# Patient Record
Sex: MALE | URBAN - METROPOLITAN AREA
[De-identification: names, ages, dates, MRNs, and addresses within clinical notes are randomized per-mention and may not be internally consistent; named-entity substitution may affect disease eponyms.]

---

## 2018-06-29 ENCOUNTER — LAB REQUISITION (OUTPATIENT)
Dept: LAB | Facility: HOSPITAL | Age: 58
End: 2018-06-29

## 2018-06-29 DIAGNOSIS — Z12.11 ENCOUNTER FOR SCREENING FOR MALIGNANT NEOPLASM OF COLON: ICD-10-CM

## 2018-06-29 DIAGNOSIS — Z80.0 FAMILY HISTORY OF MALIGNANT NEOPLASM OF DIGESTIVE ORGAN: ICD-10-CM

## 2018-06-29 PROCEDURE — 88305 TISSUE EXAM BY PATHOLOGIST: CPT | Performed by: PATHOLOGY

## 2024-04-11 ENCOUNTER — APPOINTMENT (EMERGENCY)
Dept: CT IMAGING | Facility: HOSPITAL | Age: 64
DRG: 180 | End: 2024-04-11
Payer: COMMERCIAL

## 2024-04-11 ENCOUNTER — HOSPITAL ENCOUNTER (INPATIENT)
Facility: HOSPITAL | Age: 64
LOS: 6 days | DRG: 180 | End: 2024-04-18
Attending: EMERGENCY MEDICINE | Admitting: INTERNAL MEDICINE
Payer: COMMERCIAL

## 2024-04-11 DIAGNOSIS — Z51.5 PALLIATIVE CARE PATIENT: ICD-10-CM

## 2024-04-11 DIAGNOSIS — C78.7 METASTASIS TO LIVER (HCC): ICD-10-CM

## 2024-04-11 DIAGNOSIS — J18.9 PNEUMONIA: ICD-10-CM

## 2024-04-11 DIAGNOSIS — C34.90 LUNG CANCER (HCC): ICD-10-CM

## 2024-04-11 DIAGNOSIS — R91.8 LUNG MASS: ICD-10-CM

## 2024-04-11 DIAGNOSIS — R78.81 BACTEREMIA: ICD-10-CM

## 2024-04-11 DIAGNOSIS — G89.3 CANCER RELATED PAIN: Primary | ICD-10-CM

## 2024-04-11 LAB
ALBUMIN SERPL BCP-MCNC: 3.2 G/DL (ref 3.5–5)
ALP SERPL-CCNC: 535 U/L (ref 34–104)
ALT SERPL W P-5'-P-CCNC: 161 U/L (ref 7–52)
ANION GAP SERPL CALCULATED.3IONS-SCNC: 9 MMOL/L (ref 4–13)
AST SERPL W P-5'-P-CCNC: 204 U/L (ref 13–39)
BACTERIA UR QL AUTO: ABNORMAL /HPF
BASOPHILS # BLD MANUAL: 0 THOUSAND/UL (ref 0–0.1)
BASOPHILS NFR MAR MANUAL: 0 % (ref 0–1)
BILIRUB SERPL-MCNC: 2.95 MG/DL (ref 0.2–1)
BILIRUB UR QL STRIP: ABNORMAL
BUN SERPL-MCNC: 20 MG/DL (ref 5–25)
CALCIUM ALBUM COR SERPL-MCNC: 10.3 MG/DL (ref 8.3–10.1)
CALCIUM SERPL-MCNC: 9.7 MG/DL (ref 8.4–10.2)
CARDIAC TROPONIN I PNL SERPL HS: 6 NG/L
CHLORIDE SERPL-SCNC: 100 MMOL/L (ref 96–108)
CLARITY UR: CLEAR
CO2 SERPL-SCNC: 27 MMOL/L (ref 21–32)
COLOR UR: ABNORMAL
CREAT SERPL-MCNC: 1.13 MG/DL (ref 0.6–1.3)
EOSINOPHIL # BLD MANUAL: 0 THOUSAND/UL (ref 0–0.4)
EOSINOPHIL NFR BLD MANUAL: 0 % (ref 0–6)
ERYTHROCYTE [DISTWIDTH] IN BLOOD BY AUTOMATED COUNT: 13.5 % (ref 11.6–15.1)
GFR SERPL CREATININE-BSD FRML MDRD: 69 ML/MIN/1.73SQ M
GLUCOSE SERPL-MCNC: 130 MG/DL (ref 65–140)
GLUCOSE UR STRIP-MCNC: NEGATIVE MG/DL
HCT VFR BLD AUTO: 46.6 % (ref 36.5–49.3)
HGB BLD-MCNC: 15.5 G/DL (ref 12–17)
HGB UR QL STRIP.AUTO: NEGATIVE
KETONES UR STRIP-MCNC: NEGATIVE MG/DL
LEUKOCYTE ESTERASE UR QL STRIP: NEGATIVE
LYMPHOCYTES # BLD AUTO: 1.16 THOUSAND/UL (ref 0.6–4.47)
LYMPHOCYTES # BLD AUTO: 15 % (ref 14–44)
MAGNESIUM SERPL-MCNC: 2.2 MG/DL (ref 1.9–2.7)
MCH RBC QN AUTO: 29 PG (ref 26.8–34.3)
MCHC RBC AUTO-ENTMCNC: 33.3 G/DL (ref 31.4–37.4)
MCV RBC AUTO: 87 FL (ref 82–98)
MONOCYTES # BLD AUTO: 0.69 THOUSAND/UL (ref 0–1.22)
MONOCYTES NFR BLD: 9 % (ref 4–12)
MUCOUS THREADS UR QL AUTO: ABNORMAL
NEUTROPHILS # BLD MANUAL: 5.85 THOUSAND/UL (ref 1.85–7.62)
NEUTS SEG NFR BLD AUTO: 76 % (ref 43–75)
NITRITE UR QL STRIP: NEGATIVE
NON-SQ EPI CELLS URNS QL MICRO: ABNORMAL /HPF
PH UR STRIP.AUTO: 5.5 [PH]
PLATELET # BLD AUTO: 206 THOUSANDS/UL (ref 149–390)
PLATELET BLD QL SMEAR: ADEQUATE
PMV BLD AUTO: 9.9 FL (ref 8.9–12.7)
POTASSIUM SERPL-SCNC: 3.9 MMOL/L (ref 3.5–5.3)
PROT SERPL-MCNC: 6.9 G/DL (ref 6.4–8.4)
PROT UR STRIP-MCNC: ABNORMAL MG/DL
RBC # BLD AUTO: 5.34 MILLION/UL (ref 3.88–5.62)
RBC #/AREA URNS AUTO: ABNORMAL /HPF
SODIUM SERPL-SCNC: 136 MMOL/L (ref 135–147)
SP GR UR STRIP.AUTO: >=1.05 (ref 1–1.03)
TSH SERPL DL<=0.05 MIU/L-ACNC: 1.86 UIU/ML (ref 0.45–4.5)
UROBILINOGEN UR STRIP-ACNC: <2 MG/DL
WBC # BLD AUTO: 7.7 THOUSAND/UL (ref 4.31–10.16)
WBC #/AREA URNS AUTO: ABNORMAL /HPF

## 2024-04-11 PROCEDURE — 85027 COMPLETE CBC AUTOMATED: CPT | Performed by: EMERGENCY MEDICINE

## 2024-04-11 PROCEDURE — 96360 HYDRATION IV INFUSION INIT: CPT

## 2024-04-11 PROCEDURE — 87449 NOS EACH ORGANISM AG IA: CPT | Performed by: EMERGENCY MEDICINE

## 2024-04-11 PROCEDURE — 80053 COMPREHEN METABOLIC PANEL: CPT | Performed by: EMERGENCY MEDICINE

## 2024-04-11 PROCEDURE — 71260 CT THORAX DX C+: CPT

## 2024-04-11 PROCEDURE — 74177 CT ABD & PELVIS W/CONTRAST: CPT

## 2024-04-11 PROCEDURE — 93005 ELECTROCARDIOGRAM TRACING: CPT

## 2024-04-11 PROCEDURE — 99285 EMERGENCY DEPT VISIT HI MDM: CPT | Performed by: EMERGENCY MEDICINE

## 2024-04-11 PROCEDURE — 84443 ASSAY THYROID STIM HORMONE: CPT | Performed by: EMERGENCY MEDICINE

## 2024-04-11 PROCEDURE — 83735 ASSAY OF MAGNESIUM: CPT | Performed by: EMERGENCY MEDICINE

## 2024-04-11 PROCEDURE — 99285 EMERGENCY DEPT VISIT HI MDM: CPT

## 2024-04-11 PROCEDURE — 85007 BL SMEAR W/DIFF WBC COUNT: CPT | Performed by: EMERGENCY MEDICINE

## 2024-04-11 PROCEDURE — 84145 PROCALCITONIN (PCT): CPT | Performed by: INTERNAL MEDICINE

## 2024-04-11 PROCEDURE — 36415 COLL VENOUS BLD VENIPUNCTURE: CPT | Performed by: EMERGENCY MEDICINE

## 2024-04-11 PROCEDURE — 84484 ASSAY OF TROPONIN QUANT: CPT | Performed by: EMERGENCY MEDICINE

## 2024-04-11 PROCEDURE — 81001 URINALYSIS AUTO W/SCOPE: CPT | Performed by: EMERGENCY MEDICINE

## 2024-04-11 RX ADMIN — SODIUM CHLORIDE 1000 ML: 0.9 INJECTION, SOLUTION INTRAVENOUS at 21:55

## 2024-04-11 RX ADMIN — IOHEXOL 100 ML: 350 INJECTION, SOLUTION INTRAVENOUS at 22:39

## 2024-04-12 PROBLEM — R91.8 LUNG MASS: Status: ACTIVE | Noted: 2024-04-12

## 2024-04-12 PROBLEM — J18.9 PNEUMONIA: Status: ACTIVE | Noted: 2024-04-12

## 2024-04-12 PROBLEM — Z72.0 TOBACCO ABUSE: Chronic | Status: ACTIVE | Noted: 2019-04-10

## 2024-04-12 PROBLEM — R74.01 TRANSAMINITIS: Status: ACTIVE | Noted: 2024-04-12

## 2024-04-12 LAB
2HR DELTA HS TROPONIN: 2 NG/L
4HR DELTA HS TROPONIN: 0 NG/L
ATRIAL RATE: 96 BPM
CARDIAC TROPONIN I PNL SERPL HS: 6 NG/L
CARDIAC TROPONIN I PNL SERPL HS: 8 NG/L
FLUAV RNA RESP QL NAA+PROBE: NEGATIVE
FLUBV RNA RESP QL NAA+PROBE: NEGATIVE
L PNEUMO1 AG UR QL IA.RAPID: NEGATIVE
P AXIS: 58 DEGREES
PR INTERVAL: 184 MS
PROCALCITONIN SERPL-MCNC: 13.19 NG/ML
QRS AXIS: -23 DEGREES
QRSD INTERVAL: 108 MS
QT INTERVAL: 354 MS
QTC INTERVAL: 447 MS
RSV RNA RESP QL NAA+PROBE: NEGATIVE
S PNEUM AG UR QL: NEGATIVE
SARS-COV-2 RNA RESP QL NAA+PROBE: NEGATIVE
T WAVE AXIS: 25 DEGREES
VENTRICULAR RATE: 96 BPM

## 2024-04-12 PROCEDURE — 93010 ELECTROCARDIOGRAM REPORT: CPT | Performed by: INTERNAL MEDICINE

## 2024-04-12 PROCEDURE — 0241U HB NFCT DS VIR RESP RNA 4 TRGT: CPT

## 2024-04-12 PROCEDURE — 94664 DEMO&/EVAL PT USE INHALER: CPT

## 2024-04-12 PROCEDURE — 99223 1ST HOSP IP/OBS HIGH 75: CPT | Performed by: INTERNAL MEDICINE

## 2024-04-12 PROCEDURE — 84484 ASSAY OF TROPONIN QUANT: CPT | Performed by: EMERGENCY MEDICINE

## 2024-04-12 PROCEDURE — 87147 CULTURE TYPE IMMUNOLOGIC: CPT | Performed by: EMERGENCY MEDICINE

## 2024-04-12 PROCEDURE — 87449 NOS EACH ORGANISM AG IA: CPT

## 2024-04-12 PROCEDURE — 94760 N-INVAS EAR/PLS OXIMETRY 1: CPT

## 2024-04-12 PROCEDURE — 87040 BLOOD CULTURE FOR BACTERIA: CPT | Performed by: EMERGENCY MEDICINE

## 2024-04-12 PROCEDURE — 36415 COLL VENOUS BLD VENIPUNCTURE: CPT | Performed by: EMERGENCY MEDICINE

## 2024-04-12 PROCEDURE — 87154 CUL TYP ID BLD PTHGN 6+ TRGT: CPT | Performed by: EMERGENCY MEDICINE

## 2024-04-12 PROCEDURE — 87077 CULTURE AEROBIC IDENTIFY: CPT | Performed by: EMERGENCY MEDICINE

## 2024-04-12 PROCEDURE — 99223 1ST HOSP IP/OBS HIGH 75: CPT | Performed by: STUDENT IN AN ORGANIZED HEALTH CARE EDUCATION/TRAINING PROGRAM

## 2024-04-12 PROCEDURE — 84484 ASSAY OF TROPONIN QUANT: CPT

## 2024-04-12 PROCEDURE — 87186 SC STD MICRODIL/AGAR DIL: CPT | Performed by: EMERGENCY MEDICINE

## 2024-04-12 RX ORDER — ACETAMINOPHEN 325 MG/1
650 TABLET ORAL EVERY 6 HOURS PRN
Status: DISCONTINUED | OUTPATIENT
Start: 2024-04-12 | End: 2024-04-18 | Stop reason: HOSPADM

## 2024-04-12 RX ORDER — CALCIUM CARBONATE 500 MG/1
1000 TABLET, CHEWABLE ORAL DAILY PRN
Status: DISCONTINUED | OUTPATIENT
Start: 2024-04-12 | End: 2024-04-18 | Stop reason: HOSPADM

## 2024-04-12 RX ORDER — ALBUTEROL SULFATE 90 UG/1
2 AEROSOL, METERED RESPIRATORY (INHALATION) EVERY 4 HOURS PRN
Status: DISCONTINUED | OUTPATIENT
Start: 2024-04-12 | End: 2024-04-18 | Stop reason: HOSPADM

## 2024-04-12 RX ORDER — DOCUSATE SODIUM 100 MG/1
100 CAPSULE, LIQUID FILLED ORAL 2 TIMES DAILY
Status: DISCONTINUED | OUTPATIENT
Start: 2024-04-12 | End: 2024-04-15

## 2024-04-12 RX ORDER — ALBUTEROL SULFATE 90 UG/1
2 AEROSOL, METERED RESPIRATORY (INHALATION)
COMMUNITY

## 2024-04-12 RX ORDER — HYDROMORPHONE HCL IN WATER/PF 6 MG/30 ML
0.2 PATIENT CONTROLLED ANALGESIA SYRINGE INTRAVENOUS EVERY 2 HOUR PRN
Status: DISCONTINUED | OUTPATIENT
Start: 2024-04-12 | End: 2024-04-13

## 2024-04-12 RX ORDER — ONDANSETRON 2 MG/ML
4 INJECTION INTRAMUSCULAR; INTRAVENOUS EVERY 6 HOURS PRN
Status: DISCONTINUED | OUTPATIENT
Start: 2024-04-12 | End: 2024-04-18 | Stop reason: HOSPADM

## 2024-04-12 RX ORDER — VANCOMYCIN HYDROCHLORIDE 750 MG/150ML
750 INJECTION, SOLUTION INTRAVENOUS EVERY 12 HOURS
Status: DISCONTINUED | OUTPATIENT
Start: 2024-04-13 | End: 2024-04-13

## 2024-04-12 RX ORDER — GUAIFENESIN 600 MG/1
600 TABLET, EXTENDED RELEASE ORAL 2 TIMES DAILY
Status: DISCONTINUED | OUTPATIENT
Start: 2024-04-12 | End: 2024-04-18 | Stop reason: HOSPADM

## 2024-04-12 RX ORDER — OXYCODONE HYDROCHLORIDE 5 MG/1
5 TABLET ORAL EVERY 4 HOURS PRN
Status: DISCONTINUED | OUTPATIENT
Start: 2024-04-12 | End: 2024-04-13

## 2024-04-12 RX ORDER — SODIUM CHLORIDE, SODIUM LACTATE, POTASSIUM CHLORIDE, CALCIUM CHLORIDE 600; 310; 30; 20 MG/100ML; MG/100ML; MG/100ML; MG/100ML
75 INJECTION, SOLUTION INTRAVENOUS CONTINUOUS
Status: DISCONTINUED | OUTPATIENT
Start: 2024-04-12 | End: 2024-04-18

## 2024-04-12 RX ORDER — METHYLPREDNISOLONE SODIUM SUCCINATE 40 MG/ML
40 INJECTION, POWDER, LYOPHILIZED, FOR SOLUTION INTRAMUSCULAR; INTRAVENOUS EVERY 8 HOURS SCHEDULED
Status: DISCONTINUED | OUTPATIENT
Start: 2024-04-12 | End: 2024-04-13

## 2024-04-12 RX ORDER — LEVALBUTEROL INHALATION SOLUTION 0.63 MG/3ML
0.31 SOLUTION RESPIRATORY (INHALATION) EVERY 6 HOURS PRN
Status: DISCONTINUED | OUTPATIENT
Start: 2024-04-12 | End: 2024-04-12

## 2024-04-12 RX ORDER — NICOTINE 21 MG/24HR
1 PATCH, TRANSDERMAL 24 HOURS TRANSDERMAL DAILY
Status: DISCONTINUED | OUTPATIENT
Start: 2024-04-12 | End: 2024-04-18 | Stop reason: HOSPADM

## 2024-04-12 RX ORDER — ENOXAPARIN SODIUM 100 MG/ML
40 INJECTION SUBCUTANEOUS DAILY
Status: DISCONTINUED | OUTPATIENT
Start: 2024-04-12 | End: 2024-04-18 | Stop reason: HOSPADM

## 2024-04-12 RX ADMIN — CEFTRIAXONE SODIUM 1000 MG: 10 INJECTION, POWDER, FOR SOLUTION INTRAVENOUS at 23:33

## 2024-04-12 RX ADMIN — OXYCODONE HYDROCHLORIDE 5 MG: 5 TABLET ORAL at 02:34

## 2024-04-12 RX ADMIN — METHYLPREDNISOLONE SODIUM SUCCINATE 40 MG: 40 INJECTION, POWDER, FOR SOLUTION INTRAMUSCULAR; INTRAVENOUS at 15:43

## 2024-04-12 RX ADMIN — GUAIFENESIN 600 MG: 600 TABLET ORAL at 08:58

## 2024-04-12 RX ADMIN — HYDROMORPHONE HYDROCHLORIDE 0.2 MG: 0.2 INJECTION, SOLUTION INTRAMUSCULAR; INTRAVENOUS; SUBCUTANEOUS at 19:47

## 2024-04-12 RX ADMIN — DOCUSATE SODIUM 100 MG: 100 CAPSULE, LIQUID FILLED ORAL at 08:59

## 2024-04-12 RX ADMIN — METHYLPREDNISOLONE SODIUM SUCCINATE 40 MG: 40 INJECTION, POWDER, FOR SOLUTION INTRAMUSCULAR; INTRAVENOUS at 21:16

## 2024-04-12 RX ADMIN — SODIUM CHLORIDE, SODIUM LACTATE, POTASSIUM CHLORIDE, AND CALCIUM CHLORIDE 75 ML/HR: .6; .31; .03; .02 INJECTION, SOLUTION INTRAVENOUS at 09:02

## 2024-04-12 RX ADMIN — Medication 1000 MG: at 01:33

## 2024-04-12 RX ADMIN — NICOTINE 1 PATCH: 21 PATCH, EXTENDED RELEASE TRANSDERMAL at 08:58

## 2024-04-12 RX ADMIN — METHYLPREDNISOLONE SODIUM SUCCINATE 40 MG: 40 INJECTION, POWDER, FOR SOLUTION INTRAMUSCULAR; INTRAVENOUS at 02:30

## 2024-04-12 RX ADMIN — OXYCODONE HYDROCHLORIDE 5 MG: 5 TABLET ORAL at 12:03

## 2024-04-12 RX ADMIN — GUAIFENESIN 600 MG: 600 TABLET ORAL at 17:00

## 2024-04-12 RX ADMIN — DICLOFENAC SODIUM 2 G: 10 GEL TOPICAL at 17:01

## 2024-04-12 RX ADMIN — DICLOFENAC SODIUM 2 G: 10 GEL TOPICAL at 08:58

## 2024-04-12 RX ADMIN — ENOXAPARIN SODIUM 40 MG: 40 INJECTION SUBCUTANEOUS at 08:58

## 2024-04-12 RX ADMIN — OXYCODONE HYDROCHLORIDE 5 MG: 5 TABLET ORAL at 16:43

## 2024-04-12 RX ADMIN — DOCUSATE SODIUM 100 MG: 100 CAPSULE, LIQUID FILLED ORAL at 17:00

## 2024-04-12 NOTE — CONSULTS
Oncology Consult Note  Ceasar March 63 y.o. male MRN: [id number]  Unit/Bed#: -01 Encounter: [id number]      Presenting Complaint: Here with stage IV presumed lung cancer waiting by    History of Presenting Illness: Ceasar March is seen for initial consultation 4/11/2024 at the referral of hospitalist service.  63-year-old gentleman is seen with increased weakness weight loss of about 30 pounds over 3 months absent appetite short of breath.  He became more short of breath and more fatigued can emergency room.  He was found to have right middle lobe pneumonia with what appears to be adenopathy in the mediastinum.  He also has what appears to be supraclavicular adenopathyPortal and perigastric adenopathy as well as diffuse liver abnormalities consistent with metastatic disease.  He was a heavy smoker as well as been exposed to unusual fumes as a .  He is able to eat very little.  He gets full quickly.  He again is also short of breath.  Of concern his bilirubin is 3 and he appears to becoming jaundiced.  He does not appear to have obstructive liver disease.  He has had 4 hernia repairs in the past history of tobacco use.    Review of Systems - As stated in the HPI otherwise the fourteen point review of systems was negative.    Past Medical History:   Diagnosis Date    Asthma        Social History     Socioeconomic History    Marital status: Single     Spouse name: None    Number of children: None    Years of education: None    Highest education level: None   Occupational History    None   Tobacco Use    Smoking status: Some Days     Current packs/day: 2.00     Types: Cigarettes    Smokeless tobacco: Never   Vaping Use    Vaping status: Never Used   Substance and Sexual Activity    Alcohol use: Never    Drug use: Yes     Types: Marijuana    Sexual activity: None   Other Topics Concern    None   Social History Narrative    None     Social Determinants of Health     Financial Resource Strain: Not on  file   Food Insecurity: No Food Insecurity (4/12/2024)    Hunger Vital Sign     Worried About Running Out of Food in the Last Year: Never true     Ran Out of Food in the Last Year: Never true   Transportation Needs: No Transportation Needs (4/12/2024)    PRAPARE - Transportation     Lack of Transportation (Medical): No     Lack of Transportation (Non-Medical): No   Physical Activity: Not on file   Stress: Not on file (2/11/2021)   Social Connections: Not on file   Intimate Partner Violence: Low Risk  (4/13/2021)    Received from [facility]    Intimate Partner Violence     Insults You: Not on file     Threatens You: Not on file     Screams at You: Not on file     Physically Hurt: Not on file     Intimate Partner Violence Score: Not on file   Housing Stability: Low Risk  (4/12/2024)    Housing Stability Vital Sign     Unable to Pay for Housing in the Last Year: No     Number of Places Lived in the Last Year: 1     Unstable Housing in the Last Year: No       History reviewed. No pertinent family history.    No Known Allergies      Current Facility-Administered Medications:     acetaminophen (TYLENOL) tablet 650 mg, 650 mg, Oral, Q6H PRN, CHRISTIANA Golden    albuterol (PROVENTIL HFA,VENTOLIN HFA) inhaler 2 puff, 2 puff, Inhalation, Q4H PRN, Jose Daniel Barahona MD    calcium carbonate (TUMS) chewable tablet 1,000 mg, 1,000 mg, Oral, Daily PRN, CHRISTIANA Golden    [START ON 4/13/2024] ceftriaxone (ROCEPHIN) 1 g/50 mL in dextrose IVPB, 1,000 mg, Intravenous, Q24H, CHRISTIANA Golden    Diclofenac Sodium (VOLTAREN) 1 % topical gel 2 g, 2 g, Topical, 4x Daily, CHRISTIANA Golden, 2 g at 04/12/24 1701    docusate sodium (COLACE) capsule 100 mg, 100 mg, Oral, BID, CHRISTIANA Golden, 100 mg at 04/12/24 1700    enoxaparin (LOVENOX) subcutaneous injection 40 mg, 40 mg, Subcutaneous, Daily, CHRISTIANA Golden, 40 mg at 04/12/24 0879     guaiFENesin (MUCINEX) 12 hr tablet 600 mg, 600 mg, Oral, BID, Odette Stevense-Sami, CRNP, 600 mg at 04/12/24 1700    HYDROmorphone HCl (DILAUDID) injection 0.2 mg, 0.2 mg, Intravenous, Q2H PRN, Odette Mikel-Sami, CRNP    lactated ringers infusion, 75 mL/hr, Intravenous, Continuous, Yohan Kong MD, Last Rate: 75 mL/hr at 04/12/24 0902, 75 mL/hr at 04/12/24 0902    methylPREDNISolone sodium succinate (Solu-MEDROL) injection 40 mg, 40 mg, Intravenous, Q8H KRYSTYNA, Odette Chaves-Sami, CRNP, 40 mg at 04/12/24 1543    naloxone (NARCAN) 0.04 mg/mL syringe 0.04 mg, 0.04 mg, Intravenous, Q1MIN PRN, Odette Stevense-Sami, CRNP    nicotine (NICODERM CQ) 21 mg/24 hr TD 24 hr patch 1 patch, 1 patch, Transdermal, Daily, Odette Chaves-Sami, CRNP, 1 patch at 04/12/24 0858    ondansetron (ZOFRAN) injection 4 mg, 4 mg, Intravenous, Q6H PRN, Odette Chaves-Sami, CRNP    oxyCODONE (ROXICODONE) split tablet 2.5 mg, 2.5 mg, Oral, Q4H PRN **OR** oxyCODONE (ROXICODONE) IR tablet 5 mg, 5 mg, Oral, Q4H PRN, Odette Morin-[place], CRNP, 5 mg at 04/12/24 1643      /80   Pulse 93   Temp 98 °F (36.7 °C)   Resp 18   Ht 6' (1.829 m)   Wt 79.4 kg (175 lb)   SpO2 96%   BMI 23.73 kg/m²       General Appearance:    Alert, oriented appears to have lost a lot of weight appears chronically and acutely ill       Eyes:    PERRL   Ears:    Normal external ear canals, both ears   Nose:   Nares normal, septum midline   Throat:   Mucosa moist. Pharynx without injection.    Neck:   Supple       Lungs:     Clear to auscultation bilaterally has some rhonchi bilaterally   Chest Wall:    No tenderness or deformity    Heart:    Regular rate and rhythm       Abdomen:     Soft, non-tender, bowel sounds +, no organomegaly    He does have hepatomegaly liver at least 5 cm below the costal margin minimally tender nodular       Extremities:   Extremities no cyanosis or edema       Skin:   no rash or icterus.    Lymph nodes:    "Cervical, supraclavicular, and axillary nodes normal   Neurologic:   CNII-XII intact, normal strength, sensation and reflexes     Throughout               Recent Results (from the past 48 hour(s))   CBC and differential    Collection Time: 04/11/24  9:55 PM   Result Value Ref Range    WBC 7.70 4.31 - 10.16 Thousand/uL    RBC 5.34 3.88 - 5.62 Million/uL    Hemoglobin 15.5 12.0 - 17.0 g/dL    Hematocrit 46.6 36.5 - 49.3 %    MCV 87 82 - 98 fL    MCH 29.0 26.8 - 34.3 pg    MCHC 33.3 31.4 - 37.4 g/dL    RDW 13.5 11.6 - 15.1 %    MPV 9.9 8.9 - 12.7 fL    Platelets 206 149 - 390 Thousands/uL   Comprehensive metabolic panel    Collection Time: 04/11/24  9:55 PM   Result Value Ref Range    Sodium 136 135 - 147 mmol/L    Potassium 3.9 3.5 - 5.3 mmol/L    Chloride 100 96 - 108 mmol/L    CO2 27 21 - 32 mmol/L    ANION GAP 9 4 - 13 mmol/L    BUN 20 5 - 25 mg/dL    Creatinine 1.13 0.60 - 1.30 mg/dL    Glucose 130 65 - 140 mg/dL    Calcium 9.7 8.4 - 10.2 mg/dL    Corrected Calcium 10.3 (H) 8.3 - 10.1 mg/dL     (H) 13 - 39 U/L     (H) 7 - 52 U/L    Alkaline Phosphatase 535 (H) 34 - 104 U/L    Total Protein 6.9 6.4 - 8.4 g/dL    Albumin 3.2 (L) 3.5 - 5.0 g/dL    Total Bilirubin 2.95 (H) 0.20 - 1.00 mg/dL    eGFR 69 ml/min/1.73sq m   Magnesium    Collection Time: 04/11/24  9:55 PM   Result Value Ref Range    Magnesium 2.2 1.9 - 2.7 mg/dL   TSH, 3rd generation with Free T4 reflex    Collection Time: 04/11/24  9:55 PM   Result Value Ref Range    TSH 3RD GENERATON 1.858 0.450 - 4.500 uIU/mL   HS Troponin 0hr (reflex protocol)    Collection Time: 04/11/24  9:55 PM   Result Value Ref Range    hs TnI 0hr 6 \"Refer to ACS Flowchart\"- see link ng/L   Manual Differential(PHLEBS Do Not Order)    Collection Time: 04/11/24  9:55 PM   Result Value Ref Range    Segmented % 76 (H) 43 - 75 %    Lymphocytes % 15 14 - 44 %    Monocytes % 9 4 - 12 %    Eosinophils % 0 0 - 6 %    Basophils % 0 0 - 1 %    Absolute Neutrophils 5.85 1.85 - " "7.62 Thousand/uL    Absolute Lymphocytes 1.16 0.60 - 4.47 Thousand/uL    Absolute Monocytes 0.69 0.00 - 1.22 Thousand/uL    Absolute Eosinophils 0.00 0.00 - 0.40 Thousand/uL    Absolute Basophils 0.00 0.00 - 0.10 Thousand/uL    Total Counted      Platelet Estimate Adequate Adequate   Procalcitonin    Collection Time: 04/11/24  9:55 PM   Result Value Ref Range    Procalcitonin 13.19 (H) <=0.25 ng/ml   ECG 12 lead    Collection Time: 04/11/24 10:00 PM   Result Value Ref Range    Ventricular Rate 96 BPM    Atrial Rate 96 BPM    WI Interval 184 ms    QRSD Interval 108 ms    QT Interval 354 ms    QTC Interval 447 ms    P Axis 58 degrees    QRS Axis -23 degrees    T Wave Axis 25 degrees   UA w Reflex to Microscopic w Reflex to Culture    Collection Time: 04/11/24 11:36 PM    Specimen: Urine   Result Value Ref Range    Color, UA Dark Yellow     Clarity, UA Clear     Specific Gravity, UA >=1.050 (H) 1.003 - 1.030    pH, UA 5.5 4.5, 5.0, 5.5, 6.0, 6.5, 7.0, 7.5, 8.0    Leukocytes, UA Negative Negative    Nitrite, UA Negative Negative    Protein, UA Trace (A) Negative mg/dl    Glucose, UA Negative Negative mg/dl    Ketones, UA Negative Negative mg/dl    Urobilinogen, UA <2.0 <2.0 mg/dl mg/dl    Bilirubin, UA Small (A) Negative    Occult Blood, UA Negative Negative   Urine Microscopic    Collection Time: 04/11/24 11:36 PM   Result Value Ref Range    RBC, UA 1-2 None Seen, 1-2 /hpf    WBC, UA 1-2 None Seen, 1-2 /hpf    Epithelial Cells None Seen None Seen, Occasional /hpf    Bacteria, UA None Seen None Seen, Occasional /hpf    MUCUS THREADS Occasional (A) None Seen   Strep Pneumoniae, Urine    Collection Time: 04/11/24 11:36 PM    Specimen: Urine   Result Value Ref Range    Strep pneumoniae antigen, urine Negative Negative   HS Troponin I 2hr    Collection Time: 04/12/24  1:16 AM   Result Value Ref Range    hs TnI 2hr 8 \"Refer to ACS Flowchart\"- see link ng/L    Delta 2hr hsTnI 2 <20 ng/L   Blood culture #1    Collection Time: " "04/12/24  1:16 AM    Specimen: Arm, Left; Blood   Result Value Ref Range    Blood Culture Received in Microbiology Lab. Culture in Progress.    Blood culture #2    Collection Time: 04/12/24  1:17 AM    Specimen: Arm, Left; Blood   Result Value Ref Range    Blood Culture Received in Microbiology Lab. Culture in Progress.    HS Troponin I 4hr    Collection Time: 04/12/24  2:30 AM   Result Value Ref Range    hs TnI 4hr 6 \"Refer to ACS Flowchart\"- see link ng/L    Delta 4hr hsTnI 0 <20 ng/L   Legionella antigen, Urine    Collection Time: 04/12/24  2:30 AM    Specimen: Urine, Clean Catch   Result Value Ref Range    Legionella Urinary Antigen Negative Negative   COVID/FLU/RSV    Collection Time: 04/12/24  4:16 AM    Specimen: Nose; Nares   Result Value Ref Range    SARS-CoV-2 Negative Negative    INFLUENZA A PCR Negative Negative    INFLUENZA B PCR Negative Negative    RSV PCR Negative Negative         CT chest abdomen pelvis w contrast    Result Date: 4/11/2024  Narrative: CT CHEST, ABDOMEN AND PELVIS WITH IV CONTRAST INDICATION: Weakness, weight loss. COMPARISON: None. TECHNIQUE: CT examination of the chest, abdomen and pelvis was performed. Multiplanar 2D reformatted images were created from the source data. This examination, like all CT scans performed in the [facility], was performed utilizing techniques to minimize radiation dose exposure, including the use of iterative reconstruction and automated exposure control. Radiation dose length product (DLP) for this visit: 721 mGy-cm IV Contrast: 100 mL of iohexol (OMNIPAQUE) Enteric Contrast: Not administered. FINDINGS: CHEST LUNGS: Airspace consolidation in the right middle lobe contiguous with the hilar mass, suggesting postobstructive pneumonia. Nodular opacification along the axial interstitium in the right lower lobe and nodular thickening along the right major fissure likely reflects lymphangitic spread of tumor. Severe narrowing of right middle " lobe segmental and subsegmental bronchi. PLEURA: No effusion. HEART/GREAT VESSELS: Normal heart size. No thoracic aortic aneurysm. Proximal segmental branches of the right pulmonary artery in the right middle and upper lobes are narrowed by the encasing mass. MEDIASTINUM AND LEWIS: Right hilar and perihilar heterogeneously enhancing mass measures 5.5 x 6.3 x 5.2 cm. High right paratracheal lymph nodes measure up to 1.7 cm in short axis diameter. Subcentimeter prevascular lymph nodes. Subcarinal lymph node measures 2 cm in short axis diameter. Left paraesophageal 1.4 cm lymph node. Right supraclavicular 1.4 cm lymph node. CHEST WALL AND LOWER NECK: Unremarkable. ABDOMEN LIVER/BILIARY TREE: Innumerable hypoenhancing lesions throughout the liver measuring up to 3.1 cm. Hepatomegaly. GALLBLADDER: Cholecystectomy. SPLEEN: Unremarkable. PANCREAS: Unremarkable. ADRENAL GLANDS: Unremarkable. KIDNEYS/URETERS: Symmetric nephrographic phase enhancement of the kidneys. No obstructive uropathy. STOMACH AND BOWEL: Diverticulosis without evidence of diverticulitis or colitis. Ventral herniorrhaphy APPENDIX: Normal appendix. ABDOMINOPELVIC CAVITY: Partially confluent hypoenhancing periportal lymph nodes measuring up to 2.3 cm. Paracaval lymph node measures 1.5 cm in short axis diameter. Gastroesophageal junction lymph nodes measure up to 1 cm subcentimeter para-aortic lymph nodes. VESSELS: No abdominal aortic aneurysm. Patent portal, splenic and mesenteric veins PELVIS REPRODUCTIVE ORGANS: Enlarged prostate. URINARY BLADDER: Unremarkable. ABDOMINAL WALL/INGUINAL REGIONS: Fat-containing left inguinal 2 cm hernia.. BONES: Subtle subcentimeter sclerotic lesions in the pelvis. No evidence of fracture..     Impression: 1. Right hilar and perihilar 5.5 x 6.3 x 5.2 cm mass consistent lung malignancy. 2. Right hilar, mediastinal and right supraclavicular lymphadenopathy compatible with metastatic disease. 3. Airspace consolidation in the  right middle lobe adjacent to the hilum suggesting postobstructive pneumonia. 4. Nodular thickening along the axial interstitium in the right lower lobe and the right major fissure suggesting lymphangitic spread of tumor. 5. Diffuse hepatic metastases. 6. Periportal, gastroesophageal and para-aortic lymphadenopathy The study was marked in EPIC for immediate notification. Workstation performed: [id number]     ECOG : 1-2      Assessment and plan:  1 stage IV carcinoma presumed lung origin with mediastinal adenopathy and possible obstructive pneumonia  2 diffuse liver metastasis  3 periportal perigastric mediastinal and right supraclavicular nodes.  Plan: Patient has what appears to be obstructive pneumonia we will continue antibiotics.  Patient needs to have a biopsy.  I am concerned about his liver chemistries as if they are elevated as was his bilirubin.  This may portend difficulty giving him any treatments.  Ideally we should get a biopsy within the next several days to try and potentiate any kind of treatment in the future.  The disease is not curable depending on type he may have some response to treatment.  Spoke with staff.

## 2024-04-12 NOTE — H&P
"[facility]  H&P  Name: Ceasar March 63 y.o. male I MRN: [id number]  Unit/Bed#: ED 23 I Date of Admission: 4/11/2024   Date of Service: 4/12/2024 I Hospital Day: 0      Assessment/Plan   * Pneumonia  Assessment & Plan  Patient reports feeling \"unwell\" for the last several weeks. Reports increased dyspnea on exertion, chest tightness, night sweats and chills. He states he has a strong painful cough at times and brings up thick mucous. He denies chest pain, fever, nausea, vomiting  CT reveals: Airspace consolidation in the right middle lobe adjacent to the hilum suggesting postobstructive pneumonia   Flu/RSV/ Covid pending  Urine strep and legionella pending  Initially given Rocephin in the emergency department, will continue Rocephin and monitor cultures and titrate therapy as indicated  Procalcitonin 13.19; continue to trend  WBC 7.70; continue to trend, monitor fever curve  Blood Cultures pending  Sputum culture ordered  Supplemental oxygen for saturations > 90%; wean as tolerated  Aspiration Precautions  Respiratory Protocol   Supportive Care    Transaminitis  Assessment & Plan  Lab Results   Component Value Date     (H) 04/11/2024     (H) 04/11/2024    TBILI 2.95 (H) 04/11/2024    ALKPHOS 535 (H) 04/11/2024     Like secondary to lung metastasis  Avoid hepatoxins  Consult GI, appreciate input    Lung mass  Assessment & Plan  Patient reports night sweats, recent weight loss, current every day smoker, severe back and flank pain  CT shows:   Right hilar and perihilar 5.5 x 6.3 x 5.2 cm mass consistent lung malignancy.Right hilar, mediastinal and right supraclavicular lymphadenopathy compatible with metastatic disease.Airspace consolidation in the right middle lobe adjacent to the hilum suggesting postobstructive pneumonia.Nodular thickening along the axial interstitium in the right lower lobe and the right major fissure suggesting lymphangitic spread of tumor. Diffuse hepatic " "metastases. Periportal, gastroesophageal and para-aortic lymphadenopathy  Hem/Onc consult, appreciate recommendations  Supportive care    Tobacco abuse  Assessment & Plan  Reports an average 1-1.5 pack daily cigarette smoke  Sincere smoking cessation education provided  Declining nicotine replacement therapy at this time       VTE Pharmacologic Prophylaxis: VTE Score: 7 High Risk (Score >/= 5) - Pharmacological DVT Prophylaxis Ordered: enoxaparin (Lovenox). Sequential Compression Devices Ordered.  Code Status: Level 1 - Full Code   Discussion with family: Updated  (friend) at bedside.    Anticipated Length of Stay: Patient will be admitted on an inpatient basis with an anticipated length of stay of greater than 2 midnights secondary to pneumonia.    Total Time Spent on Date of Encounter in care of patient: 78 mins. This time was spent on one or more of the following: performing physical exam; counseling and coordination of care; obtaining or reviewing history; documenting in the medical record; reviewing/ordering tests, medications or procedures; communicating with other healthcare professionals and discussing with patient's family/caregivers.    Chief Complaint:    Chief Complaint   Patient presents with    Weakness - Generalized     Pt c/o generalized weakness x2weeks. Pts states 4 infected teeth with an abscess that has been causing pain in face, causing headaches. Pt states amoxicillin for x1 week, that brought the pain done slightly. Pt states having chills, no confirmed fevers.          History of Present Illness:  Ceasar March is a 63 y.o. male with a PMH of asthma and tobacco dependence who presents with shortness of breath and generalized weakness. Patient reports increasing weakness and generally feeling \"unwell\" over the last few weeks. He has a dental abscess and was treating it with amoxicillin, however his symptoms persisted. He noted increased dyspnea, chest and back pain, night " sweats, fatigue, cough, thick sputum and unintentional weight loss. On arrival to ED, CT scan reveal Lung mass concerning for malignancy with metastasis to the liver and post obstructive pneumonia. Discussed findings with patient. All questions answered to patient's satisfaction.     Review of Systems:  Review of Systems   Constitutional:  Positive for chills, fatigue and unexpected weight change.   Respiratory:  Positive for cough and shortness of breath.    Neurological:  Positive for weakness.       Past Medical and Surgical History:   Past Medical History:   Diagnosis Date    Asthma        Past Surgical History:   Procedure Laterality Date    HERNIA REPAIR         Meds/Allergies:  Prior to Admission medications    Not on File     I have reviewed home medications with patient personally.    Allergies: No Known Allergies    Social History:  Marital Status: Single   Occupation:   Patient Pre-hospital Living Situation: Apartment, With spouse  Patient Pre-hospital Level of Mobility: walks  Patient Pre-hospital Diet Restrictions:   Substance Use History:   Social History     Substance and Sexual Activity   Alcohol Use Never     Social History     Tobacco Use   Smoking Status Some Days    Current packs/day: 2.00    Types: Cigarettes   Smokeless Tobacco Never     Social History     Substance and Sexual Activity   Drug Use Yes    Types: Marijuana       Family History:  History reviewed. No pertinent family history.    Physical Exam:     Vitals:   Blood Pressure: 116/75 (04/11/24 2110)  Pulse: 86 (04/12/24 0340)  Temperature: 97.7 °F (36.5 °C) (04/11/24 2110)  Temp Source: Oral (04/11/24 2110)  Respirations: (!) 23 (04/12/24 0340)  SpO2: 95 % (04/12/24 0340)    Physical Exam  Constitutional:       Appearance: He is ill-appearing.   Cardiovascular:      Rate and Rhythm: Normal rate and regular rhythm.      Pulses: Normal pulses.      Heart sounds: Normal heart sounds.   Pulmonary:      Effort: Pulmonary effort is normal.       Breath sounds: Wheezing present.   Abdominal:      Palpations: Abdomen is soft.   Skin:     General: Skin is warm.      Capillary Refill: Capillary refill takes less than 2 seconds.   Neurological:      General: No focal deficit present.      Mental Status: He is alert and oriented to person, place, and time.   Psychiatric:         Mood and Affect: Mood normal.         Behavior: Behavior normal.          Additional Data:     Lab Results:  Results from last 7 days   Lab Units 04/11/24  2155   WBC Thousand/uL 7.70   HEMOGLOBIN g/dL 15.5   HEMATOCRIT % 46.6   PLATELETS Thousands/uL 206   LYMPHO PCT % 15   MONO PCT % 9   EOS PCT % 0     Results from last 7 days   Lab Units 04/11/24  2155   SODIUM mmol/L 136   POTASSIUM mmol/L 3.9   CHLORIDE mmol/L 100   CO2 mmol/L 27   BUN mg/dL 20   CREATININE mg/dL 1.13   ANION GAP mmol/L 9   CALCIUM mg/dL 9.7   ALBUMIN g/dL 3.2*   TOTAL BILIRUBIN mg/dL 2.95*   ALK PHOS U/L 535*   ALT U/L 161*   AST U/L 204*   GLUCOSE RANDOM mg/dL 130                 Results from last 7 days   Lab Units 04/11/24  2155   PROCALCITONIN ng/ml 13.19*       Lines/Drains:  Invasive Devices       Peripheral Intravenous Line  Duration             Peripheral IV 04/11/24 Left Antecubital <1 day                        Imaging: Reviewed radiology reports from this admission including: abdominal/pelvic CT  CT chest abdomen pelvis w contrast   Final Result by Eron Smallwood MD (04/11 1849)         1. Right hilar and perihilar 5.5 x 6.3 x 5.2 cm mass consistent lung malignancy.   2. Right hilar, mediastinal and right supraclavicular lymphadenopathy compatible with metastatic disease.   3. Airspace consolidation in the right middle lobe adjacent to the hilum suggesting postobstructive pneumonia.   4. Nodular thickening along the axial interstitium in the right lower lobe and the right major fissure suggesting lymphangitic spread of tumor.   5. Diffuse hepatic metastases.   6. Periportal, gastroesophageal  and para-aortic lymphadenopathy      The study was marked in EPIC for immediate notification.         Workstation performed: [id number]             EKG and Other Studies Reviewed on Admission:   EKG: NSR. HR 96.    ** Please Note: This note has been constructed using a voice recognition system. **

## 2024-04-12 NOTE — ED NOTES
Called patient on his cell phone at this time as he told the provider he just needed to get something from his car but has been gone for awhile. Patient stated he will be back in 5 minutes      Anjali Bautista RN  04/12/24 0055

## 2024-04-12 NOTE — ASSESSMENT & PLAN NOTE
Reports an average 1-1.5 pack daily cigarette smoke  Sincere smoking cessation education provided  Declining nicotine replacement therapy at this time

## 2024-04-12 NOTE — ASSESSMENT & PLAN NOTE
Lab Results   Component Value Date     (H) 04/11/2024     (H) 04/11/2024    TBILI 2.95 (H) 04/11/2024    ALKPHOS 535 (H) 04/11/2024     Like secondary to lung metastasis  Avoid hepatoxins  Consult GI, appreciate input

## 2024-04-12 NOTE — ED PROVIDER NOTES
History  Chief Complaint   Patient presents with    Weakness - Generalized     Pt c/o generalized weakness x2weeks. Pts states 4 infected teeth with an abscess that has been causing pain in face, causing headaches. Pt states amoxicillin for x1 week, that brought the pain done slightly. Pt states having chills, no confirmed fevers.      Patient is a 63-year-old male.  History of asthma.  He is a smoker.  He has had bad dentition which is chronic.  Over the last couple weeks he reports his teeth become abscessed.  He was placed on amoxicillin.  No fever.  No facial swelling.  However he has become very weak.  He is lost a lot of weight.  He does have some dyspnea on exertion.  No chest pain.  No abdominal pain.  No vomiting or diarrhea.  No melena or hematochezia.  Patient does report night sweats.  Patient is worried that the dental infection has spread to his bloodstream.        None       Past Medical History:   Diagnosis Date    Asthma        Past Surgical History:   Procedure Laterality Date    HERNIA REPAIR         History reviewed. No pertinent family history.  I have reviewed and agree with the history as documented.    E-Cigarette/Vaping    E-Cigarette Use Never User      E-Cigarette/Vaping Substances    Nicotine No     THC No     CBD No     Flavoring No     Other No     Unknown No      Social History     Tobacco Use    Smoking status: Some Days     Current packs/day: 2.00     Types: Cigarettes    Smokeless tobacco: Never   Vaping Use    Vaping status: Never Used   Substance Use Topics    Alcohol use: Never    Drug use: Yes     Types: Marijuana       Review of Systems   Constitutional:  Positive for unexpected weight change. Negative for chills and fever.   HENT:  Positive for dental problem. Negative for rhinorrhea and sore throat.    Eyes:  Negative for pain, redness and visual disturbance.   Respiratory:  Positive for shortness of breath. Negative for cough.    Cardiovascular:  Negative for chest pain and  leg swelling.   Gastrointestinal:  Negative for abdominal pain, diarrhea and vomiting.   Endocrine: Negative for polydipsia and polyuria.   Genitourinary:  Negative for dysuria, frequency and hematuria.   Musculoskeletal:  Negative for back pain and neck pain.   Skin:  Negative for rash and wound.   Allergic/Immunologic: Negative for immunocompromised state.   Neurological:  Positive for weakness. Negative for numbness and headaches.   Psychiatric/Behavioral:  Negative for hallucinations and suicidal ideas.    All other systems reviewed and are negative.      Physical Exam  Physical Exam  Vitals reviewed.   Constitutional:       General: He is not in acute distress.     Appearance: He is not toxic-appearing.   HENT:      Head: Normocephalic and atraumatic.      Nose: Nose normal.      Mouth/Throat:      Mouth: Mucous membranes are moist.      Comments: Very poor dentition.  No intraoral swelling.  There is some tenderness to the vestibular space of the maxilla in the midline.  However no fluctuance.  No facial swelling.  Eyes:      General:         Right eye: No discharge.         Left eye: No discharge.      Conjunctiva/sclera: Conjunctivae normal.   Cardiovascular:      Rate and Rhythm: Normal rate and regular rhythm.      Pulses: Normal pulses.      Heart sounds: Normal heart sounds. No murmur heard.     No friction rub. No gallop.   Pulmonary:      Effort: Pulmonary effort is normal. No respiratory distress.      Breath sounds: No stridor. Wheezing present. No rhonchi or rales.   Abdominal:      General: Bowel sounds are normal. There is no distension.      Palpations: Abdomen is soft.      Tenderness: There is no abdominal tenderness. There is no right CVA tenderness, left CVA tenderness, guarding or rebound.   Musculoskeletal:         General: No swelling, tenderness, deformity or signs of injury. Normal range of motion.      Cervical back: Normal range of motion and neck supple. No rigidity.      Right lower  leg: No edema.      Left lower leg: No edema.      Comments: No calf pain or unilateral leg swelling   Skin:     General: Skin is warm and dry.      Coloration: Skin is not jaundiced or pale.      Findings: No bruising, erythema or rash.   Neurological:      General: No focal deficit present.      Mental Status: He is alert and oriented to person, place, and time.      Cranial Nerves: No facial asymmetry.      Sensory: No sensory deficit.      Motor: Motor function is intact.   Psychiatric:         Mood and Affect: Mood normal.         Behavior: Behavior normal.         Vital Signs  ED Triage Vitals [04/11/24 2110]   Temperature Pulse Respirations Blood Pressure SpO2   97.7 °F (36.5 °C) 100 12 116/75 97 %      Temp Source Heart Rate Source Patient Position - Orthostatic VS BP Location FiO2 (%)   Oral Monitor Sitting Left arm --      Pain Score       --           Vitals:    04/11/24 2110 04/11/24 2200 04/12/24 0000   BP: 116/75     Pulse: 100 92 88   Patient Position - Orthostatic VS: Sitting           Visual Acuity  Visual Acuity      Flowsheet Row Most Recent Value   L Pupil Size (mm) 3   R Pupil Size (mm) 3            ED Medications  Medications   ceftriaxone (ROCEPHIN) 1 g/50 mL in dextrose IVPB (has no administration in time range)   sodium chloride 0.9 % bolus 1,000 mL (0 mL Intravenous Stopped 4/11/24 2321)   iohexol (OMNIPAQUE) 350 MG/ML injection (MULTI-DOSE) 100 mL (100 mL Intravenous Given 4/11/24 2239)       Diagnostic Studies  Results Reviewed       Procedure Component Value Units Date/Time    Blood culture #1 [[id number]]     Lab Status: No result Specimen: Blood     Blood culture #2 [[id number]]     Lab Status: No result Specimen: Blood     Urine Microscopic [[id number]]  (Abnormal) Collected: 04/11/24 2336    Lab Status: Final result Specimen: Urine Updated: 04/11/24 2355     RBC, UA 1-2 /hpf      WBC, UA 1-2 /hpf      Epithelial Cells None Seen /hpf      Bacteria, UA None Seen /hpf      MUCUS THREADS  Occasional    HS Troponin I 4hr [[id number]]     Lab Status: No result Specimen: Blood     UA w Reflex to Microscopic w Reflex to Culture [[id number]]  (Abnormal) Collected: 04/11/24 2336    Lab Status: Final result Specimen: Urine Updated: 04/11/24 2343     Color, UA Dark Yellow     Clarity, UA Clear     Specific Gravity, UA >=1.050     pH, UA 5.5     Leukocytes, UA Negative     Nitrite, UA Negative     Protein, UA Trace mg/dl      Glucose, UA Negative mg/dl      Ketones, UA Negative mg/dl      Urobilinogen, UA <2.0 mg/dl      Bilirubin, UA Small     Occult Blood, UA Negative    CBC and differential [[id number]]  (Normal) Collected: 04/11/24 2155    Lab Status: Final result Specimen: Blood from Arm, Left Updated: 04/11/24 2246     WBC 7.70 Thousand/uL      RBC 5.34 Million/uL      Hemoglobin 15.5 g/dL      Hematocrit 46.6 %      MCV 87 fL      MCH 29.0 pg      MCHC 33.3 g/dL      RDW 13.5 %      MPV 9.9 fL      Platelets 206 Thousands/uL     Narrative:      This is an appended report.  These results have been appended to a previously verified report.    Manual Differential(PHLEBS Do Not Order) [[id number]]  (Abnormal) Collected: 04/11/24 2155    Lab Status: Final result Specimen: Blood from Arm, Left Updated: 04/11/24 2246     Segmented % 76 %      Lymphocytes % 15 %      Monocytes % 9 %      Eosinophils % 0 %      Basophils % 0 %      Absolute Neutrophils 5.85 Thousand/uL      Absolute Lymphocytes 1.16 Thousand/uL      Absolute Monocytes 0.69 Thousand/uL      Absolute Eosinophils 0.00 Thousand/uL      Absolute Basophils 0.00 Thousand/uL      Total Counted --     Platelet Estimate Adequate    TSH, 3rd generation with Free T4 reflex [[id number]]  (Normal) Collected: 04/11/24 2155    Lab Status: Final result Specimen: Blood from Arm, Left Updated: 04/11/24 2240     TSH 3RD GENERATON 1.858 uIU/mL     HS Troponin 0hr (reflex protocol) [[id number]]  (Normal) Collected: 04/11/24 2155    Lab Status: Final result Specimen: Blood  from Arm, Left Updated: 04/11/24 2231     hs TnI 0hr 6 ng/L     HS Troponin I 2hr [[id number]]     Lab Status: No result Specimen: Blood     Comprehensive metabolic panel [[id number]]  (Abnormal) Collected: 04/11/24 2155    Lab Status: Final result Specimen: Blood from Arm, Left Updated: 04/11/24 2228     Sodium 136 mmol/L      Potassium 3.9 mmol/L      Chloride 100 mmol/L      CO2 27 mmol/L      ANION GAP 9 mmol/L      BUN 20 mg/dL      Creatinine 1.13 mg/dL      Glucose 130 mg/dL      Calcium 9.7 mg/dL      Corrected Calcium 10.3 mg/dL       U/L       U/L      Alkaline Phosphatase 535 U/L      Total Protein 6.9 g/dL      Albumin 3.2 g/dL      Total Bilirubin 2.95 mg/dL      eGFR 69 ml/min/1.73sq m     Narrative:      National Kid[employer] Foundation guidelines for Chronic Kidney Disease (CKD):     Stage 1 with normal or high GFR (GFR > 90 mL/min/1.73 square meters)    Stage 2 Mild CKD (GFR = 60-89 mL/min/1.73 square meters)    Stage 3A Moderate CKD (GFR = 45-59 mL/min/1.73 square meters)    Stage 3B Moderate CKD (GFR = 30-44 mL/min/1.73 square meters)    Stage 4 Severe CKD (GFR = 15-29 mL/min/1.73 square meters)    Stage 5 End Stage CKD (GFR <15 mL/min/1.73 square meters)  Note: GFR calculation is accurate only with a steady state creatinine    Magnesium [[id number]]  (Normal) Collected: 04/11/24 2155    Lab Status: Final result Specimen: Blood from Arm, Left Updated: 04/11/24 2228     Magnesium 2.2 mg/dL                    CT chest abdomen pelvis w contrast   Final Result by Eron Smallwood MD (04/11 2342)         1. Right hilar and perihilar 5.5 x 6.3 x 5.2 cm mass consistent lung malignancy.   2. Right hilar, mediastinal and right supraclavicular lymphadenopathy compatible with metastatic disease.   3. Airspace consolidation in the right middle lobe adjacent to the hilum suggesting postobstructive pneumonia.   4. Nodular thickening along the axial interstitium in the right lower lobe and the  right major fissure suggesting lymphangitic spread of tumor.   5. Diffuse hepatic metastases.   6. Periportal, gastroesophageal and para-aortic lymphadenopathy      The study was marked in EPIC for immediate notification.         Workstation performed: [id number]                    Procedures  Procedures         ED Course                                             Medical Decision Making  Differential diagnosis included Ludewig's angina, dental abscess, bacteremia/endocarditis, malignancy, dehydration, tuberculosis, and other causes of patient's symptoms.  Imaging of the chest shows a right hilar mass likely lung cancer with liver metastasis.  There is evidence of postobstructive pneumonia.  Patient was cultured.  Empiric antibiotics were ordered.  Consulted with hospitalist for admission.      Amount and/or Complexity of Data Reviewed  Labs: ordered. Decision-making details documented in ED Course.  Radiology: ordered. Decision-making details documented in ED Course.  ECG/medicine tests: ordered and independent interpretation performed. Decision-making details documented in ED Course.    Risk  Prescription drug management.  Decision regarding hospitalization.  Risk Details: Hospitalist             Disposition  Final diagnoses:   Pneumonia   Lung cancer (HCC)   Metastasis to liver (HCC)     Time reflects when diagnosis was documented in both MDM as applicable and the Disposition within this note       Time User Action Codes Description Comment    4/12/2024 12:33 AM Tim Martinez [J18.9] Pneumonia     4/12/2024 12:33 AM Tim Martinez [C34.90] Lung cancer (HCC)     4/12/2024 12:33 AM Tim Martinez [C78.7] Metastasis to liver (HCC)           ED Disposition       ED Disposition   Admit    Condition   Stable    Date/Time   Fri Apr 12, 2024 12:33 AM    Comment   --             Follow-up Information    None         Patient's Medications    No medications on file       No discharge procedures on  file.    PDMP Review       None            ED Provider  Electronically Signed by             Tim Martinez MD  04/12/24 0035

## 2024-04-12 NOTE — RESPIRATORY THERAPY NOTE
RT Protocol Note  Ceasar March 63 y.o. male MRN: [id number]  Unit/Bed#: ED 23 Encounter: [id number]    Assessment    Principal Problem:    Pneumonia  Active Problems:    Tobacco abuse    Lung mass    Transaminitis      Home Pulmonary Medications:  inhalers       Past Medical History:   Diagnosis Date    Asthma      Social History     Socioeconomic History    Marital status: Single     Spouse name: None    Number of children: None    Years of education: None    Highest education level: None   Occupational History    None   Tobacco Use    Smoking status: Some Days     Current packs/day: 2.00     Types: Cigarettes    Smokeless tobacco: Never   Vaping Use    Vaping status: Never Used   Substance and Sexual Activity    Alcohol use: Never    Drug use: Yes     Types: Marijuana    Sexual activity: None   Other Topics Concern    None   Social History Narrative    None     Social Determinants of Health     Financial Resource Strain: Not on file   Food Insecurity: Not on file   Transportation Needs: Not on file   Physical Activity: Not on file   Stress: Not on file (2/11/2021)   Social Connections: Not on file   Intimate Partner Violence: Low Risk  (4/13/2021)    Received from [facility]    Intimate Partner Violence     Insults You: Not on file     Threatens You: Not on file     Screams at You: Not on file     Physically Hurt: Not on file     Intimate Partner Violence Score: Not on file   Housing Stability: Not on file       Subjective    Subjective Data: awake and alert    Objective    Physical Exam:   Assessment Type: Assess only  General Appearance: Alert, Awake  Respiratory Pattern: Shallow, Spontaneous  Chest Assessment: Chest expansion symmetrical  Bilateral Breath Sounds: Clear, Diminished  Cough: None  O2 Device: room air    Vitals:  Blood pressure 116/75, pulse 86, temperature 97.7 °F (36.5 °C), temperature source Oral, resp. rate (!) 23, SpO2 95%.          Imaging and other studies: I have personally reviewed  pertinent reports.      O2 Device: room air     Plan    Respiratory Plan: Home Bronchodilator Patient pathway        Resp Comments: respiratory protocol completed patient beign admitted for weakness with significant pulmonary PMH for asthma patient uses inhalers at home as needed and wishes to have them available here for this admission

## 2024-04-12 NOTE — RESPIRATORY THERAPY NOTE
RT Protocol Note  Ceasar March 63 y.o. male MRN: [id number]  Unit/Bed#: ED 23 Encounter: [id number]    Assessment    Principal Problem:    Pneumonia  Active Problems:    Tobacco abuse    Lung mass    Transaminitis      Home Pulmonary Medications:     04/12/24 0700   Respiratory Protocol   Protocol Initiated? No   Protocol Selection Respiratory   Language Barrier? No   Medical & Social History Reviewed? Yes   Diagnostic Studies Reviewed? Yes   Physical Assessment Performed? Yes   Respiratory Plan Home Bronchodilator Patient pathway   Respiratory Assessment   Resp Comments continue prn albuterol mdi   Additional Assessments   Pulse 89   Respirations (!) 23   SpO2 94 %            Past Medical History:   Diagnosis Date    Asthma      Social History     Socioeconomic History    Marital status: Single     Spouse name: None    Number of children: None    Years of education: None    Highest education level: None   Occupational History    None   Tobacco Use    Smoking status: Some Days     Current packs/day: 2.00     Types: Cigarettes    Smokeless tobacco: Never   Vaping Use    Vaping status: Never Used   Substance and Sexual Activity    Alcohol use: Never    Drug use: Yes     Types: Marijuana    Sexual activity: None   Other Topics Concern    None   Social History Narrative    None     Social Determinants of Health     Financial Resource Strain: Not on file   Food Insecurity: Not on file   Transportation Needs: Not on file   Physical Activity: Not on file   Stress: Not on file (2/11/2021)   Social Connections: Not on file   Intimate Partner Violence: Low Risk  (4/13/2021)    Received from [facility]    Intimate Partner Violence     Insults You: Not on file     Threatens You: Not on file     Screams at You: Not on file     Physically Hurt: Not on file     Intimate Partner Violence Score: Not on file   Housing Stability: Not on file       Subjective    Subjective Data: awake and alert    Objective    Physical Exam:    Assessment Type: Assess only  General Appearance: Alert, Awake  Respiratory Pattern: Shallow, Spontaneous  Chest Assessment: Chest expansion symmetrical  Bilateral Breath Sounds: Clear, Diminished  Cough: None  O2 Device: room air    Vitals:  Blood pressure 116/75, pulse 89, temperature 97.7 °F (36.5 °C), temperature source Oral, resp. rate (!) 23, SpO2 94%.          Imaging and other studies: I have personally reviewed pertinent reports.      O2 Device: room air     Plan    Respiratory Plan: Home Bronchodilator Patient pathway        Resp Comments: continue prn albuterol mdi

## 2024-04-12 NOTE — ASSESSMENT & PLAN NOTE
Patient reports night sweats, recent weight loss, current every day smoker, severe back and flank pain  CT shows:   Right hilar and perihilar 5.5 x 6.3 x 5.2 cm mass consistent lung malignancy.Right hilar, mediastinal and right supraclavicular lymphadenopathy compatible with metastatic disease.Airspace consolidation in the right middle lobe adjacent to the hilum suggesting postobstructive pneumonia.Nodular thickening along the axial interstitium in the right lower lobe and the right major fissure suggesting lymphangitic spread of tumor. Diffuse hepatic metastases. Periportal, gastroesophageal and para-aortic lymphadenopathy  Hem/Onc consult, appreciate recommendations  Supportive care

## 2024-04-12 NOTE — ASSESSMENT & PLAN NOTE
"Patient reports feeling \"unwell\" for the last several weeks. Reports increased dyspnea on exertion, chest tightness, night sweats and chills. He states he has a strong painful cough at times and brings up thick mucous. He denies chest pain, fever, nausea, vomiting  CT reveals: Airspace consolidation in the right middle lobe adjacent to the hilum suggesting postobstructive pneumonia   Flu/RSV/ Covid pending  Urine strep and legionella pending  Initially given Rocephin in the emergency department, will continue Rocephin and monitor cultures and titrate therapy as indicated  Procalcitonin 13.19; continue to trend  WBC 7.70; continue to trend, monitor fever curve  Blood Cultures pending  Sputum culture ordered  Supplemental oxygen for saturations > 90%; wean as tolerated  Aspiration Precautions  Respiratory Protocol   Supportive Care  "

## 2024-04-13 PROBLEM — R78.81 BACTEREMIA: Status: ACTIVE | Noted: 2024-04-13

## 2024-04-13 PROBLEM — E43 SEVERE PROTEIN-CALORIE MALNUTRITION (HCC): Status: ACTIVE | Noted: 2024-04-13

## 2024-04-13 LAB
ALBUMIN SERPL BCP-MCNC: 2.9 G/DL (ref 3.5–5)
ALP SERPL-CCNC: 466 U/L (ref 34–104)
ALT SERPL W P-5'-P-CCNC: 145 U/L (ref 7–52)
ANION GAP SERPL CALCULATED.3IONS-SCNC: 6 MMOL/L (ref 4–13)
AST SERPL W P-5'-P-CCNC: 191 U/L (ref 13–39)
BILIRUB SERPL-MCNC: 2.41 MG/DL (ref 0.2–1)
BUN SERPL-MCNC: 18 MG/DL (ref 5–25)
CALCIUM ALBUM COR SERPL-MCNC: 9.8 MG/DL (ref 8.3–10.1)
CALCIUM SERPL-MCNC: 8.9 MG/DL (ref 8.4–10.2)
CHLORIDE SERPL-SCNC: 105 MMOL/L (ref 96–108)
CO2 SERPL-SCNC: 26 MMOL/L (ref 21–32)
CREAT SERPL-MCNC: 0.82 MG/DL (ref 0.6–1.3)
ERYTHROCYTE [DISTWIDTH] IN BLOOD BY AUTOMATED COUNT: 13.9 % (ref 11.6–15.1)
GFR SERPL CREATININE-BSD FRML MDRD: 94 ML/MIN/1.73SQ M
GLUCOSE SERPL-MCNC: 125 MG/DL (ref 65–140)
HCT VFR BLD AUTO: 45.5 % (ref 36.5–49.3)
HGB BLD-MCNC: 14.9 G/DL (ref 12–17)
MAGNESIUM SERPL-MCNC: 2.1 MG/DL (ref 1.9–2.7)
MCH RBC QN AUTO: 28.8 PG (ref 26.8–34.3)
MCHC RBC AUTO-ENTMCNC: 32.7 G/DL (ref 31.4–37.4)
MCV RBC AUTO: 88 FL (ref 82–98)
PLATELET # BLD AUTO: 193 THOUSANDS/UL (ref 149–390)
PMV BLD AUTO: 10.2 FL (ref 8.9–12.7)
POTASSIUM SERPL-SCNC: 4.4 MMOL/L (ref 3.5–5.3)
PROCALCITONIN SERPL-MCNC: 14.05 NG/ML
PROT SERPL-MCNC: 6.1 G/DL (ref 6.4–8.4)
RBC # BLD AUTO: 5.17 MILLION/UL (ref 3.88–5.62)
S AUREUS+CONS DNA BLD POS NAA+NON-PROBE: DETECTED
SODIUM SERPL-SCNC: 137 MMOL/L (ref 135–147)
WBC # BLD AUTO: 11.31 THOUSAND/UL (ref 4.31–10.16)

## 2024-04-13 PROCEDURE — 83735 ASSAY OF MAGNESIUM: CPT | Performed by: STUDENT IN AN ORGANIZED HEALTH CARE EDUCATION/TRAINING PROGRAM

## 2024-04-13 PROCEDURE — 85027 COMPLETE CBC AUTOMATED: CPT | Performed by: STUDENT IN AN ORGANIZED HEALTH CARE EDUCATION/TRAINING PROGRAM

## 2024-04-13 PROCEDURE — 87040 BLOOD CULTURE FOR BACTERIA: CPT | Performed by: STUDENT IN AN ORGANIZED HEALTH CARE EDUCATION/TRAINING PROGRAM

## 2024-04-13 PROCEDURE — 84145 PROCALCITONIN (PCT): CPT

## 2024-04-13 PROCEDURE — 80053 COMPREHEN METABOLIC PANEL: CPT | Performed by: STUDENT IN AN ORGANIZED HEALTH CARE EDUCATION/TRAINING PROGRAM

## 2024-04-13 PROCEDURE — 99233 SBSQ HOSP IP/OBS HIGH 50: CPT | Performed by: STUDENT IN AN ORGANIZED HEALTH CARE EDUCATION/TRAINING PROGRAM

## 2024-04-13 RX ORDER — METHYLPREDNISOLONE SODIUM SUCCINATE 40 MG/ML
40 INJECTION, POWDER, LYOPHILIZED, FOR SOLUTION INTRAMUSCULAR; INTRAVENOUS EVERY 12 HOURS SCHEDULED
Status: DISCONTINUED | OUTPATIENT
Start: 2024-04-13 | End: 2024-04-14

## 2024-04-13 RX ORDER — OXYCODONE HYDROCHLORIDE 5 MG/1
5 TABLET ORAL EVERY 4 HOURS PRN
Status: DISCONTINUED | OUTPATIENT
Start: 2024-04-13 | End: 2024-04-15

## 2024-04-13 RX ORDER — HYDROMORPHONE HCL/PF 1 MG/ML
0.5 SYRINGE (ML) INJECTION
Status: DISCONTINUED | OUTPATIENT
Start: 2024-04-13 | End: 2024-04-16

## 2024-04-13 RX ORDER — OXYCODONE HYDROCHLORIDE 10 MG/1
10 TABLET ORAL EVERY 4 HOURS PRN
Status: DISCONTINUED | OUTPATIENT
Start: 2024-04-13 | End: 2024-04-15

## 2024-04-13 RX ADMIN — OXYCODONE HYDROCHLORIDE 10 MG: 10 TABLET ORAL at 17:12

## 2024-04-13 RX ADMIN — VANCOMYCIN HYDROCHLORIDE 2000 MG: 5 INJECTION, POWDER, LYOPHILIZED, FOR SOLUTION INTRAVENOUS at 00:17

## 2024-04-13 RX ADMIN — GUAIFENESIN 600 MG: 600 TABLET ORAL at 17:12

## 2024-04-13 RX ADMIN — SODIUM CHLORIDE, SODIUM LACTATE, POTASSIUM CHLORIDE, AND CALCIUM CHLORIDE 75 ML/HR: .6; .31; .03; .02 INJECTION, SOLUTION INTRAVENOUS at 02:27

## 2024-04-13 RX ADMIN — METHYLPREDNISOLONE SODIUM SUCCINATE 40 MG: 40 INJECTION, POWDER, FOR SOLUTION INTRAMUSCULAR; INTRAVENOUS at 05:00

## 2024-04-13 RX ADMIN — ENOXAPARIN SODIUM 40 MG: 40 INJECTION SUBCUTANEOUS at 08:28

## 2024-04-13 RX ADMIN — METHYLPREDNISOLONE SODIUM SUCCINATE 40 MG: 40 INJECTION, POWDER, FOR SOLUTION INTRAMUSCULAR; INTRAVENOUS at 21:16

## 2024-04-13 RX ADMIN — SODIUM CHLORIDE, SODIUM LACTATE, POTASSIUM CHLORIDE, AND CALCIUM CHLORIDE 75 ML/HR: .6; .31; .03; .02 INJECTION, SOLUTION INTRAVENOUS at 19:49

## 2024-04-13 RX ADMIN — VANCOMYCIN HYDROCHLORIDE 750 MG: 750 INJECTION, SOLUTION INTRAVENOUS at 11:49

## 2024-04-13 RX ADMIN — OXYCODONE HYDROCHLORIDE 5 MG: 5 TABLET ORAL at 04:46

## 2024-04-13 RX ADMIN — OXYCODONE HYDROCHLORIDE 10 MG: 10 TABLET ORAL at 21:15

## 2024-04-13 RX ADMIN — VANCOMYCIN HYDROCHLORIDE 1250 MG: 1 INJECTION, POWDER, LYOPHILIZED, FOR SOLUTION INTRAVENOUS at 22:35

## 2024-04-13 RX ADMIN — DOCUSATE SODIUM 100 MG: 100 CAPSULE, LIQUID FILLED ORAL at 17:12

## 2024-04-13 RX ADMIN — METHYLPREDNISOLONE SODIUM SUCCINATE 40 MG: 40 INJECTION, POWDER, FOR SOLUTION INTRAMUSCULAR; INTRAVENOUS at 13:35

## 2024-04-13 RX ADMIN — DOCUSATE SODIUM 100 MG: 100 CAPSULE, LIQUID FILLED ORAL at 08:27

## 2024-04-13 RX ADMIN — NICOTINE 1 PATCH: 21 PATCH, EXTENDED RELEASE TRANSDERMAL at 08:28

## 2024-04-13 RX ADMIN — HYDROMORPHONE HYDROCHLORIDE 0.5 MG: 1 INJECTION, SOLUTION INTRAMUSCULAR; INTRAVENOUS; SUBCUTANEOUS at 19:44

## 2024-04-13 RX ADMIN — GUAIFENESIN 600 MG: 600 TABLET ORAL at 08:27

## 2024-04-13 RX ADMIN — HYDROMORPHONE HYDROCHLORIDE 0.5 MG: 1 INJECTION, SOLUTION INTRAMUSCULAR; INTRAVENOUS; SUBCUTANEOUS at 13:35

## 2024-04-13 RX ADMIN — OXYCODONE HYDROCHLORIDE 10 MG: 10 TABLET ORAL at 11:50

## 2024-04-13 NOTE — PROGRESS NOTES
"[facility]  Progress Note  Name: Ceasar March I  MRN: [id number]  Unit/Bed#: -01 I Date of Admission: 4/11/2024   Date of Service: 4/13/2024 I Hospital Day: 1    Assessment/Plan   Lung mass  Assessment & Plan  Patient reports night sweats, recent weight loss, current every day smoker, severe back and flank pain  CT shows:   Right hilar and perihilar 5.5 x 6.3 x 5.2 cm mass consistent lung malignancy.Right hilar, mediastinal and right supraclavicular lymphadenopathy compatible with metastatic disease.Airspace consolidation in the right middle lobe adjacent to the hilum suggesting postobstructive pneumonia.Nodular thickening along the axial interstitium in the right lower lobe and the right major fissure suggesting lymphangitic spread of tumor. Diffuse hepatic metastases. Periportal, gastroesophageal and para-aortic lymphadenopathy  Hem/Onc consulted - will place IR consult for possible biopsy. Per Oncology if small cell they may consider transfer and start treatment.   Having some hip pain likely related to mets will increase oxy dose     Bacteremia  Assessment & Plan  2 out of 2 blood cultures GPCs overnight   Vanc was started   Awaiting speciation- if not CONs will consult ID   TTE  Continue Vanc and ctx for now pending speciation and sensitivities   Repeat blood cultures     * Pneumonia  Assessment & Plan  Patient reports feeling \"unwell\" for the last several weeks. Reports increased dyspnea on exertion, chest tightness, night sweats and chills. He states he has a strong painful cough at times and brings up thick mucous. He denies chest pain, fever, nausea, vomiting  CT reveals: Airspace consolidation in the right middle lobe adjacent to the hilum suggesting postobstructive pneumonia   Flu/RSV/ Covid pending  Urine strep and legionella pending  Initially given Rocephin in the emergency department, will continue Rocephin and monitor cultures and titrate therapy as " indicated  Procalcitonin 13.19; continue to trend  WBC 7.70; continue to trend, monitor fever curve  Blood Cultures pending  Sputum culture ordered  Supplemental oxygen for saturations > 90%; wean as tolerated  Aspiration Precautions  Respiratory Protocol   Supportive Care    Severe protein-calorie malnutrition (HCC)  Assessment & Plan  Malnutrition Findings:   Adult Malnutrition type: Chronic illness  Adult Degree of Malnutrition: Other severe protein calorie malnutrition  Malnutrition Characteristics: Inadequate energy, Weight loss                  360 Statement: Malnutrition related to chronic illness as evidenced by >7.5% body weight loss in 3 months, <75% energy intake compared to estimated energy needs for >1-month.  To treat with oral diet as tolerated.    BMI Findings:           Body mass index is 23.73 kg/m².       Transaminitis  Assessment & Plan  Lab Results   Component Value Date     (H) 04/13/2024     (H) 04/13/2024    TBILI 2.41 (H) 04/13/2024    ALKPHOS 466 (H) 04/13/2024     Like secondary to liver metastasis  Avoid hepatoxins  Will cont to trend     Tobacco abuse  Assessment & Plan  Reports an average 1-1.5 pack daily cigarette smoke  Sincere smoking cessation education provided  Declining nicotine replacement therapy at this time               VTE Pharmacologic Prophylaxis: VTE Score: 7 Moderate Risk (Score 3-4) - Pharmacological DVT Prophylaxis Ordered: enoxaparin (Lovenox).    Mobility:   Basic Mobility Inpatient Raw Score: 24  JH-HLM Goal: 8: Walk 250 feet or more  JH-HLM Achieved: 6: Walk 10 steps or more  JH-HLM Goal achieved. Continue to encourage appropriate mobility.    Patient Centered Rounds: I performed bedside rounds with nursing staff today.   Discussions with Specialists or Other Care Team Provider: none    Education and Discussions with Family / Patient: Patient declined call to .     Total Time Spent on Date of Encounter in care of patient: 55 mins. This  time was spent on one or more of the following: performing physical exam; counseling and coordination of care; obtaining or reviewing history; documenting in the medical record; reviewing/ordering tests, medications or procedures; communicating with other healthcare professionals and discussing with patient's family/caregivers.    Current Length of Stay: 1 day(s)  Current Patient Status: Inpatient   Certification Statement: The patient will continue to require additional inpatient hospital stay due to PNA, positive blood cultures, lung mass  Discharge Plan: Anticipate discharge in 48-72 hrs to TBD    Code Status: Level 1 - Full Code    Subjective:   Having hip pain that is worsening today. Likely from metastases. Breathing is stable. He reported that he and his family were updated regarding possible cancer and plans for biopsy. No chest pain, sob.     Objective:     Vitals:   Temp (24hrs), Av.9 °F (36.6 °C), Min:97.5 °F (36.4 °C), Max:98.6 °F (37 °C)    Temp:  [97.5 °F (36.4 °C)-98.6 °F (37 °C)] 97.5 °F (36.4 °C)  HR:  [82-94] 94  Resp:  [17-18] 17  BP: (113-124)/(71-79) 124/74  SpO2:  [93 %-100 %] 100 %  Body mass index is 23.73 kg/m².     Input and Output Summary (last 24 hours):     Intake/Output Summary (Last 24 hours) at 2024 1540  Last data filed at 2024 1700  Gross per 24 hour   Intake 360 ml   Output --   Net 360 ml       Physical Exam:   Physical Exam   NAD  Nonlabored resp  RRR  NTND abd  No pitting edema   aaox3    Additional Data:     Labs:  Results from last 7 days   Lab Units 24  0439 24  2155   WBC Thousand/uL 11.31* 7.70   HEMOGLOBIN g/dL 14.9 15.5   HEMATOCRIT % 45.5 46.6   PLATELETS Thousands/uL 193 206   LYMPHO PCT %  --  15   MONO PCT %  --  9   EOS PCT %  --  0     Results from last 7 days   Lab Units 24  0439   SODIUM mmol/L 137   POTASSIUM mmol/L 4.4   CHLORIDE mmol/L 105   CO2 mmol/L 26   BUN mg/dL 18   CREATININE mg/dL 0.82   ANION GAP mmol/L 6   CALCIUM mg/dL  8.9   ALBUMIN g/dL 2.9*   TOTAL BILIRUBIN mg/dL 2.41*   ALK PHOS U/L 466*   ALT U/L 145*   AST U/L 191*   GLUCOSE RANDOM mg/dL 125                 Results from last 7 days   Lab Units 04/13/24  0439 04/11/24  2155   PROCALCITONIN ng/ml 14.05* 13.19*       Lines/Drains:  Invasive Devices       Peripheral Intravenous Line  Duration             Peripheral IV 04/11/24 Left Antecubital 1 day                          Imaging: Reviewed radiology reports from this admission including: chest CT scan    Recent Cultures (last 7 days):   Results from last 7 days   Lab Units 04/12/24  0230 04/12/24  0117 04/12/24  0116   GRAM STAIN RESULT   --  Gram positive cocci in clusters* Gram positive cocci in clusters*   LEGIONELLA URINARY ANTIGEN  Negative  --   --        Last 24 Hours Medication List:   Current Facility-Administered Medications   Medication Dose Route Frequency Provider Last Rate    acetaminophen  650 mg Oral Q6H PRN Odette Mcbride, RUSSELNP      albuterol  2 puff Inhalation Q4H PRN Jose Daniel Barahona MD      calcium carbonate  1,000 mg Oral Daily PRN Odette Mcbride, CRNP      cefTRIAXone  1,000 mg Intravenous Q24H Odette Mcbride, RUSSELNP 1,000 mg (04/12/24 2333)    Diclofenac Sodium  2 g Topical 4x Daily Odette Mcbride, CHRISTIANA      docusate sodium  100 mg Oral BID Odette Mcbride, CRNP      enoxaparin  40 mg Subcutaneous Daily Odette Mcbride, CRNP      guaiFENesin  600 mg Oral BID Odette Mcbride, CRNP      HYDROmorphone  0.5 mg Intravenous Q3H PRN Yohan Kong MD      lactated ringers  75 mL/hr Intravenous Continuous Yohan Kong MD 75 mL/hr (04/13/24 0227)    methylPREDNISolone sodium succinate  40 mg Intravenous Q12H KRYSTYNA Yohan Kong MD      naloxone  0.04 mg Intravenous Q1MIN PRN Odette Mcbride, CHRISTIANA      nicotine  1 patch Transdermal Daily Odette Mcbride, CHRISTIANA      ondansetron  4 mg Intravenous Q6H PRN Odette Mcbride, CHRISTIANA       oxyCODONE  5 mg Oral Q4H PRN Yohan Kong MD      Or    oxyCODONE  10 mg Oral Q4H PRN Yohan Kong MD      vancomycin  1,250 mg Intravenous Q12H Nini Shelley PA-C          Today, Patient Was Seen By: Yohan Kong MD    **Please Note: This note may have been constructed using a voice recognition system.**

## 2024-04-13 NOTE — PROGRESS NOTES
Ceasar March is a 63 y.o. male who is currently ordered Vancomycin IV with management by the Pharmacy Consult service.  Relevant clinical data and objective / subjective history reviewed.  Vancomycin Assessment:  Indication and Goal AUC/Trough: Bacteremia (goal -600, trough >10)  Clinical Status:  new start  Micro:   pending  Renal Function:  SCr: 1.13 mg/dL  CrCl: 74.4 mL/min  Renal replacement: Not on dialysis  Days of Therapy: 1  Current Dose: 1250mg IV q12h  Vancomycin Plan:  New Dosinmg IV once then 750mg IV q12h  Estimated AUC: 429 mcg*hr/mL  Estimated Trough: 12.7 mcg/mL  Next Level: 24 AM labs  Renal Function Monitoring: Daily BMP and UOP  Pharmacy will continue to follow closely for s/sx of nephrotoxicity, infusion reactions and appropriateness of therapy.  BMP and CBC will be ordered per protocol. We will continue to follow the patient’s culture results and clinical progress daily.    Etta Morton, Pharmacist

## 2024-04-13 NOTE — MALNUTRITION/BMI
This medical record reflects one or more clinical indicators suggestive of malnutrition and/or morbid obesity.    Malnutrition Findings:   Adult Malnutrition type: Chronic illness  Adult Degree of Malnutrition: Other severe protein calorie malnutrition  Malnutrition Characteristics: Inadequate energy, Weight loss    360 Statement: Malnutrition related to chronic illness as evidenced by >7.5% body weight loss in 3 months, <75% energy intake compared to estimated energy needs for >1-month.  To treat with oral diet as tolerated.    BMI Findings:           Body mass index is 23.73 kg/m².     See Nutrition note dated 4/13/2024 in flow sheets for additional details.  Completed nutrition assessment is viewable in the nutrition documentation.

## 2024-04-13 NOTE — ASSESSMENT & PLAN NOTE
Lab Results   Component Value Date     (H) 04/13/2024     (H) 04/13/2024    TBILI 2.41 (H) 04/13/2024    ALKPHOS 466 (H) 04/13/2024     Like secondary to liver metastasis  Avoid hepatoxins  Will cont to trend

## 2024-04-13 NOTE — ASSESSMENT & PLAN NOTE
Malnutrition Findings:   Adult Malnutrition type: Chronic illness  Adult Degree of Malnutrition: Other severe protein calorie malnutrition  Malnutrition Characteristics: Inadequate energy, Weight loss                  360 Statement: Malnutrition related to chronic illness as evidenced by >7.5% body weight loss in 3 months, <75% energy intake compared to estimated energy needs for >1-month.  To treat with oral diet as tolerated.    BMI Findings:           Body mass index is 23.73 kg/m².

## 2024-04-13 NOTE — UTILIZATION REVIEW
Initial Clinical Review    Admission: Date/Time/Statement:   Admission Orders (From admission, onward)       Ordered        04/12/24 0035  INPATIENT ADMISSION  Once                       Orders Placed This Encounter   Procedures    INPATIENT ADMISSION     Standing Status:   Standing     Number of Occurrences:   1     Order Specific Question:   Level of Care     Answer:   Med Surg [16]     Order Specific Question:   Estimated length of stay     Answer:   More than 2 Midnights     Order Specific Question:   Certification     Answer:   I certify that inpatient services are medically necessary for this patient for a duration of greater than two midnights. See H&P and MD Progress Notes for additional information about the patient's course of treatment.     ED Arrival Information       Expected   -    Arrival   4/11/2024 20:56    Acuity   Urgent              Means of arrival   Walk-In    Escorted by   Friend    Service   Hospitalist    Admission type   Emergency              Arrival complaint   WEAKNESS             Chief Complaint   Patient presents with    Weakness - Generalized     Pt c/o generalized weakness x2weeks. Pts states 4 infected teeth with an abscess that has been causing pain in face, causing headaches. Pt states amoxicillin for x1 week, that brought the pain done slightly. Pt states having chills, no confirmed fevers.        Initial Presentation: 63 y.o. male with a PMH of asthma and tobacco dependence who presents to the ED from home with shortness of breath and generalized weakness over the last few weeks. He noted increased dyspnea, chest and back pain, night sweats, fatigue, cough, thick sputum and unintentional weight loss. ED  CT scan revealed lung mass concerning for malignancy with metastasis to the liver and post obstructive pneumonia. PE:  AOx3. Wheezing.     4/12 Inpatient admission for evaluation and treatment of pneumonia, transaminitis:  Continue IV Rocephin, follow cultures, trend  procalcitonin, WBC. Consult Gastroenterology, Oncology.     4/12 Oncology consult:  1 Stage IV carcinoma presumed lung origin with mediastinal adenopathy and possible obstructive pneumonia  2 diffuse liver metastasis.  3 periportal perigastric mediastinal and right supraclavicular nodes.  Plan: Patient has what appears to be obstructive pneumonia we will continue antibiotics.  Patient needs to have a biopsy.  I am concerned about his liver chemistries as if they are elevated as was his bilirubin.  This may portend difficulty giving him any treatments.  Ideally we should get a biopsy within the next several days to try and potentiate any kind of treatment in the future.  The disease is not curable depending on type he may have some response to treatment.        4/13 Internal Medicine:  Patient having hip pain that is worsening today. Likely from metastases. Will increase oxy dose. IR consulted for possible biopsy. 2/2 blood cultures GPCs overnight, vancomycin was started. Awaiting speciation. Repeat blood cultures. TTE.  PE: AOx3. Non labored respirations.         ED Triage Vitals   Temperature Pulse Respirations Blood Pressure SpO2   04/11/24 2110 04/11/24 2110 04/11/24 2110 04/11/24 2110 04/11/24 2110   97.7 °F (36.5 °C) 100 12 116/75 97 %      Temp Source Heart Rate Source Patient Position - Orthostatic VS BP Location FiO2 (%)   04/11/24 2110 04/11/24 2110 04/11/24 2110 04/11/24 2110 --   Oral Monitor Sitting Left arm       Pain Score       04/12/24 0807       No Pain          Wt Readings from Last 1 Encounters:   04/12/24 79.4 kg (175 lb)     Additional Vital Signs:          Date/Time Temp Pulse Resp BP MAP (mmHg) SpO2 O2 Device   04/13/24 07:19:58 97.9 °F (36.6 °C) 86 17 113/71 85 95 % --   04/12/24 22:21:10 98.6 °F (37 °C) 82 18 118/79 92 96 % --   04/12/24 2137 -- -- -- -- -- 95 % None (Room air)   04/12/24 15:00:29 98 °F (36.7 °C) 93 18 116/80 92 96 % --   04/12/24 14:36:17 97.3 °F (36.3 °C) Abnormal  92 18  118/90 99 95 % None (Room air)   04/12/24 1200 -- 81 22 127/86 -- 95 % None (Room air)   04/12/24 1041 -- 91 22 124/82 -- 96 % None (Room air)   04/12/24 0807 -- 82 26 Abnormal  128/81 -- 95 % None (Room air)   04/12/24 0755 -- -- -- -- -- 96 % --   04/12/24 0700 -- 89 23 Abnormal  -- -- 94 % --   04/12/24 0350 -- -- -- -- -- 95 % None (Room air)   04/12/24 0340 -- 86 23 Abnormal  -- -- 95 % --   04/12/24 0200 -- 87 21 -- -- 97 % None (Room air)   04/12/24 0000 -- 88 20 -- -- 98 % None (Room air)   04/11/24 2246 -- -- -- -- -- -- None (Room air)   04/11/24 2200 -- 92 15 -- -- 100 % None (Room air)           Pertinent Labs/Diagnostic Test Results:       CT chest abdomen pelvis w contrast   Final Result by Eron Smallwood MD (04/11 2342)         1. Right hilar and perihilar 5.5 x 6.3 x 5.2 cm mass consistent lung malignancy.   2. Right hilar, mediastinal and right supraclavicular lymphadenopathy compatible with metastatic disease.   3. Airspace consolidation in the right middle lobe adjacent to the hilum suggesting postobstructive pneumonia.   4. Nodular thickening along the axial interstitium in the right lower lobe and the right major fissure suggesting lymphangitic spread of tumor.   5. Diffuse hepatic metastases.   6. Periportal, gastroesophageal and para-aortic lymphadenopathy      The study was marked in EPIC for immediate notification.         Workstation performed: [id number]           4/11 EKG:    Normal sinus rhythm  Normal ECG  No previous ECGs available      Results from last 7 days   Lab Units 04/12/24  0416   SARS-COV-2  Negative     Results from last 7 days   Lab Units 04/13/24  0439 04/11/24  2155   WBC Thousand/uL 11.31* 7.70   HEMOGLOBIN g/dL 14.9 15.5   HEMATOCRIT % 45.5 46.6   PLATELETS Thousands/uL 193 206         Results from last 7 days   Lab Units 04/13/24  0439 04/11/24  2155   SODIUM mmol/L 137 136   POTASSIUM mmol/L 4.4 3.9   CHLORIDE mmol/L 105 100   CO2 mmol/L 26 27   ANION GAP mmol/L  6 9   BUN mg/dL 18 20   CREATININE mg/dL 0.82 1.13   EGFR ml/min/1.73sq m 94 69   CALCIUM mg/dL 8.9 9.7   MAGNESIUM mg/dL 2.1 2.2     Results from last 7 days   Lab Units 04/13/24 0439 04/11/24 2155   AST U/L 191* 204*   ALT U/L 145* 161*   ALK PHOS U/L 466* 535*   TOTAL PROTEIN g/dL 6.1* 6.9   ALBUMIN g/dL 2.9* 3.2*   TOTAL BILIRUBIN mg/dL 2.41* 2.95*         Results from last 7 days   Lab Units 04/13/24  0439 04/11/24  2155   GLUCOSE RANDOM mg/dL 125 130               Results from last 7 days   Lab Units 04/12/24  0230 04/12/24  0116 04/11/24  2155   HS TNI 0HR ng/L  --   --  6   HS TNI 2HR ng/L  --  8  --    HSTNI D2 ng/L  --  2  --    HS TNI 4HR ng/L 6  --   --    HSTNI D4 ng/L 0  --   --              Results from last 7 days   Lab Units 04/11/24 2155   TSH 3RD GENERATON uIU/mL 1.858     Results from last 7 days   Lab Units 04/13/24 0439 04/11/24 2155   PROCALCITONIN ng/ml 14.05* 13.19*             Results from last 7 days   Lab Units 04/11/24  2336   CLARITY UA  Clear   COLOR UA  Dark Yellow   SPEC GRAV UA  >=1.050*   PH UA  5.5   GLUCOSE UA mg/dl Negative   KETONES UA mg/dl Negative   BLOOD UA  Negative   PROTEIN UA mg/dl Trace*   NITRITE UA  Negative   BILIRUBIN UA  Small*   UROBILINOGEN UA (BE) mg/dl <2.0   LEUKOCYTES UA  Negative   WBC UA /hpf 1-2   RBC UA /hpf 1-2   BACTERIA UA /hpf None Seen   EPITHELIAL CELLS WET PREP /hpf None Seen   MUCUS THREADS  Occasional*     Results from last 7 days   Lab Units 04/12/24  0416 04/12/24  0230 04/11/24  2336   STREP PNEUMONIAE ANTIGEN, URINE   --   --  Negative   LEGIONELLA URINARY ANTIGEN   --  Negative  --    INFLUENZA A PCR  Negative  --   --    INFLUENZA B PCR  Negative  --   --    RSV PCR  Negative  --   --                Results from last 7 days   Lab Units 04/12/24  0117 04/12/24  0116   GRAM STAIN RESULT  Gram positive cocci in clusters* Gram positive cocci in clusters*                   ED Treatment:   Medication Administration from 04/11/2024 2056 to  04/12/2024 1428         Date/Time Order Dose Route Action     04/11/2024 2321 EDT sodium chloride 0.9 % bolus 1,000 mL 0 mL Intravenous Stopped     04/11/2024 2155 EDT sodium chloride 0.9 % bolus 1,000 mL 1,000 mL Intravenous New Bag     04/11/2024 2239 EDT iohexol (OMNIPAQUE) 350 MG/ML injection (MULTI-DOSE) 100 mL 100 mL Intravenous Given     04/12/2024 0159 EDT ceftriaxone (ROCEPHIN) 1 g/50 mL in dextrose IVPB 0 mg Intravenous Stopped     04/12/2024 0133 EDT ceftriaxone (ROCEPHIN) 1 g/50 mL in dextrose IVPB 1,000 mg Intravenous New Bag     04/12/2024 0859 EDT docusate sodium (COLACE) capsule 100 mg 100 mg Oral Given     04/12/2024 0858 EDT nicotine (NICODERM CQ) 21 mg/24 hr TD 24 hr patch 1 patch 1 patch Transdermal Medication Applied     04/12/2024 0858 EDT guaiFENesin (MUCINEX) 12 hr tablet 600 mg 600 mg Oral Given     04/12/2024 0858 EDT enoxaparin (LOVENOX) subcutaneous injection 40 mg 40 mg Subcutaneous Given     04/12/2024 1203 EDT oxyCODONE (ROXICODONE) split tablet 2.5 mg -- Oral See Alternative     04/12/2024 0234 EDT oxyCODONE (ROXICODONE) split tablet 2.5 mg -- Oral See Alternative     04/12/2024 1203 EDT oxyCODONE (ROXICODONE) IR tablet 5 mg 5 mg Oral Given     04/12/2024 0234 EDT oxyCODONE (ROXICODONE) IR tablet 5 mg 5 mg Oral Given     04/12/2024 1204 EDT Diclofenac Sodium (VOLTAREN) 1 % topical gel 2 g 2 g Topical Not Given     04/12/2024 0858 EDT Diclofenac Sodium (VOLTAREN) 1 % topical gel 2 g 2 g Topical Given     04/12/2024 0230 EDT methylPREDNISolone sodium succinate (Solu-MEDROL) injection 40 mg 40 mg Intravenous Given     04/12/2024 0902 EDT lactated ringers infusion 75 mL/hr Intravenous New Bag          Past Medical History:   Diagnosis Date    Asthma      Present on Admission:   Pneumonia   Lung mass   Tobacco abuse   Transaminitis      Admitting Diagnosis: Lung cancer (HCC) [C34.90]  Metastasis to liver (HCC) [C78.7]  Pneumonia [J18.9]  Weakness [R53.1]  Age/Sex: 63 y.o.  male      Admission Orders: SCD, sputum culture and gram stain.       Scheduled Medications:      cefTRIAXone, 1,000 mg, Intravenous, Q24H  Diclofenac Sodium, 2 g, Topical, 4x Daily  docusate sodium, 100 mg, Oral, BID  enoxaparin, 40 mg, Subcutaneous, Daily  guaiFENesin, 600 mg, Oral, BID  methylPREDNISolone sodium succinate, 40 mg, Intravenous, Q8H KRYSTYNA  nicotine, 1 patch, Transdermal, Daily      vancomycin (VANCOCIN) IVPB (premix in dextrose) 750 mg 150 mL  Dose: 750 mg  Freq: Every 12 hours Route: IV  Last Dose: 750 mg (04/13/24 1149)  Start: 04/13/24 1100       Continuous IV Infusions:    lactated ringers, 75 mL/hr, Intravenous, Continuous      PRN Meds:    acetaminophen, 650 mg, Oral, Q6H PRN  albuterol, 2 puff, Inhalation, Q4H PRN  calcium carbonate, 1,000 mg, Oral, Daily PRN  HYDROmorphone, 0.5 mg, Intravenous, Q3H PRN x 1 dose 4/13 @ 1335  HYDROmorphone, 0.2 mg, Intravenous, Q2H PRN x 1 dose 4/12 @ 1947  naloxone, 0.04 mg, Intravenous, Q1MIN PRN  ondansetron, 4 mg, Intravenous, Q6H PRN  oxyCODONE, 5 mg, Oral, Q4H PRN x 1 dose 4/12 @ 1643, x 1 dose 4/13 @ 0446    Or  oxyCODONE, 10 mg, Oral, Q4H PRN x 2 doses 4/13 @ 1150, 1712        IP CONSULT TO ONCOLOGY  IP CONSULT TO PHARMACY  INPATIENT CONSULT TO IR    Network Utilization Review Department  ATTENTION: Please call with any questions or concerns to [phone] and carefully listen to the prompts so that you are directed to the right person. All voicemails are confidential.   For Discharge needs, contact Care Management DC Support Team at [phone] opt. [phone]  Send all requests for admission clinical reviews, approved or denied determinations and any other requests to dedicated fax number below belonging to the campus where the patient is receiving treatment. List of dedicated fax numbers for the Facilities:  FACILITY NAME UR FAX NUMBER   ADMISSION DENIALS (Administrative/Medical Necessity) [phone]   DISCHARGE SUPPORT TEAM (NETWORK) [phone]   PARENT  CHILD HEALTH (Maternity/NICU/Pediatrics) [phone]   [facility] [phone]   [facility] [phone]   [facility] [phone]   [facility] [phone]   [facility] [phone]   [facility] [phone]   [facility] [phone]   [facility] [phone]   [facility] [phone]   [facility] [phone]   [facility] [phone]   [facility] [phone]

## 2024-04-13 NOTE — ASSESSMENT & PLAN NOTE
2 out of 2 blood cultures GPCs overnight   Vanc was started   Awaiting speciation- if not CONs will consult ID   TTE  Continue Vanc and ctx for now pending speciation and sensitivities   Repeat blood cultures

## 2024-04-13 NOTE — PLAN OF CARE
Problem: PAIN - ADULT  Goal: Verbalizes/displays adequate comfort level or baseline comfort level  Description: Interventions:  - Encourage patient to monitor pain and request assistance  - Assess pain using appropriate pain scale  - Administer analgesics based on type and severity of pain and evaluate response  - Implement non-pharmacological measures as appropriate and evaluate response  - Consider cultural and social influences on pain and pain management  - Notify physician/advanced practitioner if interventions unsuccessful or patient reports new pain  Outcome: Progressing     Problem: DISCHARGE PLANNING  Goal: Discharge to home or other facility with appropriate resources  Description: INTERVENTIONS:  - Identify barriers to discharge w/patient and caregiver  - Arrange for needed discharge resources and transportation as appropriate  - Identify discharge learning needs (meds, wound care, etc.)  - Arrange for interpretive services to assist at discharge as needed  - Refer to Case Management Department for coordinating discharge planning if the patient needs post-hospital services based on physician/advanced practitioner order or complex needs related to functional status, cognitive ability, or social support system  Outcome: Progressing     Problem: Knowledge Deficit  Goal: Patient/family/caregiver demonstrates understanding of disease process, treatment plan, medications, and discharge instructions  Description: Complete learning assessment and assess knowledge base.  Interventions:  - Provide teaching at level of understanding  - Provide teaching via preferred learning methods  Outcome: Progressing     Problem: RESPIRATORY - ADULT  Goal: Achieves optimal ventilation and oxygenation  Description: INTERVENTIONS:  - Assess for changes in respiratory status  - Assess for changes in mentation and behavior  - Position to facilitate oxygenation and minimize respiratory effort  - Oxygen administered by appropriate  delivery if ordered  - Initiate smoking cessation education as indicated  - Encourage broncho-pulmonary hygiene including cough, deep breathe, Incentive Spirometry  - Assess the need for suctioning and aspirate as needed  - Assess and instruct to report SOB or any respiratory difficulty  - Respiratory Therapy support as indicated  Outcome: Progressing

## 2024-04-13 NOTE — QUICK NOTE
Notified that patient with 2 out of 2 blood cultures positive for gram-positive cocci in clusters.  Continue to trend blood cultures and await identification panel.  However, for now for coverage will start IV vancomycin 15 mg/kg every 12 hours with pharmacy consult and continue IV ceftriaxone.

## 2024-04-13 NOTE — ASSESSMENT & PLAN NOTE
Patient reports night sweats, recent weight loss, current every day smoker, severe back and flank pain  CT shows:   Right hilar and perihilar 5.5 x 6.3 x 5.2 cm mass consistent lung malignancy.Right hilar, mediastinal and right supraclavicular lymphadenopathy compatible with metastatic disease.Airspace consolidation in the right middle lobe adjacent to the hilum suggesting postobstructive pneumonia.Nodular thickening along the axial interstitium in the right lower lobe and the right major fissure suggesting lymphangitic spread of tumor. Diffuse hepatic metastases. Periportal, gastroesophageal and para-aortic lymphadenopathy  Hem/Onc consulted - will place IR consult for possible biopsy. Per Oncology if small cell they may consider transfer and start treatment.   Having some hip pain likely related to mets will increase oxy dose

## 2024-04-13 NOTE — PROGRESS NOTES
Ceasar March is a 63 y.o. male who is currently ordered Vancomycin IV with management by the Pharmacy Consult service.  Relevant clinical data and objective / subjective history reviewed.  Vancomycin Assessment:  Indication and Goal AUC/Trough: Bacteremia (goal -600, trough >10)  Clinical Status: stable  Micro:     Renal Function:  SCr: 0.82 mg/dL  CrCl: 89.4 mL/min  Renal replacement: Not on dialysis  Days of Therapy: 2  Current Dose: 750mg IV q12h  Vancomycin Plan:  New Dosinmg IV q12h, next level   Estimated AUC: 523 mcg*hr/mL  Estimated Trough: 13.8 mcg/mL  Next Level: 24 with AM labs  Renal Function Monitoring: Daily BMP and UOP  Pharmacy will continue to follow closely for s/sx of nephrotoxicity, infusion reactions and appropriateness of therapy.  BMP and CBC will be ordered per protocol. We will continue to follow the patient’s culture results and clinical progress daily.    Valery Alcocer, Pharmacist

## 2024-04-13 NOTE — NUTRITION
04/13/24 1512   Biochemical Data,Medical Tests, and Procedures   Biochemical Data/Medical Tests/Procedures Lab values reviewed;Meds reviewed   Labs (Comment) 4/13/2024 , , total bilirubin 2.41   Meds (Comment) Colace, Solu-Medrol, lactated Ringer's infusion, Dilaudid   Nutrition-Focused Physical Exam   Nutrition-Focused Physical Exam Findings RN skin assessment reviewed;No edema documented;No skin issues documented   Nutrition-Focused Physical Exam Findings No signs of muscle/adipose loss at this time.   Medical-Related Concerns Tobacco abuse   Adequacy of Intake   Nutrition Modality PO   Feeding Route   PO Independent   Current PO Intake   Current Diet Order Regular diet, thin liquids   Current Meal Intake Inadequate   Estimated calorie intake compared to estimated need Nutrient needs are not met   PES Statement   Oral or Nutritional Support Intake (2) Inadequate oral intake NI-2.1   Related to Decreased appetite   As evidenced by: Per patient/family interview;Malnutrition   Recommendations/Interventions   360 Statement Malnutrition related to chronic illness as evidenced by >7.5% body weight loss in 3 months, <75% energy intake compared to estimated energy needs for >1-month.  To treat with oral diet as tolerated.   Summary Weight loss, poor p.o.-MST 4.  Presents with generalized weakness and complaint of 4 abscessed teeth.  CT showing lung mass with concern for malignancy and metastasis.  Past medical history significant for tobacco abuse.  Weight history unknown per EMR.  4/12 175#, estimated weight.  Please obtain updated weight via standing scale as able.  No edema.  No pressure areas.  Prescribed regular diet, thin liquids.  Meal completion 100%.  Met with patient at bedside.  He reports appetite has been decreased with pain ongoing for 3 weeks.  Along with decreased appetite he reports decreased PO intake and associated unintentional 25# weight loss over past 3 months.  Reports usually  having 2 meals daily, lunch and dinner.  NKFA.  Endorses difficulty chewing in setting of missing teeth.  Opts for softer foods and cuts food into safely sized pieces.  Denies swallowing difficulty.  Reports having tried nutrition supplements in the past however aftertaste prevents him from drinking them regularly.  His wife grocery shops.  Patient and his wife cook.  Reports usual body weight 195#-200#.  Malnutrition criteria met.  Discussed nutrition supplements - patient declines them at this time.  Agreeable to double portions.  RD to reassess intakes at follow-up.   Interventions/Recommendations Adjust diet order;Monitor I & O's;Obtain current weight   Education Assessment   Education Patient/caregiver not appropriate for education at this time   Patient Nutrition Goals   Goal Increase kcal/PRO intake;Meet PO needs

## 2024-04-14 ENCOUNTER — APPOINTMENT (INPATIENT)
Dept: NON INVASIVE DIAGNOSTICS | Facility: HOSPITAL | Age: 64
DRG: 180 | End: 2024-04-14
Payer: COMMERCIAL

## 2024-04-14 LAB
ALBUMIN SERPL BCP-MCNC: 2.9 G/DL (ref 3.5–5)
ALP SERPL-CCNC: 525 U/L (ref 34–104)
ALT SERPL W P-5'-P-CCNC: 162 U/L (ref 7–52)
ANION GAP SERPL CALCULATED.3IONS-SCNC: 4 MMOL/L (ref 4–13)
AORTIC ROOT: 3.1 CM
ASCENDING AORTA: 3.2 CM
AST SERPL W P-5'-P-CCNC: 236 U/L (ref 13–39)
BILIRUB SERPL-MCNC: 2.73 MG/DL (ref 0.2–1)
BSA FOR ECHO PROCEDURE: 2.01 M2
BUN SERPL-MCNC: 17 MG/DL (ref 5–25)
CALCIUM ALBUM COR SERPL-MCNC: 9.7 MG/DL (ref 8.3–10.1)
CALCIUM SERPL-MCNC: 8.8 MG/DL (ref 8.4–10.2)
CHLORIDE SERPL-SCNC: 103 MMOL/L (ref 96–108)
CO2 SERPL-SCNC: 29 MMOL/L (ref 21–32)
CREAT SERPL-MCNC: 0.77 MG/DL (ref 0.6–1.3)
E WAVE DECELERATION TIME: 150 MS
E/A RATIO: 1.07
ERYTHROCYTE [DISTWIDTH] IN BLOOD BY AUTOMATED COUNT: 14 % (ref 11.6–15.1)
FRACTIONAL SHORTENING: 40 (ref 28–44)
GFR SERPL CREATININE-BSD FRML MDRD: 97 ML/MIN/1.73SQ M
GLUCOSE SERPL-MCNC: 101 MG/DL (ref 65–140)
HCT VFR BLD AUTO: 44.9 % (ref 36.5–49.3)
HGB BLD-MCNC: 14.3 G/DL (ref 12–17)
INTERVENTRICULAR SEPTUM IN DIASTOLE (PARASTERNAL SHORT AXIS VIEW): 0.9 CM
INTERVENTRICULAR SEPTUM: 0.9 CM (ref 0.6–1.1)
LAAS-AP2: 8.6 CM2
LAAS-AP4: 18.5 CM2
LEFT ATRIUM AREA SYSTOLE SINGLE PLANE A4C: 17.5 CM2
LEFT ATRIUM SIZE: 3.6 CM
LEFT ATRIUM VOLUME (MOD BIPLANE): 33 ML
LEFT ATRIUM VOLUME INDEX (MOD BIPLANE): 16.4 ML/M2
LEFT INTERNAL DIMENSION IN SYSTOLE: 2.8 CM (ref 2.1–4)
LEFT VENTRICULAR INTERNAL DIMENSION IN DIASTOLE: 4.7 CM (ref 3.5–6)
LEFT VENTRICULAR POSTERIOR WALL IN END DIASTOLE: 0.8 CM
LEFT VENTRICULAR STROKE VOLUME: 76 ML
LVSV (TEICH): 76 ML
MAGNESIUM SERPL-MCNC: 2.3 MG/DL (ref 1.9–2.7)
MCH RBC QN AUTO: 28.5 PG (ref 26.8–34.3)
MCHC RBC AUTO-ENTMCNC: 31.8 G/DL (ref 31.4–37.4)
MCV RBC AUTO: 89 FL (ref 82–98)
MV E'TISSUE VEL-SEP: 11 CM/S
MV PEAK A VEL: 0.46 M/S
MV PEAK E VEL: 49 CM/S
MV STENOSIS PRESSURE HALF TIME: 44 MS
MV VALVE AREA P 1/2 METHOD: 5
NRBC BLD AUTO-RTO: 0 /100 WBCS
PLATELET # BLD AUTO: 177 THOUSANDS/UL (ref 149–390)
PMV BLD AUTO: 10.6 FL (ref 8.9–12.7)
POTASSIUM SERPL-SCNC: 4.8 MMOL/L (ref 3.5–5.3)
PROT SERPL-MCNC: 6.2 G/DL (ref 6.4–8.4)
RBC # BLD AUTO: 5.02 MILLION/UL (ref 3.88–5.62)
RIGHT ATRIUM AREA SYSTOLE A4C: 14 CM2
RIGHT VENTRICLE ID DIMENSION: 4.1 CM
SL CV LEFT ATRIUM LENGTH A2C: 3.5 CM
SL CV LV EF: 60
SL CV PED ECHO LEFT VENTRICLE DIASTOLIC VOLUME (MOD BIPLANE) 2D: 104 ML
SL CV PED ECHO LEFT VENTRICLE SYSTOLIC VOLUME (MOD BIPLANE) 2D: 28 ML
SODIUM SERPL-SCNC: 136 MMOL/L (ref 135–147)
TRICUSPID ANNULAR PLANE SYSTOLIC EXCURSION: 2.3 CM
VANCOMYCIN SERPL-MCNC: 15.7 UG/ML (ref 10–20)
WBC # BLD AUTO: 13.3 THOUSAND/UL (ref 4.31–10.16)

## 2024-04-14 PROCEDURE — NC001 PR NO CHARGE: Performed by: INTERNAL MEDICINE

## 2024-04-14 PROCEDURE — 93306 TTE W/DOPPLER COMPLETE: CPT | Performed by: INTERNAL MEDICINE

## 2024-04-14 PROCEDURE — 85027 COMPLETE CBC AUTOMATED: CPT | Performed by: STUDENT IN AN ORGANIZED HEALTH CARE EDUCATION/TRAINING PROGRAM

## 2024-04-14 PROCEDURE — 83735 ASSAY OF MAGNESIUM: CPT | Performed by: STUDENT IN AN ORGANIZED HEALTH CARE EDUCATION/TRAINING PROGRAM

## 2024-04-14 PROCEDURE — 80053 COMPREHEN METABOLIC PANEL: CPT | Performed by: STUDENT IN AN ORGANIZED HEALTH CARE EDUCATION/TRAINING PROGRAM

## 2024-04-14 PROCEDURE — 80202 ASSAY OF VANCOMYCIN: CPT | Performed by: PHYSICIAN ASSISTANT

## 2024-04-14 PROCEDURE — 93306 TTE W/DOPPLER COMPLETE: CPT

## 2024-04-14 PROCEDURE — 99232 SBSQ HOSP IP/OBS MODERATE 35: CPT | Performed by: INTERNAL MEDICINE

## 2024-04-14 RX ORDER — METHOCARBAMOL 750 MG/1
750 TABLET, FILM COATED ORAL EVERY 6 HOURS SCHEDULED
Status: DISCONTINUED | OUTPATIENT
Start: 2024-04-14 | End: 2024-04-18 | Stop reason: HOSPADM

## 2024-04-14 RX ORDER — PREDNISONE 20 MG/1
40 TABLET ORAL DAILY
Status: DISCONTINUED | OUTPATIENT
Start: 2024-04-15 | End: 2024-04-14

## 2024-04-14 RX ORDER — PREDNISONE 20 MG/1
40 TABLET ORAL DAILY
Status: COMPLETED | OUTPATIENT
Start: 2024-04-15 | End: 2024-04-16

## 2024-04-14 RX ORDER — GABAPENTIN 100 MG/1
100 CAPSULE ORAL 3 TIMES DAILY
Status: DISCONTINUED | OUTPATIENT
Start: 2024-04-14 | End: 2024-04-18 | Stop reason: HOSPADM

## 2024-04-14 RX ORDER — METHYLPREDNISOLONE SODIUM SUCCINATE 40 MG/ML
40 INJECTION, POWDER, LYOPHILIZED, FOR SOLUTION INTRAMUSCULAR; INTRAVENOUS DAILY
Status: DISCONTINUED | OUTPATIENT
Start: 2024-04-15 | End: 2024-04-14

## 2024-04-14 RX ADMIN — HYDROMORPHONE HYDROCHLORIDE 0.5 MG: 1 INJECTION, SOLUTION INTRAMUSCULAR; INTRAVENOUS; SUBCUTANEOUS at 19:18

## 2024-04-14 RX ADMIN — METHOCARBAMOL TABLETS 750 MG: 750 TABLET, COATED ORAL at 15:24

## 2024-04-14 RX ADMIN — DOCUSATE SODIUM 100 MG: 100 CAPSULE, LIQUID FILLED ORAL at 09:57

## 2024-04-14 RX ADMIN — GUAIFENESIN 600 MG: 600 TABLET ORAL at 09:57

## 2024-04-14 RX ADMIN — CEFTRIAXONE SODIUM 1000 MG: 10 INJECTION, POWDER, FOR SOLUTION INTRAVENOUS at 23:02

## 2024-04-14 RX ADMIN — CEFTRIAXONE SODIUM 1000 MG: 10 INJECTION, POWDER, FOR SOLUTION INTRAVENOUS at 00:12

## 2024-04-14 RX ADMIN — OXYCODONE HYDROCHLORIDE 10 MG: 10 TABLET ORAL at 20:59

## 2024-04-14 RX ADMIN — OXYCODONE HYDROCHLORIDE 10 MG: 10 TABLET ORAL at 16:48

## 2024-04-14 RX ADMIN — METHYLPREDNISOLONE SODIUM SUCCINATE 40 MG: 40 INJECTION, POWDER, FOR SOLUTION INTRAMUSCULAR; INTRAVENOUS at 09:57

## 2024-04-14 RX ADMIN — GUAIFENESIN 600 MG: 600 TABLET ORAL at 18:36

## 2024-04-14 RX ADMIN — HYDROMORPHONE HYDROCHLORIDE 0.5 MG: 1 INJECTION, SOLUTION INTRAMUSCULAR; INTRAVENOUS; SUBCUTANEOUS at 09:44

## 2024-04-14 RX ADMIN — GABAPENTIN 100 MG: 100 CAPSULE ORAL at 20:59

## 2024-04-14 RX ADMIN — NICOTINE 1 PATCH: 21 PATCH, EXTENDED RELEASE TRANSDERMAL at 09:57

## 2024-04-14 RX ADMIN — GABAPENTIN 100 MG: 100 CAPSULE ORAL at 15:24

## 2024-04-14 RX ADMIN — OXYCODONE HYDROCHLORIDE 10 MG: 10 TABLET ORAL at 04:36

## 2024-04-14 RX ADMIN — METHOCARBAMOL TABLETS 750 MG: 750 TABLET, COATED ORAL at 23:02

## 2024-04-14 RX ADMIN — OXYCODONE HYDROCHLORIDE 10 MG: 10 TABLET ORAL at 12:06

## 2024-04-14 RX ADMIN — ENOXAPARIN SODIUM 40 MG: 40 INJECTION SUBCUTANEOUS at 09:57

## 2024-04-14 RX ADMIN — DOCUSATE SODIUM 100 MG: 100 CAPSULE, LIQUID FILLED ORAL at 18:36

## 2024-04-14 NOTE — PLAN OF CARE
Problem: PAIN - ADULT  Goal: Verbalizes/displays adequate comfort level or baseline comfort level  Description: Interventions:  - Encourage patient to monitor pain and request assistance  - Assess pain using appropriate pain scale  - Administer analgesics based on type and severity of pain and evaluate response  - Implement non-pharmacological measures as appropriate and evaluate response  - Consider cultural and social influences on pain and pain management  - Notify physician/advanced practitioner if interventions unsuccessful or patient reports new pain  Outcome: Progressing     Problem: Knowledge Deficit  Goal: Patient/family/caregiver demonstrates understanding of disease process, treatment plan, medications, and discharge instructions  Description: Complete learning assessment and assess knowledge base.  Interventions:  - Provide teaching at level of understanding  - Provide teaching via preferred learning methods  Outcome: Progressing     Problem: RESPIRATORY - ADULT  Goal: Achieves optimal ventilation and oxygenation  Description: INTERVENTIONS:  - Assess for changes in respiratory status  - Assess for changes in mentation and behavior  - Position to facilitate oxygenation and minimize respiratory effort  - Oxygen administered by appropriate delivery if ordered  - Initiate smoking cessation education as indicated  - Encourage broncho-pulmonary hygiene including cough, deep breathe, Incentive Spirometry  - Assess the need for suctioning and aspirate as needed  - Assess and instruct to report SOB or any respiratory difficulty  - Respiratory Therapy support as indicated  Outcome: Progressing     Problem: Nutrition/Hydration-ADULT  Goal: Nutrient/Hydration intake appropriate for improving, restoring or maintaining nutritional needs  Description: Monitor and assess patient's nutrition/hydration status for malnutrition. Collaborate with interdisciplinary team and initiate plan and interventions as ordered.   Monitor patient's weight and dietary intake as ordered or per policy. Utilize nutrition screening tool and intervene as necessary. Determine patient's food preferences and provide high-protein, high-caloric foods as appropriate.     INTERVENTIONS:  - Monitor oral intake, urinary output, labs, and treatment plans  - Assess nutrition and hydration status and recommend course of action  - Evaluate amount of meals eaten  - Assist patient with eating if necessary   - Allow adequate time for meals  - Recommend/ encourage appropriate diets, oral nutritional supplements, and vitamin/mineral supplements  - Order, calculate, and assess calorie counts as needed  - Recommend, monitor, and adjust tube feedings and TPN/PPN based on assessed needs  - Assess need for intravenous fluids  - Provide specific nutrition/hydration education as appropriate  - Include patient/family/caregiver in decisions related to nutrition  Outcome: Progressing

## 2024-04-14 NOTE — TELEMEDICINE
e-Consult ([facility])  - Interventional Radiology  Ceasar March 63 y.o. male MRN: [id number]  Unit/Bed#: -01 Encounter: [id number]          Interventional Radiology has been consulted to evaluate Ceasar March    We were consulted by medicine service concerning this patient with metastatic disease.    Inpatient Consult to IR  Consult performed by: Agapito Vargas MD  Consult ordered by: Yohan Kong MD        04/14/24    Assessment/Recommendation:   63 year-old male with history of smoking, who presented to ED due to shortness of breath and weakness. CT of the chest, abdomen, and pelvis showed right lung mass, diffuse hepatic metastatic disease, and right supraclavicular lymphadenopathy.    We will assess right supraclavicular adenopathy for ultrasound-guided biopsy. If no sonographic window present, then we will proceed with biopsy of a liver lesion. Please make patient NPO after midnight and contact IR tomorrow morning to discus scheduling availability. PT/INR will be ordered with AM labs tomorrow.    >5 min, >50% time spent reviewing/analyzing record, written report will be generated in the EMR.     Thank you for allowing Interventional Radiology to participate in the care of Ceasar March. Please don't hesitate to call or TigerText us with any questions.     Agapito Vargas MD

## 2024-04-14 NOTE — PLAN OF CARE
Problem: PAIN - ADULT  Goal: Verbalizes/displays adequate comfort level or baseline comfort level  Description: Interventions:  - Encourage patient to monitor pain and request assistance  - Assess pain using appropriate pain scale  - Administer analgesics based on type and severity of pain and evaluate response  - Implement non-pharmacological measures as appropriate and evaluate response  - Consider cultural and social influences on pain and pain management  - Notify physician/advanced practitioner if interventions unsuccessful or patient reports new pain  Outcome: Progressing     Problem: DISCHARGE PLANNING  Goal: Discharge to home or other facility with appropriate resources  Description: INTERVENTIONS:  - Identify barriers to discharge w/patient and caregiver  - Arrange for needed discharge resources and transportation as appropriate  - Identify discharge learning needs (meds, wound care, etc.)  - Arrange for interpretive services to assist at discharge as needed  - Refer to Case Management Department for coordinating discharge planning if the patient needs post-hospital services based on physician/advanced practitioner order or complex needs related to functional status, cognitive ability, or social support system  Outcome: Progressing     Problem: Knowledge Deficit  Goal: Patient/family/caregiver demonstrates understanding of disease process, treatment plan, medications, and discharge instructions  Description: Complete learning assessment and assess knowledge base.  Interventions:  - Provide teaching at level of understanding  - Provide teaching via preferred learning methods  Outcome: Progressing     Problem: RESPIRATORY - ADULT  Goal: Achieves optimal ventilation and oxygenation  Description: INTERVENTIONS:  - Assess for changes in respiratory status  - Assess for changes in mentation and behavior  - Position to facilitate oxygenation and minimize respiratory effort  - Oxygen administered by appropriate  delivery if ordered  - Initiate smoking cessation education as indicated  - Encourage broncho-pulmonary hygiene including cough, deep breathe, Incentive Spirometry  - Assess the need for suctioning and aspirate as needed  - Assess and instruct to report SOB or any respiratory difficulty  - Respiratory Therapy support as indicated  Outcome: Progressing     Problem: Nutrition/Hydration-ADULT  Goal: Nutrient/Hydration intake appropriate for improving, restoring or maintaining nutritional needs  Description: Monitor and assess patient's nutrition/hydration status for malnutrition. Collaborate with interdisciplinary team and initiate plan and interventions as ordered.  Monitor patient's weight and dietary intake as ordered or per policy. Utilize nutrition screening tool and intervene as necessary. Determine patient's food preferences and provide high-protein, high-caloric foods as appropriate.     INTERVENTIONS:  - Monitor oral intake, urinary output, labs, and treatment plans  - Assess nutrition and hydration status and recommend course of action  - Evaluate amount of meals eaten  - Assist patient with eating if necessary   - Allow adequate time for meals  - Recommend/ encourage appropriate diets, oral nutritional supplements, and vitamin/mineral supplements  - Order, calculate, and assess calorie counts as needed  - Recommend, monitor, and adjust tube feedings and TPN/PPN based on assessed needs  - Assess need for intravenous fluids  - Provide specific nutrition/hydration education as appropriate  - Include patient/family/caregiver in decisions related to nutrition  Outcome: Progressing

## 2024-04-14 NOTE — CONSULTS
The patients vancomycin therapy has been completed/discontinued. Thank you for this consult. Pharmacy will sign off now.

## 2024-04-14 NOTE — ASSESSMENT & PLAN NOTE
2/2 blood culture showing Staphylococcus hominis. Sensitive to cefazolin  Continue ceftriaxone   TTE reviewed no vegetation  repeat blood culture negative at 24 hours

## 2024-04-14 NOTE — PROGRESS NOTES
Formerly [facility], [facility]  Progress Note  Name: Ceasar March I  MRN: [id number]  Unit/Bed#: -01 I Date of Admission: 4/11/2024   Date of Service: 4/14/2024 I Hospital Day: 2    Assessment/Plan   Lung mass  Assessment & Plan  Patient reports night sweats, recent weight loss, current every day smoker, severe back and flank pain  CT shows:   Right hilar and perihilar 5.5 x 6.3 x 5.2 cm mass consistent lung malignancy.Right hilar, mediastinal and right supraclavicular lymphadenopathy compatible with metastatic disease.Airspace consolidation in the right middle lobe adjacent to the hilum suggesting postobstructive pneumonia.Nodular thickening along the axial interstitium in the right lower lobe and the right major fissure suggesting lymphangitic spread of tumor. Diffuse hepatic metastases. Periportal, gastroesophageal and para-aortic lymphadenopathy  Hem/Onc consulted - will place IR consult for possible biopsy. Per Oncology if small cell they may consider transfer and start treatment.   Complaining of hip pain.  Suspicion for metastasis.  Start gabapentin 100 mg 3 times daily, Robaxin  Consult palliative care  Increase oxycodone dose  Follow-up echocardiogram result  No note from IR but will keep n.p.o. for tentative IR biopsy tomorrow.    * Pneumonia of right middle lobe due to infectious organism  Assessment & Plan  Presented with increased shortness of breath, chest tightness, night sweats, chills.  Found to have consolidation in right middle lobe with rt hilar/mediastinal mass with metastasis suspected to be lung primary along with postobstructive pneumonia.  Urine strep/Legionella negative.  Flu/RSV/COVID-negative.  Elevated procalcitonin.  Normal leukocyte count.  Blood culture without any growth.    Follow-up sputum culture  Continue ceftriaxone  Start oral prednisone 40 mg daily for 2 days to complete 5-day course for suspected COPD exacerbation  Blood Cultures pending  IV fluids 75  cc/h    Severe protein-calorie malnutrition (HCC)  Assessment & Plan  Malnutrition Findings:   Adult Malnutrition type: Chronic illness  Adult Degree of Malnutrition: Other severe protein calorie malnutrition  Malnutrition Characteristics: Inadequate energy, Weight loss                  360 Statement: Malnutrition related to chronic illness as evidenced by >7.5% body weight loss in 3 months, <75% energy intake compared to estimated energy needs for >1-month.  To treat with oral diet as tolerated.    BMI Findings:           Body mass index is 23.73 kg/m².       Bacteremia  Assessment & Plan  2/2 blood culture showing Staphylococcus hominis.  Continue vancomycin, ceftriaxone until susceptibilities are available  Hold off on echo  Follow-up repeat blood culture    Transaminitis  Assessment & Plan  Lab Results   Component Value Date     (H) 04/14/2024     (H) 04/14/2024    TBILI 2.73 (H) 04/14/2024    ALKPHOS 525 (H) 04/14/2024     Like secondary to liver metastasis  Avoid hepatoxins  Will cont to trend     Tobacco abuse  Assessment & Plan  Reports an average 1-1.5 pack daily cigarette smoke  Sincere smoking cessation education provided  Declining nicotine replacement therapy at this time               VTE Pharmacologic Prophylaxis: VTE Score: 7 Moderate Risk (Score 3-4) - Pharmacological DVT Prophylaxis Ordered: enoxaparin (Lovenox).    Mobility:   Basic Mobility Inpatient Raw Score: 24  JH-HLM Goal: 8: Walk 250 feet or more  JH-HLM Achieved: 7: Walk 25 feet or more  JH-HLM Goal achieved. Continue to encourage appropriate mobility.    Patient Centered Rounds: I performed bedside rounds with nursing staff today.   Discussions with Specialists or Other Care Team Provider: none    Education and Discussions with Family / Patient: Patient declined call to .     Total Time Spent on Date of Encounter in care of patient: 55 mins. This time was spent on one or more of the following: performing  physical exam; counseling and coordination of care; obtaining or reviewing history; documenting in the medical record; reviewing/ordering tests, medications or procedures; communicating with other healthcare professionals and discussing with patient's family/caregivers.    Current Length of Stay: 2 day(s)  Current Patient Status: Inpatient   Certification Statement: The patient will continue to require additional inpatient hospital stay due to PNA, positive blood cultures, lung mass  Discharge Plan: Anticipate discharge in 48-72 hrs to TBD    Code Status: Level 1 - Full Code    Subjective:   Still complaining of hip pain.    Objective:     Vitals:   Temp (24hrs), Av.7 °F (36.5 °C), Min:97.5 °F (36.4 °C), Max:98 °F (36.7 °C)    Temp:  [97.5 °F (36.4 °C)-98 °F (36.7 °C)] 98 °F (36.7 °C)  HR:  [71-94] 87  Resp:  [17] 17  BP: (119-126)/(74-78) 119/78  SpO2:  [93 %-100 %] 95 %  Body mass index is 23.73 kg/m².     Input and Output Summary (last 24 hours):   No intake or output data in the 24 hours ending 24 1449      Physical Exam:   Physical Exam  Vitals reviewed.   Constitutional:       General: He is not in acute distress.     Appearance: Normal appearance.   HENT:      Head: Normocephalic and atraumatic.   Eyes:      General: No scleral icterus.        Right eye: No discharge.         Left eye: No discharge.   Cardiovascular:      Rate and Rhythm: Normal rate and regular rhythm.      Pulses: Normal pulses.      Heart sounds: Normal heart sounds.   Pulmonary:      Effort: Pulmonary effort is normal. No respiratory distress.      Breath sounds: Normal breath sounds. No wheezing or rales.   Chest:      Chest wall: No tenderness.   Abdominal:      General: Abdomen is flat. Bowel sounds are normal. There is no distension.      Palpations: Abdomen is soft. There is mass.      Tenderness: There is no abdominal tenderness. There is no guarding.   Musculoskeletal:         General: No deformity. Normal range of  motion.      Cervical back: Normal range of motion and neck supple.      Right lower leg: No edema.      Left lower leg: No edema.   Skin:     General: Skin is warm.      Coloration: Skin is not jaundiced or pale.      Findings: No rash.   Neurological:      General: No focal deficit present.      Mental Status: He is alert and oriented to person, place, and time. Mental status is at baseline.      Cranial Nerves: No cranial nerve deficit.      Motor: No weakness.   Psychiatric:         Mood and Affect: Mood normal.         Behavior: Behavior normal.          Additional Data:     Labs:  Results from last 7 days   Lab Units 04/14/24  0432 04/13/24  0439 04/11/24  2155   WBC Thousand/uL 13.30*   < > 7.70   HEMOGLOBIN g/dL 14.3   < > 15.5   HEMATOCRIT % 44.9   < > 46.6   PLATELETS Thousands/uL 177   < > 206   LYMPHO PCT %  --   --  15   MONO PCT %  --   --  9   EOS PCT %  --   --  0    < > = values in this interval not displayed.     Results from last 7 days   Lab Units 04/14/24  0432   SODIUM mmol/L 136   POTASSIUM mmol/L 4.8   CHLORIDE mmol/L 103   CO2 mmol/L 29   BUN mg/dL 17   CREATININE mg/dL 0.77   ANION GAP mmol/L 4   CALCIUM mg/dL 8.8   ALBUMIN g/dL 2.9*   TOTAL BILIRUBIN mg/dL 2.73*   ALK PHOS U/L 525*   ALT U/L 162*   AST U/L 236*   GLUCOSE RANDOM mg/dL 101                 Results from last 7 days   Lab Units 04/13/24  0439 04/11/24  2155   PROCALCITONIN ng/ml 14.05* 13.19*       Lines/Drains:  Invasive Devices       Peripheral Intravenous Line  Duration             Peripheral IV 04/11/24 Left Antecubital 2 days                          Imaging: Reviewed radiology reports from this admission including: chest CT scan    Recent Cultures (last 7 days):   Results from last 7 days   Lab Units 04/13/24  1700 04/12/24  0230 04/12/24  0117 04/12/24  0116   BLOOD CULTURE  Received in Microbiology Lab. Culture in Progress.  Received in Microbiology Lab. Culture in Progress.  --  Staphylococcus hominis* Staphylococcus  hominis*   GRAM STAIN RESULT   --   --  Gram positive cocci in clusters* Gram positive cocci in clusters*   LEGIONELLA URINARY ANTIGEN   --  Negative  --   --        Last 24 Hours Medication List:   Current Facility-Administered Medications   Medication Dose Route Frequency Provider Last Rate    acetaminophen  650 mg Oral Q6H PRN Odette Mikel-[place], CRNP      albuterol  2 puff Inhalation Q4H PRN Jose Daniel Barahona MD      calcium carbonate  1,000 mg Oral Daily PRN Odette Mikel-[place], CRNP      cefTRIAXone  1,000 mg Intravenous Q24H Odette Mikel-[place], CRNP 1,000 mg (04/14/24 0012)    Diclofenac Sodium  2 g Topical 4x Daily Odette Bethel[place], CRNP      docusate sodium  100 mg Oral BID Odette [place], CRNP      enoxaparin  40 mg Subcutaneous Daily Odette [place]hel-[place], CRNP      gabapentin  100 mg Oral TID Ashish Gordon MD      guaiFENesin  600 mg Oral BID Odette Morin-[place], CRNP      HYDROmorphone  0.5 mg Intravenous Q3H PRN Yohan Kong MD      lactated ringers  75 mL/hr Intravenous Continuous Yohan Kong MD 75 mL/hr (04/13/24 1949)    methocarbamol  750 mg Oral Q6H [facility] Ashish Gordon MD      naloxone  0.04 mg Intravenous Q1MIN PRN Odette Mcbride, CRNP      nicotine  1 patch Transdermal Daily Odette Mcbride, CRNP      ondansetron  4 mg Intravenous Q6H PRN Odette Mcbride, CRNP      oxyCODONE  5 mg Oral Q4H PRN Yohan Kong MD      Or    oxyCODONE  10 mg Oral Q4H PRN Yohan Kong MD      [START ON 4/15/2024] predniSONE  40 mg Oral Daily Ashish Gordon MD          Today, Patient Was Seen By: Ashish Gordon MD    **Please Note: This note may have been constructed using a voice recognition system.**

## 2024-04-14 NOTE — ASSESSMENT & PLAN NOTE
Lab Results   Component Value Date     (H) 04/14/2024     (H) 04/14/2024    TBILI 2.73 (H) 04/14/2024    ALKPHOS 525 (H) 04/14/2024     Like secondary to liver metastasis  Avoid hepatoxins  Will cont to trend

## 2024-04-14 NOTE — ASSESSMENT & PLAN NOTE
Reports an average 1-1.5 pack daily cigarette smoke  Sincere smoking cessation education provided  Declining nicotine replacement therapy at this time   none

## 2024-04-14 NOTE — ASSESSMENT & PLAN NOTE
Presented with increased shortness of breath, chest tightness, night sweats, chills.  Found to have consolidation in right middle lobe with rt hilar/mediastinal mass with metastasis suspected to be lung primary along with postobstructive pneumonia.  Urine strep/Legionella negative.  Flu/RSV/COVID-negative.  Elevated procalcitonin.  Normal leukocyte count.  Blood culture without any growth.      Continue ceftriaxone (D4)  Start oral prednisone 40 mg daily for 2 days to complete 5-day course for suspected COPD exacerbation  Repeated blood culture revealed no growth at 24 hours  IV fluids 75 cc/h  Repeat procalcitonin

## 2024-04-14 NOTE — PROGRESS NOTES
Ceasar March is a 63 y.o. male who is currently ordered Vancomycin IV with management by the Pharmacy Consult service.  Relevant clinical data and objective / subjective history reviewed.  Vancomycin Assessment:  Indication and Goal AUC/Trough: Bacteremia (goal -600, trough >10)  Clinical Status: stable  Micro:     Renal Function:  SCr: 0.77 mg/dL  CrCl: 94.8 mL/min  Renal replacement: Not on dialysis  Days of Therapy: 3  Current Dose: 1250mg IV q12h  Vancomycin Plan:  New Dosing: continue current dose, next level 4/21  Estimated AUC: 462 mcg*hr/mL  Estimated Trough: 11.5 mcg/mL  Next Level: 04/21/24 with AM labs  Renal Function Monitoring: Daily BMP and UOP  Pharmacy will continue to follow closely for s/sx of nephrotoxicity, infusion reactions and appropriateness of therapy.  BMP and CBC will be ordered per protocol. We will continue to follow the patient’s culture results and clinical progress daily.    Valery Alcocer, Pharmacist

## 2024-04-14 NOTE — ASSESSMENT & PLAN NOTE
Patient reports night sweats, recent weight loss, current every day smoker, severe back and flank pain  CT shows:   Right hilar and perihilar 5.5 x 6.3 x 5.2 cm mass consistent lung malignancy.Right hilar, mediastinal and right supraclavicular lymphadenopathy compatible with metastatic disease.Airspace consolidation in the right middle lobe adjacent to the hilum suggesting postobstructive pneumonia.Nodular thickening along the axial interstitium in the right lower lobe and the right major fissure suggesting lymphangitic spread of tumor. Diffuse hepatic metastases. Periportal, gastroesophageal and para-aortic lymphadenopathy  Hem/Onc consulted - will place IR consult for possible biopsy. Per Oncology if small cell they may consider transfer and start treatment.   Complaining of hip pain.  Suspicion for metastasis.  Start gabapentin 100 mg 3 times daily, Robaxin  Consult palliative care  Increase oxycodone dose  Follow-up echocardiogram result  Outpatient follow-up with medical oncology.

## 2024-04-15 ENCOUNTER — APPOINTMENT (OUTPATIENT)
Dept: NON INVASIVE DIAGNOSTICS | Facility: HOSPITAL | Age: 64
DRG: 180 | End: 2024-04-15
Attending: INTERNAL MEDICINE
Payer: COMMERCIAL

## 2024-04-15 ENCOUNTER — APPOINTMENT (INPATIENT)
Dept: CT IMAGING | Facility: HOSPITAL | Age: 64
DRG: 180 | End: 2024-04-15
Attending: RADIOLOGY
Payer: COMMERCIAL

## 2024-04-15 PROBLEM — R16.0 LIVER MASS: Status: ACTIVE | Noted: 2024-04-15

## 2024-04-15 PROBLEM — Z51.5 PALLIATIVE CARE PATIENT: Status: ACTIVE | Noted: 2024-04-15

## 2024-04-15 PROBLEM — G89.3 CANCER RELATED PAIN: Status: ACTIVE | Noted: 2024-04-15

## 2024-04-15 LAB
ALBUMIN SERPL BCP-MCNC: 2.9 G/DL (ref 3.5–5)
ALP SERPL-CCNC: 696 U/L (ref 34–104)
ALT SERPL W P-5'-P-CCNC: 207 U/L (ref 7–52)
ANION GAP SERPL CALCULATED.3IONS-SCNC: 3 MMOL/L (ref 4–13)
AST SERPL W P-5'-P-CCNC: 289 U/L (ref 13–39)
BACTERIA BLD CULT: ABNORMAL
BASOPHILS # BLD MANUAL: 0.12 THOUSAND/UL (ref 0–0.1)
BASOPHILS NFR MAR MANUAL: 1 % (ref 0–1)
BILIRUB SERPL-MCNC: 3.72 MG/DL (ref 0.2–1)
BUN SERPL-MCNC: 21 MG/DL (ref 5–25)
CALCIUM ALBUM COR SERPL-MCNC: 9.5 MG/DL (ref 8.3–10.1)
CALCIUM SERPL-MCNC: 8.6 MG/DL (ref 8.4–10.2)
CHLORIDE SERPL-SCNC: 104 MMOL/L (ref 96–108)
CO2 SERPL-SCNC: 30 MMOL/L (ref 21–32)
CREAT SERPL-MCNC: 0.84 MG/DL (ref 0.6–1.3)
EOSINOPHIL # BLD MANUAL: 0 THOUSAND/UL (ref 0–0.4)
EOSINOPHIL NFR BLD MANUAL: 0 % (ref 0–6)
ERYTHROCYTE [DISTWIDTH] IN BLOOD BY AUTOMATED COUNT: 14.5 % (ref 11.6–15.1)
GFR SERPL CREATININE-BSD FRML MDRD: 93 ML/MIN/1.73SQ M
GLUCOSE SERPL-MCNC: 106 MG/DL (ref 65–140)
GRAM STN SPEC: ABNORMAL
GRAM STN SPEC: ABNORMAL
HAV IGM SER QL: NORMAL
HBV CORE IGM SER QL: NORMAL
HBV SURFACE AG SER QL: NORMAL
HCT VFR BLD AUTO: 44.6 % (ref 36.5–49.3)
HCV AB SER QL: NORMAL
HGB BLD-MCNC: 14.7 G/DL (ref 12–17)
INR PPP: 1.12 (ref 0.84–1.19)
LYMPHOCYTES # BLD AUTO: 1.69 THOUSAND/UL (ref 0.6–4.47)
LYMPHOCYTES # BLD AUTO: 14 % (ref 14–44)
MCH RBC QN AUTO: 29 PG (ref 26.8–34.3)
MCHC RBC AUTO-ENTMCNC: 33 G/DL (ref 31.4–37.4)
MCV RBC AUTO: 88 FL (ref 82–98)
MONOCYTES # BLD AUTO: 0.48 THOUSAND/UL (ref 0–1.22)
MONOCYTES NFR BLD: 4 % (ref 4–12)
MYELOCYTES NFR BLD MANUAL: 3 % (ref 0–1)
NEUTROPHILS # BLD MANUAL: 9.41 THOUSAND/UL (ref 1.85–7.62)
NEUTS SEG NFR BLD AUTO: 78 % (ref 43–75)
PLATELET # BLD AUTO: 171 THOUSANDS/UL (ref 149–390)
PLATELET BLD QL SMEAR: ADEQUATE
PMV BLD AUTO: 10.2 FL (ref 8.9–12.7)
POTASSIUM SERPL-SCNC: 4.9 MMOL/L (ref 3.5–5.3)
PROT SERPL-MCNC: 6.2 G/DL (ref 6.4–8.4)
PROTHROMBIN TIME: 15 SECONDS (ref 11.6–14.5)
RBC # BLD AUTO: 5.07 MILLION/UL (ref 3.88–5.62)
RBC MORPH BLD: NORMAL
S AUREUS+CONS DNA BLD POS NAA+NON-PROBE: DETECTED
SODIUM SERPL-SCNC: 137 MMOL/L (ref 135–147)
WBC # BLD AUTO: 12.07 THOUSAND/UL (ref 4.31–10.16)

## 2024-04-15 PROCEDURE — 80074 ACUTE HEPATITIS PANEL: CPT | Performed by: INTERNAL MEDICINE

## 2024-04-15 PROCEDURE — 85007 BL SMEAR W/DIFF WBC COUNT: CPT | Performed by: INTERNAL MEDICINE

## 2024-04-15 PROCEDURE — 99222 1ST HOSP IP/OBS MODERATE 55: CPT | Performed by: NURSE PRACTITIONER

## 2024-04-15 PROCEDURE — 88341 IMHCHEM/IMCYTCHM EA ADD ANTB: CPT | Performed by: PATHOLOGY

## 2024-04-15 PROCEDURE — NC001 PR NO CHARGE: Performed by: RADIOLOGY

## 2024-04-15 PROCEDURE — 88342 IMHCHEM/IMCYTCHM 1ST ANTB: CPT | Performed by: PATHOLOGY

## 2024-04-15 PROCEDURE — 88307 TISSUE EXAM BY PATHOLOGIST: CPT | Performed by: PATHOLOGY

## 2024-04-15 PROCEDURE — 47000 NEEDLE BIOPSY OF LIVER PERQ: CPT | Performed by: RADIOLOGY

## 2024-04-15 PROCEDURE — 85027 COMPLETE CBC AUTOMATED: CPT | Performed by: INTERNAL MEDICINE

## 2024-04-15 PROCEDURE — 77012 CT SCAN FOR NEEDLE BIOPSY: CPT | Performed by: RADIOLOGY

## 2024-04-15 PROCEDURE — 88360 TUMOR IMMUNOHISTOCHEM/MANUAL: CPT | Performed by: PATHOLOGY

## 2024-04-15 PROCEDURE — 80053 COMPREHEN METABOLIC PANEL: CPT | Performed by: INTERNAL MEDICINE

## 2024-04-15 PROCEDURE — 85610 PROTHROMBIN TIME: CPT | Performed by: INTERNAL MEDICINE

## 2024-04-15 RX ORDER — MIDAZOLAM HYDROCHLORIDE 2 MG/2ML
INJECTION, SOLUTION INTRAMUSCULAR; INTRAVENOUS AS NEEDED
Status: COMPLETED | OUTPATIENT
Start: 2024-04-15 | End: 2024-04-15

## 2024-04-15 RX ORDER — OXYCODONE HYDROCHLORIDE 10 MG/1
10 TABLET ORAL EVERY 4 HOURS
Status: DISCONTINUED | OUTPATIENT
Start: 2024-04-15 | End: 2024-04-18 | Stop reason: HOSPADM

## 2024-04-15 RX ORDER — FENTANYL CITRATE 50 UG/ML
INJECTION, SOLUTION INTRAMUSCULAR; INTRAVENOUS AS NEEDED
Status: COMPLETED | OUTPATIENT
Start: 2024-04-15 | End: 2024-04-15

## 2024-04-15 RX ORDER — LIDOCAINE WITH 8.4% SOD BICARB 0.9%(10ML)
SYRINGE (ML) INJECTION AS NEEDED
Status: COMPLETED | OUTPATIENT
Start: 2024-04-15 | End: 2024-04-15

## 2024-04-15 RX ORDER — SENNOSIDES 8.6 MG
1 TABLET ORAL 2 TIMES DAILY
Status: DISCONTINUED | OUTPATIENT
Start: 2024-04-15 | End: 2024-04-18 | Stop reason: HOSPADM

## 2024-04-15 RX ORDER — POLYETHYLENE GLYCOL 3350 17 G/17G
17 POWDER, FOR SOLUTION ORAL DAILY PRN
Status: DISCONTINUED | OUTPATIENT
Start: 2024-04-15 | End: 2024-04-18 | Stop reason: HOSPADM

## 2024-04-15 RX ADMIN — OXYCODONE HYDROCHLORIDE 10 MG: 10 TABLET ORAL at 16:00

## 2024-04-15 RX ADMIN — GABAPENTIN 100 MG: 100 CAPSULE ORAL at 17:08

## 2024-04-15 RX ADMIN — MIDAZOLAM HYDROCHLORIDE 1 MG: 1 INJECTION, SOLUTION INTRAMUSCULAR; INTRAVENOUS at 11:02

## 2024-04-15 RX ADMIN — DOCUSATE SODIUM 100 MG: 100 CAPSULE, LIQUID FILLED ORAL at 09:16

## 2024-04-15 RX ADMIN — METHOCARBAMOL TABLETS 750 MG: 750 TABLET, COATED ORAL at 23:05

## 2024-04-15 RX ADMIN — OXYCODONE HYDROCHLORIDE 10 MG: 10 TABLET ORAL at 09:57

## 2024-04-15 RX ADMIN — PREDNISONE 40 MG: 20 TABLET ORAL at 09:16

## 2024-04-15 RX ADMIN — OXYCODONE HYDROCHLORIDE 10 MG: 10 TABLET ORAL at 04:37

## 2024-04-15 RX ADMIN — MIDAZOLAM HYDROCHLORIDE 1 MG: 1 INJECTION, SOLUTION INTRAMUSCULAR; INTRAVENOUS at 10:48

## 2024-04-15 RX ADMIN — GABAPENTIN 100 MG: 100 CAPSULE ORAL at 23:05

## 2024-04-15 RX ADMIN — SODIUM CHLORIDE, SODIUM LACTATE, POTASSIUM CHLORIDE, AND CALCIUM CHLORIDE 75 ML/HR: .6; .31; .03; .02 INJECTION, SOLUTION INTRAVENOUS at 20:14

## 2024-04-15 RX ADMIN — SENNOSIDES 8.6 MG: 8.6 TABLET, FILM COATED ORAL at 17:49

## 2024-04-15 RX ADMIN — OXYCODONE HYDROCHLORIDE 10 MG: 10 TABLET ORAL at 20:14

## 2024-04-15 RX ADMIN — METHOCARBAMOL TABLETS 750 MG: 750 TABLET, COATED ORAL at 05:17

## 2024-04-15 RX ADMIN — FENTANYL CITRATE 50 MCG: 50 INJECTION, SOLUTION INTRAMUSCULAR; INTRAVENOUS at 10:48

## 2024-04-15 RX ADMIN — POLYETHYLENE GLYCOL 3350 17 G: 17 POWDER, FOR SOLUTION ORAL at 17:59

## 2024-04-15 RX ADMIN — CEFTRIAXONE SODIUM 1000 MG: 10 INJECTION, POWDER, FOR SOLUTION INTRAVENOUS at 23:07

## 2024-04-15 RX ADMIN — HYDROMORPHONE HYDROCHLORIDE 0.5 MG: 1 INJECTION, SOLUTION INTRAMUSCULAR; INTRAVENOUS; SUBCUTANEOUS at 07:28

## 2024-04-15 RX ADMIN — METHOCARBAMOL TABLETS 750 MG: 750 TABLET, COATED ORAL at 17:49

## 2024-04-15 RX ADMIN — SODIUM CHLORIDE, SODIUM LACTATE, POTASSIUM CHLORIDE, AND CALCIUM CHLORIDE 75 ML/HR: .6; .31; .03; .02 INJECTION, SOLUTION INTRAVENOUS at 04:36

## 2024-04-15 RX ADMIN — FENTANYL CITRATE 50 MCG: 50 INJECTION, SOLUTION INTRAMUSCULAR; INTRAVENOUS at 11:02

## 2024-04-15 RX ADMIN — GABAPENTIN 100 MG: 100 CAPSULE ORAL at 09:16

## 2024-04-15 RX ADMIN — Medication 10 ML: at 11:02

## 2024-04-15 NOTE — BRIEF OP NOTE (RAD/CATH)
INTERVENTIONAL RADIOLOGY PROCEDURE NOTE    Date: 4/15/2024    Procedure: IR BIOPSY LIVER MASS     Preoperative diagnosis:   1. Pneumonia    2. Lung cancer (HCC)    3. Metastasis to liver (HCC)    4. Lung mass       Postoperative diagnosis: Same.    Surgeon: Red Chauhan MD     Assistant: None. No qualified resident was available.    Blood loss: minimal    Specimens: 18 gauge core x 3    Findings: Successful CT guided liver mass biopsy.    Complications: None immediate.    Anesthesia: conscious sedation

## 2024-04-15 NOTE — CASE MANAGEMENT
Case Management Assessment & Discharge Planning Note    Patient name Ceasar March  Location /-01 MRN [id number]  : 1960 Date 4/15/2024       Current Admission Date: 2024  Current Admission Diagnosis:Pneumonia of right middle lobe due to infectious organism   Patient Active Problem List    Diagnosis Date Noted    Liver mass 04/15/2024    Cancer related pain 04/15/2024    Palliative care patient 04/15/2024    Bacteremia 2024    Severe protein-calorie malnutrition (HCC) 2024    Pneumonia of right middle lobe due to infectious organism 2024    Lung mass 2024    Transaminitis 2024    Tobacco abuse 04/10/2019      LOS (days): 3  Geometric Mean LOS (GMLOS) (days): 4.9  Days to GMLOS:1.2     OBJECTIVE:    Risk of Unplanned Readmission Score: 13.37         Current admission status: Inpatient       Preferred Pharmacy:   [id number] [address]  [facility] [address]  Phone: [phone] Fax: [phone]    Primary Care Provider: No primary care provider on file.    Primary Insurance: BLUE CROSS  Secondary Insurance:     ASSESSMENT:  Active Health Care Proxies       Anna March [facility] Care Representative - Daughter   Primary Phone: [phone] (Mobile)  Home Phone: [phone]                 Advance Directives  Does patient have a Health Care POA?: No  Was patient offered paperwork?: Yes (CM to give patient paperwork)  Does patient currently have a Health Care decision maker?: Yes, please see Health Care Proxy section  Does patient have Advance Directives?: No  Was patient offered paperwork?: Yes (CM to give patient paperwork)  Primary Contact: Anna March, [place]    Readmission Root Cause  30 Day Readmission: No    Patient Information  Admitted from:: Home  Mental Status: Alert  During Assessment patient was accompanied by: Daughter  Assessment information provided  by:: Daughter  Primary Caregiver: Self  Support Systems: Self, Spouse/significant other, Son, Daughter, Friends/neighbors  County of Residence: [place]  What [facility] do you live in?: [place]  Home entry access options. Select all that apply.: Stairs  Number of steps to enter home.: 4  Do the steps have railings?: Yes  Type of Current Residence: 2 story home  Upon entering residence, is there a bedroom on the main floor (no further steps)?: No  A bedroom is located on the following floor levels of residence (select all that apply):: 2nd Floor  Upon entering residence, is there a bathroom on the main floor (no further steps)?: Yes  Number of steps to 2nd floor from main floor: One Flight  Living Arrangements: Lives w/ Spouse/significant other, Lives w/ Daughter  Is patient a ?: No    Activities of Daily Living Prior to Admission  Functional Status: Independent  Completes ADLs independently?: Yes  Ambulates independently?: Yes  Does patient use assisted devices?: No  Does patient currently own DME?: No  Does patient have a history of Outpatient Therapy (PT/OT)?: No  Does the patient have a history of Short-Term Rehab?: No  Does patient have a history of HHC?: No  Does patient currently have HHC?: No    Patient Information Continued  Income Source: Self-employed (To file for SSI)  Does patient have prescription coverage?: Yes  Does patient receive dialysis treatments?: No  Does patient have a history of substance abuse?: No  Does patient have a history of Mental Health Diagnosis?: No    PHQ 2/9 Screening   Reviewed PHQ 2/9 Depression Screening Score?: No    Means of Transportation  Means of Transport to Appts:: Drives Self      Social Determinants of Health (SDOH)      Flowsheet Row Most Recent Value   Housing Stability    In the last 12 months, was there a time when you were not able to pay the mortgage or rent on time? N   In the last 12 months, how many places have you lived? 1   In the last 12 months, was  there a time when you did not have a steady place to sleep or slept in a shelter (including now)? N   Transportation Needs    In the past 12 months, has lack of transportation kept you from medical appointments or from getting medications? no   In the past 12 months, has lack of transportation kept you from meetings, work, or from getting things needed for daily living? No   Food Insecurity    Within the past 12 months, you worried that your food would run out before you got the money to buy more. Never true   Within the past 12 months, the food you bought just didn't last and you didn't have money to get more. Never true   Utilities    In the past 12 months has the electric, gas, oil, or water company threatened to shut off services in your home? No            DISCHARGE DETAILS:    Discharge planning discussed with:: Daughter, Anna March  Freedom of Choice: Yes  Comments - Freedom of Choice: CM discussed discharge planning and freedom of choice if services are recommended by [facility].  CM contacted family/caregiver?: Yes  Were Treatment Team discharge recommendations reviewed with patient/caregiver?: Yes  Did patient/caregiver verbalize understanding of patient care needs?: Yes  Were patient/caregiver advised of the risks associated with not following Treatment Team discharge recommendations?: Yes    Contacts  Patient Contacts: Anna Eliahannah, [place]  Relationship to Patient:: Family  Contact Method: Phone  Phone Number: [phone]  Reason/Outcome: Continuity of Care, Discharge Planning    Requested Home Health Care         Is the patient interested in HHC at discharge?: No    DME Referral Provided  Referral made for DME?: No    Other Referral/Resources/Interventions Provided:  Interventions: None Indicated    Would you like to participate in our [facility] service program?  : No - Declined    Treatment Team Recommendation: Home  Discharge Destination Plan:: Home  Transport at Discharge : Family

## 2024-04-15 NOTE — ASSESSMENT & PLAN NOTE
Patient with increasing back pain rating 9-10/10 over past 4-5 weeks.  He did not take any medication at home.   Oxycodone 10 mg Q 4 hours scheduled with hold parameters  Robaxin Q 6 hours scheduled  Hydromorphone 0.5 mg Q 3 hours prn  Started bowel regimen

## 2024-04-15 NOTE — ASSESSMENT & PLAN NOTE
Malnutrition Findings:   Adult Malnutrition type: Chronic illness  Adult Degree of Malnutrition: Other severe protein calorie malnutrition  Malnutrition Characteristics: Inadequate energy, Weight loss     360 Statement: Malnutrition related to chronic illness as evidenced by >7.5% body weight loss in 3 months, <75% energy intake compared to estimated energy needs for >1-month.  To treat with oral diet as tolerated.    BMI Findings:    Body mass index is 23.73 kg/m².       -  patient reports 20 pound weight loss in past 3 months.  - poor appetite  -  would benefit from outpatient oncology nutrition.  -  Encouraged use of supplements if unable to consume enough protein, calories.

## 2024-04-15 NOTE — DISCHARGE INSTRUCTIONS
Percutaneous Liver Biopsy   WHAT YOU NEED TO KNOW:   A PLB is a procedure to remove a sample of tissue from your liver. The sample can be sent to a lab and tested for liver disease, cancer, or infection. After the procedure you may have pain and bruising at the biopsy site. You may also have pain in your right shoulder. These symptoms should get better in 48 to 72 hours.    DISCHARGE INSTRUCTIONS:     Contact Interventional Radiology at [phone]   [facility] PATIENTS: Contact Interventional Radiology at [phone]   [facility] PATIENTS: Contact Interventional Radiology at [phone] if:    Fever greater than 101 or chills  You have severe pain in your abdomen.    Your abdomen is larger than usual and feels hard.    Your neck is more swollen and you have trouble swallowing.    You feel weak or dizzy.    Your heart is beating faster than usual.   Your pain does not get better after you take pain medicine.    Your wound is red, swollen, or draining pus.   You have nausea or are vomiting.   Your skin is itchy, swollen, or you have a rash.   You have questions or concerns about your condition or care.  Medicines:   Acetaminophen decreases pain and fever. It is available without a doctor's order. Acetaminophen can cause liver damage if not taken correctly.   Take your home medicine as directed.  Resume your normal diet. Small sips of flat soda will help with mild nausea.  Self-care:   Rest as directed. Do not play sports, exercise, or lift anything heavier than 5 pounds for up to 1 week.     Apply firm, steady pressure if bleeding occurs. A small amount of bleeding from your wound is possible. Apply pressure with a clean gauze or towel for 5 to 10 minutes. Call 911 if bleeding becomes heavy or does not stop.    Ask your healthcare provider when to take your blood thinner or antiplatelet medicine. You may need to wait 24 to 72 hours to take your medicine. This will prevent bleeding.  Follow up with your healthcare provider  as directed: Write down your questions so you remember to ask them during your visits.

## 2024-04-15 NOTE — PROGRESS NOTES
[facility]  Progress Note  Name: Ceasar March I  MRN: [id number]  Unit/Bed#: -01 I Date of Admission: 4/11/2024   Date of Service: 4/15/2024 I Hospital Day: 3    Assessment/Plan   Liver mass  Assessment & Plan  Likely due metastasis  S/p IR liver mass biopsy on 4/15/2024    Severe protein-calorie malnutrition (HCC)  Assessment & Plan  Malnutrition Findings:   Adult Malnutrition type: Chronic illness  Adult Degree of Malnutrition: Other severe protein calorie malnutrition  Malnutrition Characteristics: Inadequate energy, Weight loss                  360 Statement: Malnutrition related to chronic illness as evidenced by >7.5% body weight loss in 3 months, <75% energy intake compared to estimated energy needs for >1-month.  To treat with oral diet as tolerated.    BMI Findings:           Body mass index is 23.73 kg/m².       Bacteremia  Assessment & Plan  2/2 blood culture showing Staphylococcus hominis. Sensitive to cefazolin  Continue ceftriaxone   TTE reviewed no vegetation  repeat blood culture negative at 24 hours    Transaminitis  Assessment & Plan  Lab Results   Component Value Date     (H) 04/14/2024     (H) 04/14/2024    TBILI 2.73 (H) 04/14/2024    ALKPHOS 525 (H) 04/14/2024     Like secondary to liver metastasis  Avoid hepatoxins  Will cont to trend     Lung mass  Assessment & Plan  Patient reports night sweats, recent weight loss, current every day smoker, severe back and flank pain  CT shows:   Right hilar and perihilar 5.5 x 6.3 x 5.2 cm mass consistent lung malignancy.Right hilar, mediastinal and right supraclavicular lymphadenopathy compatible with metastatic disease.Airspace consolidation in the right middle lobe adjacent to the hilum suggesting postobstructive pneumonia.Nodular thickening along the axial interstitium in the right lower lobe and the right major fissure suggesting lymphangitic spread of tumor. Diffuse hepatic metastases. Periportal,  gastroesophageal and para-aortic lymphadenopathy  Hem/Onc consulted - will place IR consult for possible biopsy. Per Oncology if small cell they may consider transfer and start treatment.   Complaining of hip pain.  Suspicion for metastasis.  Start gabapentin 100 mg 3 times daily, Robaxin  Consult palliative care  Increase oxycodone dose  Follow-up echocardiogram result  Outpatient follow-up with medical oncology.    Tobacco abuse  Assessment & Plan  Reports an average 1-1.5 pack daily cigarette smoke  Sincere smoking cessation education provided  Declining nicotine replacement therapy at this time    * Pneumonia of right middle lobe due to infectious organism  Assessment & Plan  Presented with increased shortness of breath, chest tightness, night sweats, chills.  Found to have consolidation in right middle lobe with rt hilar/mediastinal mass with metastasis suspected to be lung primary along with postobstructive pneumonia.  Urine strep/Legionella negative.  Flu/RSV/COVID-negative.  Elevated procalcitonin.  Normal leukocyte count.  Blood culture without any growth.      Continue ceftriaxone (D4)  Start oral prednisone 40 mg daily for 2 days to complete 5-day course for suspected COPD exacerbation  Repeated blood culture revealed no growth at 24 hours  IV fluids 75 cc/h  Repeat procalcitonin                 VTE Pharmacologic Prophylaxis: VTE Score: 7 High Risk (Score >/= 5) - Pharmacological DVT Prophylaxis Ordered: enoxaparin (Lovenox). Sequential Compression Devices Ordered.    Mobility:   Basic Mobility Inpatient Raw Score: 24  JH-HLM Goal: 8: Walk 250 feet or more  JH-HLM Achieved: 7: Walk 25 feet or more  JH-HLM Goal achieved. Continue to encourage appropriate mobility.    Patient Centered Rounds: I performed bedside rounds with nursing staff today.   Discussions with Specialists or Other Care Team Provider:     Education and Discussions with Family / Patient:  Patient.     Total Time Spent on Date of Encounter  in care of patient: 30 mins. This time was spent on one or more of the following: performing physical exam; counseling and coordination of care; obtaining or reviewing history; documenting in the medical record; reviewing/ordering tests, medications or procedures; communicating with other healthcare professionals and discussing with patient's family/caregivers.    Current Length of Stay: 3 day(s)  Current Patient Status: Inpatient   Certification Statement: The patient will continue to require additional inpatient hospital stay due to pneumonia  Discharge Plan: Anticipate discharge in 24-48 hrs to home.    Code Status: Level 1 - Full Code    Subjective:   Patient complained of back pain and dehydrated.    Objective:     Vitals:   Temp (24hrs), Av °F (36.7 °C), Min:97.5 °F (36.4 °C), Max:98.4 °F (36.9 °C)    Temp:  [97.5 °F (36.4 °C)-98.4 °F (36.9 °C)] 97.8 °F (36.6 °C)  HR:  [87-94] 87  Resp:  [18-22] 18  BP: (108-142)/(66-91) 134/91  SpO2:  [92 %-100 %] 92 %  Body mass index is 23.73 kg/m².     Input and Output Summary (last 24 hours):     Intake/Output Summary (Last 24 hours) at 4/15/2024 1603  Last data filed at 4/15/2024 1015  Gross per 24 hour   Intake 4480 ml   Output 300 ml   Net 4180 ml       Physical Exam:   Physical Exam  Vitals reviewed.   Constitutional:       General: He is not in acute distress.     Appearance: Normal appearance.   HENT:      Head: Normocephalic and atraumatic.   Eyes:      General: No scleral icterus.        Right eye: No discharge.         Left eye: No discharge.   Cardiovascular:      Rate and Rhythm: Normal rate and regular rhythm.      Pulses: Normal pulses.      Heart sounds: Normal heart sounds.   Pulmonary:      Effort: Pulmonary effort is normal. No respiratory distress.      Breath sounds: Normal breath sounds. No wheezing or rales.   Chest:      Chest wall: No tenderness.   Abdominal:      General: Abdomen is flat. Bowel sounds are normal. There is no distension.       Palpations: Abdomen is soft. There is mass.      Tenderness: There is no abdominal tenderness. There is no guarding.   Musculoskeletal:         General: No deformity. Normal range of motion.      Cervical back: Normal range of motion and neck supple.      Right lower leg: No edema.      Left lower leg: No edema.   Skin:     General: Skin is warm.      Coloration: Skin is not jaundiced or pale.      Findings: No rash.   Neurological:      General: No focal deficit present.      Mental Status: He is alert and oriented to person, place, and time. Mental status is at baseline.      Cranial Nerves: No cranial nerve deficit.      Motor: No weakness.   Psychiatric:         Mood and Affect: Mood normal.         Behavior: Behavior normal.          Additional Data:     Labs:  Results from last 7 days   Lab Units 04/15/24  0432   WBC Thousand/uL 12.07*   HEMOGLOBIN g/dL 14.7   HEMATOCRIT % 44.6   PLATELETS Thousands/uL 171   LYMPHO PCT % 14   MONO PCT % 4   EOS PCT % 0     Results from last 7 days   Lab Units 04/15/24  0432   SODIUM mmol/L 137   POTASSIUM mmol/L 4.9   CHLORIDE mmol/L 104   CO2 mmol/L 30   BUN mg/dL 21   CREATININE mg/dL 0.84   ANION GAP mmol/L 3*   CALCIUM mg/dL 8.6   ALBUMIN g/dL 2.9*   TOTAL BILIRUBIN mg/dL 3.72*   ALK PHOS U/L 696*   ALT U/L 207*   AST U/L 289*   GLUCOSE RANDOM mg/dL 106     Results from last 7 days   Lab Units 04/15/24  0432   INR  1.12             Results from last 7 days   Lab Units 04/13/24  0439 04/11/24  2155   PROCALCITONIN ng/ml 14.05* 13.19*       Lines/Drains:  Invasive Devices       Peripheral Intravenous Line  Duration             Peripheral IV 04/14/24 Distal;Dorsal (posterior);Left Forearm 1 day                          Imaging: Reviewed radiology reports from this admission including: chest CT scan    Recent Cultures (last 7 days):   Results from last 7 days   Lab Units 04/13/24  1700 04/12/24  0230 04/12/24  0117 04/12/24  0116   BLOOD CULTURE  No Growth at 24 hrs.  No  Growth at 24 hrs.  --  Staphylococcus hominis* Staphylococcus hominis*  Dermobacter hominis*   GRAM STAIN RESULT   --   --  Gram positive cocci in clusters* Gram positive cocci in clusters*   LEGIONELLA URINARY ANTIGEN   --  Negative  --   --        Last 24 Hours Medication List:   Current Facility-Administered Medications   Medication Dose Route Frequency Provider Last Rate    acetaminophen  650 mg Oral Q6H PRN Odette Mcbride, RUSSELNP      albuterol  2 puff Inhalation Q4H PRN Jose Daniel Barahona MD      calcium carbonate  1,000 mg Oral Daily PRN Odette Mcbride, CRNP      cefTRIAXone  1,000 mg Intravenous Q24H Odette Mcbride, RUSSELNP 1,000 mg (04/14/24 2302)    Diclofenac Sodium  2 g Topical 4x Daily Odette Mcbride, CHRISTIANA      enoxaparin  40 mg Subcutaneous Daily Odette Mcbride, CHRISTIANA      gabapentin  100 mg Oral TID Ashish Gordon MD      guaiFENesin  600 mg Oral BID Odette Mcbride, CHRISTIANA      HYDROmorphone  0.5 mg Intravenous Q3H PRN Yohan Kong MD      lactated ringers  75 mL/hr Intravenous Continuous Yohan Kong MD 75 mL/hr (04/15/24 1132)    methocarbamol  750 mg Oral Q6H [facility] Ashish Gordon MD      naloxone  0.04 mg Intravenous Q1MIN PRN Odette Mcbride, CHRISTIANA      nicotine  1 patch Transdermal Daily Odette Mcbride, CHRISTIANA      ondansetron  4 mg Intravenous Q6H PRN Odette Mcbride, CHRISTIANA      oxyCODONE  10 mg Oral Q4H April CHRISTIANA Hernandez      polyethylene glycol  17 g Oral Daily PRN April CHRISTIANA Hernandez      predniSONE  40 mg Oral Daily Ashish Gordon MD      senna  1 tablet Oral BID April CHRISTIANA Hernandez          Today, Patient Was Seen By: Meaghan Allen MD    **Please Note: This note may have been constructed using a voice recognition system.**

## 2024-04-15 NOTE — ASSESSMENT & PLAN NOTE
CT CAP- R Hilar mass with lymphangitic spread of tumor, Right hilar and perihilar heterogeneously enhancing mass measures 5.5 x 6.3 x 5.2 cm,  Innumerable hypoenhancing lesions throughout the liver measuring up to 3.1 cm   Likely new diagnosis metastatic cancer.  Biopsy completed 4/15/24  Outpatient medical oncology appointment scheduled 5/1

## 2024-04-15 NOTE — ASSESSMENT & PLAN NOTE
CT CAP- R Hilar mass with lymphangitic spread of tumor, Right hilar and perihilar heterogeneously enhancing mass measures 5.5 x 6.3 x 5.2 cm,  Innumerable hypoenhancing lesions throughout the liver measuring up to 3.1 cm   Likely new diagnosis metastatic cancer.

## 2024-04-15 NOTE — CONSULTS
[facility]  Consult  Name: Ceasar March 63 y.o. male I MRN: [id number]  Unit/Bed#: -01 I Date of Admission: 4/11/2024   Date of Service: 4/15/2024 I Hospital Day: 3      Assessment/Plan   Palliative care patient  Assessment & Plan  Introduced palliative care to the patient during inpatient consult 4/15  Appropriate for outpatient follow up for symptom management, support and advanced care planning in metastatic cancer.  Psychosocial support  Prefers daughter be contacted prior to wife.  Lives with wife and daughter, Anna.    Cancer related pain  Assessment & Plan  Patient with increasing back pain rating 9-10/10 over past 4-5 weeks.  He did not take any medication at home.   Oxycodone 10 mg Q 4 hours scheduled with hold parameters  Robaxin Q 6 hours scheduled  Hydromorphone 0.5 mg Q 3 hours prn  Started bowel regimen    Liver mass  Assessment & Plan  CT CAP- R Hilar mass with lymphangitic spread of tumor, Right hilar and perihilar heterogeneously enhancing mass measures 5.5 x 6.3 x 5.2 cm,  Innumerable hypoenhancing lesions throughout the liver measuring up to 3.1 cm   Likely new diagnosis metastatic cancer.  Biopsy completed 4/15/24  Outpatient medical oncology appointment scheduled 5/1    Severe protein-calorie malnutrition (HCC)  Assessment & Plan  Malnutrition Findings:   Adult Malnutrition type: Chronic illness  Adult Degree of Malnutrition: Other severe protein calorie malnutrition  Malnutrition Characteristics: Inadequate energy, Weight loss     360 Statement: Malnutrition related to chronic illness as evidenced by >7.5% body weight loss in 3 months, <75% energy intake compared to estimated energy needs for >1-month.  To treat with oral diet as tolerated.    BMI Findings:    Body mass index is 23.73 kg/m².       -  patient reports 20 pound weight loss in past 3 months.  - poor appetite  -  would benefit from outpatient oncology nutrition.  -  Encouraged use of supplements  if unable to consume enough protein, calories.     Lung mass  Assessment & Plan  CT CAP- R Hilar mass with lymphangitic spread of tumor, Right hilar and perihilar heterogeneously enhancing mass measures 5.5 x 6.3 x 5.2 cm,  Innumerable hypoenhancing lesions throughout the liver measuring up to 3.1 cm   Likely new diagnosis metastatic cancer.           Code Status: Full Code  - Level 1   Decisional apparatus:  Patient is competent on my exam today.  If competence is lost, patient's substitute decision maker would default to spouse, Lisa by [place] Act 169.   Advance Directive / Living Will / POLST:  none on file      I have reviewed the patient's controlled substance dispensing history in the Prescription Drug Monitoring Program in compliance with the [facility] regulations before prescribing any controlled substances.         We appreciate the invitation to be involved in this patient's care.  We will continue to follow.  Please do not hesitate to reach our on call provider through our clinic answering service at [phone] should you have acute symptom control concerns.    CHRISTIANA Hensley  Palliative and Supportive Care  Clinic/Answering Service: [phone]  You can find me on [employer]!       IDENTIFICATION:  Inpatient consult to Palliative Care  Consult performed by: CHRISTIANA Hensley  Consult ordered by: Ashish Gordon MD        Physician Requesting Consult: Meaghan Allen MD  Reason for Consult / Principal Problem: symptom management  Hx and PE limited by: tired    NARRATIVE AND INTERVAL HISTORY:       Ceasar March is a 63 y.o. male with past medical history of asthma and tobacco dependence who presented to the ED on 4/11/24  with increased weakness, generally feeling unwell, back pain, night sweats, fatigue and weight loss.  The patient underwent The chest abdomen pelvis which revealed multiple liver masses and  postobstructive pneumonia .    The patient is being treated for pneumonia.  He had live  "biopsy completed in IR on 4/15.    Introduced palliative care to the patient and reviewed current symptoms.  The patient reports decreased appetite for the past 5 weeks. He states he had a 20 pound weight loss.  He reports pain significantly impairs his appetite.  Prior to admission he was not taking anything for pain.   He reports oxycodone 10 mg is moderately effective but does not feel he gets it \"on time: reviewed PRN dosing.  Will schedule 10 mg for next 24 hours with hold parameters for better pain control.  Reviewed appropriate use of pain medications.      Patient is not very engaged in consult and reports feeling tired and wanting to sleep after biopsy.     Appropriate for outpatient follow up      Review of Systems   Constitutional: Positive for decreased appetite, malaise/fatigue, night sweats and weight loss.   Musculoskeletal:  Positive for back pain.   All other systems reviewed and are negative.      Past Medical History:   Diagnosis Date    Asthma      Past Surgical History:   Procedure Laterality Date    HERNIA REPAIR      IR BIOPSY LIVER MASS  4/15/2024     Social History     Socioeconomic History    Marital status: Single     Spouse name: Not on file    Number of children: Not on file    Years of education: Not on file    Highest education level: Not on file   Occupational History    Not on file   Tobacco Use    Smoking status: Some Days     Current packs/day: 2.00     Types: Cigarettes    Smokeless tobacco: Never   Vaping Use    Vaping status: Never Used   Substance and Sexual Activity    Alcohol use: Never    Drug use: Yes     Types: Marijuana    Sexual activity: Not on file   Other Topics Concern    Not on file   Social History Narrative    Not on file     Social Determinants of Health     Financial Resource Strain: Not on file   Food Insecurity: No Food Insecurity (4/12/2024)    Hunger Vital Sign     Worried About Running Out of Food in the Last Year: Never true     Ran Out of Food in the Last " Year: Never true   Transportation Needs: No Transportation Needs (4/12/2024)    PRAPARE - Transportation     Lack of Transportation (Medical): No     Lack of Transportation (Non-Medical): No   Physical Activity: Not on file   Stress: Not on file (2/11/2021)   Social Connections: Not on file   Intimate Partner Violence: Low Risk  (4/13/2021)    Received from [facility]    Intimate Partner Violence     Insults You: Not on file     Threatens You: Not on file     Screams at You: Not on file     Physically Hurt: Not on file     Intimate Partner Violence Score: Not on file   Housing Stability: Low Risk  (4/12/2024)    Housing Stability Vital Sign     Unable to Pay for Housing in the Last Year: No     Number of Places Lived in the Last Year: 1     Unstable Housing in the Last Year: No     History reviewed. No pertinent family history.    MEDICATIONS / ALLERGIES:    all current active meds have been reviewed    No Known Allergies    OBJECTIVE:    Physical Exam  Physical Exam  Vitals and nursing note reviewed.   Constitutional:       General: He is not in acute distress.     Appearance: Normal appearance. He is well-developed.   HENT:      Head: Normocephalic and atraumatic.   Eyes:      Conjunctiva/sclera: Conjunctivae normal.   Cardiovascular:      Rate and Rhythm: Normal rate and regular rhythm.      Heart sounds: No murmur heard.  Pulmonary:      Effort: Pulmonary effort is normal. No respiratory distress.      Breath sounds: Normal breath sounds.   Abdominal:      Palpations: Abdomen is soft.      Tenderness: There is no abdominal tenderness.   Musculoskeletal:         General: No swelling.      Cervical back: Neck supple.   Skin:     General: Skin is warm and dry.   Neurological:      General: No focal deficit present.      Mental Status: He is alert and oriented to person, place, and time.   Psychiatric:         Attention and Perception: Attention normal.         Mood and Affect: Mood normal. Affect is flat.        "  Behavior: Behavior is cooperative.         Lab Results: CBC:   Lab Results   Component Value Date    WBC 12.07 (H) 04/15/2024    HGB 14.7 04/15/2024    HCT 44.6 04/15/2024    MCV 88 04/15/2024     04/15/2024    RBC 5.07 04/15/2024    MCH 29.0 04/15/2024    MCHC 33.0 04/15/2024    RDW 14.5 04/15/2024    MPV 10.2 04/15/2024   , CMP:   Lab Results   Component Value Date    SODIUM 137 04/15/2024    K 4.9 04/15/2024     04/15/2024    CO2 30 04/15/2024    BUN 21 04/15/2024    CREATININE 0.84 04/15/2024    CALCIUM 8.6 04/15/2024     (H) 04/15/2024     (H) 04/15/2024    ALKPHOS 696 (H) 04/15/2024    EGFR 93 04/15/2024   , BMP:  Lab Results   Component Value Date    SODIUM 137 04/15/2024    K 4.9 04/15/2024     04/15/2024    CO2 30 04/15/2024    BUN 21 04/15/2024    CREATININE 0.84 04/15/2024    GLUC 106 04/15/2024    CALCIUM 8.6 04/15/2024    AGAP 3 (L) 04/15/2024    EGFR 93 04/15/2024   , PT/PTT:No results found for: \"PT\", \"PTT\"      Counseling / Coordination of Care    Total floor / unit time spent today 45+  minutes. Greater than 50% of total time was spent with the patient and / or family counseling and / or coordination of care. A description of the counseling / coordination of care: review of chart, modification of medication, supportive listening, introduced palliative care. .        "

## 2024-04-15 NOTE — ASSESSMENT & PLAN NOTE
Introduced palliative care to the patient during inpatient consult 4/15  Appropriate for outpatient follow up for symptom management, support and advanced care planning in metastatic cancer.  Psychosocial support  Prefers daughter be contacted prior to wife.  Lives with wife and daughter, Anna.

## 2024-04-16 ENCOUNTER — APPOINTMENT (INPATIENT)
Dept: CT IMAGING | Facility: HOSPITAL | Age: 64
DRG: 180 | End: 2024-04-16
Payer: COMMERCIAL

## 2024-04-16 LAB
ALBUMIN SERPL BCP-MCNC: 2.9 G/DL (ref 3.5–5)
ALP SERPL-CCNC: 915 U/L (ref 34–104)
ALT SERPL W P-5'-P-CCNC: 252 U/L (ref 7–52)
ANION GAP SERPL CALCULATED.3IONS-SCNC: 2 MMOL/L (ref 4–13)
AST SERPL W P-5'-P-CCNC: 361 U/L (ref 13–39)
BILIRUB SERPL-MCNC: 5.36 MG/DL (ref 0.2–1)
BUN SERPL-MCNC: 22 MG/DL (ref 5–25)
CALCIUM ALBUM COR SERPL-MCNC: 9.5 MG/DL (ref 8.3–10.1)
CALCIUM SERPL-MCNC: 8.6 MG/DL (ref 8.4–10.2)
CHLORIDE SERPL-SCNC: 103 MMOL/L (ref 96–108)
CO2 SERPL-SCNC: 32 MMOL/L (ref 21–32)
CREAT SERPL-MCNC: 0.83 MG/DL (ref 0.6–1.3)
ERYTHROCYTE [DISTWIDTH] IN BLOOD BY AUTOMATED COUNT: 14.9 % (ref 11.6–15.1)
GFR SERPL CREATININE-BSD FRML MDRD: 94 ML/MIN/1.73SQ M
GLUCOSE SERPL-MCNC: 92 MG/DL (ref 65–140)
HCT VFR BLD AUTO: 43.3 % (ref 36.5–49.3)
HGB BLD-MCNC: 14.5 G/DL (ref 12–17)
MCH RBC QN AUTO: 29.2 PG (ref 26.8–34.3)
MCHC RBC AUTO-ENTMCNC: 33.5 G/DL (ref 31.4–37.4)
MCV RBC AUTO: 87 FL (ref 82–98)
PLATELET # BLD AUTO: 150 THOUSANDS/UL (ref 149–390)
PMV BLD AUTO: 9.9 FL (ref 8.9–12.7)
POTASSIUM SERPL-SCNC: 4.7 MMOL/L (ref 3.5–5.3)
PROCALCITONIN SERPL-MCNC: 16.48 NG/ML
PROT SERPL-MCNC: 6.1 G/DL (ref 6.4–8.4)
RBC # BLD AUTO: 4.96 MILLION/UL (ref 3.88–5.62)
SODIUM SERPL-SCNC: 137 MMOL/L (ref 135–147)
WBC # BLD AUTO: 11.05 THOUSAND/UL (ref 4.31–10.16)

## 2024-04-16 PROCEDURE — 99233 SBSQ HOSP IP/OBS HIGH 50: CPT | Performed by: NURSE PRACTITIONER

## 2024-04-16 PROCEDURE — 84145 PROCALCITONIN (PCT): CPT | Performed by: INTERNAL MEDICINE

## 2024-04-16 PROCEDURE — 80053 COMPREHEN METABOLIC PANEL: CPT | Performed by: INTERNAL MEDICINE

## 2024-04-16 PROCEDURE — 99223 1ST HOSP IP/OBS HIGH 75: CPT | Performed by: INTERNAL MEDICINE

## 2024-04-16 PROCEDURE — 72128 CT CHEST SPINE W/O DYE: CPT

## 2024-04-16 PROCEDURE — 85027 COMPLETE CBC AUTOMATED: CPT | Performed by: INTERNAL MEDICINE

## 2024-04-16 PROCEDURE — 99233 SBSQ HOSP IP/OBS HIGH 50: CPT | Performed by: STUDENT IN AN ORGANIZED HEALTH CARE EDUCATION/TRAINING PROGRAM

## 2024-04-16 PROCEDURE — 72131 CT LUMBAR SPINE W/O DYE: CPT

## 2024-04-16 RX ORDER — ONDANSETRON 4 MG/1
4 TABLET, FILM COATED ORAL EVERY 8 HOURS PRN
Qty: 10 TABLET | Refills: 0 | Status: SHIPPED | OUTPATIENT
Start: 2024-04-16

## 2024-04-16 RX ORDER — GABAPENTIN 100 MG/1
100 CAPSULE ORAL 3 TIMES DAILY
Qty: 90 CAPSULE | Refills: 0 | Status: SHIPPED | OUTPATIENT
Start: 2024-04-16

## 2024-04-16 RX ORDER — OXYCODONE HYDROCHLORIDE 10 MG/1
10 TABLET ORAL EVERY 4 HOURS PRN
Qty: 60 TABLET | Refills: 0 | Status: SHIPPED | OUTPATIENT
Start: 2024-04-16 | End: 2024-04-22

## 2024-04-16 RX ORDER — METHOCARBAMOL 750 MG/1
750 TABLET, FILM COATED ORAL EVERY 6 HOURS PRN
Qty: 60 TABLET | Refills: 0 | Status: SHIPPED | OUTPATIENT
Start: 2024-04-16 | End: 2024-04-22

## 2024-04-16 RX ORDER — HYDROMORPHONE HCL/PF 1 MG/ML
0.3 SYRINGE (ML) INJECTION
Status: DISCONTINUED | OUTPATIENT
Start: 2024-04-16 | End: 2024-04-18 | Stop reason: HOSPADM

## 2024-04-16 RX ADMIN — METHOCARBAMOL TABLETS 750 MG: 750 TABLET, COATED ORAL at 04:44

## 2024-04-16 RX ADMIN — PREDNISONE 40 MG: 20 TABLET ORAL at 08:58

## 2024-04-16 RX ADMIN — AMPICILLIN SODIUM AND SULBACTAM SODIUM 3 G: 100; 50 INJECTION, POWDER, FOR SOLUTION INTRAVENOUS at 12:02

## 2024-04-16 RX ADMIN — SODIUM CHLORIDE, SODIUM LACTATE, POTASSIUM CHLORIDE, AND CALCIUM CHLORIDE 75 ML/HR: .6; .31; .03; .02 INJECTION, SOLUTION INTRAVENOUS at 12:52

## 2024-04-16 RX ADMIN — METHOCARBAMOL TABLETS 750 MG: 750 TABLET, COATED ORAL at 17:15

## 2024-04-16 RX ADMIN — GABAPENTIN 100 MG: 100 CAPSULE ORAL at 08:58

## 2024-04-16 RX ADMIN — AMPICILLIN SODIUM AND SULBACTAM SODIUM 3 G: 100; 50 INJECTION, POWDER, FOR SOLUTION INTRAVENOUS at 17:15

## 2024-04-16 RX ADMIN — GABAPENTIN 100 MG: 100 CAPSULE ORAL at 20:40

## 2024-04-16 RX ADMIN — OXYCODONE HYDROCHLORIDE 10 MG: 10 TABLET ORAL at 16:22

## 2024-04-16 RX ADMIN — OXYCODONE HYDROCHLORIDE 10 MG: 10 TABLET ORAL at 08:58

## 2024-04-16 RX ADMIN — OXYCODONE HYDROCHLORIDE 10 MG: 10 TABLET ORAL at 00:37

## 2024-04-16 RX ADMIN — ALBUTEROL SULFATE 2 PUFF: 90 AEROSOL, METERED RESPIRATORY (INHALATION) at 16:21

## 2024-04-16 RX ADMIN — GABAPENTIN 100 MG: 100 CAPSULE ORAL at 16:22

## 2024-04-16 RX ADMIN — HYDROMORPHONE HYDROCHLORIDE 0.3 MG: 1 INJECTION, SOLUTION INTRAMUSCULAR; INTRAVENOUS; SUBCUTANEOUS at 19:15

## 2024-04-16 RX ADMIN — METHOCARBAMOL TABLETS 750 MG: 750 TABLET, COATED ORAL at 22:45

## 2024-04-16 RX ADMIN — AMPICILLIN SODIUM AND SULBACTAM SODIUM 3 G: 100; 50 INJECTION, POWDER, FOR SOLUTION INTRAVENOUS at 22:45

## 2024-04-16 RX ADMIN — SENNOSIDES 8.6 MG: 8.6 TABLET, FILM COATED ORAL at 08:58

## 2024-04-16 RX ADMIN — METHOCARBAMOL TABLETS 750 MG: 750 TABLET, COATED ORAL at 12:01

## 2024-04-16 RX ADMIN — OXYCODONE HYDROCHLORIDE 10 MG: 10 TABLET ORAL at 04:40

## 2024-04-16 RX ADMIN — OXYCODONE HYDROCHLORIDE 10 MG: 10 TABLET ORAL at 12:01

## 2024-04-16 RX ADMIN — OXYCODONE HYDROCHLORIDE 10 MG: 10 TABLET ORAL at 20:40

## 2024-04-16 NOTE — PROGRESS NOTES
[facility]  Progress Note  Name: Ceasar March I  MRN: [id number]  Unit/Bed#: -01 I Date of Admission: 4/11/2024   Date of Service: 4/16/2024  Hospital Day: 4    Assessment/Plan   Palliative care patient  Assessment & Plan  Introduced palliative care to the patient during inpatient consult 4/15  Appropriate for outpatient follow up for symptom management, support and advanced care planning in metastatic cancer.  Psychosocial support  Prefers daughter be contacted prior to wife.  Lives with wife and daughter, Anna.    Cancer related pain  Assessment & Plan  Patient with increasing chest and back pain rating 9-10/10 over past 4-5 weeks.  He did not take any medication at home.   Oxycodone 10 mg Q 4 hours scheduled with hold parameters  Robaxin Q 6 hours scheduled  Hydromorphone 0.3 mg Q 3 hours prn  Continue bowel regimen  Patient reports severe low back pain with radiation down his legs.  Unclear cause.  Discussed with primary team, more imaging to be obtained.      Liver mass  Assessment & Plan  CT CAP- R Hilar mass with lymphangitic spread of tumor, Right hilar and perihilar heterogeneously enhancing mass measures 5.5 x 6.3 x 5.2 cm,  Innumerable hypoenhancing lesions throughout the liver measuring up to 3.1 cm   Likely new diagnosis metastatic cancer.  Liver biopsy, biopsy completed 4/15/24  Outpatient medical oncology appointment scheduled 5/1    Severe protein-calorie malnutrition (HCC)  Assessment & Plan  Malnutrition Findings:   Adult Malnutrition type: Chronic illness  Adult Degree of Malnutrition: Other severe protein calorie malnutrition  Malnutrition Characteristics: Inadequate energy, Weight loss     360 Statement: Malnutrition related to chronic illness as evidenced by >7.5% body weight loss in 3 months, <75% energy intake compared to estimated energy needs for >1-month.  To treat with oral diet as tolerated.    BMI Findings:    Body mass index is 23.73 kg/m².       -   patient reports 20 pound weight loss in past 3 months.  - poor appetite  -  would benefit from outpatient oncology nutrition.  -  Encouraged use of supplements if unable to consume enough protein, calories.         Code Status: Full Code  - Level 1   Decisional apparatus:  Patient is competent on my exam today.  If competence is lost, patient's substitute decision maker would default to spouse by [place] Act 169.   Advance Directive / Living Will / POLST:  none on file     NARRATIVE AND INTERVAL HISTORY:       Patient reports improvement in chest and upper back pain with oxycodone.  Has complaints of severe low back pain with radiation down his legs with weakness.  Patient states he is having difficulty ambulating and feels like his legs are going to collapse.  No identified underlying cause.  Communicated with primary team.  Additional imaging may be warranted.      MEDICATIONS / ALLERGIES:     current meds:   Current Facility-Administered Medications   Medication Dose Route Frequency    acetaminophen (TYLENOL) tablet 650 mg  650 mg Oral Q6H PRN    albuterol (PROVENTIL HFA,VENTOLIN HFA) inhaler 2 puff  2 puff Inhalation Q4H PRN    calcium carbonate (TUMS) chewable tablet 1,000 mg  1,000 mg Oral Daily PRN    ceftriaxone (ROCEPHIN) 1 g/50 mL in dextrose IVPB  1,000 mg Intravenous Q24H    Diclofenac Sodium (VOLTAREN) 1 % topical gel 2 g  2 g Topical 4x Daily    enoxaparin (LOVENOX) subcutaneous injection 40 mg  40 mg Subcutaneous Daily    gabapentin (NEURONTIN) capsule 100 mg  100 mg Oral TID    guaiFENesin (MUCINEX) 12 hr tablet 600 mg  600 mg Oral BID    HYDROmorphone (DILAUDID) injection 0.3 mg  0.3 mg Intravenous Q3H PRN    lactated ringers infusion  75 mL/hr Intravenous Continuous    methocarbamol (ROBAXIN) tablet 750 mg  750 mg Oral Q6H KRYSTYNA    naloxone (NARCAN) 0.04 mg/mL syringe 0.04 mg  0.04 mg Intravenous Q1MIN PRN    nicotine (NICODERM CQ) 21 mg/24 hr TD 24 hr patch 1 patch  1 patch Transdermal Daily     ondansetron (ZOFRAN) injection 4 mg  4 mg Intravenous Q6H PRN    oxyCODONE (ROXICODONE) immediate release tablet 10 mg  10 mg Oral Q4H    polyethylene glycol (MIRALAX) packet 17 g  17 g Oral Daily PRN    senna (SENOKOT) tablet 8.6 mg  1 tablet Oral BID       No Known Allergies    OBJECTIVE:    Physical Exam  Physical Exam  Vitals and nursing note reviewed.   Constitutional:       General: He is not in acute distress.     Appearance: He is well-developed.   HENT:      Head: Normocephalic and atraumatic.   Eyes:      Conjunctiva/sclera: Conjunctivae normal.   Cardiovascular:      Rate and Rhythm: Normal rate and regular rhythm.      Heart sounds: No murmur heard.  Pulmonary:      Effort: Pulmonary effort is normal. No respiratory distress.      Breath sounds: Normal breath sounds.   Abdominal:      Palpations: Abdomen is soft.      Tenderness: There is no abdominal tenderness.   Musculoskeletal:         General: No swelling.      Cervical back: Neck supple.   Skin:     General: Skin is warm and dry.      Coloration: Skin is jaundiced.   Neurological:      General: No focal deficit present.      Mental Status: He is alert and oriented to person, place, and time.   Psychiatric:         Attention and Perception: Attention and perception normal.         Mood and Affect: Mood normal.         Cognition and Memory: Cognition and memory normal.         Lab Results: CBC:   Lab Results   Component Value Date    WBC 11.05 (H) 04/16/2024    HGB 14.5 04/16/2024    HCT 43.3 04/16/2024    MCV 87 04/16/2024     04/16/2024    RBC 4.96 04/16/2024    MCH 29.2 04/16/2024    MCHC 33.5 04/16/2024    RDW 14.9 04/16/2024    MPV 9.9 04/16/2024   , CMP:   Lab Results   Component Value Date    SODIUM 137 04/16/2024    K 4.7 04/16/2024     04/16/2024    CO2 32 04/16/2024    BUN 22 04/16/2024    CREATININE 0.83 04/16/2024    CALCIUM 8.6 04/16/2024     (H) 04/16/2024     (H) 04/16/2024    ALKPHOS 915 (H) 04/16/2024     "EGFR 94 04/16/2024   , BMP:  Lab Results   Component Value Date    SODIUM 137 04/16/2024    K 4.7 04/16/2024     04/16/2024    CO2 32 04/16/2024    BUN 22 04/16/2024    CREATININE 0.83 04/16/2024    GLUC 92 04/16/2024    CALCIUM 8.6 04/16/2024    AGAP 2 (L) 04/16/2024    EGFR 94 04/16/2024   , PT/PTT:No results found for: \"PT\", \"PTT\"      Counseling / Coordination of Care    Total floor / unit time spent today 35+  minutes. Greater than 50% of total time was spent with the patient and / or family counseling and / or coordination of care. A description of the counseling / coordination of care: symptoms management, modification of opioid medications, support given, collaboration with primary team.        "

## 2024-04-16 NOTE — ASSESSMENT & PLAN NOTE
CT CAP- R Hilar mass with lymphangitic spread of tumor, Right hilar and perihilar heterogeneously enhancing mass measures 5.5 x 6.3 x 5.2 cm,  Innumerable hypoenhancing lesions throughout the liver measuring up to 3.1 cm   Likely new diagnosis metastatic cancer.  Liver biopsy, biopsy completed 4/15/24  Outpatient medical oncology appointment scheduled 5/1

## 2024-04-16 NOTE — PROGRESS NOTES
Pending [facility] to [facility]  Progress Note  Name: Ceasar March I  MRN: [id number]  Unit/Bed#: -01 I Date of Admission: 4/11/2024   Date of Service: 4/16/2024  Hospital Day: 4    Assessment/Plan   * Pneumonia of right middle lobe due to infectious organism  Assessment & Plan  Presented with increased shortness of breath, chest tightness, night sweats, chills.  Found to have consolidation in right middle lobe with rt hilar/mediastinal mass with metastasis suspected to be lung primary along with postobstructive pneumonia.  Urine strep/Legionella negative.  Flu/RSV/COVID-negative.  Elevated procalcitonin.  Normal leukocyte count.  Blood culture without any growth.      On my encounter patient appears comfortable nondistressed.  Acutely nontoxic-appearing.  Transition to IV Unasyn per ID recommendation.  Monitor CBC and procalcitonin trend.  Continue supportive care.    Cancer related pain  Assessment & Plan  Palliative care team helping manage pain.    Liver mass  Assessment & Plan  Likely due metastasis  S/p IR liver mass biopsy on 4/15/2024  Pathology report currently pending.    Severe protein-calorie malnutrition (HCC)  Assessment & Plan  Malnutrition Findings:   Adult Malnutrition type: Chronic illness  Adult Degree of Malnutrition: Other severe protein calorie malnutrition  Malnutrition Characteristics: Inadequate energy, Weight loss                  360 Statement: Malnutrition related to chronic illness as evidenced by >7.5% body weight loss in 3 months, <75% energy intake compared to estimated energy needs for >1-month.  To treat with oral diet as tolerated.    BMI Findings:           Body mass index is 23.73 kg/m².       Bacteremia  Assessment & Plan  2/2 blood culture showing Staphylococcus hominis.  Likely contaminant per ID.    Transaminitis  Assessment & Plan  Lab Results   Component Value Date     (H) 04/16/2024     (H) 04/16/2024    TBILI 5.36 (H) 04/16/2024    ALKPHOS 915  (H) 04/16/2024     Like secondary to liver metastasis  Avoid hepatoxins  S/p IR guided liver biopsy currently pending.  Will cont to trend     Lung mass  Assessment & Plan  Patient reports night sweats, recent weight loss, current every day smoker, severe back and flank pain  CT shows:   Right hilar and perihilar 5.5 x 6.3 x 5.2 cm mass consistent lung malignancy.Right hilar, mediastinal and right supraclavicular lymphadenopathy compatible with metastatic disease.Airspace consolidation in the right middle lobe adjacent to the hilum suggesting postobstructive pneumonia.Nodular thickening along the axial interstitium in the right lower lobe and the right major fissure suggesting lymphangitic spread of tumor. Diffuse hepatic metastases. Periportal, gastroesophageal and para-aortic lymphadenopathy    Hem/Onc consulted -Per Oncology if small cell they may consider transfer and start treatment.   Complaining of hip pain: CT abdomen pelvis report noted with sclerotic lesion in the pelvis.  Start gabapentin 100 mg 3 times daily, Robaxin  Consult palliative care  Increase oxycodone dose  2D echo report noted with EF of 60%.  Oncology recommendation noted status post IR guided liver biopsy.    Tobacco abuse  Assessment & Plan  Reports an average 1-1.5 pack daily cigarette smoke  Sincere smoking cessation education provided for more than 3 minutes.                 VTE Pharmacologic Prophylaxis: VTE Score: 7 Moderate Risk (Score 3-4) - Pharmacological DVT Prophylaxis Ordered: enoxaparin (Lovenox).    Mobility:   Basic Mobility Inpatient Raw Score: 23  JH-HLM Goal: 7: Walk 25 feet or more  JH-HLM Achieved: 7: Walk 25 feet or more      Patient Centered Rounds: I performed bedside rounds with nursing staff today.   Discussions with Specialists or Other Care Team Provider: ID    Education and Discussions with Family / Patient: Updated  (daughter) at bedside.    Total Time Spent on Date of Encounter in care of patient:  35 mins. This time was spent on one or more of the following: performing physical exam; counseling and coordination of care; obtaining or reviewing history; documenting in the medical record; reviewing/ordering tests, medications or procedures; communicating with other healthcare professionals and discussing with patient's family/caregivers.    Current Length of Stay: 4 day(s)  Current Patient Status: Inpatient   Certification Statement: The patient will continue to require additional inpatient hospital stay due to pending biopsy results.  Discharge Plan:  To be determined    Code Status: Level 1 - Full Code    Subjective:   Seen during a.m. rounds and patient was also again seen during afternoon.  Complaining of back pain.  Does report significant weight loss over a month.  Seen ambulating with the daughter at times in the hallway.  No other events reported.    Objective:     Vitals:   Temp (24hrs), Av.1 °F (36.7 °C), Min:97.6 °F (36.4 °C), Max:98.6 °F (37 °C)    Temp:  [97.6 °F (36.4 °C)-98.6 °F (37 °C)] 98.1 °F (36.7 °C)  HR:  [82-95] 82  Resp:  [14-17] 14  BP: (122-139)/(81-93) 139/86  SpO2:  [89 %-96 %] 96 %  Body mass index is 23.73 kg/m².     Input and Output Summary (last 24 hours):     Intake/Output Summary (Last 24 hours) at 2024 1613  Last data filed at 2024 1252  Gross per 24 hour   Intake 1360 ml   Output 400 ml   Net 960 ml       Physical Exam:   Physical Exam  Constitutional:       General: He is not in acute distress.     Appearance: Normal appearance. He is ill-appearing. He is not toxic-appearing or diaphoretic.   HENT:      Head: Normocephalic and atraumatic.   Eyes:      Pupils: Pupils are equal, round, and reactive to light.   Cardiovascular:      Rate and Rhythm: Normal rate.      Pulses: Normal pulses.   Pulmonary:      Effort: Pulmonary effort is normal. No respiratory distress.      Breath sounds: Normal breath sounds. No wheezing.   Abdominal:      General: Bowel sounds are  normal. There is no distension.      Palpations: Abdomen is soft.      Tenderness: There is no abdominal tenderness.   Musculoskeletal:      Right lower leg: No edema.      Left lower leg: No edema.      Comments: Midthoracic back point tenderness noted on exam.   Neurological:      Mental Status: He is alert and oriented to person, place, and time.   Psychiatric:         Mood and Affect: Mood normal.         Behavior: Behavior normal.          Additional Data:     Labs:  Results from last 7 days   Lab Units 04/16/24  0428 04/15/24  0432   WBC Thousand/uL 11.05* 12.07*   HEMOGLOBIN g/dL 14.5 14.7   HEMATOCRIT % 43.3 44.6   PLATELETS Thousands/uL 150 171   LYMPHO PCT %  --  14   MONO PCT %  --  4   EOS PCT %  --  0     Results from last 7 days   Lab Units 04/16/24  0428   SODIUM mmol/L 137   POTASSIUM mmol/L 4.7   CHLORIDE mmol/L 103   CO2 mmol/L 32   BUN mg/dL 22   CREATININE mg/dL 0.83   ANION GAP mmol/L 2*   CALCIUM mg/dL 8.6   ALBUMIN g/dL 2.9*   TOTAL BILIRUBIN mg/dL 5.36*   ALK PHOS U/L 915*   ALT U/L 252*   AST U/L 361*   GLUCOSE RANDOM mg/dL 92     Results from last 7 days   Lab Units 04/15/24  0432   INR  1.12             Results from last 7 days   Lab Units 04/16/24  0428 04/13/24  0439 04/11/24  2155   PROCALCITONIN ng/ml 16.48* 14.05* 13.19*       Lines/Drains:  Invasive Devices       Peripheral Intravenous Line  Duration             Peripheral IV 04/14/24 Distal;Dorsal (posterior);Left Forearm 2 days                          Imaging: Reviewed radiology reports from this admission including: abdominal/pelvic CT    Recent Cultures (last 7 days):   Results from last 7 days   Lab Units 04/13/24  1700 04/12/24  0230 04/12/24  0117 04/12/24  0116   BLOOD CULTURE  No Growth at 48 hrs.  No Growth at 48 hrs.  --  Staphylococcus hominis* Staphylococcus hominis*  Dermobacter hominis*   GRAM STAIN RESULT   --   --  Gram positive cocci in clusters* Gram positive cocci in clusters*   LEGIONELLA URINARY ANTIGEN   --   Negative  --   --        Last 24 Hours Medication List:   Current Facility-Administered Medications   Medication Dose Route Frequency Provider Last Rate    acetaminophen  650 mg Oral Q6H PRN CHRISTIANA Golden      albuterol  2 puff Inhalation Q4H PRN Jose Daniel Barahona MD      ampicillin-sulbactam  3 g Intravenous Q6H Surya Capone MD 3 g (04/16/24 1202)    calcium carbonate  1,000 mg Oral Daily PRN Odette Mcbride, CHRISTIANA      Diclofenac Sodium  2 g Topical 4x Daily Odette Mcbride, CHRISTIANA      enoxaparin  40 mg Subcutaneous Daily Odette Mcbride, CHRISTIANA      gabapentin  100 mg Oral TID Ashish Gordon MD      guaiFENesin  600 mg Oral BID Odette Mcbride, CHRISTIANA      HYDROmorphone  0.3 mg Intravenous Q3H PRN CHRISTIANA Hensley      lactated ringers  75 mL/hr Intravenous Continuous Yohna Kong MD 75 mL/hr (04/16/24 1252)    methocarbamol  750 mg Oral Q6H [facility] Ashish Gordon MD      naloxone  0.04 mg Intravenous Q1MIN PRN CHRISTIANA Golden      nicotine  1 patch Transdermal Daily Odette Mcbride, CHRISTIANA      ondansetron  4 mg Intravenous Q6H PRN Odette Mcbride, CHRISTIANA      oxyCODONE  10 mg Oral Q4H CHRISTIANA Hensley      polyethylene glycol  17 g Oral Daily PRN April CHRISTIANA Hernandez      senna  1 tablet Oral BID April CHRISTIANA Hernandez          Today, Patient Was Seen By: Gareth Romo MD    **Please Note: This note may have been constructed using a voice recognition system.**

## 2024-04-16 NOTE — CONSULTS
"Consultation - Infectious Disease   Ceasar March 63 y.o. male MRN: [id number]  Unit/Bed#: -01 Encounter: [id number]      IMPRESSION & RECOMMENDATIONS:   Impression/Recommendations:  This is a 63 y.o. male, with history of asthma and tobacco dependence, presented to ER with dyspnea, chest pain, back pain and generalized weakness.  Chest CT showed right-sided lung mass with postobstructive pneumonia.  Pelvis CT with multiple liver masses concerning for metastasis.  Pneumonia is not responding to ceftriaxone.    1.  RML postobstructive pneumonia, secondary to obstructing large right-sided lung mass.  Patient has low risk for resistant pathogens, given lack of hospitalization or antibiotic use.  However patient's respiratory symptoms are not improved and his procalcitonin is increasing despite ceftriaxone.  Patient will benefit from addition of anaerobic coverage.  Hopefully, this will help.  Patient may need bronchoscopy in attempt to open up his bronchus, if antibiotic does not alleviate postobstructive pneumonia.  Change antibiotic to IV Unasyn.  Monitor temperature/WBC per  Monitor respiratory symptoms.    Monitor procalcitonin.  Patient may need pulmonary evaluation for bronchoscopy if his postobstructive pneumonia is not improved with antibiotic alone.    2.  Bacteremia.  Although there was growth of Staphylococcus hominis in \"both\" admission blood cultures, it appears that this was a single blood draw from the left arm.  In addition, there was Dermobacter growing in 1 out of 2 sets also.  Patient has no indwelling intravascular foreign body.  He is systemically well, without sepsis or systemic toxicity.  Repeat blood cultures have no growth thus far.  This is more consistent with contaminated blood draw than true bacteremia.  No antibiotic needed for this indication.  Follow-up on final repeat blood cultures.    3.  Low back pain.  Given presence of likely metastatic malignancy, we did consider the " possibility of vertebral metastasis.  Doubt discitis/vertebral osteomyelitis without true bacteremia.  Monitor low back pain.  Consider imaging of spine.    4.  Right-sided lung mass with hilar mediastinal lymphadenopathy.  Especially in a smoker, this is concerning for primary lung malignancy.  Possible need for biopsy depending on pathology from liver biopsy.    5.  Multiple liver masses, suggestive of metastases.  Patient is status post liver biopsy on 4/15.  Follow-up on pathology from biopsy.    6.  Elevated LFTs, most likely secondary to liver masses.  Abdominal exam is benign.  Doubt biliary obstruction.  Monitor LFTs.    Hospitalization records reviewed in detail.  Discussed with patient in detail regarding the above plan.  Discussed with Dr. Romo regarding antibiotic change for patient's postobstructive pneumonia and likely contaminated blood draw rather than true bacteremia.  He is in agreement.    Thank you for this consultation.  We will follow along with you.    HISTORY OF PRESENT ILLNESS:  Reason for Consult: Postobstructive pneumonia, not improving on ceftriaxone.    HPI: Ceasar March is a 63 y.o. male, with history of asthma and tobacco dependence, presented to ER on 4/12 with dyspnea, chest pain, low back pain and generalized weakness.  On presentation, patient had no fever or leukocytosis.  However, chest/abdomen/pelvis CT showed right-sided lung mass with postobstructive pneumonia and multiple hepatic metastases.  Patient was admitted and started on IV ceftriaxone.  Yesterday, patient had biopsy of his liver mass.  Despite IV ceftriaxone since admission, patient's procalcitonin continues to increase.  For these reasons, we are asked to evaluate the patient    At present, patient is a little more comfortable.  Dyspnea is improved but still requiring O2 supplement.  Cough mild, nonproductive.  Stable chest pain.  Low back pain also stable, without leg weakness.  Temperature stays down,  without chills.      REVIEW OF SYSTEMS:  A complete system-based review was done.  Except for what is noted in HPI above, ROS of systems is otherwise negative.    PAST MEDICAL HISTORY:  Past Medical History:   Diagnosis Date    Asthma      Past Surgical History:   Procedure Laterality Date    HERNIA REPAIR      IR BIOPSY LIVER MASS  4/15/2024     Problem list reviewed.    FAMILY HISTORY:  Non-contributory    SOCIAL HISTORY:  Social History     Substance and Sexual Activity   Alcohol Use Never     Social History     Substance and Sexual Activity   Drug Use Yes    Types: Marijuana     Social History     Tobacco Use   Smoking Status Some Days    Current packs/day: 2.00    Types: Cigarettes   Smokeless Tobacco Never       ALLERGIES:  No Known Allergies    MEDICATIONS:  All current active medications have been reviewed.  Is currently on IV ceftriaxone.    PHYSICAL EXAM:  Vitals:  Temp:  [97.6 °F (36.4 °C)-98.6 °F (37 °C)] 98.6 °F (37 °C)  HR:  [87-95] 95  Resp:  [16-19] 17  BP: (120-139)/(69-93) 122/81  SpO2:  [89 %-99 %] 89 %  Temp (24hrs), Av °F (36.7 °C), Min:97.6 °F (36.4 °C), Max:98.6 °F (37 °C)  Current: Temperature: 98.6 °F (37 °C)     Physical Exam:  General:  Well-nourished, well-developed, in no acute distress. Awake, alert and oriented x 3.  Eyes:  Conjunctive clear with no hemorrhages or effusions  Oropharynx:  No ulcers, no lesions, pharynx benign, no tonsillitis  Neck:  Supple, no lymphadenopathy, no mass, nontender  Lungs:  Expansion symmetric, right basilar rhonchi and rales, no wheezing, no accessory muscle use  Cardiac:  Regular rate and rhythm, normal S1, normal S2, no murmurs  Abdomen:  Soft, nondistended, non-tender, no HSM  Extremities:  No edema, no erythema, nontender. No ulcers  Skin:  No rashes, no ulcers  Neurological:  Moves all four extremities spontaneously, sensation grossly intact    LABS, IMAGING, & OTHER STUDIES:  Lab Results:  I have personally reviewed pertinent labs.  Results from  last 7 days   Lab Units 04/16/24 0428 04/15/24  0432 04/14/24  0432   POTASSIUM mmol/L 4.7 4.9 4.8   CHLORIDE mmol/L 103 104 103   CO2 mmol/L 32 30 29   BUN mg/dL 22 21 17   CREATININE mg/dL 0.83 0.84 0.77   EGFR ml/min/1.73sq m 94 93 97   CALCIUM mg/dL 8.6 8.6 8.8   AST U/L 361* 289* 236*   ALT U/L 252* 207* 162*   ALK PHOS U/L 915* 696* 525*     Results from last 7 days   Lab Units 04/16/24  0428 04/15/24  0432 04/14/24  0432   WBC Thousand/uL 11.05* 12.07* 13.30*   HEMOGLOBIN g/dL 14.5 14.7 14.3   PLATELETS Thousands/uL 150 171 177     Results from last 7 days   Lab Units 04/13/24  1700 04/12/24  0230 04/12/24  0117 04/12/24  0116   BLOOD CULTURE  No Growth at 48 hrs.  No Growth at 48 hrs.  --  Staphylococcus hominis* Staphylococcus hominis*  Dermobacter hominis*   GRAM STAIN RESULT   --   --  Gram positive cocci in clusters* Gram positive cocci in clusters*   LEGIONELLA URINARY ANTIGEN   --  Negative  --   --        Imaging Studies:   I have personally reviewed pertinent imaging study reports and images in PACS.  Chest/abdomen/pelvis CT reviewed personally.  Right hilar mass with hilar/mediastinal/supraclavicular lymphadenopathy.  RML consolidation consistent with postobstructive pneumonia.  Nodular thickening in right major fissure concerning for lymphangitic spread.  Diffuse hepatic metastases.    EKG, Pathology, and Other Studies:   I have personally reviewed pertinent reports.

## 2024-04-16 NOTE — ASSESSMENT & PLAN NOTE
Presented with increased shortness of breath, chest tightness, night sweats, chills.  Found to have consolidation in right middle lobe with rt hilar/mediastinal mass with metastasis suspected to be lung primary along with postobstructive pneumonia.  Urine strep/Legionella negative.  Flu/RSV/COVID-negative.  Elevated procalcitonin.  Normal leukocyte count.  Blood culture without any growth.      On my encounter patient appears comfortable nondistressed.  Acutely nontoxic-appearing.  Transition to IV Unasyn per ID recommendation.  Monitor CBC and procalcitonin trend.  Continue supportive care.

## 2024-04-16 NOTE — ASSESSMENT & PLAN NOTE
Patient reports night sweats, recent weight loss, current every day smoker, severe back and flank pain  CT shows:   Right hilar and perihilar 5.5 x 6.3 x 5.2 cm mass consistent lung malignancy.Right hilar, mediastinal and right supraclavicular lymphadenopathy compatible with metastatic disease.Airspace consolidation in the right middle lobe adjacent to the hilum suggesting postobstructive pneumonia.Nodular thickening along the axial interstitium in the right lower lobe and the right major fissure suggesting lymphangitic spread of tumor. Diffuse hepatic metastases. Periportal, gastroesophageal and para-aortic lymphadenopathy    Hem/Onc consulted -Per Oncology if small cell they may consider transfer and start treatment.   Complaining of hip pain: CT abdomen pelvis report noted with sclerotic lesion in the pelvis.  Start gabapentin 100 mg 3 times daily, Robaxin  Consult palliative care  Increase oxycodone dose  2D echo report noted with EF of 60%.  Oncology recommendation noted status post IR guided liver biopsy.

## 2024-04-16 NOTE — ASSESSMENT & PLAN NOTE
Patient with increasing chest and back pain rating 9-10/10 over past 4-5 weeks.  He did not take any medication at home.   Oxycodone 10 mg Q 4 hours scheduled with hold parameters  Robaxin Q 6 hours scheduled  Hydromorphone 0.3 mg Q 3 hours prn  Continue bowel regimen  Patient reports severe low back pain with radiation down his legs.  Unclear cause.  Discussed with primary team, more imaging to be obtained.

## 2024-04-16 NOTE — ASSESSMENT & PLAN NOTE
Reports an average 1-1.5 pack daily cigarette smoke  Sincere smoking cessation education provided for more than 3 minutes.

## 2024-04-16 NOTE — ASSESSMENT & PLAN NOTE
Lab Results   Component Value Date     (H) 04/16/2024     (H) 04/16/2024    TBILI 5.36 (H) 04/16/2024    ALKPHOS 915 (H) 04/16/2024     Like secondary to liver metastasis  Avoid hepatoxins  S/p IR guided liver biopsy currently pending.  Will cont to trend

## 2024-04-17 PROBLEM — Z71.89 COUNSELING REGARDING ADVANCE CARE PLANNING AND GOALS OF CARE: Status: ACTIVE | Noted: 2024-04-17

## 2024-04-17 LAB
ALBUMIN SERPL BCP-MCNC: 2.7 G/DL (ref 3.5–5)
ALP SERPL-CCNC: 834 U/L (ref 34–104)
ALT SERPL W P-5'-P-CCNC: 226 U/L (ref 7–52)
ANION GAP SERPL CALCULATED.3IONS-SCNC: 6 MMOL/L (ref 4–13)
AST SERPL W P-5'-P-CCNC: 303 U/L (ref 13–39)
BILIRUB SERPL-MCNC: 6.01 MG/DL (ref 0.2–1)
BUN SERPL-MCNC: 22 MG/DL (ref 5–25)
CALCIUM ALBUM COR SERPL-MCNC: 9.1 MG/DL (ref 8.3–10.1)
CALCIUM SERPL-MCNC: 8.1 MG/DL (ref 8.4–10.2)
CHLORIDE SERPL-SCNC: 103 MMOL/L (ref 96–108)
CO2 SERPL-SCNC: 27 MMOL/L (ref 21–32)
CREAT SERPL-MCNC: 0.79 MG/DL (ref 0.6–1.3)
ERYTHROCYTE [DISTWIDTH] IN BLOOD BY AUTOMATED COUNT: 15.1 % (ref 11.6–15.1)
GFR SERPL CREATININE-BSD FRML MDRD: 96 ML/MIN/1.73SQ M
GLUCOSE SERPL-MCNC: 94 MG/DL (ref 65–140)
HCT VFR BLD AUTO: 41.3 % (ref 36.5–49.3)
HGB BLD-MCNC: 13.5 G/DL (ref 12–17)
MCH RBC QN AUTO: 28.8 PG (ref 26.8–34.3)
MCHC RBC AUTO-ENTMCNC: 32.7 G/DL (ref 31.4–37.4)
MCV RBC AUTO: 88 FL (ref 82–98)
PLATELET # BLD AUTO: 141 THOUSANDS/UL (ref 149–390)
PMV BLD AUTO: 10.8 FL (ref 8.9–12.7)
POTASSIUM SERPL-SCNC: 4.4 MMOL/L (ref 3.5–5.3)
PROT SERPL-MCNC: 5.5 G/DL (ref 6.4–8.4)
RBC # BLD AUTO: 4.69 MILLION/UL (ref 3.88–5.62)
SODIUM SERPL-SCNC: 136 MMOL/L (ref 135–147)
WBC # BLD AUTO: 10.44 THOUSAND/UL (ref 4.31–10.16)

## 2024-04-17 PROCEDURE — 99232 SBSQ HOSP IP/OBS MODERATE 35: CPT | Performed by: STUDENT IN AN ORGANIZED HEALTH CARE EDUCATION/TRAINING PROGRAM

## 2024-04-17 PROCEDURE — 85027 COMPLETE CBC AUTOMATED: CPT | Performed by: STUDENT IN AN ORGANIZED HEALTH CARE EDUCATION/TRAINING PROGRAM

## 2024-04-17 PROCEDURE — 99232 SBSQ HOSP IP/OBS MODERATE 35: CPT | Performed by: INTERNAL MEDICINE

## 2024-04-17 PROCEDURE — 99233 SBSQ HOSP IP/OBS HIGH 50: CPT | Performed by: NURSE PRACTITIONER

## 2024-04-17 PROCEDURE — 88342 IMHCHEM/IMCYTCHM 1ST ANTB: CPT | Performed by: PATHOLOGY

## 2024-04-17 PROCEDURE — 88360 TUMOR IMMUNOHISTOCHEM/MANUAL: CPT | Performed by: PATHOLOGY

## 2024-04-17 PROCEDURE — 88307 TISSUE EXAM BY PATHOLOGIST: CPT | Performed by: PATHOLOGY

## 2024-04-17 PROCEDURE — 80053 COMPREHEN METABOLIC PANEL: CPT | Performed by: STUDENT IN AN ORGANIZED HEALTH CARE EDUCATION/TRAINING PROGRAM

## 2024-04-17 PROCEDURE — 88341 IMHCHEM/IMCYTCHM EA ADD ANTB: CPT | Performed by: PATHOLOGY

## 2024-04-17 RX ORDER — PANTOPRAZOLE SODIUM 40 MG/1
40 TABLET, DELAYED RELEASE ORAL
Status: DISCONTINUED | OUTPATIENT
Start: 2024-04-17 | End: 2024-04-18 | Stop reason: HOSPADM

## 2024-04-17 RX ADMIN — GABAPENTIN 100 MG: 100 CAPSULE ORAL at 08:49

## 2024-04-17 RX ADMIN — AMPICILLIN SODIUM AND SULBACTAM SODIUM 3 G: 100; 50 INJECTION, POWDER, FOR SOLUTION INTRAVENOUS at 04:45

## 2024-04-17 RX ADMIN — OXYCODONE HYDROCHLORIDE 10 MG: 10 TABLET ORAL at 20:58

## 2024-04-17 RX ADMIN — OXYCODONE HYDROCHLORIDE 10 MG: 10 TABLET ORAL at 12:51

## 2024-04-17 RX ADMIN — GABAPENTIN 100 MG: 100 CAPSULE ORAL at 17:43

## 2024-04-17 RX ADMIN — AMPICILLIN SODIUM AND SULBACTAM SODIUM 3 G: 100; 50 INJECTION, POWDER, FOR SOLUTION INTRAVENOUS at 23:49

## 2024-04-17 RX ADMIN — OXYCODONE HYDROCHLORIDE 10 MG: 10 TABLET ORAL at 16:46

## 2024-04-17 RX ADMIN — SODIUM CHLORIDE, SODIUM LACTATE, POTASSIUM CHLORIDE, AND CALCIUM CHLORIDE 75 ML/HR: .6; .31; .03; .02 INJECTION, SOLUTION INTRAVENOUS at 16:45

## 2024-04-17 RX ADMIN — PANTOPRAZOLE SODIUM 40 MG: 40 TABLET, DELAYED RELEASE ORAL at 11:28

## 2024-04-17 RX ADMIN — GABAPENTIN 100 MG: 100 CAPSULE ORAL at 22:34

## 2024-04-17 RX ADMIN — METHOCARBAMOL TABLETS 750 MG: 750 TABLET, COATED ORAL at 11:55

## 2024-04-17 RX ADMIN — AMPICILLIN SODIUM AND SULBACTAM SODIUM 3 G: 100; 50 INJECTION, POWDER, FOR SOLUTION INTRAVENOUS at 17:44

## 2024-04-17 RX ADMIN — METHOCARBAMOL TABLETS 750 MG: 750 TABLET, COATED ORAL at 04:43

## 2024-04-17 RX ADMIN — OXYCODONE HYDROCHLORIDE 10 MG: 10 TABLET ORAL at 04:43

## 2024-04-17 RX ADMIN — OXYCODONE HYDROCHLORIDE 10 MG: 10 TABLET ORAL at 08:49

## 2024-04-17 RX ADMIN — OXYCODONE HYDROCHLORIDE 10 MG: 10 TABLET ORAL at 00:30

## 2024-04-17 RX ADMIN — HYDROMORPHONE HYDROCHLORIDE 0.3 MG: 1 INJECTION, SOLUTION INTRAMUSCULAR; INTRAVENOUS; SUBCUTANEOUS at 11:28

## 2024-04-17 RX ADMIN — METHOCARBAMOL TABLETS 750 MG: 750 TABLET, COATED ORAL at 17:43

## 2024-04-17 RX ADMIN — AMPICILLIN SODIUM AND SULBACTAM SODIUM 3 G: 100; 50 INJECTION, POWDER, FOR SOLUTION INTRAVENOUS at 11:55

## 2024-04-17 NOTE — ASSESSMENT & PLAN NOTE
CT CAP- R Hilar mass with lymphangitic spread of tumor, Right hilar and perihilar heterogeneously enhancing mass measures 5.5 x 6.3 x 5.2 cm,  Innumerable hypoenhancing lesions throughout the liver measuring up to 3.1 cm   Likely new diagnosis metastatic cancer.  Liver biopsy, biopsy completed 4/15/24, if small cell potentially transfer to initiate inpatient treatment.    Outpatient medical oncology appointment scheduled 5/1

## 2024-04-17 NOTE — ASSESSMENT & PLAN NOTE
Patient with increasing chest and back pain rating 9-10/10 over past 4-5 weeks.  He did not take any medication at home.   Oxycodone 10 mg Q 4 hours scheduled with hold parameters, consider changing to PRN tomorrow.   Robaxin Q 6 hours scheduled  Hydromorphone 0.3 mg Q 3 hours prn  Continue bowel regimen  Patient reports severe low back pain with radiation down his legs.  Unclear cause.  Discussed with primary team, more imaging to be obtained.  Questionable lytic lesion of pelvis.

## 2024-04-17 NOTE — ASSESSMENT & PLAN NOTE
Malnutrition Findings:   Adult Malnutrition type: Chronic illness  Adult Degree of Malnutrition: Other severe protein calorie malnutrition  Malnutrition Characteristics: Inadequate energy, Weight loss     360 Statement: Malnutrition related to chronic illness as evidenced by >7.5% body weight loss in 3 months, <75% energy intake compared to estimated energy needs for >1-month.  To treat with oral diet as tolerated.    BMI Findings:    Body mass index is 23.73 kg/m².       -  patient reports 20 pound weight loss in past 3 months.  -  poor appetite since pain started 5 weeks ago.   -  would benefit from outpatient oncology nutrition upon discharge   -  Encouraged use of supplements if unable to consume enough protein, calories.

## 2024-04-17 NOTE — PROGRESS NOTES
[facility]  Progress Note  Name: Ceasar March I  MRN: [id number]  Unit/Bed#: -01 I Date of Admission: 4/11/2024   Date of Service: 4/17/2024 I Hospital Day: 5    Assessment/Plan   Counseling regarding advance care planning and goals of care  Assessment & Plan  Initiated goals of care conversations with the patient.  Limits set at DNR/DNI  Patient is in process of completing medical POA paperwork and well as addressing financial POA.  Daughter Anna is helping.  Offered completion of [place] Advanced Directive, he is going to discuss with Anna.      Palliative care patient  Assessment & Plan  Introduced palliative care to the patient during inpatient consult 4/15  Appropriate for outpatient follow up for symptom management, support and advanced care planning in metastatic cancer.  Psychosocial support  Prefers daughter be contacted prior to wife.  Lives with wife and daughter, Anna.    Cancer related pain  Assessment & Plan  Patient with increasing chest and back pain rating 9-10/10 over past 4-5 weeks.  He did not take any medication at home.   Oxycodone 10 mg Q 4 hours scheduled with hold parameters, consider changing to PRN tomorrow.   Robaxin Q 6 hours scheduled  Hydromorphone 0.3 mg Q 3 hours prn  Continue bowel regimen  Patient reports severe low back pain with radiation down his legs.  Unclear cause.  Discussed with primary team, more imaging to be obtained.  Questionable lytic lesion of pelvis.     Liver mass  Assessment & Plan  CT CAP- R Hilar mass with lymphangitic spread of tumor, Right hilar and perihilar heterogeneously enhancing mass measures 5.5 x 6.3 x 5.2 cm,  Innumerable hypoenhancing lesions throughout the liver measuring up to 3.1 cm   Likely new diagnosis metastatic cancer.  Liver biopsy, biopsy completed 4/15/24, if small cell potentially transfer to initiate inpatient treatment.    Outpatient medical oncology appointment scheduled 5/1    Severe protein-calorie  malnutrition (HCC)  Assessment & Plan  Malnutrition Findings:   Adult Malnutrition type: Chronic illness  Adult Degree of Malnutrition: Other severe protein calorie malnutrition  Malnutrition Characteristics: Inadequate energy, Weight loss     360 Statement: Malnutrition related to chronic illness as evidenced by >7.5% body weight loss in 3 months, <75% energy intake compared to estimated energy needs for >1-month.  To treat with oral diet as tolerated.    BMI Findings:    Body mass index is 23.73 kg/m².       -  patient reports 20 pound weight loss in past 3 months.  -  poor appetite since pain started 5 weeks ago.   -  would benefit from outpatient oncology nutrition upon discharge   -  Encouraged use of supplements if unable to consume enough protein, calories.     Lung mass  Assessment & Plan  CT CAP- R Hilar mass with lymphangitic spread of tumor, Right hilar and perihilar heterogeneously enhancing mass measures 5.5 x 6.3 x 5.2 cm,  Innumerable hypoenhancing lesions throughout the liver measuring up to 3.1 cm   Likely new diagnosis metastatic cancer.             Code Status: DNAR/DNI  - Level 3   Decisional apparatus:  Patient is competent on my exam today.  If competence is lost, patient's substitute decision maker would default to spouse  by [place] Act 169.   Advance Directive / Living Will / POLST:  none on file     NARRATIVE AND INTERVAL HISTORY:      No acute events of overnight.  Patient seen sitting on the edge of the bed.  Discussion about symptoms and goals of care as above.  Patient still having chest wall, abdominal and low back pain with radiation down his legs.\    MEDICATIONS / ALLERGIES:     current meds:   Current Facility-Administered Medications   Medication Dose Route Frequency    acetaminophen (TYLENOL) tablet 650 mg  650 mg Oral Q6H PRN    albuterol (PROVENTIL HFA,VENTOLIN HFA) inhaler 2 puff  2 puff Inhalation Q4H PRN    ampicillin-sulbactam (UNASYN) 3 g in sodium chloride 0.9 % 100 mL IVPB  3  g Intravenous Q6H    calcium carbonate (TUMS) chewable tablet 1,000 mg  1,000 mg Oral Daily PRN    Diclofenac Sodium (VOLTAREN) 1 % topical gel 2 g  2 g Topical 4x Daily    enoxaparin (LOVENOX) subcutaneous injection 40 mg  40 mg Subcutaneous Daily    gabapentin (NEURONTIN) capsule 100 mg  100 mg Oral TID    guaiFENesin (MUCINEX) 12 hr tablet 600 mg  600 mg Oral BID    HYDROmorphone (DILAUDID) injection 0.3 mg  0.3 mg Intravenous Q3H PRN    lactated ringers infusion  75 mL/hr Intravenous Continuous    methocarbamol (ROBAXIN) tablet 750 mg  750 mg Oral Q6H KRYSTYNA    naloxone (NARCAN) 0.04 mg/mL syringe 0.04 mg  0.04 mg Intravenous Q1MIN PRN    nicotine (NICODERM CQ) 21 mg/24 hr TD 24 hr patch 1 patch  1 patch Transdermal Daily    ondansetron (ZOFRAN) injection 4 mg  4 mg Intravenous Q6H PRN    oxyCODONE (ROXICODONE) immediate release tablet 10 mg  10 mg Oral Q4H    pantoprazole (PROTONIX) EC tablet 40 mg  40 mg Oral Early Morning    polyethylene glycol (MIRALAX) packet 17 g  17 g Oral Daily PRN    senna (SENOKOT) tablet 8.6 mg  1 tablet Oral BID       No Known Allergies    OBJECTIVE:    Physical Exam  Physical Exam  Vitals and nursing note reviewed.   Constitutional:       General: He is not in acute distress.     Appearance: He is well-developed. He is ill-appearing.   HENT:      Head: Normocephalic and atraumatic.      Jaw: There is normal jaw occlusion.      Mouth/Throat:      Mouth: Mucous membranes are moist.   Eyes:      General: Lids are normal.      Conjunctiva/sclera: Conjunctivae normal.   Cardiovascular:      Rate and Rhythm: Normal rate and regular rhythm.      Pulses: Normal pulses.      Heart sounds: No murmur heard.  Pulmonary:      Effort: Pulmonary effort is normal. No respiratory distress.   Abdominal:      Palpations: Abdomen is soft.      Tenderness: There is no abdominal tenderness.   Musculoskeletal:         General: No swelling.      Cervical back: Full passive range of motion without pain and  "neck supple.   Skin:     General: Skin is warm and dry.      Coloration: Skin is jaundiced.   Neurological:      Mental Status: He is alert.   Psychiatric:         Mood and Affect: Mood normal.         Behavior: Behavior is cooperative.         Lab Results: CBC:   Lab Results   Component Value Date    WBC 10.44 (H) 04/17/2024    HGB 13.5 04/17/2024    HCT 41.3 04/17/2024    MCV 88 04/17/2024     (L) 04/17/2024    RBC 4.69 04/17/2024    MCH 28.8 04/17/2024    MCHC 32.7 04/17/2024    RDW 15.1 04/17/2024    MPV 10.8 04/17/2024   , CMP:   Lab Results   Component Value Date    SODIUM 136 04/17/2024    K 4.4 04/17/2024     04/17/2024    CO2 27 04/17/2024    BUN 22 04/17/2024    CREATININE 0.79 04/17/2024    CALCIUM 8.1 (L) 04/17/2024     (H) 04/17/2024     (H) 04/17/2024    ALKPHOS 834 (H) 04/17/2024    EGFR 96 04/17/2024   , BMP:  Lab Results   Component Value Date    SODIUM 136 04/17/2024    K 4.4 04/17/2024     04/17/2024    CO2 27 04/17/2024    BUN 22 04/17/2024    CREATININE 0.79 04/17/2024    GLUC 94 04/17/2024    CALCIUM 8.1 (L) 04/17/2024    AGAP 6 04/17/2024    EGFR 96 04/17/2024   , PT/PTT:No results found for: \"PT\", \"PTT\"  Imaging Studies: CT spine reviewed.       Counseling / Coordination of Care    Total floor / unit time spent today 45+  minutes. Greater than 50% of total time was spent with the patient and / or family counseling and / or coordination of care. A description of the counseling / coordination of care: Review of chart, review of opioids, supportive listening offered information, goals of care discussions.         "

## 2024-04-17 NOTE — PROGRESS NOTES
[facility]  Progress Note  Name: Ceasar March I  MRN: [id number]  Unit/Bed#: -01 I Date of Admission: 4/11/2024   Date of Service: 4/17/2024  Hospital Day: 5    Assessment/Plan   * Pneumonia of right middle lobe due to infectious organism  Assessment & Plan  Presented with increased shortness of breath, chest tightness, night sweats, chills.  Found to have consolidation in right middle lobe with rt hilar/mediastinal mass with metastasis suspected to be lung primary along with postobstructive pneumonia.  Urine strep/Legionella negative.  Flu/RSV/COVID-negative.  Elevated procalcitonin.  Normal leukocyte count.  Blood culture without any growth.      On my encounter patient appears comfortable nondistressed.  Acutely nontoxic-appearing.  Transition to IV Unasyn per ID recommendation.  Monitor CBC and procalcitonin trend.  Continue supportive care.    Cancer related pain  Assessment & Plan  Palliative care team helping manage pain.    Liver mass  Assessment & Plan  Likely due metastasis  S/p IR liver mass biopsy on 4/15/2024  Pathology report as noted above.    Severe protein-calorie malnutrition (HCC)  Assessment & Plan  Malnutrition Findings:   Adult Malnutrition type: Chronic illness  Adult Degree of Malnutrition: Other severe protein calorie malnutrition  Malnutrition Characteristics: Inadequate energy, Weight loss                  360 Statement: Malnutrition related to chronic illness as evidenced by >7.5% body weight loss in 3 months, <75% energy intake compared to estimated energy needs for >1-month.  To treat with oral diet as tolerated.    BMI Findings:           Body mass index is 23.73 kg/m².       Bacteremia  Assessment & Plan  2/2 blood culture showing Staphylococcus hominis.  Likely contaminant per ID.    Transaminitis  Assessment & Plan  Lab Results   Component Value Date     (H) 04/17/2024     (H) 04/17/2024    TBILI 6.01 (H) 04/17/2024    ALKPHOS 834  (H) 04/17/2024     Like secondary to liver metastasis  Avoid hepatoxins  S/p IR guided liver biopsy: pathology report noted for poorly differentiated neoplasm with extensive necrosis.:  Further stain currently pending per oncology.  Oncology team having discussions with the patient and the family regarding further prognosis and treatment options.    Lung mass  Assessment & Plan  Patient reports night sweats, recent weight loss, current every day smoker, severe back and flank pain  CT shows:   Right hilar and perihilar 5.5 x 6.3 x 5.2 cm mass consistent lung malignancy.Right hilar, mediastinal and right supraclavicular lymphadenopathy compatible with metastatic disease.Airspace consolidation in the right middle lobe adjacent to the hilum suggesting postobstructive pneumonia.Nodular thickening along the axial interstitium in the right lower lobe and the right major fissure suggesting lymphangitic spread of tumor. Diffuse hepatic metastases. Periportal, gastroesophageal and para-aortic lymphadenopathy    Hem/Onc consulted -Per Oncology if small cell they may consider transfer and start treatment.   Complaining of hip pain: CT abdomen pelvis report noted with sclerotic lesion in the pelvis.  Start gabapentin 100 mg 3 times daily, Robaxin  Consult palliative care  Increase oxycodone dose  2D echo report noted with EF of 60%.  Oncology recommendation noted status post IR guided liver biopsy.    Tobacco abuse  Assessment & Plan  Reports an average 1-1.5 pack daily cigarette smoke  Sincere smoking cessation education provided for more than 3 minutes.                 VTE Pharmacologic Prophylaxis: VTE Score: 7 Moderate Risk (Score 3-4) - Pharmacological DVT Prophylaxis Ordered: enoxaparin (Lovenox).    Mobility:   Basic Mobility Inpatient Raw Score: 23  JH-HLM Goal: 7: Walk 25 feet or more  JH-HLM Achieved: 7: Walk 25 feet or more      Patient Centered Rounds: I performed bedside rounds with nursing staff today.    Discussions with Specialists or Other Care Team Provider:  ID and Oncology    Education and Discussions with Family / Patient: Patient declined call to .     Total Time Spent on Date of Encounter in care of patient: 25 mins. This time was spent on one or more of the following: performing physical exam; counseling and coordination of care; obtaining or reviewing history; documenting in the medical record; reviewing/ordering tests, medications or procedures; communicating with other healthcare professionals and discussing with patient's family/caregivers.    Current Length of Stay: 5 day(s)  Current Patient Status: Inpatient   Certification Statement: The patient will continue to require additional inpatient hospital stay due to pending further stain testing on pathology and Oncology having discussion with pt and family for further prognosis, treatment options.  Discharge Plan: Anticipate discharge in 24-48 hrs to home.    Code Status: Level 3 - DNAR and DNI    Subjective:   Seen during a.m. rounds.  Patient appears comfortable not in distress.  Currently reports back pain and hip pain is currently controlled with oxycodone, Dilaudid.  Denies chest pain, dyspnea, fever, chills, nausea, vomiting, any other new complaints.  No other events reported.    Objective:     Vitals:   Temp (24hrs), Av.7 °F (36.5 °C), Min:97.4 °F (36.3 °C), Max:98.1 °F (36.7 °C)    Temp:  [97.4 °F (36.3 °C)-98.1 °F (36.7 °C)] 97.4 °F (36.3 °C)  HR:  [82-94] 87  Resp:  [14-17] 17  BP: (128-144)/(76-87) 128/76  SpO2:  [94 %-96 %] 94 %  Body mass index is 23.73 kg/m².     Input and Output Summary (last 24 hours):     Intake/Output Summary (Last 24 hours) at 2024 1430  Last data filed at 2024 0501  Gross per 24 hour   Intake 360 ml   Output --   Net 360 ml       Physical Exam:   Physical Exam  Constitutional:       General: He is not in acute distress.     Appearance: Normal appearance. He is ill-appearing. He is not  toxic-appearing or diaphoretic.   HENT:      Head: Normocephalic and atraumatic.   Eyes:      Pupils: Pupils are equal, round, and reactive to light.   Cardiovascular:      Rate and Rhythm: Normal rate.      Pulses: Normal pulses.   Pulmonary:      Effort: Pulmonary effort is normal. No respiratory distress.      Breath sounds: Normal breath sounds. No wheezing.   Abdominal:      General: Bowel sounds are normal. There is no distension.      Palpations: Abdomen is soft.      Tenderness: There is no abdominal tenderness.   Musculoskeletal:      Right lower leg: No edema.      Left lower leg: No edema.      Comments: Midthoracic back point tenderness noted on exam.   Neurological:      Mental Status: He is alert and oriented to person, place, and time.   Psychiatric:         Mood and Affect: Mood normal.         Behavior: Behavior normal.          Additional Data:     Labs:  Results from last 7 days   Lab Units 04/17/24  0520 04/16/24 0428 04/15/24  0432   WBC Thousand/uL 10.44*   < > 12.07*   HEMOGLOBIN g/dL 13.5   < > 14.7   HEMATOCRIT % 41.3   < > 44.6   PLATELETS Thousands/uL 141*   < > 171   LYMPHO PCT %  --   --  14   MONO PCT %  --   --  4   EOS PCT %  --   --  0    < > = values in this interval not displayed.     Results from last 7 days   Lab Units 04/17/24  0520   SODIUM mmol/L 136   POTASSIUM mmol/L 4.4   CHLORIDE mmol/L 103   CO2 mmol/L 27   BUN mg/dL 22   CREATININE mg/dL 0.79   ANION GAP mmol/L 6   CALCIUM mg/dL 8.1*   ALBUMIN g/dL 2.7*   TOTAL BILIRUBIN mg/dL 6.01*   ALK PHOS U/L 834*   ALT U/L 226*   AST U/L 303*   GLUCOSE RANDOM mg/dL 94     Results from last 7 days   Lab Units 04/15/24  0432   INR  1.12             Results from last 7 days   Lab Units 04/16/24  0428 04/13/24  0439 04/11/24  2155   PROCALCITONIN ng/ml 16.48* 14.05* 13.19*       Lines/Drains:  Invasive Devices       Peripheral Intravenous Line  Duration             Peripheral IV 04/14/24 Distal;Dorsal (posterior);Left Forearm 2 days                           Recent Cultures (last 7 days):   Results from last 7 days   Lab Units 04/13/24  1700 04/12/24  0230 04/12/24  0117 04/12/24  0116   BLOOD CULTURE  No Growth at 72 hrs.  No Growth at 72 hrs.  --  Staphylococcus hominis* Staphylococcus hominis*  Dermobacter hominis*   GRAM STAIN RESULT   --   --  Gram positive cocci in clusters* Gram positive cocci in clusters*   LEGIONELLA URINARY ANTIGEN   --  Negative  --   --        Last 24 Hours Medication List:   Current Facility-Administered Medications   Medication Dose Route Frequency Provider Last Rate    acetaminophen  650 mg Oral Q6H PRN CHRISTIANA Golden      albuterol  2 puff Inhalation Q4H PRN Jose Daniel Barahona MD      ampicillin-sulbactam  3 g Intravenous Q6H Surya Capone MD 3 g (04/17/24 1155)    calcium carbonate  1,000 mg Oral Daily PRN Odette Mcbride, CHRISTIANA      Diclofenac Sodium  2 g Topical 4x Daily CHRISTIANA Golden      enoxaparin  40 mg Subcutaneous Daily Odette Mcbride, CHRISTIANA      gabapentin  100 mg Oral TID Ashish Gordon MD      guaiFENesin  600 mg Oral BID Odette Mcbride, CHRISTIANA      HYDROmorphone  0.3 mg Intravenous Q3H PRN April CHRISTIANA Hernandez      lactated ringers  75 mL/hr Intravenous Continuous Yohan Kong MD 75 mL/hr (04/16/24 1252)    methocarbamol  750 mg Oral Q6H [facility] Ashish Gordon MD      naloxone  0.04 mg Intravenous Q1MIN PRN Odette Mcbride, CHRISTIANA      nicotine  1 patch Transdermal Daily Odette Mcbride, CHRISTIANA      ondansetron  4 mg Intravenous Q6H PRN Odette Mcbride, CHRISTIANA      oxyCODONE  10 mg Oral Q4H April KAYLA Aguillon, CHRISTIANA      pantoprazole  40 mg Oral Early Morning Gareth HUMPHREYS MD      polyethylene glycol  17 g Oral Daily PRN April KAYLA Aguillon, CHRISTIANA      senna  1 tablet Oral BID April CHRISTIANA Hernandez          Today, Patient Was Seen By: Gareth Romo MD    **Please Note: This note may have been constructed using a voice  recognition system.**

## 2024-04-17 NOTE — ASSESSMENT & PLAN NOTE
Initiated goals of care conversations with the patient.  Limits set at DNR/DNI  Patient is in process of completing medical POA paperwork and well as addressing financial POA.  Daughter Anna is helping.  Offered completion of [place] Advanced Directive, he is going to discuss with Anna.

## 2024-04-17 NOTE — PROGRESS NOTES
"Progress Note - Infectious Disease   Ceasar March 63 y.o. male MRN: [id number]  Unit/Bed#: -01 Encounter: [id number]      Impression/Recommendations:  1.  RML postobstructive pneumonia, secondary to obstructing large right-sided lung mass.  Patient has low risk for resistant pathogens, given lack of hospitalization or antibiotic use.  However patient's respiratory symptoms are not improved and his procalcitonin is increasing despite ceftriaxone.  Antibiotic regimen was changed to Unasyn, to provide better anaerobic coverage.  Patient's respiratory status is improved today, with O2 being weaned off.  WBC decreased.  Patient may need bronchoscopy in attempt to open up his bronchus, if antibiotic does not alleviate postobstructive pneumonia.  Continue IV Unasyn.  Monitor temperature/WBC per  Monitor respiratory symptoms.    Monitor procalcitonin.  Recheck in AM.  Patient may need pulmonary evaluation for bronchoscopy if his postobstructive pneumonia is not improved with antibiotic alone.     2.  Bacteremia.  Although there was growth of Staphylococcus hominis in \"both\" admission blood cultures, it appears that this was a single blood draw from the left arm.  In addition, there was Dermobacter growing in 1 out of 2 sets also.  Patient has no indwelling intravascular foreign body.  He is systemically well, without sepsis or systemic toxicity.  Repeat blood cultures have no growth thus far.  This is more consistent with contaminated blood draw than true bacteremia.  No antibiotic needed for this indication.  Follow-up on final repeat blood cultures.     3.  Low back pain.  CT of T-spine and L-spine did not show any metastases or discitis/vertebral osteomyelitis.  Monitor low back pain.  Pain control per primary service.     4.  Right-sided lung mass with hilar mediastinal lymphadenopathy.  Especially in a smoker, this is concerning for primary lung malignancy.  Possible need for biopsy but defer to oncologist.   "   5.  Multiple liver masses, suggestive of metastases.  Patient is status post liver biopsy on 4/15.  Pathology showed poorly differentiated neoplasm.  Oncology follow-up.     6.  Elevated LFTs, most likely secondary to liver masses.  Abdominal exam is benign.  Doubt biliary obstruction.  Monitor LFTs.     Discussed with patient in detail regarding the above plan.  Discussed with Dr. Romo regarding clinical improvement with antibiotic change.  Will continue current biotic regimen and recheck procalcitonin in a.m.  He is in agreement.    Antibiotics:  Unasyn # 2  Antibiotic # 6    Subjective:  Patient with improved dyspnea.  Cough remains mild, mostly nonproductive.  Stable mild pleuritic chest pain.  Stable low back pain, better controlled.  Temperature is down.  No chills.  He is tolerating antibiotic well.  No nausea, vomiting or diarrhea.    Objective:  Vitals:  Temp:  [97.4 °F (36.3 °C)-98.1 °F (36.7 °C)] 97.4 °F (36.3 °C)  HR:  [82-94] 87  Resp:  [14-17] 17  BP: (128-144)/(76-87) 128/76  SpO2:  [94 %-96 %] 94 %  Temp (24hrs), Av.7 °F (36.5 °C), Min:97.4 °F (36.3 °C), Max:98.1 °F (36.7 °C)  Current: Temperature: (!) 97.4 °F (36.3 °C)    Physical Exam:     General: Awake, alert, cooperative, no distress.   Neck:  Supple. No mass.  No lymphadenopathy.   Lungs: Expansion symmetric, stable mild right-sided rhonchi and rales, no wheezing, respirations unlabored.   Heart:  Regular rate and rhythm, S1 and S2 normal, no murmur.   Abdomen: Soft, nondistended, non-tender, bowel sounds active all four quadrants, no masses, no organomegaly.   Extremities: No edema. No erythema/warmth. No ulcer. Nontender to palpation.   Skin:  No rash.   Neuro: Moves all extremities.     Invasive Devices       Peripheral Intravenous Line  Duration             Peripheral IV 24 Distal;Dorsal (posterior);Left Forearm 2 days                    Labs studies:   I have personally reviewed pertinent labs.  Results from last 7 days    Lab Units 04/17/24  0520 04/16/24  0428 04/15/24  0432   POTASSIUM mmol/L 4.4 4.7 4.9   CHLORIDE mmol/L 103 103 104   CO2 mmol/L 27 32 30   BUN mg/dL 22 22 21   CREATININE mg/dL 0.79 0.83 0.84   EGFR ml/min/1.73sq m 96 94 93   CALCIUM mg/dL 8.1* 8.6 8.6   AST U/L 303* 361* 289*   ALT U/L 226* 252* 207*   ALK PHOS U/L 834* 915* 696*     Results from last 7 days   Lab Units 04/17/24  0520 04/16/24  0428 04/15/24  0432   WBC Thousand/uL 10.44* 11.05* 12.07*   HEMOGLOBIN g/dL 13.5 14.5 14.7   PLATELETS Thousands/uL 141* 150 171     Results from last 7 days   Lab Units 04/13/24  1700 04/12/24  0230 04/12/24  0117 04/12/24  0116   BLOOD CULTURE  No Growth at 72 hrs.  No Growth at 72 hrs.  --  Staphylococcus hominis* Staphylococcus hominis*  Dermobacter hominis*   GRAM STAIN RESULT   --   --  Gram positive cocci in clusters* Gram positive cocci in clusters*   LEGIONELLA URINARY ANTIGEN   --  Negative  --   --        Imaging Studies:   I have personally reviewed pertinent imaging study reports and images in PACS.  T-spine and L-spine CT reviewed personally.  No metastatic lesion.  No discitis/vertebral osteomyelitis.    EKG, Pathology, and Other Studies:   I have personally reviewed pertinent reports.

## 2024-04-17 NOTE — ASSESSMENT & PLAN NOTE
Lab Results   Component Value Date     (H) 04/17/2024     (H) 04/17/2024    TBILI 6.01 (H) 04/17/2024    ALKPHOS 834 (H) 04/17/2024     Like secondary to liver metastasis  Avoid hepatoxins  S/p IR guided liver biopsy: pathology report noted for poorly differentiated neoplasm with extensive necrosis.:  Further stain currently pending per oncology.  Oncology team having discussions with the patient and the family regarding further prognosis and treatment options.

## 2024-04-18 ENCOUNTER — HOSPITAL ENCOUNTER (INPATIENT)
Facility: HOSPITAL | Age: 64
LOS: 4 days | Discharge: HOME | DRG: 846 | End: 2024-04-22
Attending: INTERNAL MEDICINE | Admitting: INTERNAL MEDICINE
Payer: COMMERCIAL

## 2024-04-18 ENCOUNTER — TELEPHONE (OUTPATIENT)
Dept: HEMATOLOGY ONCOLOGY | Facility: CLINIC | Age: 64
End: 2024-04-18

## 2024-04-18 VITALS
HEART RATE: 97 BPM | OXYGEN SATURATION: 95 % | WEIGHT: 175 LBS | BODY MASS INDEX: 23.7 KG/M2 | TEMPERATURE: 97.5 F | RESPIRATION RATE: 17 BRPM | HEIGHT: 72 IN | DIASTOLIC BLOOD PRESSURE: 80 MMHG | SYSTOLIC BLOOD PRESSURE: 123 MMHG

## 2024-04-18 DIAGNOSIS — C34.90 SMALL CELL LUNG CANCER IN ADULT (HCC): ICD-10-CM

## 2024-04-18 DIAGNOSIS — R78.81 BACTEREMIA: ICD-10-CM

## 2024-04-18 DIAGNOSIS — C34.90 SMALL CELL LUNG CANCER (HCC): ICD-10-CM

## 2024-04-18 DIAGNOSIS — R16.0 LIVER MASS: ICD-10-CM

## 2024-04-18 DIAGNOSIS — Z51.5 PALLIATIVE CARE PATIENT: ICD-10-CM

## 2024-04-18 DIAGNOSIS — J18.9 PNEUMONIA OF RIGHT MIDDLE LOBE DUE TO INFECTIOUS ORGANISM: Primary | ICD-10-CM

## 2024-04-18 DIAGNOSIS — R91.8 LUNG MASS: ICD-10-CM

## 2024-04-18 DIAGNOSIS — G89.3 CANCER RELATED PAIN: ICD-10-CM

## 2024-04-18 LAB
ALBUMIN SERPL BCP-MCNC: 2.6 G/DL (ref 3.5–5)
ALP SERPL-CCNC: 723 U/L (ref 34–104)
ALT SERPL W P-5'-P-CCNC: 226 U/L (ref 7–52)
ANION GAP SERPL CALCULATED.3IONS-SCNC: 7 MMOL/L (ref 4–13)
AST SERPL W P-5'-P-CCNC: 304 U/L (ref 13–39)
BILIRUB SERPL-MCNC: 6.88 MG/DL (ref 0.2–1)
BUN SERPL-MCNC: 18 MG/DL (ref 5–25)
CALCIUM ALBUM COR SERPL-MCNC: 9.2 MG/DL (ref 8.3–10.1)
CALCIUM SERPL-MCNC: 8.1 MG/DL (ref 8.4–10.2)
CHLORIDE SERPL-SCNC: 102 MMOL/L (ref 96–108)
CO2 SERPL-SCNC: 28 MMOL/L (ref 21–32)
CREAT SERPL-MCNC: 0.75 MG/DL (ref 0.6–1.3)
ERYTHROCYTE [DISTWIDTH] IN BLOOD BY AUTOMATED COUNT: 15.5 % (ref 11.6–15.1)
GFR SERPL CREATININE-BSD FRML MDRD: 98 ML/MIN/1.73SQ M
GLUCOSE SERPL-MCNC: 93 MG/DL (ref 65–140)
HCT VFR BLD AUTO: 39.3 % (ref 36.5–49.3)
HGB BLD-MCNC: 13 G/DL (ref 12–17)
MCH RBC QN AUTO: 28.8 PG (ref 26.8–34.3)
MCHC RBC AUTO-ENTMCNC: 33.1 G/DL (ref 31.4–37.4)
MCV RBC AUTO: 87 FL (ref 82–98)
PLATELET # BLD AUTO: 127 THOUSANDS/UL (ref 149–390)
PMV BLD AUTO: 11.7 FL (ref 8.9–12.7)
POTASSIUM SERPL-SCNC: 4.3 MMOL/L (ref 3.5–5.3)
PROCALCITONIN SERPL-MCNC: 13.31 NG/ML
PROT SERPL-MCNC: 5.2 G/DL (ref 6.4–8.4)
RBC # BLD AUTO: 4.51 MILLION/UL (ref 3.88–5.62)
SODIUM SERPL-SCNC: 137 MMOL/L (ref 135–147)
WBC # BLD AUTO: 9.77 THOUSAND/UL (ref 4.31–10.16)

## 2024-04-18 PROCEDURE — 84145 PROCALCITONIN (PCT): CPT | Performed by: INTERNAL MEDICINE

## 2024-04-18 PROCEDURE — 85027 COMPLETE CBC AUTOMATED: CPT | Performed by: STUDENT IN AN ORGANIZED HEALTH CARE EDUCATION/TRAINING PROGRAM

## 2024-04-18 PROCEDURE — 99239 HOSP IP/OBS DSCHRG MGMT >30: CPT | Performed by: STUDENT IN AN ORGANIZED HEALTH CARE EDUCATION/TRAINING PROGRAM

## 2024-04-18 PROCEDURE — 99232 SBSQ HOSP IP/OBS MODERATE 35: CPT | Performed by: INTERNAL MEDICINE

## 2024-04-18 PROCEDURE — 99223 1ST HOSP IP/OBS HIGH 75: CPT | Performed by: NURSE PRACTITIONER

## 2024-04-18 PROCEDURE — 80053 COMPREHEN METABOLIC PANEL: CPT | Performed by: STUDENT IN AN ORGANIZED HEALTH CARE EDUCATION/TRAINING PROGRAM

## 2024-04-18 RX ORDER — ENOXAPARIN SODIUM 100 MG/ML
40 INJECTION SUBCUTANEOUS DAILY
Status: CANCELLED | OUTPATIENT
Start: 2024-04-19

## 2024-04-18 RX ORDER — ALLOPURINOL 100 MG/1
200 TABLET ORAL DAILY
Status: DISCONTINUED | OUTPATIENT
Start: 2024-04-18 | End: 2024-04-18 | Stop reason: HOSPADM

## 2024-04-18 RX ORDER — PANTOPRAZOLE SODIUM 40 MG/1
40 TABLET, DELAYED RELEASE ORAL
Status: DISCONTINUED | OUTPATIENT
Start: 2024-04-19 | End: 2024-04-22 | Stop reason: HOSPADM

## 2024-04-18 RX ORDER — METHOCARBAMOL 750 MG/1
750 TABLET, FILM COATED ORAL EVERY 6 HOURS SCHEDULED
Status: DISCONTINUED | OUTPATIENT
Start: 2024-04-19 | End: 2024-04-22 | Stop reason: HOSPADM

## 2024-04-18 RX ORDER — ACETAMINOPHEN 325 MG/1
650 TABLET ORAL EVERY 6 HOURS PRN
Status: CANCELLED | OUTPATIENT
Start: 2024-04-18

## 2024-04-18 RX ORDER — ONDANSETRON 2 MG/ML
4 INJECTION INTRAMUSCULAR; INTRAVENOUS EVERY 6 HOURS PRN
Status: CANCELLED | OUTPATIENT
Start: 2024-04-18

## 2024-04-18 RX ORDER — SENNOSIDES 8.6 MG
1 TABLET ORAL 2 TIMES DAILY
Status: CANCELLED | OUTPATIENT
Start: 2024-04-18

## 2024-04-18 RX ORDER — NICOTINE 21 MG/24HR
1 PATCH, TRANSDERMAL 24 HOURS TRANSDERMAL DAILY
Status: CANCELLED | OUTPATIENT
Start: 2024-04-19

## 2024-04-18 RX ORDER — ONDANSETRON 2 MG/ML
4 INJECTION INTRAMUSCULAR; INTRAVENOUS EVERY 6 HOURS PRN
Status: DISCONTINUED | OUTPATIENT
Start: 2024-04-18 | End: 2024-04-22 | Stop reason: HOSPADM

## 2024-04-18 RX ORDER — GABAPENTIN 100 MG/1
100 CAPSULE ORAL 3 TIMES DAILY
Status: CANCELLED | OUTPATIENT
Start: 2024-04-18

## 2024-04-18 RX ORDER — POLYETHYLENE GLYCOL 3350 17 G/17G
17 POWDER, FOR SOLUTION ORAL DAILY PRN
Status: DISCONTINUED | OUTPATIENT
Start: 2024-04-18 | End: 2024-04-22 | Stop reason: HOSPADM

## 2024-04-18 RX ORDER — HYDROMORPHONE HCL/PF 1 MG/ML
0.3 SYRINGE (ML) INJECTION
Status: DISCONTINUED | OUTPATIENT
Start: 2024-04-18 | End: 2024-04-19

## 2024-04-18 RX ORDER — GABAPENTIN 100 MG/1
100 CAPSULE ORAL 3 TIMES DAILY
Status: DISCONTINUED | OUTPATIENT
Start: 2024-04-18 | End: 2024-04-22 | Stop reason: HOSPADM

## 2024-04-18 RX ORDER — ALBUTEROL SULFATE 90 UG/1
2 AEROSOL, METERED RESPIRATORY (INHALATION) EVERY 4 HOURS PRN
Status: CANCELLED | OUTPATIENT
Start: 2024-04-18

## 2024-04-18 RX ORDER — ALLOPURINOL 100 MG/1
200 TABLET ORAL DAILY
Status: DISCONTINUED | OUTPATIENT
Start: 2024-04-19 | End: 2024-04-22 | Stop reason: HOSPADM

## 2024-04-18 RX ORDER — HYDROMORPHONE HCL/PF 1 MG/ML
0.3 SYRINGE (ML) INJECTION
Status: CANCELLED | OUTPATIENT
Start: 2024-04-18

## 2024-04-18 RX ORDER — OXYCODONE HYDROCHLORIDE 10 MG/1
10 TABLET ORAL EVERY 4 HOURS
Status: CANCELLED | OUTPATIENT
Start: 2024-04-18

## 2024-04-18 RX ORDER — ALBUTEROL SULFATE 90 UG/1
2 AEROSOL, METERED RESPIRATORY (INHALATION) EVERY 4 HOURS PRN
Status: DISCONTINUED | OUTPATIENT
Start: 2024-04-18 | End: 2024-04-22 | Stop reason: HOSPADM

## 2024-04-18 RX ORDER — PANTOPRAZOLE SODIUM 40 MG/1
40 TABLET, DELAYED RELEASE ORAL
Status: CANCELLED | OUTPATIENT
Start: 2024-04-19

## 2024-04-18 RX ORDER — ALLOPURINOL 100 MG/1
100 TABLET ORAL DAILY
Status: DISCONTINUED | OUTPATIENT
Start: 2024-04-18 | End: 2024-04-18

## 2024-04-18 RX ORDER — GUAIFENESIN 600 MG/1
600 TABLET, EXTENDED RELEASE ORAL 2 TIMES DAILY
Status: DISCONTINUED | OUTPATIENT
Start: 2024-04-19 | End: 2024-04-22 | Stop reason: HOSPADM

## 2024-04-18 RX ORDER — ENOXAPARIN SODIUM 100 MG/ML
40 INJECTION SUBCUTANEOUS DAILY
Status: DISCONTINUED | OUTPATIENT
Start: 2024-04-19 | End: 2024-04-22 | Stop reason: HOSPADM

## 2024-04-18 RX ORDER — METHOCARBAMOL 750 MG/1
750 TABLET, FILM COATED ORAL EVERY 6 HOURS SCHEDULED
Status: CANCELLED | OUTPATIENT
Start: 2024-04-18

## 2024-04-18 RX ORDER — CALCIUM CARBONATE 500 MG/1
1000 TABLET, CHEWABLE ORAL DAILY PRN
Status: CANCELLED | OUTPATIENT
Start: 2024-04-18

## 2024-04-18 RX ORDER — ACETAMINOPHEN 325 MG/1
650 TABLET ORAL EVERY 6 HOURS PRN
Status: DISCONTINUED | OUTPATIENT
Start: 2024-04-18 | End: 2024-04-22 | Stop reason: HOSPADM

## 2024-04-18 RX ORDER — SENNOSIDES 8.6 MG
1 TABLET ORAL 2 TIMES DAILY
Status: DISCONTINUED | OUTPATIENT
Start: 2024-04-19 | End: 2024-04-19

## 2024-04-18 RX ORDER — ALLOPURINOL 100 MG/1
200 TABLET ORAL DAILY
Status: CANCELLED | OUTPATIENT
Start: 2024-04-19

## 2024-04-18 RX ORDER — NICOTINE 21 MG/24HR
1 PATCH, TRANSDERMAL 24 HOURS TRANSDERMAL DAILY
Status: DISCONTINUED | OUTPATIENT
Start: 2024-04-19 | End: 2024-04-22 | Stop reason: HOSPADM

## 2024-04-18 RX ORDER — GUAIFENESIN 600 MG/1
600 TABLET, EXTENDED RELEASE ORAL 2 TIMES DAILY
Status: CANCELLED | OUTPATIENT
Start: 2024-04-18

## 2024-04-18 RX ORDER — CALCIUM CARBONATE 500 MG/1
1000 TABLET, CHEWABLE ORAL DAILY PRN
Status: DISCONTINUED | OUTPATIENT
Start: 2024-04-18 | End: 2024-04-22 | Stop reason: HOSPADM

## 2024-04-18 RX ORDER — OXYCODONE HYDROCHLORIDE 10 MG/1
10 TABLET ORAL EVERY 4 HOURS
Status: DISCONTINUED | OUTPATIENT
Start: 2024-04-18 | End: 2024-04-19

## 2024-04-18 RX ORDER — POLYETHYLENE GLYCOL 3350 17 G/17G
17 POWDER, FOR SOLUTION ORAL DAILY PRN
Status: CANCELLED | OUTPATIENT
Start: 2024-04-18

## 2024-04-18 RX ADMIN — METHOCARBAMOL TABLETS 750 MG: 750 TABLET, COATED ORAL at 17:05

## 2024-04-18 RX ADMIN — OXYCODONE HYDROCHLORIDE 10 MG: 10 TABLET ORAL at 12:07

## 2024-04-18 RX ADMIN — PANTOPRAZOLE SODIUM 40 MG: 40 TABLET, DELAYED RELEASE ORAL at 05:17

## 2024-04-18 RX ADMIN — AMPICILLIN SODIUM AND SULBACTAM SODIUM 3 G: 100; 50 INJECTION, POWDER, FOR SOLUTION INTRAVENOUS at 23:26

## 2024-04-18 RX ADMIN — METHOCARBAMOL TABLETS 750 MG: 750 TABLET, COATED ORAL at 12:08

## 2024-04-18 RX ADMIN — GUAIFENESIN 600 MG: 600 TABLET ORAL at 09:47

## 2024-04-18 RX ADMIN — HYDROMORPHONE HYDROCHLORIDE 0.3 MG: 1 INJECTION, SOLUTION INTRAMUSCULAR; INTRAVENOUS; SUBCUTANEOUS at 14:40

## 2024-04-18 RX ADMIN — GABAPENTIN 100 MG: 100 CAPSULE ORAL at 09:52

## 2024-04-18 RX ADMIN — OXYCODONE HYDROCHLORIDE 10 MG: 10 TABLET ORAL at 20:02

## 2024-04-18 RX ADMIN — AMPICILLIN SODIUM AND SULBACTAM SODIUM 3 G: 100; 50 INJECTION, POWDER, FOR SOLUTION INTRAVENOUS at 17:10

## 2024-04-18 RX ADMIN — OXYCODONE HYDROCHLORIDE 10 MG: 10 TABLET ORAL at 09:48

## 2024-04-18 RX ADMIN — GUAIFENESIN 600 MG: 600 TABLET ORAL at 17:05

## 2024-04-18 RX ADMIN — GABAPENTIN 100 MG: 100 CAPSULE ORAL at 20:02

## 2024-04-18 RX ADMIN — ENOXAPARIN SODIUM 40 MG: 40 INJECTION SUBCUTANEOUS at 09:46

## 2024-04-18 RX ADMIN — METHOCARBAMOL TABLETS 750 MG: 750 TABLET, COATED ORAL at 23:06

## 2024-04-18 RX ADMIN — ALLOPURINOL 200 MG: 100 TABLET ORAL at 14:32

## 2024-04-18 RX ADMIN — HYDROMORPHONE HYDROCHLORIDE 0.3 MG: 1 INJECTION, SOLUTION INTRAMUSCULAR; INTRAVENOUS; SUBCUTANEOUS at 07:38

## 2024-04-18 RX ADMIN — METHOCARBAMOL TABLETS 750 MG: 750 TABLET, COATED ORAL at 05:05

## 2024-04-18 RX ADMIN — OXYCODONE HYDROCHLORIDE 10 MG: 10 TABLET ORAL at 17:05

## 2024-04-18 RX ADMIN — GABAPENTIN 100 MG: 100 CAPSULE ORAL at 17:05

## 2024-04-18 RX ADMIN — AMPICILLIN SODIUM AND SULBACTAM SODIUM 3 G: 100; 50 INJECTION, POWDER, FOR SOLUTION INTRAVENOUS at 12:08

## 2024-04-18 RX ADMIN — OXYCODONE HYDROCHLORIDE 10 MG: 10 TABLET ORAL at 00:39

## 2024-04-18 RX ADMIN — SENNOSIDES 8.6 MG: 8.6 TABLET, FILM COATED ORAL at 09:48

## 2024-04-18 RX ADMIN — HYDROMORPHONE HYDROCHLORIDE 0.3 MG: 1 INJECTION, SOLUTION INTRAMUSCULAR; INTRAVENOUS; SUBCUTANEOUS at 23:11

## 2024-04-18 RX ADMIN — METHOCARBAMOL TABLETS 750 MG: 750 TABLET, COATED ORAL at 00:39

## 2024-04-18 RX ADMIN — OXYCODONE HYDROCHLORIDE 10 MG: 10 TABLET ORAL at 23:56

## 2024-04-18 RX ADMIN — AMPICILLIN SODIUM AND SULBACTAM SODIUM 3 G: 100; 50 INJECTION, POWDER, FOR SOLUTION INTRAVENOUS at 05:05

## 2024-04-18 RX ADMIN — OXYCODONE HYDROCHLORIDE 10 MG: 10 TABLET ORAL at 04:51

## 2024-04-18 RX ADMIN — SENNOSIDES 8.6 MG: 8.6 TABLET, FILM COATED ORAL at 17:06

## 2024-04-18 NOTE — NURSING NOTE
Discharge order placed for transfer to [facility], Pt left floor by stretcher with transport crew.

## 2024-04-18 NOTE — ASSESSMENT & PLAN NOTE
CT CAP- R Hilar mass with lymphangitic spread of tumor, Right hilar and perihilar heterogeneously enhancing mass measures 5.5 x 6.3 x 5.2 cm,  Innumerable hypoenhancing lesions throughout the liver measuring up to 3.1 cm   New diagnosis metastatic neuroendocrine carcinoma

## 2024-04-18 NOTE — TELEPHONE ENCOUNTER
"Soft Intake Form   Patient Details   Ceasar March     1960     Reason For Appointment   Who is Calling? Maya Olsen   If not patient, Name?  NA   DID YOU CONFIRM INSURANCE WITH PATIENT? E verified, Routed to finance   Who is the Referring Doctor? Dr. Guadarrama   What is the diagnosis? 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   Has this diagnosis been confirmed by a biopsy/surgery?    If yes, what is the date it was done? Liver bx taken on 4/15-confirmed metastatic neuroendocrine carcinoma, small cell type   Biopsy done at [facility]?  If not, where was it done? Yes   Was imaging done, and was it done at [facility]?  If not, where was it done? Yes-[facility]   Have you been seen by another Oncologist?  If so, who and where (name of facility, city and state) No   For 2nd Opinions Only: Are you currently undergoing treatment, or are you scheduled to start treatment?  If yes, name of facility, city and state  The patient is being transferred to [facility] for inpatient chemo   For \"History Of\" only: Have you completed treatment?  NA   Have you had Genetic Testing done in the past?  If so, advise to bring test results to their visit NA   Record Gathering Information   Did you advise to have records faxed to [phone]?  NA   Did you advise to have disks sent to the proper address with imaging? (\"History of\" Patients)  5 years of imaging for breast patients-Mammos, US etc NA   Scheduling Information   Did you send new patient paperwork?  Email or mail? NA   What is the best call back number?   (If the [facility] is calling, please use their number) NA   Miscellaneous Information      The patient is scheduled for his HFU appointment with Dr. Guadarrama on 5/1 at 0900 in the [place] office.      "

## 2024-04-18 NOTE — DISCHARGE SUMMARY
Pending sale to [facility]  Discharge- Ceasar March 1960, 63 y.o. male MRN: [id number]  Unit/Bed#: -01 Encounter: [id number]  Primary Care Provider: No primary care provider on file.   Date and time admitted to hospital: 4/11/2024  9:17 PM    * Pneumonia of right middle lobe due to infectious organism  Assessment & Plan  Presented with increased shortness of breath, chest tightness, night sweats, chills.  Found to have consolidation in right middle lobe with rt hilar/mediastinal mass with metastasis suspected to be lung primary along with postobstructive pneumonia.  Urine strep/Legionella negative.  Flu/RSV/COVID-negative.  Elevated procalcitonin.      Leukocytosis has now resolved.  Total blood culture growing Staph hominis likely contaminant per ID.    On my encounter patient appears comfortable nondistressed.  Acutely nontoxic-appearing.  Transition to IV Unasyn per ID recommendation.  Monitor CBC and procalcitonin trend.  ID recommendation is to treat for 10 days antibiotic course through 4/25/2024  Can transition to p.o. Augmentin at discharge.  ID recommendation appreciated.    Cancer related pain  Assessment & Plan  Palliative care team helping manage pain.    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  AST/ALT, ALP, T. bili elevated likely due to liver metastasis.  Continue to trend CMP.    Severe protein-calorie malnutrition (HCC)  Assessment & Plan  Malnutrition Findings:   Adult Malnutrition type: Chronic illness  Adult Degree of Malnutrition: Other severe protein calorie malnutrition  Malnutrition Characteristics: Inadequate energy, Weight loss                  360 Statement: Malnutrition related to chronic illness as evidenced by >7.5% body weight loss in 3 months, <75% energy intake compared to estimated energy needs for >1-month.  To treat with oral  diet as tolerated.    BMI Findings:           Body mass index is 23.73 kg/m².       Bacteremia  Assessment & Plan  2/2 blood culture showing Staphylococcus hominis.  Likely contaminant per ID.    Transaminitis  Assessment & Plan  Lab Results   Component Value Date     (H) 04/18/2024     (H) 04/18/2024    TBILI 6.88 (H) 04/18/2024    ALKPHOS 723 (H) 04/18/2024     Like secondary to liver metastasis  Avoid hepatoxins  S/p IR guided liver biopsy: pathology report noted for poorly differentiated neoplasm with extensive necrosis.:  Concerning of small cell carcinoma.  Currently patient is being transferred to [facility] for chemotherapy as per oncology recommendation.  Started on allopurinol 200 mg daily per oncology recommendation.      Lung mass  Assessment & Plan  Patient reports night sweats, recent weight loss, current every day smoker, severe back and flank pain  CT shows:   Right hilar and perihilar 5.5 x 6.3 x 5.2 cm mass consistent lung malignancy.Right hilar, mediastinal and right supraclavicular lymphadenopathy compatible with metastatic disease.Airspace consolidation in the right middle lobe adjacent to the hilum suggesting postobstructive pneumonia.Nodular thickening along the axial interstitium in the right lower lobe and the right major fissure suggesting lymphangitic spread of tumor. Diffuse hepatic metastases. Periportal, gastroesophageal and para-aortic lymphadenopathy  2D echo report noted with EF of 60%.  Hem/Onc consulted -Per Oncology if small cell they may consider transfer and start treatment.   Complaining of hip pain: CT abdomen pelvis report noted with sclerotic lesion in the pelvis.  Liver biopsy/pathology report positive for small cell carcinoma.  Currently being transferred to [facility] for chemotherapy.  Started on allopurinol 20 mg daily per oncology.  Start gabapentin 100 mg 3 times daily, Robaxin, oxycodone 10 mg every 4 hours, IV Dilaudid  0.3 mg as needed for breakthrough pain per palliative care.  Palliative care recommendation appreciated for symptoms management.  Transferred to [facility] and is on campus for chemotherapy per oncology.      Tobacco abuse  Assessment & Plan  Reports an average 1-1.5 pack daily cigarette smoke  Sincere smoking cessation education provided for more than 3 minutes.          Discharging Physician / Practitioner: Gareth Romo MD  PCP: No primary care provider on file.  Admission Date:   Admission Orders (From admission, onward)       Ordered        04/12/24 0035  INPATIENT ADMISSION  Once            04/12/24 0033  INPATIENT ADMISSION  Once,   Status:  Canceled                          Discharge Date: 04/18/24    Medical Problems       Resolved Problems  Date Reviewed: 4/18/2024   None         Consultations During Hospital Stay:  Oncology, infectious disease.    Procedures Performed:   IR guided liver biopsy    Reason for Admission: Post obstructive neumonia,lung mass    Hospital Course:     Ceasar March is a 63 y.o. male patient active smoker, asthma, who originally presented to the hospital on 4/11/2024 due to shortness of breath, generalized weakness.  On further evaluation noted with lung mass with post obstructive pneumonia, metastatic disease to liver. 2/2 blood culture growing contaminant per ID.  Currently on IV Unasyn per ID recommendation and plan to treat for 10 days through 4/25/2024.  Patient was seen by oncology and had IR guided liver biopsy.  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  Guarded prognosis.  Had lengthy discussion with oncology, patient, daughter and currently plan is for transfer to [facility] for inpatient chemotherapy initiation.  Patient and daughter both are in agreement with all plan.  Palliative care team following for pain and symptoms management.  Patient was also complaining of back and hip pain and CT thoracic lumbar spine report noted  negative for acute lesions.  Currently patient is hemodynamically stable for transfer to [facility].  No other events reported.  Refer to earlier notes for further clarification.        Please see above list of diagnoses and related plan for additional information.     Condition at Discharge: good     Discharge Day Visit / Exam:     Subjective: Seen during rounds.  Patient reports currently pain is manageable with current regimen.  Denies chest pain, dyspnea, fever, chills, nausea, vomiting, any other new limits.  Tolerating antibiotics well.  Had lengthy discussion with patient as well as with the daughter over the phone regarding small cell carcinoma finding of the pathology report as well as oncology recommendation and currently patient and daughter both are in agreement to be transferred to [facility] to initiate hemotherapy.    Vitals: Blood Pressure: 123/80 (04/18/24 1530)  Pulse: 97 (04/18/24 1530)  Temperature: 97.5 °F (36.4 °C) (04/18/24 1530)  Temp Source: Oral (04/17/24 1528)  Respirations: 17 (04/18/24 1530)  Height: 6' (182.9 cm) (04/14/24 1025)  Weight - Scale: 79.4 kg (175 lb) (04/14/24 1025)  SpO2: 95 % (04/18/24 1530)  Exam:   Physical Exam  Constitutional:       General: He is not in acute distress.     Appearance: Normal appearance. He is ill-appearing. He is not toxic-appearing or diaphoretic.   HENT:      Head: Normocephalic and atraumatic.   Eyes:      Pupils: Pupils are equal, round, and reactive to light.   Cardiovascular:      Rate and Rhythm: Normal rate.      Pulses: Normal pulses.   Pulmonary:      Effort: Pulmonary effort is normal. No respiratory distress.      Breath sounds: Normal breath sounds. No wheezing.   Abdominal:      General: Bowel sounds are normal. There is no distension.      Palpations: Abdomen is soft.      Tenderness: There is no abdominal tenderness.   Musculoskeletal:      Right lower leg: No edema.      Left lower leg: No edema.      Comments:  Midthoracic back point tenderness noted on exam.   Neurological:      Mental Status: He is alert and oriented to person, place, and time.   Psychiatric:         Mood and Affect: Mood normal.         Behavior: Behavior normal.       Discussion with Family: spoke with daughter over the phone at length.     Discharge instructions/Information to patient and family:   See after visit summary for information provided to patient and family.      Provisions for Follow-Up Care:  See after visit summary for information related to follow-up care and any pertinent home health orders.      Disposition:     Other: Being transferred to [facility].    Planned Readmission:      Discharge Statement:  I spent 35 minutes discharging the patient. This time was spent on the day of discharge. I had direct contact with the patient on the day of discharge. Greater than 50% of the total time was spent examining patient, answering all patient questions, arranging and discussing plan of care with patient as well as directly providing post-discharge instructions.  Additional time then spent on discharge activities.    Discharge Medications:  See after visit summary for reconciled discharge medications provided to patient and family.      ** Please Note: This note has been constructed using a voice recognition system **

## 2024-04-18 NOTE — TRANSPORTATION MEDICAL NECESSITY
"Section I - General Information    Name of Patient: Ceasar March                 : 1960    Medicare #: [id number]  Transport Date: 24 (PCS is valid for round trips on this date and for all repetitive trips in the 60-day range as noted below.)  Origin: [facility] 4TH FLOOR MED SURG UNIT                                                         Destination: [facility]  Is the pt's stay covered under Medicare Part A (PPS/DRG)   []     Closest appropriate facility? If no, why is transport to more distant facility required? Yes  If hospice pt, is this transport related to pt's terminal illness?        Section II - Medical Necessity Questionnaire  Ambulance transportation is medically necessary only if other means of transport are contraindicated or would be potentially harmful to the patient. To meet this requirement, the patient must either be \"bed confined\" or suffer from a condition such that transport by means other than ambulance is contraindicated by the patient's condition. The following questions must be answered by the medical professional signing below for this form to be valid:    1)  Describe the MEDICAL CONDITION (physical and/or mental) of this patient AT THE TIME OF AMBULANCE TRANSPORT that requires the patient to be transported in an ambulance and why transport by other means is contraindicated by the patient's condition: Patient presented with shortness of breath and generalized weakness. Found to have lung mass with concern for metastatic disease and postobstructive pneumonia. Started on antibiotics     2) Is the patient \"bed confined\" as defined below?     Yes  To be \"be confined\" the patient must satisfy all three of the following conditions: (1) unable to get up from bed without Assistance; AND (2) unable to ambulate; AND (3) unable to sit in a chair or wheelchair.    3) Can this patient safely be transported by car or wheelchair van (i.e., seated during " transport without a medical attendant or monitoring)?   No    4) In addition to completing questions 1-3 above, please check any of the following conditions that apply*:   *Note: supporting documentation for any boxes checked must be maintained in the patient's medical records.  If hosp-hosp transfer, describe services needed at 2nd facility not available at 1st facility?   IV meds/fluids required   Requires oxygen-unable to self administer      Section III - Signature of Physician or Healthcare Professional  I certify that the above information is true and correct based on my evaluation of this patient, and represent that the patient requires transport by ambulance and that other forms of transport are contraindicated. I understand that this information will be used by the Centers for Medicare and Medicaid Services (CMS) to support the determination of medical necessity for ambulance services, and I represent that I have personal knowledge of the patient's condition at time of transport.    [x]  If this box is checked, I also certify that the patient is physically or mentally incapable of signing the ambulance service's claim and that the institution with which I am affiliated has furnished care, services, or assistance to the patient.    My signature below is made on behalf of the patient pursuant to 42 CFR §424.36(b)(4). In accordance with 42 CFR §424.37, the specific reason(s) that the patient is physically or mentally incapable of signing the claim form is as follows: .      Signature of Physician* or Healthcare Professional__  Christine Rivera____________________________________________________________  Signature Date 04/18/24 (For scheduled repetitive transports, this form is not valid for transports performed more than 60 days after this date)    Printed Name & Credentials of Physician or Healthcare Professional (MD, DO, RN, etc.)________________________________  *Form must be signed by patient's  attending physician for scheduled, repetitive transports. For non-repetitive, unscheduled ambulance transports, if unable to obtain the signature of the attending physician, any of the following may sign (choose appropriate option below)  [] Physician Assistant []  Clinical Nurse Specialist []  Registered Nurse  []  Nurse Practitioner  [x] Discharge Planner

## 2024-04-18 NOTE — ASSESSMENT & PLAN NOTE
Lab Results   Component Value Date     (H) 04/18/2024     (H) 04/18/2024    TBILI 6.88 (H) 04/18/2024    ALKPHOS 723 (H) 04/18/2024     Like secondary to liver metastasis  Avoid hepatoxins  S/p IR guided liver biopsy: pathology report noted for poorly differentiated neoplasm with extensive necrosis.:  Further stain currently pending per oncology.  Oncology team having discussions with the patient and the family regarding further prognosis and treatment options.

## 2024-04-19 ENCOUNTER — PATIENT OUTREACH (OUTPATIENT)
Dept: HEMATOLOGY ONCOLOGY | Facility: CLINIC | Age: 64
End: 2024-04-19

## 2024-04-19 DIAGNOSIS — R91.8 LUNG MASS: Primary | ICD-10-CM

## 2024-04-19 DIAGNOSIS — C34.90 SMALL CELL LUNG CANCER (HCC): Primary | ICD-10-CM

## 2024-04-19 PROBLEM — Z71.89 GOALS OF CARE, COUNSELING/DISCUSSION: Status: ACTIVE | Noted: 2024-04-19

## 2024-04-19 LAB
ALBUMIN SERPL BCP-MCNC: 2.7 G/DL (ref 3.5–5)
ALP SERPL-CCNC: 742 U/L (ref 34–104)
ALT SERPL W P-5'-P-CCNC: 221 U/L (ref 7–52)
ANION GAP SERPL CALCULATED.3IONS-SCNC: 13 MMOL/L (ref 4–13)
ANISOCYTOSIS BLD QL SMEAR: PRESENT
AST SERPL W P-5'-P-CCNC: 306 U/L (ref 13–39)
BACTERIA BLD CULT: NORMAL
BACTERIA BLD CULT: NORMAL
BASOPHILS # BLD MANUAL: 0 THOUSAND/UL (ref 0–0.1)
BASOPHILS NFR MAR MANUAL: 0 % (ref 0–1)
BILIRUB SERPL-MCNC: 9.42 MG/DL (ref 0.2–1)
BUN SERPL-MCNC: 19 MG/DL (ref 5–25)
CALCIUM ALBUM COR SERPL-MCNC: 9 MG/DL (ref 8.3–10.1)
CALCIUM SERPL-MCNC: 8 MG/DL (ref 8.4–10.2)
CHLORIDE SERPL-SCNC: 101 MMOL/L (ref 96–108)
CO2 SERPL-SCNC: 19 MMOL/L (ref 21–32)
CREAT SERPL-MCNC: 0.79 MG/DL (ref 0.6–1.3)
EOSINOPHIL # BLD MANUAL: 0.26 THOUSAND/UL (ref 0–0.4)
EOSINOPHIL NFR BLD MANUAL: 2 % (ref 0–6)
ERYTHROCYTE [DISTWIDTH] IN BLOOD BY AUTOMATED COUNT: 16.3 % (ref 11.6–15.1)
GFR SERPL CREATININE-BSD FRML MDRD: 96 ML/MIN/1.73SQ M
GLUCOSE SERPL-MCNC: 70 MG/DL (ref 65–140)
HCT VFR BLD AUTO: 43.9 % (ref 36.5–49.3)
HGB BLD-MCNC: 14.3 G/DL (ref 12–17)
LYMPHOCYTES # BLD AUTO: 0.92 THOUSAND/UL (ref 0.6–4.47)
LYMPHOCYTES # BLD AUTO: 5 % (ref 14–44)
MCH RBC QN AUTO: 29 PG (ref 26.8–34.3)
MCHC RBC AUTO-ENTMCNC: 32.6 G/DL (ref 31.4–37.4)
MCV RBC AUTO: 89 FL (ref 82–98)
METAMYELOCYTE ABSOLUTE CT: 0.26 THOUSAND/UL (ref 0–0.1)
METAMYELOCYTES NFR BLD MANUAL: 2 % (ref 0–1)
MONOCYTES # BLD AUTO: 0.65 THOUSAND/UL (ref 0–1.22)
MONOCYTES NFR BLD: 5 % (ref 4–12)
NEUTROPHILS # BLD MANUAL: 11 THOUSAND/UL (ref 1.85–7.62)
NEUTS BAND NFR BLD MANUAL: 5 % (ref 0–8)
NEUTS SEG NFR BLD AUTO: 79 % (ref 43–75)
PLATELET # BLD AUTO: 133 THOUSANDS/UL (ref 149–390)
PLATELET BLD QL SMEAR: ABNORMAL
PMV BLD AUTO: 10.3 FL (ref 8.9–12.7)
POTASSIUM SERPL-SCNC: 4.7 MMOL/L (ref 3.5–5.3)
PROCALCITONIN SERPL-MCNC: 16.96 NG/ML
PROT SERPL-MCNC: 5.4 G/DL (ref 6.4–8.4)
RBC # BLD AUTO: 4.93 MILLION/UL (ref 3.88–5.62)
RBC MORPH BLD: PRESENT
SODIUM SERPL-SCNC: 133 MMOL/L (ref 135–147)
TARGETS BLD QL SMEAR: PRESENT
URATE SERPL-MCNC: 4.3 MG/DL (ref 3.5–8.5)
VARIANT LYMPHS # BLD AUTO: 2 %
WBC # BLD AUTO: 13.09 THOUSAND/UL (ref 4.31–10.16)

## 2024-04-19 PROCEDURE — 84550 ASSAY OF BLOOD/URIC ACID: CPT | Performed by: INTERNAL MEDICINE

## 2024-04-19 PROCEDURE — 94760 N-INVAS EAR/PLS OXIMETRY 1: CPT

## 2024-04-19 PROCEDURE — 94640 AIRWAY INHALATION TREATMENT: CPT

## 2024-04-19 PROCEDURE — 99223 1ST HOSP IP/OBS HIGH 75: CPT | Performed by: INTERNAL MEDICINE

## 2024-04-19 PROCEDURE — 99232 SBSQ HOSP IP/OBS MODERATE 35: CPT | Performed by: HOSPITALIST

## 2024-04-19 PROCEDURE — 85027 COMPLETE CBC AUTOMATED: CPT | Performed by: NURSE PRACTITIONER

## 2024-04-19 PROCEDURE — 80053 COMPREHEN METABOLIC PANEL: CPT | Performed by: NURSE PRACTITIONER

## 2024-04-19 PROCEDURE — 85007 BL SMEAR W/DIFF WBC COUNT: CPT | Performed by: NURSE PRACTITIONER

## 2024-04-19 PROCEDURE — 99223 1ST HOSP IP/OBS HIGH 75: CPT | Performed by: NURSE PRACTITIONER

## 2024-04-19 PROCEDURE — 99222 1ST HOSP IP/OBS MODERATE 55: CPT | Performed by: INTERNAL MEDICINE

## 2024-04-19 PROCEDURE — 84145 PROCALCITONIN (PCT): CPT | Performed by: NURSE PRACTITIONER

## 2024-04-19 PROCEDURE — 3E03305 INTRODUCTION OF OTHER ANTINEOPLASTIC INTO PERIPHERAL VEIN, PERCUTANEOUS APPROACH: ICD-10-PCS | Performed by: HOSPITALIST

## 2024-04-19 RX ORDER — HYDROMORPHONE HCL/PF 1 MG/ML
0.5 SYRINGE (ML) INJECTION
Status: DISCONTINUED | OUTPATIENT
Start: 2024-04-19 | End: 2024-04-22 | Stop reason: HOSPADM

## 2024-04-19 RX ORDER — SODIUM CHLORIDE 9 MG/ML
20 INJECTION, SOLUTION INTRAVENOUS ONCE
Status: COMPLETED | OUTPATIENT
Start: 2024-04-19 | End: 2024-04-19

## 2024-04-19 RX ORDER — LEVALBUTEROL INHALATION SOLUTION 1.25 MG/3ML
1.25 SOLUTION RESPIRATORY (INHALATION)
Status: DISCONTINUED | OUTPATIENT
Start: 2024-04-19 | End: 2024-04-20

## 2024-04-19 RX ORDER — POLYETHYLENE GLYCOL 3350 17 G/17G
17 POWDER, FOR SOLUTION ORAL DAILY
Status: DISCONTINUED | OUTPATIENT
Start: 2024-04-19 | End: 2024-04-22 | Stop reason: HOSPADM

## 2024-04-19 RX ORDER — DEXAMETHASONE SODIUM PHOSPHATE 4 MG/ML
4 INJECTION, SOLUTION INTRA-ARTICULAR; INTRALESIONAL; INTRAMUSCULAR; INTRAVENOUS; SOFT TISSUE DAILY
Status: DISCONTINUED | OUTPATIENT
Start: 2024-04-19 | End: 2024-04-22 | Stop reason: HOSPADM

## 2024-04-19 RX ORDER — SENNOSIDES 8.6 MG
2 TABLET ORAL 2 TIMES DAILY
Status: DISCONTINUED | OUTPATIENT
Start: 2024-04-19 | End: 2024-04-22 | Stop reason: HOSPADM

## 2024-04-19 RX ORDER — DOCUSATE SODIUM 100 MG/1
100 CAPSULE, LIQUID FILLED ORAL 2 TIMES DAILY
Status: DISCONTINUED | OUTPATIENT
Start: 2024-04-19 | End: 2024-04-22 | Stop reason: HOSPADM

## 2024-04-19 RX ADMIN — GABAPENTIN 100 MG: 100 CAPSULE ORAL at 17:17

## 2024-04-19 RX ADMIN — AMPICILLIN SODIUM AND SULBACTAM SODIUM 3 G: 100; 50 INJECTION, POWDER, FOR SOLUTION INTRAVENOUS at 21:20

## 2024-04-19 RX ADMIN — METHOCARBAMOL TABLETS 750 MG: 750 TABLET, COATED ORAL at 17:13

## 2024-04-19 RX ADMIN — METHOCARBAMOL TABLETS 750 MG: 750 TABLET, COATED ORAL at 12:26

## 2024-04-19 RX ADMIN — SODIUM CHLORIDE 20 ML/HR: 0.9 INJECTION, SOLUTION INTRAVENOUS at 11:03

## 2024-04-19 RX ADMIN — LEVALBUTEROL HYDROCHLORIDE 1.25 MG: 1.25 SOLUTION RESPIRATORY (INHALATION) at 09:19

## 2024-04-19 RX ADMIN — METHOCARBAMOL TABLETS 750 MG: 750 TABLET, COATED ORAL at 05:39

## 2024-04-19 RX ADMIN — IPRATROPIUM BROMIDE 0.5 MG: 0.5 SOLUTION RESPIRATORY (INHALATION) at 14:12

## 2024-04-19 RX ADMIN — DICLOFENAC SODIUM 2 G: 10 GEL TOPICAL at 17:15

## 2024-04-19 RX ADMIN — IPRATROPIUM BROMIDE 0.5 MG: 0.5 SOLUTION RESPIRATORY (INHALATION) at 09:19

## 2024-04-19 RX ADMIN — ETOPOSIDE 100.6 MG: 20 INJECTION, SOLUTION INTRAVENOUS at 13:06

## 2024-04-19 RX ADMIN — GABAPENTIN 100 MG: 100 CAPSULE ORAL at 21:20

## 2024-04-19 RX ADMIN — HYDROMORPHONE HYDROCHLORIDE 0.3 MG: 1 INJECTION, SOLUTION INTRAMUSCULAR; INTRAVENOUS; SUBCUTANEOUS at 05:39

## 2024-04-19 RX ADMIN — GABAPENTIN 100 MG: 100 CAPSULE ORAL at 08:14

## 2024-04-19 RX ADMIN — SENNOSIDES 8.6 MG: 8.6 TABLET, FILM COATED ORAL at 08:14

## 2024-04-19 RX ADMIN — AMPICILLIN SODIUM AND SULBACTAM SODIUM 3 G: 100; 50 INJECTION, POWDER, FOR SOLUTION INTRAVENOUS at 05:31

## 2024-04-19 RX ADMIN — CARBOPLATIN 669.5 MG: 600 INJECTION, SOLUTION INTRAVENOUS at 12:29

## 2024-04-19 RX ADMIN — LEVALBUTEROL HYDROCHLORIDE 1.25 MG: 1.25 SOLUTION RESPIRATORY (INHALATION) at 14:12

## 2024-04-19 RX ADMIN — AMPICILLIN SODIUM AND SULBACTAM SODIUM 3 G: 100; 50 INJECTION, POWDER, FOR SOLUTION INTRAVENOUS at 14:31

## 2024-04-19 RX ADMIN — GUAIFENESIN 600 MG: 600 TABLET ORAL at 17:13

## 2024-04-19 RX ADMIN — HYDROMORPHONE HYDROCHLORIDE 0.5 MG: 1 INJECTION, SOLUTION INTRAMUSCULAR; INTRAVENOUS; SUBCUTANEOUS at 10:52

## 2024-04-19 RX ADMIN — OXYCODONE HYDROCHLORIDE 10 MG: 10 TABLET ORAL at 08:14

## 2024-04-19 RX ADMIN — OXYCODONE HYDROCHLORIDE 10 MG: 10 TABLET ORAL at 04:10

## 2024-04-19 RX ADMIN — DEXAMETHASONE SODIUM PHOSPHATE 4 MG: 4 INJECTION INTRA-ARTICULAR; INTRALESIONAL; INTRAMUSCULAR; INTRAVENOUS; SOFT TISSUE at 10:52

## 2024-04-19 RX ADMIN — PANTOPRAZOLE SODIUM 40 MG: 40 TABLET, DELAYED RELEASE ORAL at 05:39

## 2024-04-19 RX ADMIN — OXYCODONE HYDROCHLORIDE 15 MG: 10 TABLET ORAL at 12:26

## 2024-04-19 RX ADMIN — ENOXAPARIN SODIUM 40 MG: 60 INJECTION SUBCUTANEOUS at 08:15

## 2024-04-19 RX ADMIN — OXYCODONE HYDROCHLORIDE 15 MG: 10 TABLET ORAL at 17:13

## 2024-04-19 RX ADMIN — FOSAPREPITANT DIMEGLUMINE 150 MG: 150 INJECTION, POWDER, LYOPHILIZED, FOR SOLUTION INTRAVENOUS at 11:28

## 2024-04-19 RX ADMIN — ONDANSETRON: 2 INJECTION INTRAMUSCULAR; INTRAVENOUS at 11:03

## 2024-04-19 RX ADMIN — DOCUSATE SODIUM 100 MG: 100 CAPSULE, LIQUID FILLED ORAL at 17:13

## 2024-04-19 RX ADMIN — HYDROMORPHONE HYDROCHLORIDE 0.5 MG: 1 INJECTION, SOLUTION INTRAMUSCULAR; INTRAVENOUS; SUBCUTANEOUS at 17:26

## 2024-04-19 RX ADMIN — SENNOSIDES 17.2 MG: 8.6 TABLET, FILM COATED ORAL at 17:13

## 2024-04-19 RX ADMIN — OXYCODONE HYDROCHLORIDE 15 MG: 10 TABLET ORAL at 21:20

## 2024-04-19 NOTE — ASSESSMENT & PLAN NOTE
"Patient reported night sweats, recent weight loss, current every day smoker, severe back and flank pain.  CT chest: \" Right hilar and perihilar 5.5 x 6.3 x 5.2 cm mass consistent lung malignancy. Right hilar, mediastinal and right supraclavicular lymphadenopathy compatible with metastatic disease. Airspace consolidation in the right middle lobe adjacent to the hilum suggesting postobstructive pneumonia. Nodular thickening along the axial interstitium in the right lower lobe and the right major fissure suggesting lymphangitic spread of tumor. Diffuse hepatic metastases. Periportal, gastroesophageal and para-aortic lymphadenopathy\"  Complained of hip pain -- CT A/P displayed sclerotic lesion in the pelvis.  Liver biopsy pathology report positive for small cell carcinoma.    Plan  4/19-day 1 of chemotherapy  Oncology consult.   "

## 2024-04-19 NOTE — H&P
"[facility]  H&P  Name: Ceasar March 63 y.o. male I MRN: [id number]  Unit/Bed#: S -01 I Date of Admission: 4/18/2024   Date of Service: 4/18/2024 I Hospital Day: 0      Assessment/Plan   * Pneumonia of right middle lobe due to infectious organism  Assessment & Plan  Presentation: Patient originally presented to [facility] on 04/11/2024 due to complaints of increased shortness of breath, chest tightness, night sweats and chills. Patient was found to have consolidation in right middle lobe with right hilar/mediastinal mass with metastasis suspected to be lung primary along with postobstructive pneumonia.   Urine strep/legionella negative.  COVID/Influenza/RSV negative.  BC grew Staph hominis likely contaminant per ID.  Monitor CBC and procalcitonin trend.   Continue IV Unasyn per ID -- 10 day course of abx through 04/25/2024.  Plan to transition to Augmentin on discharge per ID.  Appreciate ID recommendations.    Lung mass  Assessment & Plan  Patient reported night sweats, recent weight loss, current every day smoker, severe back and flank pain.  CT chest: \" Right hilar and perihilar 5.5 x 6.3 x 5.2 cm mass consistent lung malignancy. Right hilar, mediastinal and right supraclavicular lymphadenopathy compatible with metastatic disease. Airspace consolidation in the right middle lobe adjacent to the hilum suggesting postobstructive pneumonia. Nodular thickening along the axial interstitium in the right lower lobe and the right major fissure suggesting lymphangitic spread of tumor. Diffuse hepatic metastases. Periportal, gastroesophageal and para-aortic lymphadenopathy\"  Complained of hip pain -- CT A/P displayed sclerotic lesion in the pelvis.  Liver biopsy pathology report positive for small cell carcinoma.  Transferred to [facility] for chemotherapy.  Oncology consult.     Liver mass  Assessment & Plan  Likely 2/2 metastases.  S/P IR liver biopsy on 04/15/2024.  Pathology report positive for " small cell carcinoma.   Transaminitis likely 2/2 liver mets.  Trend CMP.    Cancer related pain  Assessment & Plan  Continue Gabapentin, Robaxin prn and Oxycodone prn.   Appreciate palliative care recommendations.    Transaminitis  Assessment & Plan  Lab Results   Component Value Date     (H) 04/18/2024     (H) 04/18/2024    TBILI 6.88 (H) 04/18/2024    ALKPHOS 723 (H) 04/18/2024    Suspect 2/2 liver metastasis.  Avoid hepatotoxins.  S/P IR guided liver biopsy: Pathology report noted for poorly differentiated neoplasm with extensive necrosis; concerning for small cell carcinoma.  Continue Allopurinol 200 mg daily.  Patient transferred to [facility] for initiation of chemotherapy per Oncology recs.  Oncology consult.     Bacteremia  Assessment & Plan  2/2 BC grew Staph hominis.  Suspect contaminant per ID.    Severe protein-calorie malnutrition (HCC)  Assessment & Plan  Malnutrition Findings:   Adult malnutrition type: Chronic illness.  Adult degree of malnutrition: Other severe protein calorie malnutrition.  Malnutrition characteristics: Inadequate energy, weight loss.    360 statement: Malnutrition related to chronic illness as evidenced by >7.5% body weight loss in 3 months, <75% energy intake compared to estimated energy needs for >1 month. To treat with oral diet as tolerated.     BMI Findings:        Body mass index is 23.73 kg/m3      Tobacco abuse  Assessment & Plan  Reported an average of 1-1.5 PPD.  NRT.  Encouraged smoking cessation.         VTE Pharmacologic Prophylaxis: VTE Score: 5 High Risk (Score >/= 5) - Pharmacological DVT Prophylaxis Ordered: enoxaparin (Lovenox). Sequential Compression Devices Ordered.  Code Status: Level 3 - DNAR and DNI prior  Discussion with family: Patient declined call to .     Anticipated Length of Stay: Patient will be admitted on an inpatient basis with an anticipated length of stay of greater than 2 midnights secondary to pneumonia, lung mass/liver  [facility] with initiation of chemotherapy, oncology consult, ID consult, palliative care consult.    Total Time Spent on Date of Encounter in care of patient: 70 mins. This time was spent on one or more of the following: performing physical exam; counseling and coordination of care; obtaining or reviewing history; documenting in the medical record; reviewing/ordering tests, medications or procedures; communicating with other healthcare professionals and discussing with patient's family/caregivers.    Chief Complaint: Back pain    History of Present Illness:  Ceasar March is a 63 y.o. male with a PMH of cigarette smoker and asthma who originally presented to [facility] on 04/11/2024 due to complaints of increased shortness of breath, chest tightness, night sweats and chills. Patient was found to have consolidation in right middle lobe with right hilar/mediastinal mass with metastasis suspected to be lung primary along with postobstructive pneumonia. CT imaging displayed liver mass for which patient underwent liver biopsy on 04/15/2024. Pathology report indicated small cell carcinoma. Patient had two of two blood cultures growing contaminant per ID. Patient currently on IV Unasyn for 10 course through 04/25/2024. Patient complained of back and hip pain for which CT imaging was ordered and negative for acute lesions. Palliative care was consulted for pain and symptom management. There was a lengthy discussion with oncology, patient and patient's daughter with plan to transfer patient to [facility] for initiation of inpatient chemotherapy.    Review of Systems:  Review of Systems   Constitutional:  Negative for chills and fever.   Respiratory:  Negative for shortness of breath.    Cardiovascular:  Negative for chest pain.   Gastrointestinal:  Negative for nausea and vomiting.   Musculoskeletal:  Positive for arthralgias and back pain.   All other systems reviewed and are negative.      Past Medical and Surgical History:   Past  Medical History:   Diagnosis Date    Asthma        Past Surgical History:   Procedure Laterality Date    HERNIA REPAIR      IR BIOPSY LIVER MASS  4/15/2024       Meds/Allergies:  Prior to Admission medications    Medication Sig Start Date End Date Taking? Authorizing Provider   albuterol (PROVENTIL HFA,VENTOLIN HFA) 90 mcg/act inhaler Inhale 2 puffs    Historical Provider, MD   gabapentin (NEURONTIN) 100 mg capsule Take 1 capsule (100 mg total) by mouth 3 (three) times a day 4/16/24 April CHRISTIANA Hernandez   methocarbamol (ROBAXIN) 750 mg tablet Take 1 tablet (750 mg total) by mouth every 6 (six) hours as needed for muscle spasms 4/16/24 April CHRISTIANA Hernandez   ondansetron (ZOFRAN) 4 mg tablet Take 1 tablet (4 mg total) by mouth every 8 (eight) hours as needed for nausea or vomiting 4/16/24 April CHRISTIANA Hernandez   oxyCODONE (ROXICODONE) 10 MG TABS Take 1 tablet (10 mg total) by mouth every 4 (four) hours as needed for moderate pain for up to 10 days Max Daily Amount: 60 mg 4/16/24 4/26/24 April CHRISTIANA Hernandez     I have reviewed home medications using recent Epic encounter.    Allergies: No Known Allergies    Social History:  Marital Status: Single   Occupation: Unknown  Patient Pre-hospital Living Situation: Home  Patient Pre-hospital Level of Mobility: walks  Patient Pre-hospital Diet Restrictions: None  Substance Use History:   Social History     Substance and Sexual Activity   Alcohol Use Never     Social History     Tobacco Use   Smoking Status Some Days    Current packs/day: 2.00    Types: Cigarettes   Smokeless Tobacco Never     Social History     Substance and Sexual Activity   Drug Use Yes    Types: Marijuana       Family History:  No family history on file.    Physical Exam:     Vitals:   Blood Pressure: 133/83 (04/18/24 2318)  Pulse: 93 (04/18/24 2318)  Temperature: 98.2 °F (36.8 °C) (04/18/24 2318)  Respirations: 16 (04/18/24 2318)  SpO2: 94 % (04/18/24 2318)    Physical Exam  Vitals and nursing note  reviewed.   Constitutional:       General: He is not in acute distress.     Appearance: He is ill-appearing. He is not toxic-appearing or diaphoretic.   HENT:      Head: Normocephalic.      Nose: Nose normal.      Mouth/Throat:      Dentition: Abnormal dentition.   Eyes:      General: No scleral icterus.     Conjunctiva/sclera: Conjunctivae normal.   Cardiovascular:      Rate and Rhythm: Normal rate and regular rhythm.      Pulses:           Posterior tibial pulses are 1+ on the right side and 1+ on the left side.      Heart sounds: Normal heart sounds.   Pulmonary:      Effort: Pulmonary effort is normal.      Breath sounds: Decreased breath sounds and wheezing present. No rhonchi or rales.   Abdominal:      General: Bowel sounds are normal. There is no distension.      Palpations: Abdomen is soft.      Tenderness: There is no abdominal tenderness.   Musculoskeletal:         General: Normal range of motion.      Cervical back: Normal range of motion.      Right lower leg: No edema.      Left lower leg: No edema.   Skin:     General: Skin is warm and dry.      Capillary Refill: Capillary refill takes 2 to 3 seconds.   Neurological:      Mental Status: He is alert and oriented to person, place, and time.          Additional Data:     Lab Results:  Results from last 7 days   Lab Units 04/18/24  0454 04/16/24  0428 04/15/24  0432   WBC Thousand/uL 9.77   < > 12.07*   HEMOGLOBIN g/dL 13.0   < > 14.7   HEMATOCRIT % 39.3   < > 44.6   PLATELETS Thousands/uL 127*   < > 171   LYMPHO PCT %  --   --  14   MONO PCT %  --   --  4   EOS PCT %  --   --  0    < > = values in this interval not displayed.     Results from last 7 days   Lab Units 04/18/24  0454   SODIUM mmol/L 137   POTASSIUM mmol/L 4.3   CHLORIDE mmol/L 102   CO2 mmol/L 28   BUN mg/dL 18   CREATININE mg/dL 0.75   ANION GAP mmol/L 7   CALCIUM mg/dL 8.1*   ALBUMIN g/dL 2.6*   TOTAL BILIRUBIN mg/dL 6.88*   ALK PHOS U/L 723*   ALT U/L 226*   AST U/L 304*   GLUCOSE RANDOM  mg/dL 93     Results from last 7 days   Lab Units 04/15/24  0432   INR  1.12             Results from last 7 days   Lab Units 04/18/24  0454 04/16/24  0428 04/13/24  0439   PROCALCITONIN ng/ml 13.31* 16.48* 14.05*       Lines/Drains:  Invasive Devices       Peripheral Intravenous Line  Duration             Peripheral IV 04/14/24 Distal;Dorsal (posterior);Left Forearm 4 days                        Imaging: Reviewed radiology reports from this admission including: chest CT scan, abdominal/pelvic CT, and CT thoracic/lumbar spine  No orders to display       EKG and Other Studies Reviewed on Admission:   EKG: No EKG obtained.    ** Please Note: This note has been constructed using a voice recognition system. **

## 2024-04-19 NOTE — PLAN OF CARE
Problem: PAIN - ADULT  Goal: Verbalizes/displays adequate comfort level or baseline comfort level  Description: Interventions:  - Encourage patient to monitor pain and request assistance  - Assess pain using appropriate pain scale  - Administer analgesics based on type and severity of pain and evaluate response  - Implement non-pharmacological measures as appropriate and evaluate response  - Consider cultural and social influences on pain and pain management  - Notify physician/advanced practitioner if interventions unsuccessful or patient reports new pain  Outcome: Progressing     Problem: INFECTION - ADULT  Goal: Absence or prevention of progression during hospitalization  Description: INTERVENTIONS:  - Assess and monitor for signs and symptoms of infection  - Monitor lab/diagnostic results  - Monitor all insertion sites, i.e. indwelling lines, tubes, and drains  - Monitor endotracheal if appropriate and nasal secretions for changes in amount and color  - [place] appropriate cooling/warming therapies per order  - Administer medications as ordered  - Instruct and encourage patient and family to use good hand hygiene technique  - Identify and instruct in appropriate isolation precautions for identified infection/condition  Outcome: Progressing     Problem: SAFETY ADULT  Goal: Patient will remain free of falls  Description: INTERVENTIONS:  - Educate patient/family on patient safety including physical limitations  - Instruct patient to call for assistance with activity   - Consult OT/PT to assist with strengthening/mobility   - Keep Call bell within reach  - Keep bed low and locked with side rails adjusted as appropriate  - Keep care items and personal belongings within reach  - Initiate and maintain comfort rounds  - Make Fall Risk Sign visible to staff  - Offer Toileting every 2 Hours, in advance of need  - Initiate/Maintain bedalarm  - Obtain necessary fall risk management equipment: bed   - Apply yellow socks  and bracelet for high fall risk patients  - Consider moving patient to room near nurses station  Outcome: Progressing     Problem: DISCHARGE PLANNING  Goal: Discharge to home or other facility with appropriate resources  Description: INTERVENTIONS:  - Identify barriers to discharge w/patient and caregiver  - Arrange for needed discharge resources and transportation as appropriate  - Identify discharge learning needs (meds, wound care, etc.)  - Arrange for interpretive services to assist at discharge as needed  - Refer to Case Management Department for coordinating discharge planning if the patient needs post-hospital services based on physician/advanced practitioner order or complex needs related to functional status, cognitive ability, or social support system  Outcome: Progressing     Problem: Knowledge Deficit  Goal: Patient/family/caregiver demonstrates understanding of disease process, treatment plan, medications, and discharge instructions  Description: Complete learning assessment and assess knowledge base.  Interventions:  - Provide teaching at level of understanding  - Provide teaching via preferred learning methods  Outcome: Progressing

## 2024-04-19 NOTE — ASSESSMENT & PLAN NOTE
Lab Results   Component Value Date     (H) 04/18/2024     (H) 04/18/2024    TBILI 6.88 (H) 04/18/2024    ALKPHOS 723 (H) 04/18/2024    Suspect 2/2 liver metastasis.  Avoid hepatotoxins.  S/P IR guided liver biopsy: Pathology report noted for poorly differentiated neoplasm with extensive necrosis; concerning for small cell carcinoma.  Continue Allopurinol 200 mg daily.  Patient transferred to [facility] for initiation of chemotherapy per Oncology recs.  Oncology consult.

## 2024-04-19 NOTE — UTILIZATION REVIEW
4/25/2024     Dave NICK RN     ERROR  made in initial review below:   Pt was transferred from [facility] to  [facility] on 4/18.    Review should have read as follows:    Admit  4/18  @  1922     [facility];  INPT status,   MS  Level of care     ====================================================================================          Initial Clinical Review    Admission: Date/Time/Statement:   Admission Orders (From admission, onward)       Ordered        04/18/24 1922  INPATIENT ADMISSION  Once                          Orders Placed This Encounter   Procedures    INPATIENT ADMISSION     Standing Status:   Standing     Number of Occurrences:   1     Order Specific Question:   Level of Care     Answer:   Med Surg [16]     Order Specific Question:   Estimated length of stay     Answer:   More than 2 Midnights     Order Specific Question:   Certification     Answer:   I certify that inpatient services are medically necessary for this patient for a duration of greater than two midnights. See H&P and MD Progress Notes for additional information about the patient's course of treatment.       Initial Presentation: 63 y.o. male transferred from  [facility] where hospitalized  4/11-4/18 for treatment of PNA of Rt mid lobe -  found rt hilar/mediastinal mass with metastasis suspected to be lung primary along with postobstructive pneumonia. Treated w/IVABs  (10 day course of abx through 04/25/2024)    Transaminitis  2/2 liver mets.   C/o pain -- CT A/P displayed sclerotic lesion in the pelvis.   Liver biopsy pathology report positive for small cell carcinoma.    CT of T-spine and L-spine neg for mets/osteo       Will Transfer to Higher Level of Care/ [facility] for  inpt chemo and oncology services      Admit  4/18  @  1922     [facility];  INPT status,   MS  Level of care   For initiation of INPT CHEMOTHERAPY.   Oncology , ID,  palliative care consults.  Provide pain control,  trend  LFTs, CMP.  Serial abd exams          EXAM:  jaundiced.   A/O to PPT.  Decreased breath sounds/ wheezing present.  Abd soft, nondistended     Date: 4/19      Day 2:   DAY 1  of Chemotherapy - Continue allopurinol;  If uric acid trends start rasburicase 3 mg 1 dose only per oncology    4/19  ID:    respiratory status stable and  WBC normalized.  Procalcitonin decreased.  Continue IV Unasyn    4/19  ONCOLOGY:       Assessment and plan:    Extensive stage small cell lung cancer with right lower lobe, hilum, right middle lobe mass with diffuse liver metastasis, jaundice, elevated liver enzyme, postobstructive pneumonia, currently on Unasyn, liver biopsy confirmed extensive stage small cell lung cancer, he was transferred here for inpatient chemotherapy as soon as possible    Giving his liver enzyme elevation and intact creatinine patient to be started on etoposide 50 mg/m² daily for the next 3 days, carboplatin AUC 5 on day 1.  continue allopurinol 200 mg p.o. daily will check uric acid on daily basis if he starts to have increased uric acid we will start rasburicase 3 mg 1 dose only.    Prognosis guarded    ---------------------------------------------------------------------------------------------------------------------------------------------    ED Triage Vitals   Temperature Pulse Respirations Blood Pressure SpO2   04/18/24 1915 04/18/24 1915 04/18/24 1915 04/18/24 1915 04/18/24 1915   97.6 °F (36.4 °C) 96 19 131/83 95 %      Temp Source Heart Rate Source Patient Position - Orthostatic VS BP Location FiO2 (%)   04/19/24 0707 -- 04/19/24 0707 04/19/24 0707 --   Oral  Lying Left arm       Pain Score       04/18/24 2002       10 - Worst Possible Pain          Wt Readings from Last 1 Encounters:   04/19/24 79.4 kg (175 lb 0.7 oz)     Body mass index is 23.73 kg/m².    360 Statement: Malnutrition related to chronic illness as evidenced by >7.5% body weight loss in 3 months, <75% energy intake compared to estimated  energy needs for >1-month. To treat with oral diet as tolerated.     Additional Vital Signs:   04/19/24 1319 97.5 °F (36.4 °C) 92 20 114/80 91 95 % 28 2 L/min Nasal cannula Sitting   04/19/24 1226 97.6 °F (36.4 °C) 92 -- 126/85 -- 96 % -- -- -- --   04/19/24 12:25:54 -- 90 -- 126/85 99 96 % -- -- -- --   04/19/24 12:00:45 97.5 °F (36.4 °C) 89 19 133/86 102 98 % 28 2 L/min Nasal cannula Lying   04/19/24 0921 -- -- -- -- -- 96 % -- -- -- --   04/19/24 0821 97.7 °F (36.5 °C) -- -- -- -- -- -- -- -- --   04/19/24 0800 -- -- -- -- -- -- 28 2 L/min Nasal cannula --   04/19/24 07:07:17 97.7 °F (36.5 °C) 98 17 124/83 97 96 % -- -- None (Room air) Lying   04/18/24 23:18:53 98.2 °F (36.8 °C) 93 16 133/83 100 94 % -- -- --      Pertinent Labs/Diagnostic Test Results:       Results from last 7 days   Lab Units 04/19/24  0444 04/18/24  0454 04/17/24  0520 04/16/24  0428 04/15/24  0432   WBC Thousand/uL 13.09* 9.77 10.44* 11.05* 12.07*   HEMOGLOBIN g/dL 14.3 13.0 13.5 14.5 14.7   HEMATOCRIT % 43.9 39.3 41.3 43.3 44.6   PLATELETS Thousands/uL 133* 127* 141* 150 171   BANDS PCT % 5  --   --   --   --          Results from last 7 days   Lab Units 04/19/24 0444 04/18/24 0454 04/17/24 0520 04/16/24 0428 04/15/24  0432 04/14/24  0432 04/13/24  0439   SODIUM mmol/L 133* 137 136 137 137 136 137   POTASSIUM mmol/L 4.7 4.3 4.4 4.7 4.9 4.8 4.4   CHLORIDE mmol/L 101 102 103 103 104 103 105   CO2 mmol/L 19* 28 27 32 30 29 26   ANION GAP mmol/L 13 7 6 2* 3* 4 6   BUN mg/dL 19 18 22 22 21 17 18   CREATININE mg/dL 0.79 0.75 0.79 0.83 0.84 0.77 0.82   EGFR ml/min/1.73sq m 96 98 96 94 93 97 94   CALCIUM mg/dL 8.0* 8.1* 8.1* 8.6 8.6 8.8 8.9   MAGNESIUM mg/dL  --   --   --   --   --  2.3 2.1     Results from last 7 days   Lab Units 04/19/24  0444 04/18/24  0454 04/17/24  0520 04/16/24  0428 04/15/24  0432   AST U/L 306* 304* 303* 361* 289*   ALT U/L 221* 226* 226* 252* 207*   ALK PHOS U/L 742* 723* 834* 915* 696*   TOTAL PROTEIN g/dL 5.4* 5.2*  5.5* 6.1* 6.2*   ALBUMIN g/dL 2.7* 2.6* 2.7* 2.9* 2.9*   TOTAL BILIRUBIN mg/dL 9.42* 6.88* 6.01* 5.36* 3.72*         Results from last 7 days   Lab Units 04/19/24  0444 04/18/24  0454 04/17/24  0520 04/16/24  0428 04/15/24  0432 04/14/24  0432 04/13/24  0439   GLUCOSE RANDOM mg/dL 70 93 94 92 106 101 125       Results from last 7 days   Lab Units 04/19/24 0444 04/18/24 0454 04/16/24 0428 04/13/24 0439   PROCALCITONIN ng/ml 16.96* 13.31* 16.48* 14.05*     Past Medical History:   Diagnosis Date    Asthma      Present on Admission:   Pneumonia of right middle lobe due to infectious organism   Cancer related pain   Liver mass   Severe protein-calorie malnutrition (HCC)   Bacteremia   Transaminitis   Lung mass   Tobacco abuse      Admitting Diagnosis: Pneumonia of right middle lobe due to infectious organism  Age/Sex: 63 y.o. male    Admission Orders:    scd's b/l LE;  routine VS;  frequent oral care;  I/O q shift; Hourly inc spirometry;  OOB as tolerated; reg diet - aspiration precautions    Scheduled Medications:  allopurinol, 200 mg, Oral, Daily  ampicillin-sulbactam, 3 g, Intravenous, Q6H  dexamethasone, 4 mg, Intravenous, Daily  Diclofenac Sodium, 2 g, Topical, 4x Daily  docusate sodium, 100 mg, Oral, BID  enoxaparin, 40 mg, Subcutaneous, Daily  etoposide, 50 mg/m2 (Treatment Plan Recorded), Intravenous, Once  gabapentin, 100 mg, Oral, TID  guaiFENesin, 600 mg, Oral, BID  ipratropium, 0.5 mg, Nebulization, TID  levalbuterol, 1.25 mg, Nebulization, TID  methocarbamol, 750 mg, Oral, Q6H KRYSTYNA  nicotine, 1 patch, Transdermal, Daily  oxyCODONE, 15 mg, Oral, Q4H  pantoprazole, 40 mg, Oral, Early Morning  polyethylene glycol, 17 g, Oral, Daily  senna, 2 tablet, Oral, BID      Continuous IV Infusions:     PRN Meds:  acetaminophen, 650 mg, Oral, Q6H PRN  albuterol, 2 puff, Inhalation, Q4H PRN  alteplase, 2 mg, Intracatheter, Q1MIN PRN  calcium carbonate, 1,000 mg, Oral, Daily PRN  HYDROmorphone, 0.5 mg, Intravenous, Q3H  PRN.... 4/18 x1;   4/19  x2  naloxone, 0.04 mg, Intravenous, Q1MIN PRN  ondansetron, 4 mg, Intravenous, Q6H PRN  polyethylene glycol, 17 g, Oral, Daily PRN        IP CONSULT TO INFECTIOUS DISEASES  IP CONSULT TO PALLIATIVE CARE  IP CONSULT TO ONCOLOGY    Network Utilization Review Department  ATTENTION: Please call with any questions or concerns to [phone] and carefully listen to the prompts so that you are directed to the right person. All voicemails are confidential.   For Discharge needs, contact Care Management DC Support Team at [phone] opt. [phone]  Send all requests for admission clinical reviews, approved or denied determinations and any other requests to dedicated fax number below belonging to the campus where the patient is receiving treatment. List of dedicated fax numbers for the Facilities:  FACILITY NAME UR FAX NUMBER   ADMISSION DENIALS (Administrative/Medical Necessity) [phone]   DISCHARGE SUPPORT TEAM (NETWORK) [phone]   PARENT CHILD HEALTH (Maternity/NICU/Pediatrics) [phone]   [facility] [phone]   [facility] [phone]   [facility] [phone]   [facility] [phone]   [facility] [phone]   [facility] [phone]   [facility] [phone]   [facility] [phone]   [facility] [phone]   [facility] [phone]   [facility] [phone]   [facility] [phone]

## 2024-04-19 NOTE — CONSULTS
"[facility]  Progress Note  Name: Ceasar March I  MRN: [id number]  Unit/Bed#: S -01 I Date of Admission: 4/18/2024   Date of Service: 4/19/2024 I Hospital Day: 1    Assessment/Plan   Goals of care, counseling/discussion  Assessment & Plan  Level 3 code status  Disease focused care with DNR/DNI limits placed.   Concerns introduced today include:  Wants to attempt chemo  Understands prognosis - \"getting my affairs in order\"  Will continue discussions regarding GOC as patient's clinical presentation evolves.       Small cell lung cancer with metastasis to liver  Assessment & Plan  Oncology following  Transferred to [facility] to initiate chemo    Palliative care patient  Assessment & Plan  Social support:  Patient is finding comfort in his family  Works as a   Daughter is his only child  Prefers daughter be contacted prior to wife.  Supportive listening provided  Normalized experience of patient/family  Provided anxiety containment  Provided anticipatory guidance  Encouraged self care    Follow up  Palliative Care will continue to follow and goals of care discussions will be ongoing.    Please reach out via [email or web address] Connect if questions or concerns arise.    Care Coordination  Reviewed case with RNElizabeth    I have reviewed the patient's controlled substance dispensing history in the Prescription Drug Monitoring Program in compliance with the [facility] regulations before prescribing any controlled substances.    Decisional apparatus:  Patient is competent on exam today.  If competency is lost, patient's substitute decision maker would default to spouse by [place] Act 169.  ER contacts:   Name Relation Home Work Mobile   Anna March Daughter [phone]     Advance Directive/Living Will: none  POLST: none  POA Forms: none    We appreciate the invitation to be involved in this patient's care.  We will continue to follow throughout this hospitalization.  Please do not hesitate " to reach our on call provider through our clinic answering service at [phone] should you have acute symptom control concerns.    Jennifer P. Bloch, MSN, RUSSELNP, [facility]  Palliative and Supportive Care  Clinic/Answering Service: [phone]  You can find me on [employer]!       Cancer related pain  Assessment & Plan  Patient with increasing back pain rating 9-10/10 over past 4-5 weeks.  He did not take any medication at home.   Start dexamethasone 4mg once daily - Reviewed steroids with Dr. Aguilar and he is in agreement with plan  Increase Oxycodone to 15 mg q4h scheduled with hold parameters  Continue Robaxin 750mg q6h scheduled  Increase Hydromorphone to 0.3 mg q3h PRN for breakthrough    Bowel regimen in place - last BM yesterday    Liver mass  Assessment & Plan  CT CAP- R Hilar mass with lymphangitic spread of tumor, Right hilar and perihilar heterogeneously enhancing mass measures 5.5 x 6.3 x 5.2 cm,  Innumerable hypoenhancing lesions throughout the liver measuring up to 3.1 cm   Biopsy completed 4/15/24 - [facility]  Outpatient medical oncology appointment scheduled 5/1 - transferred to [facility] for initiation of chemotherapy    Severe protein-calorie malnutrition (HCC)  Assessment & Plan  Patient reports 20 pound weight loss in past 3 months.  Encouraged use of supplements if unable to consume enough protein, calories.   Start dexamethasone 4mg once daily - Reviewed plan for steroids with Dr. Aguilar and he is in agreement with plan          Lung mass  Assessment & Plan  New  diagnosis of small cell lung cancer - transferred to [facility] for initiation of chemotherapy     CT CAP- R Hilar mass with lymphangitic spread of tumor, Right hilar and perihilar heterogeneously enhancing mass measures 5.5 x 6.3 x 5.2 cm,  Innumerable hypoenhancing lesions throughout the liver measuring up to 3.1 cm                      IDENTIFICATION:  Inpatient consult to Palliative Care  Consult performed by: Jennifer Paige Bloch, CRNP  Consult  ordered by: CHRISTIANA Downs        Physician Requesting Consult: Roshan Medina MD  Reason for Consult / Principal Problem: GOC counseling and SM secondary to metastatic small cell lung cancer    History of Present Illness:  Ceasar March is a 63 y.o. male who presents with a new palliative diagnosis of metastatic small cell lung cancer. He was brought into the ED at [facility] on /4/11/24 secondary to weakness, fatigue, back pain, night sweats, fatigue and ongoing weight loss.  Imaging revealed multiple liver lesions, a right middle lobe mass a right hilar mass mediastinal mass and postobstructive pneumonia.  He had a liver biopsy on 4/15 confirming small cell lung cancer.  He was started Unasyn and transferred to [facility] for emergent chemotherapy.      During his admission at [place], Palliative Medicine was consulted for symptom management secondary to worsening cancer related pain. He was started on oxycodone 10mg q4h scheduled with hold parameters, Robaxin 750mg and dilaudid for breakthrough pain.  Palliative Medicine was again consulted here at [facility] to assist with ongoing pain.    Patient seen with no family present.  He is sitting on the edge of the bed and visibly uncomfortable.  Says he is sick of talking about his pain. Currently, reports pain is at 10/10. His entire abdomen has constant pain. Pain is the worst in RLQ. Lying on his right side is helpful. Is taking oxy q4 which brings his pain to a 7/10.  He is tolerating oxy well.  Last BM was yesterday.    Reports he lives at home with his wife and daughter.  They are supportive.  Loves his cat who brings him much entertainment.      Review of Systems   All other systems reviewed and are negative.      Past Medical History:   Diagnosis Date    Asthma      Past Surgical History:   Procedure Laterality Date    HERNIA REPAIR      IR BIOPSY LIVER MASS  4/15/2024     Social History     Socioeconomic History    Marital status: Single     Spouse name: Not on  file    Number of children: Not on file    Years of education: Not on file    Highest education level: Not on file   Occupational History    Not on file   Tobacco Use    Smoking status: Some Days     Current packs/day: 2.00     Types: Cigarettes    Smokeless tobacco: Never   Vaping Use    Vaping status: Never Used   Substance and Sexual Activity    Alcohol use: Never    Drug use: Yes     Types: Marijuana    Sexual activity: Not on file   Other Topics Concern    Not on file   Social History Narrative    Not on file     Social Determinants of Health     Financial Resource Strain: Not on file   Food Insecurity: No Food Insecurity (4/19/2024)    Hunger Vital Sign     Worried About Running Out of Food in the Last Year: Never true     Ran Out of Food in the Last Year: Never true   Transportation Needs: No Transportation Needs (4/19/2024)    PRAPARE - Transportation     Lack of Transportation (Medical): No     Lack of Transportation (Non-Medical): No   Physical Activity: Not on file   Stress: Not on file (2/11/2021)   Social Connections: Not on file   Intimate Partner Violence: Low Risk  (4/13/2021)    Received from [facility]    Intimate Partner Violence     Insults You: Not on file     Threatens You: Not on file     Screams at You: Not on file     Physically Hurt: Not on file     Intimate Partner Violence Score: Not on file   Housing Stability: Low Risk  (4/19/2024)    Housing Stability Vital Sign     Unable to Pay for Housing in the Last Year: No     Number of Places Lived in the Last Year: 1     Unstable Housing in the Last Year: No     No family history on file.    Medications:  all current active meds have been reviewed    No Known Allergies    Objective:  /84 (BP Location: Right arm)   Pulse 78   Temp 97.7 °F (36.5 °C) (Oral)   Resp 20   Ht 6' (1.829 m)   Wt 79.4 kg (175 lb 0.7 oz)   SpO2 98%   BMI 23.74 kg/m²     Physical Exam  Vitals and nursing note reviewed.   Constitutional:       General: He  is awake. He is not in acute distress.     Appearance: He is underweight. He is not toxic-appearing.   HENT:      Head: Normocephalic and atraumatic.      Right Ear: External ear normal.      Left Ear: External ear normal.   Eyes:      General: No scleral icterus.        Right eye: No discharge.         Left eye: No discharge.   Cardiovascular:      Rate and Rhythm: Normal rate.   Pulmonary:      Effort: Pulmonary effort is normal. No tachypnea, bradypnea, accessory muscle usage or respiratory distress.   Abdominal:      General: There is distension.      Tenderness: There is abdominal tenderness.   Musculoskeletal:      Cervical back: Normal range of motion.      Right lower leg: No edema.      Left lower leg: No edema.   Skin:     General: Skin is dry.      Coloration: Skin is jaundiced.   Neurological:      Mental Status: He is alert.      Cranial Nerves: No dysarthria or facial asymmetry.      Motor: Weakness present.   Psychiatric:         Attention and Perception: Attention normal.         Mood and Affect: Mood and affect normal.         Speech: Speech normal.         Behavior: Behavior normal. Behavior is cooperative.         Thought Content: Thought content normal.         Cognition and Memory: Cognition and memory normal.         Judgment: Judgment normal.       Lab Results: I have personally reviewed pertinent labs., CBC:   Lab Results   Component Value Date    WBC 13.09 (H) 04/19/2024    HGB 14.3 04/19/2024    HCT 43.9 04/19/2024    MCV 89 04/19/2024     (L) 04/19/2024    RBC 4.93 04/19/2024    MCH 29.0 04/19/2024    MCHC 32.6 04/19/2024    RDW 16.3 (H) 04/19/2024    MPV 10.3 04/19/2024   , CMP:   Lab Results   Component Value Date    SODIUM 133 (L) 04/19/2024    K 4.7 04/19/2024     04/19/2024    CO2 19 (L) 04/19/2024    BUN 19 04/19/2024    CREATININE 0.79 04/19/2024    CALCIUM 8.0 (L) 04/19/2024     (H) 04/19/2024     (H) 04/19/2024    ALKPHOS 742 (H) 04/19/2024    EGFR 96  "04/19/2024     Imaging Studies: I have personally reviewed pertinent reports.  EKG, Pathology, and Other Studies: I have personally reviewed pertinent reports.    Counseling / Coordination of Care  Total floor / unit time spent today 70 minutes. Greater than 50% of total time was spent with the patient and / or family counseling and / or coordination of care. A description of the counseling / coordination of care: Reviewed chart, provided medical updates, determined goals of care, discussed palliative care and symptom management, discussed comfort care and hospice care, discussed code status, provided anticipatory guidance, determined competency and POA/HCA, determined social/family support, provided psychosocial support.     Portions of this document may have been created using dictation software and as such some \"sound alike\" terms may have been generated by the system. Do not hesitate to contact me with any questions or clarifications.      "

## 2024-04-19 NOTE — ASSESSMENT & PLAN NOTE
Presentation: Patient originally presented to [facility] on 04/11/2024 due to complaints of increased shortness of breath, chest tightness, night sweats and chills. Patient was found to have consolidation in right middle lobe with right hilar/mediastinal mass with metastasis suspected to be lung primary along with postobstructive pneumonia.   Urine strep/legionella negative.  COVID/Influenza/RSV negative.  BC grew Staph hominis likely contaminant per ID.  Monitor CBC and procalcitonin trend.   Continue IV Unasyn per ID -- 10 day course of abx through 04/25/2024.  Plan to transition to Augmentin on discharge per ID.  Appreciate ID recommendations.

## 2024-04-19 NOTE — CASE MANAGEMENT
Case Management Assessment & Discharge Planning Note    Patient name Ceasar March  Location S /S -01 MRN [id number]  : 1960 Date 2024       Current Admission Date: 2024  Current Admission Diagnosis:Pneumonia of right middle lobe due to infectious organism   Patient Active Problem List    Diagnosis Date Noted    Small cell lung cancer with metastasis to liver 2024    Goals of care, counseling/discussion 2024    Counseling regarding advance care planning and goals of care 2024    Liver mass 04/15/2024    Cancer related pain 04/15/2024    Palliative care patient 04/15/2024    Bacteremia 2024    Severe protein-calorie malnutrition (HCC) 2024    Pneumonia of right middle lobe due to infectious organism 2024    Lung mass 2024    Transaminitis 2024    Tobacco abuse 04/10/2019      LOS (days): 1  Geometric Mean LOS (GMLOS) (days):   Days to GMLOS:     OBJECTIVE:    Risk of Unplanned Readmission Score: 15.91         Current admission status: Inpatient       Preferred Pharmacy:   [id number] [address]  [facility] [address]  Phone: [phone] Fax: [phone]    Primary Care Provider: No primary care provider on file.    Primary Insurance: BLUE CROSS  Secondary Insurance:     ASSESSMENT:  Active Health Care Proxies       Anna March [facility] Care Representative - Daughter   Primary Phone: [phone] (Mobile)  Home Phone: [phone]                 Advance Directives  Does patient have a Health Care POA?: No  Was patient offered paperwork?: Yes (Pt declined)  Does patient currently have a Health Care decision maker?: Yes, please see Health Care Proxy section  Does patient have Advance Directives?: No  Was patient offered paperwork?: Yes (Pt declined)  Primary Contact: Anna March, [place]         Readmission Root Cause  30 Day Readmission:  No    Patient Information  Admitted from:: Home  Mental Status: Alert  During Assessment patient was accompanied by: Not accompanied during assessment  Assessment information provided by:: Patient  Primary Caregiver: Self  Support Systems: Self, Spouse/significant other, Son, Daughter, Friends/neighbors  County of Residence: [place]  What city do you live in?: [place]  Home entry access options. Select all that apply.: Stairs  Number of steps to enter home.: 4  Do the steps have railings?: Yes  Type of Current Residence: 2 story home  Upon entering residence, is there a bedroom on the main floor (no further steps)?: No  A bedroom is located on the following floor levels of residence (select all that apply):: 2nd Floor  Upon entering residence, is there a bathroom on the main floor (no further steps)?: Yes  Number of steps to 2nd floor from main floor: One Flight  Living Arrangements: Lives w/ Spouse/significant other, Lives w/ Daughter  Is patient a ?: No    Activities of Daily Living Prior to Admission  Functional Status: Independent  Completes ADLs independently?: Yes  Ambulates independently?: Yes  Does patient use assisted devices?: No  Does patient currently own DME?: No  Does patient have a history of Outpatient Therapy (PT/OT)?: No  Does the patient have a history of Short-Term Rehab?: No  Does patient have a history of HHC?: No  Does patient currently have HHC?: No         Patient Information Continued  Income Source: Self-employed  Does patient have prescription coverage?: Yes  Does patient receive dialysis treatments?: No  Does patient have a history of substance abuse?: No  Does patient have a history of Mental Health Diagnosis?: No    PHQ 2/9 Screening   Reviewed PHQ 2/9 Depression Screening Score?: No    Means of Transportation  Means of Transport to Appts:: Drives Self      Social Determinants of Health (SDOH)      Flowsheet Row Most Recent Value   Housing Stability    In the last 12 months,  was there a time when you were not able to pay the mortgage or rent on time? N   In the last 12 months, how many places have you lived? 1   In the last 12 months, was there a time when you did not have a steady place to sleep or slept in a shelter (including now)? N   Transportation Needs    In the past 12 months, has lack of transportation kept you from medical appointments or from getting medications? no   In the past 12 months, has lack of transportation kept you from meetings, work, or from getting things needed for daily living? No   Food Insecurity    Within the past 12 months, you worried that your food would run out before you got the money to buy more. Never true   Within the past 12 months, the food you bought just didn't last and you didn't have money to get more. Never true   Utilities    In the past 12 months has the electric, gas, oil, or water company threatened to shut off services in your home? Yes            DISCHARGE DETAILS:    Discharge planning discussed with:: Patient  Freedom of Choice: Yes     CM contacted family/caregiver?: Yes  Were Treatment Team discharge recommendations reviewed with patient/caregiver?: Yes  Did patient/caregiver verbalize understanding of patient care needs?: Yes  Were patient/caregiver advised of the risks associated with not following Treatment Team discharge recommendations?: Yes    Contacts  Patient Contacts: Anna Rodrigezner, Daughter  Relationship to Patient:: Family  Contact Method: Other (Comment) (Pt declined, he will call daughter)    Requested Home Health Care         Is the patient interested in HHC at discharge?: No    DME Referral Provided  Referral made for DME?: No         Would you like to participate in our [facility] service program?  : No - Declined          Transport at Discharge : Family            CM met with patient to introduce self and role with discharge planning.  Patient lives with his wife in a two story home.  Patient reports being  fully independent PTA.  Patient does not use or own any DME.  Patient denies having any CM needs at this time.  CM department remains available for any discharge needs.

## 2024-04-19 NOTE — ASSESSMENT & PLAN NOTE
"Level 3 code status  Disease focused care with DNR/DNI limits placed.   Concerns introduced today include:  Wants to attempt chemo  Understands prognosis - \"getting my affairs in order\"  Will continue discussions regarding GOC as patient's clinical presentation evolves.     "

## 2024-04-19 NOTE — ASSESSMENT & PLAN NOTE
Malnutrition Findings:   Adult malnutrition type: Chronic illness.  Adult degree of malnutrition: Other severe protein calorie malnutrition.  Malnutrition characteristics: Inadequate energy, weight loss.    360 statement: Malnutrition related to chronic illness as evidenced by >7.5% body weight loss in 3 months, <75% energy intake compared to estimated energy needs for >1 month. To treat with oral diet as tolerated.     BMI Findings:        Body mass index is 23.73 kg/m3

## 2024-04-19 NOTE — ASSESSMENT & PLAN NOTE
"Patient reported night sweats, recent weight loss, current every day smoker, severe back and flank pain.  CT chest: \" Right hilar and perihilar 5.5 x 6.3 x 5.2 cm mass consistent lung malignancy. Right hilar, mediastinal and right supraclavicular lymphadenopathy compatible with metastatic disease. Airspace consolidation in the right middle lobe adjacent to the hilum suggesting postobstructive pneumonia. Nodular thickening along the axial interstitium in the right lower lobe and the right major fissure suggesting lymphangitic spread of tumor. Diffuse hepatic metastases. Periportal, gastroesophageal and para-aortic lymphadenopathy\"  Complained of hip pain -- CT A/P displayed sclerotic lesion in the pelvis.  Liver biopsy pathology report positive for small cell carcinoma.  Transferred to [facility] for chemotherapy.  Oncology consult.   "

## 2024-04-19 NOTE — UTILIZATION REVIEW
NOTIFICATION OF ADMISSION DISCHARGE   This is a Notification of Discharge from [facility]. Please be advised that this patient has been discharge from our facility. Below you will find the admission and discharge date and time including the patient’s disposition.   UTILIZATION REVIEW CONTACT:  Soraya Mckeon  Utilization   Network Utilization Review Department  Phone: [phone] x carefully listen to the prompts. All voicemails are confidential.  Email: [email or web address]     ADMISSION INFORMATION  PRESENTATION DATE: 4/11/2024  9:17 PM  OBERVATION ADMISSION DATE:   INPATIENT ADMISSION DATE: 4/12/24 12:34 AM   DISCHARGE DATE: 4/18/2024  6:29 PM   DISPOSITION:[facility] Utilization Review Department  ATTENTION: Please call with any questions or concerns to [phone] and carefully listen to the prompts so that you are directed to the right person. All voicemails are confidential.   For Discharge needs, contact Care Management DC Support Team at [phone] opt. 2  Send all requests for admission clinical reviews, approved or denied determinations and any other requests to dedicated fax number below belonging to the campus where the patient is receiving treatment. List of dedicated fax numbers for the Facilities:  FACILITY NAME UR FAX NUMBER   ADMISSION DENIALS (Administrative/Medical Necessity) [phone]   DISCHARGE SUPPORT TEAM ([facility]) [phone]   PARENT CHILD HEALTH (Maternity/NICU/Pediatrics) [phone]   [facility] [phone]   [facility] [phone]   [facility] [phone]   [facility] [phone]   [facility] [phone]   General acute hospital [phone]   [facility] [phone]   [facility]  [phone]   [facility] [phone]   [facility] [phone]   [facility] [phone]   [facility] [phone]

## 2024-04-19 NOTE — ASSESSMENT & PLAN NOTE
Likely 2/2 metastases.  S/P IR liver biopsy on 04/15/2024.  Pathology report positive for small cell carcinoma.   Transaminitis likely 2/2 liver mets.  Trend CMP.

## 2024-04-19 NOTE — ASSESSMENT & PLAN NOTE
Likely 2/2 metastases.  S/P IR liver biopsy on 04/15/2024.  Pathology report positive for small cell carcinoma.   Transaminitis likely 2/2 liver mets.    Continue allopurinol  If uric acid trends start rasburicase 3 mg 1 dose only per oncology  Trend CMP.

## 2024-04-19 NOTE — PROGRESS NOTES
"[facility]  Progress Note  Name: Ceasar March I  MRN: [id number]  Unit/Bed#: S -01 I Date of Admission: 4/18/2024   Date of Service: 4/19/2024 I Hospital Day: 1    Assessment/Plan   Small cell lung cancer with metastasis to liver  Assessment & Plan  Patient reported night sweats, recent weight loss, current every day smoker, severe back and flank pain.  CT chest: \" Right hilar and perihilar 5.5 x 6.3 x 5.2 cm mass consistent lung malignancy. Right hilar, mediastinal and right supraclavicular lymphadenopathy compatible with metastatic disease. Airspace consolidation in the right middle lobe adjacent to the hilum suggesting postobstructive pneumonia. Nodular thickening along the axial interstitium in the right lower lobe and the right major fissure suggesting lymphangitic spread of tumor. Diffuse hepatic metastases. Periportal, gastroesophageal and para-aortic lymphadenopathy\"  Complained of hip pain -- CT A/P displayed sclerotic lesion in the pelvis.  Liver biopsy pathology report positive for small cell carcinoma.    Plan  4/19-day 1 of chemotherapy  Oncology consult.     Cancer related pain  Assessment & Plan  Continue pain regimen  Palliative on board    Liver mass  Assessment & Plan  Likely 2/2 metastases.  S/P IR liver biopsy on 04/15/2024.  Pathology report positive for small cell carcinoma.   Transaminitis likely 2/2 liver mets.    Continue allopurinol  If uric acid trends start rasburicase 3 mg 1 dose only per oncology  Trend CMP.    Severe protein-calorie malnutrition (HCC)  Assessment & Plan  Malnutrition Findings:   Adult malnutrition type: Chronic illness.  Adult degree of malnutrition: Other severe protein calorie malnutrition.  Malnutrition characteristics: Inadequate energy, weight loss.    360 statement: Malnutrition related to chronic illness as evidenced by >7.5% body weight loss in 3 months, <75% energy intake compared to estimated energy needs for >1 month. To " treat with oral diet as tolerated.     BMI Findings:        Body mass index is 23.73 kg/m3      Bacteremia  Assessment & Plan  2/2 BC grew Staph hominis.  Suspect contaminant per ID.    Transaminitis  Assessment & Plan  Lab Results   Component Value Date     (H) 04/19/2024     (H) 04/19/2024    TBILI 9.42 (H) 04/19/2024    ALKPHOS 742 (H) 04/19/2024    Suspect 2/2 liver metastasis.  Avoid hepatotoxins.  S/P IR guided liver biopsy: Pathology report noted for poorly differentiated neoplasm with extensive necrosis; concerning for small cell carcinoma.  Continue Allopurinol 200 mg daily.  Oncology consult.     Tobacco abuse  Assessment & Plan  Reported an average of 1-1.5 PPD.  NRT.  Encouraged smoking cessation.    * Pneumonia of right middle lobe due to infectious organism  Assessment & Plan  Presentation: Patient originally presented to [facility] on 04/11/2024 due to complaints of increased shortness of breath, chest tightness, night sweats and chills. Patient was found to have consolidation in right middle lobe with right hilar/mediastinal mass with metastasis suspected to be lung primary along with postobstructive pneumonia.   Urine strep/legionella negative.  COVID/Influenza/RSV negative.  BC grew Staph hominis likely contaminant per ID.    Plan:  Continue IV Unasyn per ID -- 10 day course of abx through 04/25/2024.  Plan to transition to Augmentin on discharge per ID.  Monitor CBC and procalcitonin trend.   Appreciate ID recommendations.               VTE Pharmacologic Prophylaxis: VTE Score: 5 Moderate Risk (Score 3-4) - Pharmacological DVT Prophylaxis Ordered: enoxaparin (Lovenox).    Mobility:   Basic Mobility Inpatient Raw Score: 21  -HLM Goal: 6: Walk 10 steps or more  -HLM Achieved: 3: Sit at edge of bed      Patient Centered Rounds: I performed bedside rounds with nursing staff today.   Discussions with Specialists or Other Care Team Provider:     Education and Discussions with Family /  Patient:  Will update daughter.     Total Time Spent on Date of Encounter in care of patient: 30 mins. This time was spent on one or more of the following: performing physical exam; counseling and coordination of care; obtaining or reviewing history; documenting in the medical record; reviewing/ordering tests, medications or procedures; communicating with other healthcare professionals and discussing with patient's family/caregivers.    Current Length of Stay: 1 day(s)  Current Patient Status: Inpatient   Certification Statement:   Discharge Plan: Anticipate discharge in 24-48 hrs to home with home services.    Code Status: Level 3 - DNAR and DNI    Subjective:   Patient was seen and examined this morning.  Patient reported having ongoing pain on the right ribs given his cancer.  Will uptitrate his pain regimen.  Otherwise all questions and concerns were answered.     Objective:     Vitals:   Temp (24hrs), Av.7 °F (36.5 °C), Min:97.5 °F (36.4 °C), Max:98.2 °F (36.8 °C)    Temp:  [97.5 °F (36.4 °C)-98.2 °F (36.8 °C)] 97.7 °F (36.5 °C)  HR:  [93-98] 98  Resp:  [16-19] 17  BP: (123-133)/(80-83) 124/83  SpO2:  [94 %-96 %] 96 %  There is no height or weight on file to calculate BMI.     Input and Output Summary (last 24 hours):     Intake/Output Summary (Last 24 hours) at 2024 1019  Last data filed at 2024 0042  Gross per 24 hour   Intake 480 ml   Output 600 ml   Net -120 ml       Physical Exam:   Physical Exam  Vitals and nursing note reviewed.   Constitutional:       General: He is not in acute distress.     Appearance: He is ill-appearing. He is not toxic-appearing or diaphoretic.   HENT:      Head: Normocephalic.      Nose: Nose normal.   Eyes:      General: No scleral icterus.     Conjunctiva/sclera: Conjunctivae normal.   Cardiovascular:      Rate and Rhythm: Normal rate and regular rhythm.      Heart sounds: Normal heart sounds.   Pulmonary:      Effort: Pulmonary effort is normal.      Breath  sounds: Decreased breath sounds and wheezing present. No rhonchi or rales.   Abdominal:      General: Bowel sounds are normal. There is no distension.      Palpations: Abdomen is soft.      Tenderness: There is no abdominal tenderness.   Musculoskeletal:         General: Normal range of motion.      Cervical back: Normal range of motion.      Right lower leg: No edema.      Left lower leg: No edema.   Skin:     General: Skin is warm and dry.   Neurological:      Mental Status: He is alert and oriented to person, place, and time.          Additional Data:     Labs:  Results from last 7 days   Lab Units 04/19/24  0444   WBC Thousand/uL 13.09*   HEMOGLOBIN g/dL 14.3   HEMATOCRIT % 43.9   PLATELETS Thousands/uL 133*   BANDS PCT % 5   LYMPHO PCT % 5*   MONO PCT % 5   EOS PCT % 2     Results from last 7 days   Lab Units 04/19/24  0444   SODIUM mmol/L 133*   POTASSIUM mmol/L 4.7   CHLORIDE mmol/L 101   CO2 mmol/L 19*   BUN mg/dL 19   CREATININE mg/dL 0.79   ANION GAP mmol/L 13   CALCIUM mg/dL 8.0*   ALBUMIN g/dL 2.7*   TOTAL BILIRUBIN mg/dL 9.42*   ALK PHOS U/L 742*   ALT U/L 221*   AST U/L 306*   GLUCOSE RANDOM mg/dL 70     Results from last 7 days   Lab Units 04/15/24  0432   INR  1.12             Results from last 7 days   Lab Units 04/19/24  0444 04/18/24  0454 04/16/24  0428 04/13/24  0439   PROCALCITONIN ng/ml 16.96* 13.31* 16.48* 14.05*       Lines/Drains:  Invasive Devices       Peripheral Intravenous Line  Duration             Peripheral IV 04/19/24 Right Forearm <1 day                          Imaging:     Recent Cultures (last 7 days):   Results from last 7 days   Lab Units 04/13/24  1700   BLOOD CULTURE  No Growth After 5 Days.  No Growth After 5 Days.       Last 24 Hours Medication List:   Current Facility-Administered Medications   Medication Dose Route Frequency Provider Last Rate    acetaminophen  650 mg Oral Q6H PRN Herberth Pittman MD      albuterol  2 puff Inhalation Q4H PRN Herberth Pittman MD       allopurinol  200 mg Oral Daily Herberth Pittman MD      alteplase  2 mg Intracatheter Q1MIN PRN Therese Aguilar MD      ampicillin-sulbactam  3 g Intravenous Q6H Herberth Pittman MD 3 g (04/19/24 0531)    calcium carbonate  1,000 mg Oral Daily PRN Herberth Pittman MD      CARBOplatin (PARAPLATIN) 669.5 mg in sodium chloride 0.9 % 250 mL IVPB  669.5 mg Intravenous Once Therese Aguilar MD      Diclofenac Sodium  2 g Topical 4x Daily Herberth Pittman MD      docusate sodium  100 mg Oral BID Josr Kay MD      enoxaparin  40 mg Subcutaneous Daily Herberth Pittman MD      etoposide  50 mg/m2 (Treatment Plan Recorded) Intravenous Once Therese Aguilar MD      fosaprepitant (EMEND) 150 mg in sodium chloride 0.9 % 250 mL IVPB  150 mg Intravenous Once Therese Aguilar MD      gabapentin  100 mg Oral TID Herberth Pittman MD      guaiFENesin  600 mg Oral BID Herberth Pittman MD      HYDROmorphone  0.3 mg Intravenous Q3H PRN Herberth Pittman MD      ipratropium  0.5 mg Nebulization TID Josr Kay MD      levalbuterol  1.25 mg Nebulization TID Josr Kay MD      methocarbamol  750 mg Oral Q6H KRYSTYNA Herberth Pittman MD      naloxone  0.04 mg Intravenous Q1MIN PRN Herberth Pittman MD      nicotine  1 patch Transdermal Daily Herberth Pittman MD      ondansetron (ZOFRAN) 16 mg, dexamethasone (DECADRON) 10 mg in sodium chloride 0.9 % 50 mL IVPB   Intravenous Once Therese Aguilar MD      ondansetron  4 mg Intravenous Q6H PRN Herberth Pittman MD      oxyCODONE  10 mg Oral Q4H Herberth Pittman MD      pantoprazole  40 mg Oral Early Morning Herberth Pittman MD      polyethylene glycol  17 g Oral Daily PRN Herberth Pittman MD      polyethylene glycol  17 g Oral Daily Josr Kay MD      senna  1 tablet Oral BID Herberth Pittman MD      sodium chloride  20 mL/hr Intravenous Once Therese Aguilar MD          Today, Patient Was Seen By: Josr Kay MD    **Please Note: This note may have been constructed using a voice recognition  system.**

## 2024-04-19 NOTE — ASSESSMENT & PLAN NOTE
Patient reports 20 pound weight loss in past 3 months.  Encouraged use of supplements if unable to consume enough protein, calories.   Start dexamethasone 4mg once daily - Reviewed plan for steroids with Dr. Aguilar and he is in agreement with plan

## 2024-04-19 NOTE — PLAN OF CARE
Problem: PAIN - ADULT  Goal: Verbalizes/displays adequate comfort level or baseline comfort level  Description: Interventions:  - Encourage patient to monitor pain and request assistance  - Assess pain using appropriate pain scale  - Administer analgesics based on type and severity of pain and evaluate response  - Implement non-pharmacological measures as appropriate and evaluate response  - Consider cultural and social influences on pain and pain management  - Notify physician/advanced practitioner if interventions unsuccessful or patient reports new pain  Outcome: Progressing     Problem: INFECTION - ADULT  Goal: Absence or prevention of progression during hospitalization  Description: INTERVENTIONS:  - Assess and monitor for signs and symptoms of infection  - Monitor lab/diagnostic results  - Monitor all insertion sites, i.e. indwelling lines, tubes, and drains  - Monitor endotracheal if appropriate and nasal secretions for changes in amount and color  - [place] appropriate cooling/warming therapies per order  - Administer medications as ordered  - Instruct and encourage patient and family to use good hand hygiene technique  - Identify and instruct in appropriate isolation precautions for identified infection/condition  Outcome: Progressing     Problem: SAFETY ADULT  Goal: Patient will remain free of falls  Description: INTERVENTIONS:  - Educate patient/family on patient safety including physical limitations  - Instruct patient to call for assistance with activity   - Consult OT/PT to assist with strengthening/mobility   - Keep Call bell within reach  - Keep bed low and locked with side rails adjusted as appropriate  - Keep care items and personal belongings within reach  - Initiate and maintain comfort rounds  - Apply yellow socks and bracelet for high fall risk patients  - Consider moving patient to room near nurses station  Outcome: Progressing     Problem: DISCHARGE PLANNING  Goal: Discharge to home or  other facility with appropriate resources  Description: INTERVENTIONS:  - Identify barriers to discharge w/patient and caregiver  - Arrange for needed discharge resources and transportation as appropriate  - Identify discharge learning needs (meds, wound care, etc.)  - Arrange for interpretive services to assist at discharge as needed  - Refer to Case Management Department for coordinating discharge planning if the patient needs post-hospital services based on physician/advanced practitioner order or complex needs related to functional status, cognitive ability, or social support system  Outcome: Progressing     Problem: Knowledge Deficit  Goal: Patient/family/caregiver demonstrates understanding of disease process, treatment plan, medications, and discharge instructions  Description: Complete learning assessment and assess knowledge base.  Interventions:  - Provide teaching at level of understanding  - Provide teaching via preferred learning methods  Outcome: Progressing     Problem: Nutrition/Hydration-ADULT  Goal: Nutrient/Hydration intake appropriate for improving, restoring or maintaining nutritional needs  Description: Monitor and assess patient's nutrition/hydration status for malnutrition. Collaborate with interdisciplinary team and initiate plan and interventions as ordered.  Monitor patient's weight and dietary intake as ordered or per policy. Utilize nutrition screening tool and intervene as necessary. Determine patient's food preferences and provide high-protein, high-caloric foods as appropriate.     INTERVENTIONS:  - Monitor oral intake, urinary output, labs, and treatment plans  - Assess nutrition and hydration status and recommend course of action  - Evaluate amount of meals eaten  - Assist patient with eating if necessary   - Allow adequate time for meals  - Recommend/ encourage appropriate diets, oral nutritional supplements, and vitamin/mineral supplements  - Order, calculate, and assess calorie  counts as needed  - Recommend, monitor, and adjust tube feedings and TPN/PPN based on assessed needs  - Assess need for intravenous fluids  - Provide specific nutrition/hydration education as appropriate  - Include patient/family/caregiver in decisions related to nutrition  Outcome: Progressing

## 2024-04-19 NOTE — ASSESSMENT & PLAN NOTE
CT CAP- R Hilar mass with lymphangitic spread of tumor, Right hilar and perihilar heterogeneously enhancing mass measures 5.5 x 6.3 x 5.2 cm,  Innumerable hypoenhancing lesions throughout the liver measuring up to 3.1 cm   Biopsy completed 4/15/24 - [facility]  Outpatient medical oncology appointment scheduled 5/1 - transferred to [facility] for initiation of chemotherapy

## 2024-04-19 NOTE — ASSESSMENT & PLAN NOTE
Social support:  Patient is finding comfort in his family  Works as a   Daughter is his only child  Prefers daughter be contacted prior to wife.  Supportive listening provided  Normalized experience of patient/family  Provided anxiety containment  Provided anticipatory guidance  Encouraged self care    Follow up  Palliative Care will continue to follow and goals of care discussions will be ongoing.    Please reach out via [place] [facility] if questions or concerns arise.    Care Coordination  Reviewed case with RN, Elizabeth    I have reviewed the patient's controlled substance dispensing history in the Prescription Drug Monitoring Program in compliance with the [facility] regulations before prescribing any controlled substances.    Decisional apparatus:  Patient is competent on exam today.  If competency is lost, patient's substitute decision maker would default to spouse by [place] Act 169.  ER contacts:   Name Relation Home Work Mobile   Anna March Daughter [phone]     Advance Directive/Living Will: none  POLST: none  POA Forms: none    We appreciate the invitation to be involved in this patient's care.  We will continue to follow throughout this hospitalization.  Please do not hesitate to reach our on call provider through our clinic answering service at [phone] should you have acute symptom control concerns.    Jennifer P. Bloch, MSN, CRNP, ACHPN  Palliative and Supportive Care  Clinic/Answering Service: [phone]  You can find me on [email or web address]!

## 2024-04-19 NOTE — ASSESSMENT & PLAN NOTE
Presentation: Patient originally presented to [facility] on 04/11/2024 due to complaints of increased shortness of breath, chest tightness, night sweats and chills. Patient was found to have consolidation in right middle lobe with right hilar/mediastinal mass with metastasis suspected to be lung primary along with postobstructive pneumonia.   Urine strep/legionella negative.  COVID/Influenza/RSV negative.  BC grew Staph hominis likely contaminant per ID.    Plan:  Continue IV Unasyn per ID -- 10 day course of abx through 04/25/2024.  Plan to transition to Augmentin on discharge per ID.  Monitor CBC and procalcitonin trend.   Appreciate ID recommendations.

## 2024-04-19 NOTE — PROGRESS NOTES
Intake received.  Chart reviewed.  Patient is currently inpatient receiving his first cycle of chemotherapy for extensive stage small cell lung cancer.  Hospital follow-up scheduled with Dr. Guadarrama on 5/1/24.  Care coordination to outreach upon discharge.

## 2024-04-19 NOTE — CONSULTS
Consultation - Infectious Disease   Ceasar March 63 y.o. male MRN: [id number]  Unit/Bed#: S -01 Encounter: [id number]    IMPRESSION & RECOMMENDATIONS:   1.  RML postobstructive pneumonia, secondary to obstructing large right-sided lung mass.  Patient had minimal improvement on ceftriaxone but clinically improving on Unasyn.  Patient's respiratory status is improved.  WBC normalized.  Procalcitonin decreased.    Continue IV Unasyn.  Monitor temperature and vitals while on IV Unasyn  Continue to monitor respiratory symptoms   Anticipate treatment x 10 days through 4/25.  Can eventually transition to p.o. Augmentin at discharge.     2.  Positive blood cultures. Although there was growth of Staphylococcus hominis in both admission blood cultures, it appears that this was a single blood draw only from the left arm.  In addition, there was Dermobacter growing in 1 out of 2 sets also, suggesting skin contaminants. Patient has no indwelling intravascular foreign body.  He is systemically well, without sepsis or systemic toxicity.  Repeat blood cultures resulted NG. This is more consistent with contaminated blood draw than true bacteremia.  No antibiotic needed for this indication.     3.  Low back pain.  CT of T-spine and L-spine did not show any metastases or discitis/vertebral osteomyelitis.  Monitor low back pain.  Pain control per primary service.     4.  Right-sided lung mass with hilar mediastinal lymphadenopathy.  Especially in a smoker, this is concerning for primary lung malignancy. Now transferred to [facility] for initiation of chemotherapy.   Management per Oncology     5.  Multiple liver masses, suggestive of metastases.  Patient is status post liver biopsy on 4/15.  Pathology report indicated small cell carcinoma.   Oncology follow-up.     6.  Elevated LFTs, most likely secondary to liver masses.  Abdominal exam is benign.  Doubt biliary obstruction.  Monitor LFTs.    Above plan was discussed in detail  with patient at bedside.   Above plan was discussed in detail with primary service, who agrees with the plan to continue IV Unasyn for 10d course as above.    HISTORY OF PRESENT ILLNESS:  Reason for Consult: 1. Pneumonia of right middle lobe due to infectious organism 2. Bacteremia  HPI: Ceasar March is a 63 y.o. year old male with past medical history of tobacco abuse, asthma, who initially presented to the [facility] ER on 4/12 with dyspnea, chest pain, low back pain and generalized weakness.  On presentation, patient had no fever or leukocytosis.  However, CT chest/abdomen/pelvis showed a right-sided lung mass with postobstructive pneumonia and multiple hepatic metastases.  Patient underwent liver biopsy on 4/15/2024.  Pathology report indicated small cell carcinoma.  Blood cultures on admission resulted positive for Staphylococcus hominis.  Patient was seen by infectious disease at [facility].  Favored the blood cultures to represent a contaminant and continued patient on IV Unasyn to treat for a right middle lobe postobstructive pneumonia.  Patient was seen by oncology and transferred to [facility] for initiation of inpatient chemotherapy.  ID is consulted for ongoing antibiotic management.    Per my discussion with Mr. March, he states that his cough has improved. He isn't bringing anything up. States that he has had a cough for months, as well as night sweats. No fevers or chills currently. Denies vomiting and diarrhea, but states he has a queasy stomach. Also reporting abdominal bloating. Still having bowel movements. No headache, visual changes, URI symptoms, rashes, urinary symptoms. States he would like to go outside and have a cigarette.     REVIEW OF SYSTEMS:  A complete 12 point system-based review of systems is otherwise negative.    PAST MEDICAL HISTORY:  Past Medical History:   Diagnosis Date    Asthma      Past Surgical History:   Procedure Laterality Date    HERNIA REPAIR      IR BIOPSY LIVER MASS  4/15/2024        FAMILY HISTORY:  Non-contributory    SOCIAL HISTORY:  Social History   Social History     Substance and Sexual Activity   Alcohol Use Never     Social History     Substance and Sexual Activity   Drug Use Yes    Types: Marijuana     Social History     Tobacco Use   Smoking Status Some Days    Current packs/day: 2.00    Types: Cigarettes   Smokeless Tobacco Never       ALLERGIES:  No Known Allergies    MEDICATIONS:  All current active medications have been reviewed.    ANTIBIOTICS:  Unasyn    PHYSICAL EXAM:  Temp:  [97.5 °F (36.4 °C)-98.2 °F (36.8 °C)] 97.7 °F (36.5 °C)  HR:  [93-98] 98  Resp:  [16-19] 17  BP: (123-133)/(80-83) 124/83  SpO2:  [94 %-96 %] 96 %  Temp (24hrs), Av.7 °F (36.5 °C), Min:97.5 °F (36.4 °C), Max:98.2 °F (36.8 °C)  Current: Temperature: 97.7 °F (36.5 °C)    Intake/Output Summary (Last 24 hours) at 2024 0924  Last data filed at 2024 0042  Gross per 24 hour   Intake 480 ml   Output 600 ml   Net -120 ml       General Appearance:  Chronically ill appearing, nontoxic, and in no distress.   Head:  Normocephalic, without obvious abnormality, atraumatic.   Eyes:  Conjunctiva pink, scleral icterus bilaterally.    Nose: Nares normal, mucosa normal, no drainage.   Throat: Oropharynx moist without lesions. Poor denition. No perioral lesions or ulcerations.    Neck: Supple, symmetrical, no adenopathy, no tenderness/mass/nodules.   Back:   Symmetric, ROM normal, no CVA tenderness.   Lungs:   Decreased breath sounds, expiratory wheezes noted,  respirations unlabored. On NC.   Chest Wall:  No tenderness or deformity.   Heart:  RRR; no murmur, rub or gallop.   Abdomen:   Distended, bowel sounds present. Tenderness with palpation of RUQ.   Extremities: No cyanosis or clubbing. Bilateral lower extremity edema.    Skin: No rashes or lesions. Jaundice.    Neurologic: Alert and oriented times 3, follows commands, speech is organized and appropriate, symmetric facial movement.     LABS, IMAGING, &  OTHER STUDIES:  Lab Results:  I have personally reviewed pertinent labs.  Results from last 7 days   Lab Units 04/19/24  0444 04/18/24  0454 04/17/24  0520   WBC Thousand/uL 13.09* 9.77 10.44*   HEMOGLOBIN g/dL 14.3 13.0 13.5   PLATELETS Thousands/uL 133* 127* 141*     Results from last 7 days   Lab Units 04/19/24  0444 04/18/24  0454 04/17/24  0520   POTASSIUM mmol/L 4.7 4.3 4.4   CHLORIDE mmol/L 101 102 103   CO2 mmol/L 19* 28 27   BUN mg/dL 19 18 22   CREATININE mg/dL 0.79 0.75 0.79   EGFR ml/min/1.73sq m 96 98 96   CALCIUM mg/dL 8.0* 8.1* 8.1*   AST U/L 306* 304* 303*   ALT U/L 221* 226* 226*   ALK PHOS U/L 742* 723* 834*     Results from last 7 days   Lab Units 04/13/24  1700   BLOOD CULTURE  No Growth After 5 Days.  No Growth After 5 Days.     Results from last 7 days   Lab Units 04/19/24  0444 04/18/24  0454 04/16/24  0428 04/13/24  0439   PROCALCITONIN ng/ml 16.96* 13.31* 16.48* 14.05*                   Imaging Studies:   I have personally reviewed pertinent imaging study reports and images in PACS.    4/11/2024 CT chest/abdomen/pelvis: Right hilar and perihilar 5.5 x 6.3 x 5.2 cm mass consistent with lung malignancy.  Right hilar, mediastinal, and right supraclavicular lymphadenopathy compatible with metastatic disease.  Airspace consolidation seen in the right middle lobe suggesting postobstructive pneumonia.  Diffuse hepatic metastases.  Potential lymphangitic spread of tumor as demonstrated by nodular thickening along the axial interstitium of the right lower lobe and the right major fissure.  Periportal, gastroesophageal, and para-aortic lymphadenopathy.    Other Studies:   I have personally reviewed pertinent reports.        Ruth Barraza PA-C  [facility]

## 2024-04-19 NOTE — ASSESSMENT & PLAN NOTE
New  diagnosis of small cell lung cancer - transferred to [facility] for initiation of chemotherapy     CT CAP- R Hilar mass with lymphangitic spread of tumor, Right hilar and perihilar heterogeneously enhancing mass measures 5.5 x 6.3 x 5.2 cm,  Innumerable hypoenhancing lesions throughout the liver measuring up to 3.1 cm

## 2024-04-19 NOTE — ASSESSMENT & PLAN NOTE
Lab Results   Component Value Date     (H) 04/19/2024     (H) 04/19/2024    TBILI 9.42 (H) 04/19/2024    ALKPHOS 742 (H) 04/19/2024    Suspect 2/2 liver metastasis.  Avoid hepatotoxins.  S/P IR guided liver biopsy: Pathology report noted for poorly differentiated neoplasm with extensive necrosis; concerning for small cell carcinoma.  Continue Allopurinol 200 mg daily.  Oncology consult.

## 2024-04-19 NOTE — CONSULTS
Oncology Consult Note  Ceasar March 63 y.o. male MRN: [id number]  Unit/Bed#: S -01 Encounter: [id number]      Presenting Complaint: Extensive stage small cell lung cancer  He is 63-year-old  male with active nicotine abuse, COPD, asthma he was transferred from [facility] for inpatient chemotherapy  History of Presenting Illness: Ceasar March is seen for initial consultation 4/18/2024 at the referral of NEW   63-year-old  male with COPD, active nicotine abuse, no complaint of weight loss, jaundice, darkening of the urine, chest tightness, chills, night sweats, was found to have on the CAT scan on 4/2020 for right middle lobe and right hilar, mediastinal mass with diffuse hepatic metastasis, postobstructive pneumonia status post liver biopsy on 4/15/2024 showed small cell lung cancer, he was initiated on Unasyn, he was evaluated by Dr. Nadeem Guadarrama the hematologist at [facility] and the need for emergent chemotherapy that is why the patient transferred here I communicated the case with him    The patient is complaining of shortness of breath, cough, fever, back pain, right upper quadrant pain, fatigue, loss of appetite  Review of Systems - As stated in the HPI otherwise the fourteen point review of systems was negative.    Past Medical History:   Diagnosis Date    Asthma        Social History     Socioeconomic History    Marital status: Single     Spouse name: Not on file    Number of children: Not on file    Years of education: Not on file    Highest education level: Not on file   Occupational History    Not on file   Tobacco Use    Smoking status: Some Days     Current packs/day: 2.00     Types: Cigarettes    Smokeless tobacco: Never   Vaping Use    Vaping status: Never Used   Substance and Sexual Activity    Alcohol use: Never    Drug use: Yes     Types: Marijuana    Sexual activity: Not on file   Other Topics Concern    Not on file   Social History Narrative    Not on file      Social Determinants of Health     Financial Resource Strain: Not on file   Food Insecurity: No Food Insecurity (4/18/2024)    Hunger Vital Sign     Worried About Running Out of Food in the Last Year: Never true     Ran Out of Food in the Last Year: Never true   Transportation Needs: No Transportation Needs (4/18/2024)    PRAPARE - Transportation     Lack of Transportation (Medical): No     Lack of Transportation (Non-Medical): No   Physical Activity: Not on file   Stress: Not on file (2/11/2021)   Social Connections: Not on file   Intimate Partner Violence: Low Risk  (4/13/2021)    Received from [facility]    Intimate Partner Violence     Insults You: Not on file     Threatens You: Not on file     Screams at You: Not on file     Physically Hurt: Not on file     Intimate Partner Violence Score: Not on file   Housing Stability: Low Risk  (4/18/2024)    Housing Stability Vital Sign     Unable to Pay for Housing in the Last Year: No     Number of Places Lived in the Last Year: 1     Unstable Housing in the Last Year: No       No family history on file.    No Known Allergies      Current Facility-Administered Medications:     acetaminophen (TYLENOL) tablet 650 mg, 650 mg, Oral, Q6H PRN, Herberth Pittman MD    albuterol (PROVENTIL HFA,VENTOLIN HFA) inhaler 2 puff, 2 puff, Inhalation, Q4H PRN, Herberth Pittman MD    allopurinol (ZYLOPRIM) tablet 200 mg, 200 mg, Oral, Daily, Herberth Pittman MD    ampicillin-sulbactam (UNASYN) 3 g in sodium chloride 0.9 % 100 mL IVPB, 3 g, Intravenous, Q6H, Herberth Pittman MD, Last Rate: 200 mL/hr at 04/19/24 0531, 3 g at 04/19/24 0531    calcium carbonate (TUMS) chewable tablet 1,000 mg, 1,000 mg, Oral, Daily PRN, Herberth Pittman MD    Diclofenac Sodium (VOLTAREN) 1 % topical gel 2 g, 2 g, Topical, 4x Daily, Herberth Pittman MD    enoxaparin (LOVENOX) subcutaneous injection 40 mg, 40 mg, Subcutaneous, Daily, Herberth Pittman MD    gabapentin (NEURONTIN) capsule 100 mg, 100  mg, Oral, TID, Herberth Pittman MD, 100 mg at 04/18/24 2002    guaiFENesin (MUCINEX) 12 hr tablet 600 mg, 600 mg, Oral, BID, Herberth Pittman MD    HYDROmorphone (DILAUDID) injection 0.3 mg, 0.3 mg, Intravenous, Q3H PRN, Herberth Pittman MD, 0.3 mg at 04/19/24 0539    ipratropium (ATROVENT) 0.02 % inhalation solution 0.5 mg, 0.5 mg, Nebulization, TID, Josr Kay MD    levalbuterol (XOPENEX) inhalation solution 1.25 mg, 1.25 mg, Nebulization, TID, Josr Kay MD    methocarbamol (ROBAXIN) tablet 750 mg, 750 mg, Oral, Q6H KRYSTYNA, Herberth Pittman MD, 750 mg at 04/19/24 0539    naloxone (NARCAN) 0.04 mg/mL syringe 0.04 mg, 0.04 mg, Intravenous, Q1MIN PRN, Herberth Pittman MD    nicotine (NICODERM CQ) 21 mg/24 hr TD 24 hr patch 1 patch, 1 patch, Transdermal, Daily, Herberth Pittman MD    ondansetron (ZOFRAN) injection 4 mg, 4 mg, Intravenous, Q6H PRN, Herberth Pittman MD    oxyCODONE (ROXICODONE) immediate release tablet 10 mg, 10 mg, Oral, Q4H, Herberth Pittman MD, 10 mg at 04/19/24 0410    pantoprazole (PROTONIX) EC tablet 40 mg, 40 mg, Oral, Early Morning, Herberth Pittman MD, 40 mg at 04/19/24 0539    polyethylene glycol (MIRALAX) packet 17 g, 17 g, Oral, Daily PRN, Herberth Pittman MD    senna (SENOKOT) tablet 8.6 mg, 1 tablet, Oral, BID, Herberth Pittman MD      /83   Pulse 98   Temp 97.7 °F (36.5 °C)   Resp 16   SpO2 96%       General Appearance:    Alert, oriented, dyspneic       Eyes:    PERRL, icterus   Ears:     Nose:    Throat:   Mucosa moist. Pharynx without injection.  Poor dental hygiene   Neck:   Supple       Lungs:   Diffuse wheezing bilaterally   Chest Wall:    No tenderness or deformity    Heart:    Regular rate and rhythm       Abdomen:     Soft, non-tender, bowel sounds +, liver is about 7 cm below the right costal margin           Extremities:   Extremities no cyanosis or edema       Skin: Positive for clubbing, icterus   Lymph nodes:   Cervical, supraclavicular, and axillary  nodes normal   Neurologic:   CNII-XII intact, normal strength, sensation and reflexes     Throughout               Recent Results (from the past 48 hour(s))   Procalcitonin    Collection Time: 04/18/24  4:54 AM   Result Value Ref Range    Procalcitonin 13.31 (H) <=0.25 ng/ml   CBC and Platelet    Collection Time: 04/18/24  4:54 AM   Result Value Ref Range    WBC 9.77 4.31 - 10.16 Thousand/uL    RBC 4.51 3.88 - 5.62 Million/uL    Hemoglobin 13.0 12.0 - 17.0 g/dL    Hematocrit 39.3 36.5 - 49.3 %    MCV 87 82 - 98 fL    MCH 28.8 26.8 - 34.3 pg    MCHC 33.1 31.4 - 37.4 g/dL    RDW 15.5 (H) 11.6 - 15.1 %    Platelets 127 (L) 149 - 390 Thousands/uL    MPV 11.7 8.9 - 12.7 fL   Comprehensive metabolic panel    Collection Time: 04/18/24  4:54 AM   Result Value Ref Range    Sodium 137 135 - 147 mmol/L    Potassium 4.3 3.5 - 5.3 mmol/L    Chloride 102 96 - 108 mmol/L    CO2 28 21 - 32 mmol/L    ANION GAP 7 4 - 13 mmol/L    BUN 18 5 - 25 mg/dL    Creatinine 0.75 0.60 - 1.30 mg/dL    Glucose 93 65 - 140 mg/dL    Calcium 8.1 (L) 8.4 - 10.2 mg/dL    Corrected Calcium 9.2 8.3 - 10.1 mg/dL     (H) 13 - 39 U/L     (H) 7 - 52 U/L    Alkaline Phosphatase 723 (H) 34 - 104 U/L    Total Protein 5.2 (L) 6.4 - 8.4 g/dL    Albumin 2.6 (L) 3.5 - 5.0 g/dL    Total Bilirubin 6.88 (H) 0.20 - 1.00 mg/dL    eGFR 98 ml/min/1.73sq m   CBC and differential    Collection Time: 04/19/24  4:44 AM   Result Value Ref Range    WBC 13.09 (H) 4.31 - 10.16 Thousand/uL    RBC 4.93 3.88 - 5.62 Million/uL    Hemoglobin 14.3 12.0 - 17.0 g/dL    Hematocrit 43.9 36.5 - 49.3 %    MCV 89 82 - 98 fL    MCH 29.0 26.8 - 34.3 pg    MCHC 32.6 31.4 - 37.4 g/dL    RDW 16.3 (H) 11.6 - 15.1 %    MPV 10.3 8.9 - 12.7 fL    Platelets 133 (L) 149 - 390 Thousands/uL   Procalcitonin    Collection Time: 04/19/24  4:44 AM   Result Value Ref Range    Procalcitonin 16.96 (H) <=0.25 ng/ml   Comprehensive metabolic panel    Collection Time: 04/19/24  4:44 AM   Result Value  Ref Range    Sodium 133 (L) 135 - 147 mmol/L    Potassium 4.7 3.5 - 5.3 mmol/L    Chloride 101 96 - 108 mmol/L    CO2 19 (L) 21 - 32 mmol/L    ANION GAP 13 4 - 13 mmol/L    BUN 19 5 - 25 mg/dL    Creatinine 0.79 0.60 - 1.30 mg/dL    Glucose 70 65 - 140 mg/dL    Calcium 8.0 (L) 8.4 - 10.2 mg/dL    Corrected Calcium 9.0 8.3 - 10.1 mg/dL     (H) 13 - 39 U/L     (H) 7 - 52 U/L    Alkaline Phosphatase 742 (H) 34 - 104 U/L    Total Protein 5.4 (L) 6.4 - 8.4 g/dL    Albumin 2.7 (L) 3.5 - 5.0 g/dL    Total Bilirubin 9.42 (H) 0.20 - 1.00 mg/dL    eGFR 96 ml/min/1.73sq m   Manual Differential(PHLEBS Do Not Order)    Collection Time: 04/19/24  4:44 AM   Result Value Ref Range    Segmented % 79 (H) 43 - 75 %    Bands % 5 0 - 8 %    Lymphocytes % 5 (L) 14 - 44 %    Monocytes % 5 4 - 12 %    Eosinophils % 2 0 - 6 %    Basophils % 0 0 - 1 %    Metamyelocytes % 2 (H) 0 - 1 %    Atypical Lymphocytes % 2 (H) <=0 %    Absolute Neutrophils 11.00 (H) 1.85 - 7.62 Thousand/uL    Absolute Lymphocytes 0.92 0.60 - 4.47 Thousand/uL    Absolute Monocytes 0.65 0.00 - 1.22 Thousand/uL    Absolute Eosinophils 0.26 0.00 - 0.40 Thousand/uL    Absolute Basophils 0.00 0.00 - 0.10 Thousand/uL    Absolute Metamyelocytes 0.26 (H) 0.00 - 0.10 Thousand/uL    Total Counted      RBC Morphology Present     Platelet Estimate Borderline (A) Adequate    Anisocytosis Present     Target Cells Present          CT spine thoracic & lumbar wo contrast    Result Date: 4/17/2024  Narrative: CT THORACIC AND LUMBAR SPINE INDICATION:   Back and hip pain.. COMPARISON:/11/24. TECHNIQUE:  Contiguous axial images were obtained. Sagittal and coronal reconstructions were performed. Radiation dose length product (DLP) for this visit:  1195 mGy-cm .  This examination, like all CT scans performed in the [facility], was performed utilizing techniques to minimize radiation dose exposure, including the use of iterative reconstruction and automated  exposure control. IMAGE QUALITY:  Diagnostic. FINDINGS: ALIGNMENT: Normal alignment of the thoracic and lumbar spine. VERTEBRAE:  No evidence of thoracic or lumbar spine fracture, lytic or blastic lesion. DEGENERATIVE CHANGES: Mild multilevel degenerative disc disease. Endplate degenerative change in the lower thoracic spine. No significant disc degenerative change in the lumbar spine PARASPINAL SOFT TISSUES: No mass or fluid collection. OTHER: Partially imaged right hilar mass and postobstructive pneumonia, further described on the recent chest CT. Mediastinal, peritoneal and retroperitoneal lymphadenopathy, partially visualized. Hepatic metastases.     Impression: No evidence of pathologic fracture, lytic or blastic lesion in the thoracic or lumbar spine. Workstation performed: [id number]     IR biopsy liver mass    Result Date: 4/15/2024  Narrative: CT-scan guided needle biopsy of a liver mass History: Lung mass with liver metastases Conscious sedation time: 30 mins Technique: This examination, like all CT scans performed in the [facility], was performed utilizing techniques to minimize radiation dose exposure, including the use of iterative reconstruction and automated exposure control. The patient was brought to the CT scanner and placed supine on the table. After axial images were obtained through the region of interest an area of the skin was then marked, prepped, and draped in usual sterile fashion. Lidocaine was administered to the  skin and a small skin incision was made. A 17-gauge cannula was advanced up to the lesion, and through this, an 18-gauge core biopsy specimen was obtained. At the end of the procedure, D-Stat was used for hemostasis through the cannula as it was removed. The patient tolerated the procedure well and suffered no complications.     Impression: Impression: Successful percutaneous core biopsy of a left lobe liver mass. A full pathology report will follow. Workstation  performed: [id number]     Echo complete w/ contrast if indicated    Result Date: 4/14/2024  Narrative:   Very technically difficult study due to body habitus.   Left Ventricle: Left ventricular cavity size is normal. Wall thickness is normal. The left ventricular ejection fraction is 60%. Systolic function is normal. Wall motion is normal. Diastolic function is normal.   Right Ventricle: Right ventricular cavity size is possibly mildly dilated.   Tricuspid Valve: There is trace regurgitation.     CT chest abdomen pelvis w contrast    Result Date: 4/11/2024  Narrative: CT CHEST, ABDOMEN AND PELVIS WITH IV CONTRAST INDICATION: Weakness, weight loss. COMPARISON: None. TECHNIQUE: CT examination of the chest, abdomen and pelvis was performed. Multiplanar 2D reformatted images were created from the source data. This examination, like all CT scans performed in the [facility], was performed utilizing techniques to minimize radiation dose exposure, including the use of iterative reconstruction and automated exposure control. Radiation dose length product (DLP) for this visit: 721 mGy-cm IV Contrast: 100 mL of iohexol (OMNIPAQUE) Enteric Contrast: Not administered. FINDINGS: CHEST LUNGS: Airspace consolidation in the right middle lobe contiguous with the hilar mass, suggesting postobstructive pneumonia. Nodular opacification along the axial interstitium in the right lower lobe and nodular thickening along the right major fissure likely reflects lymphangitic spread of tumor. Severe narrowing of right middle lobe segmental and subsegmental bronchi. PLEURA: No effusion. HEART/GREAT VESSELS: Normal heart size. No thoracic aortic aneurysm. Proximal segmental branches of the right pulmonary artery in the right middle and upper lobes are narrowed by the encasing mass. MEDIASTINUM AND LEWIS: Right hilar and perihilar heterogeneously enhancing mass measures 5.5 x 6.3 x 5.2 cm. High right paratracheal lymph nodes  measure up to 1.7 cm in short axis diameter. Subcentimeter prevascular lymph nodes. Subcarinal lymph node measures 2 cm in short axis diameter. Left paraesophageal 1.4 cm lymph node. Right supraclavicular 1.4 cm lymph node. CHEST WALL AND LOWER NECK: Unremarkable. ABDOMEN LIVER/BILIARY TREE: Innumerable hypoenhancing lesions throughout the liver measuring up to 3.1 cm. Hepatomegaly. GALLBLADDER: Cholecystectomy. SPLEEN: Unremarkable. PANCREAS: Unremarkable. ADRENAL GLANDS: Unremarkable. KIDNEYS/URETERS: Symmetric nephrographic phase enhancement of the kidneys. No obstructive uropathy. STOMACH AND BOWEL: Diverticulosis without evidence of diverticulitis or colitis. Ventral herniorrhaphy APPENDIX: Normal appendix. ABDOMINOPELVIC CAVITY: Partially confluent hypoenhancing periportal lymph nodes measuring up to 2.3 cm. Paracaval lymph node measures 1.5 cm in short axis diameter. Gastroesophageal junction lymph nodes measure up to 1 cm subcentimeter para-aortic lymph nodes. VESSELS: No abdominal aortic aneurysm. Patent portal, splenic and mesenteric veins PELVIS REPRODUCTIVE ORGANS: Enlarged prostate. URINARY BLADDER: Unremarkable. ABDOMINAL WALL/INGUINAL REGIONS: Fat-containing left inguinal 2 cm hernia.. BONES: Subtle subcentimeter sclerotic lesions in the pelvis. No evidence of fracture..     Impression: 1. Right hilar and perihilar 5.5 x 6.3 x 5.2 cm mass consistent lung malignancy. 2. Right hilar, mediastinal and right supraclavicular lymphadenopathy compatible with metastatic disease. 3. Airspace consolidation in the right middle lobe adjacent to the hilum suggesting postobstructive pneumonia. 4. Nodular thickening along the axial interstitium in the right lower lobe and the right major fissure suggesting lymphangitic spread of tumor. 5. Diffuse hepatic metastases. 6. Periportal, gastroesophageal and para-aortic lymphadenopathy The study was marked in EPIC for immediate notification. Workstation performed: [id number]      ECOG :2      Assessment and plan:  Extensive stage small cell lung cancer with right lower lobe, hilum, right middle lobe mass with diffuse liver metastasis, jaundice, elevated liver enzyme, postobstructive pneumonia, currently on Unasyn, liver biopsy confirmed extensive stage small cell lung cancer, he was transferred here for inpatient chemotherapy as soon as possible    Giving his liver enzyme elevation and intact creatinine patient to be started on etoposide 50 mg/m² daily for the next 3 days, carboplatin AUC 5 on day 1    Side effects such as alopecia, nausea, vomiting, elevated liver enzyme, allergic reaction, pancytopenia, sepsis etc. told he agreed to proceed he signed the consent, will start therapy as soon as possible, continue allopurinol 200 mg p.o. daily will check uric acid on daily basis if he starts to have increased uric acid we will start rasburicase 3 mg 1 dose only    Prognosis guarded

## 2024-04-19 NOTE — ASSESSMENT & PLAN NOTE
Patient with increasing back pain rating 9-10/10 over past 4-5 weeks.  He did not take any medication at home.   Start dexamethasone 4mg once daily - Reviewed steroids with Dr. Aguilar and he is in agreement with plan  Increase Oxycodone to 15 mg q4h scheduled with hold parameters  Continue Robaxin 750mg q6h scheduled  Increase Hydromorphone to 0.3 mg q3h PRN for breakthrough    Bowel regimen in place - last BM yesterday

## 2024-04-19 NOTE — PLAN OF CARE
Problem: PAIN - ADULT  Goal: Verbalizes/displays adequate comfort level or baseline comfort level  Description: Interventions:  - Encourage patient to monitor pain and request assistance  - Assess pain using appropriate pain scale  - Administer analgesics based on type and severity of pain and evaluate response  - Implement non-pharmacological measures as appropriate and evaluate response  - Consider cultural and social influences on pain and pain management  - Notify physician/advanced practitioner if interventions unsuccessful or patient reports new pain  Outcome: Progressing     Problem: INFECTION - ADULT  Goal: Absence or prevention of progression during hospitalization  Description: INTERVENTIONS:  - Assess and monitor for signs and symptoms of infection  - Monitor lab/diagnostic results  - Monitor all insertion sites, i.e. indwelling lines, tubes, and drains  - Monitor endotracheal if appropriate and nasal secretions for changes in amount and color  - [place] appropriate cooling/warming therapies per order  - Administer medications as ordered  - Instruct and encourage patient and family to use good hand hygiene technique  - Identify and instruct in appropriate isolation precautions for identified infection/condition  Outcome: Progressing     Problem: SAFETY ADULT  Goal: Patient will remain free of falls  Description: INTERVENTIONS:  - Educate patient/family on patient safety including physical limitations  - Instruct patient to call for assistance with activity   - Consult OT/PT to assist with strengthening/mobility   - Keep Call bell within reach  - Keep bed low and locked with side rails adjusted as appropriate  - Keep care items and personal belongings within reach  - Initiate and maintain comfort rounds  - Make Fall Risk Sign visible to staff  - Offer Toileting every Hours, in advance of need  - Initiate/Maintain alarm  - Obtain necessary fall risk management equipment:   - Apply yellow socks and  bracelet for high fall risk patients  - Consider moving patient to room near nurses station  Outcome: Progressing

## 2024-04-20 LAB
ALBUMIN SERPL BCP-MCNC: 2.7 G/DL (ref 3.5–5)
ALP SERPL-CCNC: 716 U/L (ref 34–104)
ALT SERPL W P-5'-P-CCNC: 227 U/L (ref 7–52)
ANION GAP SERPL CALCULATED.3IONS-SCNC: 10 MMOL/L (ref 4–13)
ANISOCYTOSIS BLD QL SMEAR: PRESENT
AST SERPL W P-5'-P-CCNC: 304 U/L (ref 13–39)
BASOPHILS # BLD MANUAL: 0 THOUSAND/UL (ref 0–0.1)
BASOPHILS NFR MAR MANUAL: 0 % (ref 0–1)
BILIRUB DIRECT SERPL-MCNC: 6.63 MG/DL (ref 0–0.2)
BILIRUB SERPL-MCNC: 11.18 MG/DL (ref 0.2–1)
BUN SERPL-MCNC: 24 MG/DL (ref 5–25)
CALCIUM SERPL-MCNC: 8.4 MG/DL (ref 8.4–10.2)
CHLORIDE SERPL-SCNC: 99 MMOL/L (ref 96–108)
CO2 SERPL-SCNC: 27 MMOL/L (ref 21–32)
CREAT SERPL-MCNC: 0.87 MG/DL (ref 0.6–1.3)
EOSINOPHIL # BLD MANUAL: 0 THOUSAND/UL (ref 0–0.4)
EOSINOPHIL NFR BLD MANUAL: 0 % (ref 0–6)
ERYTHROCYTE [DISTWIDTH] IN BLOOD BY AUTOMATED COUNT: 16 % (ref 11.6–15.1)
GFR SERPL CREATININE-BSD FRML MDRD: 92 ML/MIN/1.73SQ M
GLUCOSE SERPL-MCNC: 102 MG/DL (ref 65–140)
HCT VFR BLD AUTO: 44.7 % (ref 36.5–49.3)
HGB BLD-MCNC: 14.5 G/DL (ref 12–17)
LYMPHOCYTES # BLD AUTO: 1.49 THOUSAND/UL (ref 0.6–4.47)
LYMPHOCYTES # BLD AUTO: 10 % (ref 14–44)
MCH RBC QN AUTO: 28.5 PG (ref 26.8–34.3)
MCHC RBC AUTO-ENTMCNC: 32.4 G/DL (ref 31.4–37.4)
MCV RBC AUTO: 88 FL (ref 82–98)
METAMYELOCYTE ABSOLUTE CT: 0.15 THOUSAND/UL (ref 0–0.1)
METAMYELOCYTES NFR BLD MANUAL: 1 % (ref 0–1)
MONOCYTES # BLD AUTO: 0.15 THOUSAND/UL (ref 0–1.22)
MONOCYTES NFR BLD: 1 % (ref 4–12)
MYELOCYTE ABSOLUTE CT: 0.15 THOUSAND/UL (ref 0–0.1)
MYELOCYTES NFR BLD MANUAL: 1 % (ref 0–1)
NEUTROPHILS # BLD MANUAL: 12.95 THOUSAND/UL (ref 1.85–7.62)
NEUTS BAND NFR BLD MANUAL: 6 % (ref 0–8)
NEUTS SEG NFR BLD AUTO: 81 % (ref 43–75)
PLATELET # BLD AUTO: 160 THOUSANDS/UL (ref 149–390)
PLATELET BLD QL SMEAR: ADEQUATE
PMV BLD AUTO: 11.3 FL (ref 8.9–12.7)
POTASSIUM SERPL-SCNC: 5.1 MMOL/L (ref 3.5–5.3)
PROCALCITONIN SERPL-MCNC: 18.66 NG/ML
PROT SERPL-MCNC: 5.7 G/DL (ref 6.4–8.4)
RBC # BLD AUTO: 5.09 MILLION/UL (ref 3.88–5.62)
RBC MORPH BLD: PRESENT
SODIUM SERPL-SCNC: 136 MMOL/L (ref 135–147)
URATE SERPL-MCNC: 6.1 MG/DL (ref 3.5–8.5)
WBC # BLD AUTO: 14.88 THOUSAND/UL (ref 4.31–10.16)

## 2024-04-20 PROCEDURE — 84550 ASSAY OF BLOOD/URIC ACID: CPT | Performed by: INTERNAL MEDICINE

## 2024-04-20 PROCEDURE — 94760 N-INVAS EAR/PLS OXIMETRY 1: CPT

## 2024-04-20 PROCEDURE — 80048 BASIC METABOLIC PNL TOTAL CA: CPT

## 2024-04-20 PROCEDURE — 84145 PROCALCITONIN (PCT): CPT

## 2024-04-20 PROCEDURE — 85007 BL SMEAR W/DIFF WBC COUNT: CPT

## 2024-04-20 PROCEDURE — 85027 COMPLETE CBC AUTOMATED: CPT

## 2024-04-20 PROCEDURE — 80076 HEPATIC FUNCTION PANEL: CPT

## 2024-04-20 PROCEDURE — 94640 AIRWAY INHALATION TREATMENT: CPT

## 2024-04-20 PROCEDURE — 99232 SBSQ HOSP IP/OBS MODERATE 35: CPT | Performed by: HOSPITALIST

## 2024-04-20 RX ORDER — LEVALBUTEROL INHALATION SOLUTION 1.25 MG/3ML
1.25 SOLUTION RESPIRATORY (INHALATION)
Status: DISCONTINUED | OUTPATIENT
Start: 2024-04-20 | End: 2024-04-21

## 2024-04-20 RX ORDER — SODIUM CHLORIDE 9 MG/ML
20 INJECTION, SOLUTION INTRAVENOUS ONCE
Status: COMPLETED | OUTPATIENT
Start: 2024-04-20 | End: 2024-04-20

## 2024-04-20 RX ADMIN — ALLOPURINOL 200 MG: 100 TABLET ORAL at 09:21

## 2024-04-20 RX ADMIN — GABAPENTIN 100 MG: 100 CAPSULE ORAL at 20:42

## 2024-04-20 RX ADMIN — AMPICILLIN SODIUM AND SULBACTAM SODIUM 3 G: 100; 50 INJECTION, POWDER, FOR SOLUTION INTRAVENOUS at 09:34

## 2024-04-20 RX ADMIN — PANTOPRAZOLE SODIUM 40 MG: 40 TABLET, DELAYED RELEASE ORAL at 05:33

## 2024-04-20 RX ADMIN — AMPICILLIN SODIUM AND SULBACTAM SODIUM 3 G: 100; 50 INJECTION, POWDER, FOR SOLUTION INTRAVENOUS at 15:12

## 2024-04-20 RX ADMIN — DEXAMETHASONE SODIUM PHOSPHATE 4 MG: 4 INJECTION INTRA-ARTICULAR; INTRALESIONAL; INTRAMUSCULAR; INTRAVENOUS; SOFT TISSUE at 09:25

## 2024-04-20 RX ADMIN — POLYETHYLENE GLYCOL 3350 17 G: 17 POWDER, FOR SOLUTION ORAL at 09:25

## 2024-04-20 RX ADMIN — OXYCODONE HYDROCHLORIDE 15 MG: 10 TABLET ORAL at 20:42

## 2024-04-20 RX ADMIN — IPRATROPIUM BROMIDE 0.5 MG: 0.5 SOLUTION RESPIRATORY (INHALATION) at 07:11

## 2024-04-20 RX ADMIN — GABAPENTIN 100 MG: 100 CAPSULE ORAL at 09:21

## 2024-04-20 RX ADMIN — IPRATROPIUM BROMIDE 0.5 MG: 0.5 SOLUTION RESPIRATORY (INHALATION) at 20:01

## 2024-04-20 RX ADMIN — METHOCARBAMOL TABLETS 750 MG: 750 TABLET, COATED ORAL at 00:21

## 2024-04-20 RX ADMIN — HYDROMORPHONE HYDROCHLORIDE 0.5 MG: 1 INJECTION, SOLUTION INTRAMUSCULAR; INTRAVENOUS; SUBCUTANEOUS at 11:23

## 2024-04-20 RX ADMIN — ONDANSETRON: 2 INJECTION INTRAMUSCULAR; INTRAVENOUS at 10:39

## 2024-04-20 RX ADMIN — SODIUM CHLORIDE 20 ML/HR: 0.9 INJECTION, SOLUTION INTRAVENOUS at 10:26

## 2024-04-20 RX ADMIN — LEVALBUTEROL HYDROCHLORIDE 1.25 MG: 1.25 SOLUTION RESPIRATORY (INHALATION) at 20:01

## 2024-04-20 RX ADMIN — GABAPENTIN 100 MG: 100 CAPSULE ORAL at 15:11

## 2024-04-20 RX ADMIN — HYDROMORPHONE HYDROCHLORIDE 0.5 MG: 1 INJECTION, SOLUTION INTRAMUSCULAR; INTRAVENOUS; SUBCUTANEOUS at 19:30

## 2024-04-20 RX ADMIN — OXYCODONE HYDROCHLORIDE 15 MG: 10 TABLET ORAL at 05:33

## 2024-04-20 RX ADMIN — LEVALBUTEROL HYDROCHLORIDE 1.25 MG: 1.25 SOLUTION RESPIRATORY (INHALATION) at 07:11

## 2024-04-20 RX ADMIN — AMPICILLIN SODIUM AND SULBACTAM SODIUM 3 G: 100; 50 INJECTION, POWDER, FOR SOLUTION INTRAVENOUS at 01:39

## 2024-04-20 RX ADMIN — OXYCODONE HYDROCHLORIDE 15 MG: 10 TABLET ORAL at 09:21

## 2024-04-20 RX ADMIN — OXYCODONE HYDROCHLORIDE 15 MG: 10 TABLET ORAL at 15:10

## 2024-04-20 RX ADMIN — ENOXAPARIN SODIUM 40 MG: 60 INJECTION SUBCUTANEOUS at 09:26

## 2024-04-20 RX ADMIN — METHOCARBAMOL TABLETS 750 MG: 750 TABLET, COATED ORAL at 05:33

## 2024-04-20 RX ADMIN — AMPICILLIN SODIUM AND SULBACTAM SODIUM 3 G: 100; 50 INJECTION, POWDER, FOR SOLUTION INTRAVENOUS at 20:42

## 2024-04-20 RX ADMIN — SENNOSIDES 17.2 MG: 8.6 TABLET, FILM COATED ORAL at 09:21

## 2024-04-20 RX ADMIN — ETOPOSIDE 100.6 MG: 20 INJECTION, SOLUTION INTRAVENOUS at 11:15

## 2024-04-20 RX ADMIN — OXYCODONE HYDROCHLORIDE 15 MG: 10 TABLET ORAL at 00:21

## 2024-04-20 NOTE — ASSESSMENT & PLAN NOTE
Likely 2/2 metastases.  S/P IR liver biopsy on 04/15/2024.  Pathology report positive for small cell carcinoma.   Transaminitis likely 2/2 liver mets.    Continue allopurinol  If uric acid trends up, start rasburicase 3 mg 1 dose only per oncology  Trend CMP.

## 2024-04-20 NOTE — ASSESSMENT & PLAN NOTE
Lab Results   Component Value Date     (H) 04/20/2024     (H) 04/20/2024    TBILI 11.18 (H) 04/20/2024    ALKPHOS 716 (H) 04/20/2024    Suspect 2/2 liver metastasis.  Avoid hepatotoxins.  S/P IR guided liver biopsy: Pathology report noted for poorly differentiated neoplasm with extensive necrosis; concerning for small cell carcinoma.  Continue Allopurinol 200 mg daily.

## 2024-04-20 NOTE — PROGRESS NOTES
"[facility]  Progress Note  Name: Ceasar March I  MRN: [id number]  Unit/Bed#: S -01 I Date of Admission: 4/18/2024   Date of Service: 4/20/2024 I Hospital Day: 2    Assessment/Plan   Small cell lung cancer with metastasis to liver  Assessment & Plan  Patient reported night sweats, recent weight loss, current every day smoker, severe back and flank pain.  CT chest: \" Right hilar and perihilar 5.5 x 6.3 x 5.2 cm mass consistent lung malignancy. Right hilar, mediastinal and right supraclavicular lymphadenopathy compatible with metastatic disease. Airspace consolidation in the right middle lobe adjacent to the hilum suggesting postobstructive pneumonia. Nodular thickening along the axial interstitium in the right lower lobe and the right major fissure suggesting lymphangitic spread of tumor. Diffuse hepatic metastases. Periportal, gastroesophageal and para-aortic lymphadenopathy\"  Complained of hip pain -- CT A/P displayed sclerotic lesion in the pelvis.  Liver biopsy pathology report positive for small cell carcinoma.    Plan  Started chemotherapy on 4/19 . Continue chemotherapy  4/19 started on Decadron 4 mg IV  Palliative and oncology on board-recommendations appreciated    * Pneumonia of right middle lobe due to infectious organism  Assessment & Plan  Presentation: Patient originally presented to [facility] on 04/11/2024 due to complaints of increased shortness of breath, chest tightness, night sweats and chills. Patient was found to have consolidation in right middle lobe with right hilar/mediastinal mass with metastasis suspected to be lung primary along with postobstructive pneumonia.   Urine strep/legionella negative.  COVID/Influenza/RSV negative.  BC grew Staph hominis likely contaminant per ID.    Plan:  Continue IV Unasyn per ID -- 10 day course of abx through 04/25/2024.  Plan to transition to Augmentin on discharge per ID.  Monitor CBC and procalcitonin trend.   Appreciate ID " recommendations.    Cancer related pain  Assessment & Plan  Continue pain regimen.  Oxycodone 15 mg every 4 hours scheduled, IV Dilaudid 0.3 mg as needed every 3 hours  Palliative on board    Liver mass  Assessment & Plan  Likely 2/2 metastases.  S/P IR liver biopsy on 04/15/2024.  Pathology report positive for small cell carcinoma.   Transaminitis likely 2/2 liver mets.    Continue allopurinol  If uric acid trends up, start rasburicase 3 mg 1 dose only per oncology  Trend CMP.    Severe protein-calorie malnutrition (HCC)  Assessment & Plan  Malnutrition Findings:   Adult malnutrition type: Chronic illness.  Adult degree of malnutrition: Other severe protein calorie malnutrition.  Malnutrition characteristics: Inadequate energy, weight loss.    360 statement: Malnutrition related to chronic illness as evidenced by >7.5% body weight loss in 3 months, <75% energy intake compared to estimated energy needs for >1 month. To treat with oral diet as tolerated.     BMI Findings:        Body mass index is 23.73 kg/m3      Bacteremia  Assessment & Plan  2/2 BC grew Staph hominis.  Suspect contaminant per ID.    Transaminitis  Assessment & Plan  Lab Results   Component Value Date     (H) 04/20/2024     (H) 04/20/2024    TBILI 11.18 (H) 04/20/2024    ALKPHOS 716 (H) 04/20/2024    Suspect 2/2 liver metastasis.  Avoid hepatotoxins.  S/P IR guided liver biopsy: Pathology report noted for poorly differentiated neoplasm with extensive necrosis; concerning for small cell carcinoma.  Continue Allopurinol 200 mg daily.    Tobacco abuse  Assessment & Plan  Reported an average of 1-1.5 PPD.  NRT.  Encouraged smoking cessation.               VTE Pharmacologic Prophylaxis: VTE Score: 5 High Risk (Score >/= 5) - Pharmacological DVT Prophylaxis Ordered: enoxaparin (Lovenox). Sequential Compression Devices Ordered.    Mobility:   Basic Mobility Inpatient Raw Score: 21  -HLM Goal: 6: Walk 10 steps or more  -HLM Achieved: 6:  Walk 10 steps or more      Patient Centered Rounds: I performed bedside rounds with nursing staff today.   Discussions with Specialists or Other Care Team Provider:     Education and Discussions with Family / Patient:  Will update daughter.     Total Time Spent on Date of Encounter in care of patient: 30 mins. This time was spent on one or more of the following: performing physical exam; counseling and coordination of care; obtaining or reviewing history; documenting in the medical record; reviewing/ordering tests, medications or procedures; communicating with other healthcare professionals and discussing with patient's family/caregivers.    Current Length of Stay: 2 day(s)  Current Patient Status: Inpatient   Certification Statement:   Discharge Plan: Anticipate discharge in 24-48 hrs to home.    Code Status: Level 3 - DNAR and DNI    Subjective:   Patient was seen and examined bedside's morning.  Patient doing well overall not in any acute distress.  Still endorses pain in the right lower rib.  Pain regimen uptitrated per palliative    Objective:     Vitals:   Temp (24hrs), Av.7 °F (36.5 °C), Min:97.5 °F (36.4 °C), Max:98.2 °F (36.8 °C)    Temp:  [97.5 °F (36.4 °C)-98.2 °F (36.8 °C)] 97.8 °F (36.6 °C)  HR:  [] 100  Resp:  [16-20] 18  BP: (114-141)/(71-87) 128/87  SpO2:  [92 %-98 %] 94 %  Body mass index is 23.74 kg/m².     Input and Output Summary (last 24 hours):     Intake/Output Summary (Last 24 hours) at 2024 1058  Last data filed at 2024 0501  Gross per 24 hour   Intake 1541.95 ml   Output 800 ml   Net 741.95 ml       Physical Exam:   Physical Exam  Vitals and nursing note reviewed.   Constitutional:       General: He is not in acute distress.     Appearance: He is not ill-appearing, toxic-appearing or diaphoretic.   HENT:      Head: Normocephalic.      Nose: Nose normal.   Eyes:      General: No scleral icterus.     Conjunctiva/sclera: Conjunctivae normal.   Cardiovascular:      Rate and  Rhythm: Normal rate and regular rhythm.      Heart sounds: Normal heart sounds.   Pulmonary:      Effort: Pulmonary effort is normal.      Breath sounds: Decreased breath sounds present. No wheezing, rhonchi or rales.   Abdominal:      General: Bowel sounds are normal. There is no distension.      Palpations: Abdomen is soft.      Tenderness: There is no abdominal tenderness.   Musculoskeletal:         General: Normal range of motion.      Cervical back: Normal range of motion.      Right lower leg: No edema.      Left lower leg: No edema.   Skin:     General: Skin is warm and dry.   Neurological:      Mental Status: He is alert and oriented to person, place, and time.          Additional Data:     Labs:  Results from last 7 days   Lab Units 04/20/24  0545   WBC Thousand/uL 14.88*   HEMOGLOBIN g/dL 14.5   HEMATOCRIT % 44.7   PLATELETS Thousands/uL 160   BANDS PCT % 6   LYMPHO PCT % 10*   MONO PCT % 1*   EOS PCT % 0     Results from last 7 days   Lab Units 04/20/24  0545   SODIUM mmol/L 136   POTASSIUM mmol/L 5.1   CHLORIDE mmol/L 99   CO2 mmol/L 27   BUN mg/dL 24   CREATININE mg/dL 0.87   ANION GAP mmol/L 10   CALCIUM mg/dL 8.4   ALBUMIN g/dL 2.7*   TOTAL BILIRUBIN mg/dL 11.18*   ALK PHOS U/L 716*   ALT U/L 227*   AST U/L 304*   GLUCOSE RANDOM mg/dL 102     Results from last 7 days   Lab Units 04/15/24  0432   INR  1.12             Results from last 7 days   Lab Units 04/20/24  0545 04/19/24  0444 04/18/24  0454 04/16/24  0428   PROCALCITONIN ng/ml 18.66* 16.96* 13.31* 16.48*       Lines/Drains:  Invasive Devices       Peripheral Intravenous Line  Duration             Peripheral IV 04/19/24 Right Forearm 1 day                          Imaging:     Recent Cultures (last 7 days):   Results from last 7 days   Lab Units 04/13/24  1700   BLOOD CULTURE  No Growth After 5 Days.  No Growth After 5 Days.       Last 24 Hours Medication List:   Current Facility-Administered Medications   Medication Dose Route Frequency  Provider Last Rate    acetaminophen  650 mg Oral Q6H PRN Herberth Pittman MD      albuterol  2 puff Inhalation Q4H PRN Herberth Pittman MD      allopurinol  200 mg Oral Daily Herberth Pittman MD      alteplase  2 mg Intracatheter Q1MIN PRN Therese Aguilar MD      alteplase  2 mg Intracatheter Q1MIN PRN Therese Aguilar MD      ampicillin-sulbactam  3 g Intravenous Q6H Herberth Pittman MD 3 g (04/20/24 0934)    calcium carbonate  1,000 mg Oral Daily PRN Herberth Pittman MD      dexamethasone  4 mg Intravenous Daily Jennifer Paige Bloch, CRNP      Diclofenac Sodium  2 g Topical 4x Daily Herberth Pittman MD      docusate sodium  100 mg Oral BID Josr Kay MD      enoxaparin  40 mg Subcutaneous Daily Herberth Pittman MD      etoposide  50 mg/m2 (Treatment Plan Recorded) Intravenous Once Therese Aguilar MD      gabapentin  100 mg Oral TID Herberth Pittman MD      guaiFENesin  600 mg Oral BID Herberth Pittman MD      HYDROmorphone  0.5 mg Intravenous Q3H PRN Jennifer Paige Bloch, CRNP      ipratropium  0.5 mg Nebulization TID Josr Kay MD      levalbuterol  1.25 mg Nebulization TID Josr Kay MD      methocarbamol  750 mg Oral Q6H KRYSTYNA Herberth Pittman MD      naloxone  0.04 mg Intravenous Q1MIN PRN Herberth Pittman MD      nicotine  1 patch Transdermal Daily Herberth Pittman MD      ondansetron (ZOFRAN) 16 mg, dexamethasone (DECADRON) 10 mg in sodium chloride 0.9 % 50 mL IVPB   Intravenous Once Therese Aguilar  mL/hr at 04/20/24 1039    ondansetron  4 mg Intravenous Q6H PRN Herberth Pittman MD      oxyCODONE  15 mg Oral Q4H Jennifer Paige Bloch, CRNP      pantoprazole  40 mg Oral Early Morning Herberth Pittman MD      polyethylene glycol  17 g Oral Daily PRN Herberth Pittman MD      polyethylene glycol  17 g Oral Daily Josr Kay MD      senna  2 tablet Oral BID Jennifer Paige Bloch, CRNP          Today, Patient Was Seen By: Josr Kay MD    **Please Note: This note may have been constructed using a  voice recognition system.**

## 2024-04-20 NOTE — PLAN OF CARE
Problem: PAIN - ADULT  Goal: Verbalizes/displays adequate comfort level or baseline comfort level  Description: Interventions:  - Encourage patient to monitor pain and request assistance  - Assess pain using appropriate pain scale  - Administer analgesics based on type and severity of pain and evaluate response  - Implement non-pharmacological measures as appropriate and evaluate response  - Consider cultural and social influences on pain and pain management  - Notify physician/advanced practitioner if interventions unsuccessful or patient reports new pain  Outcome: Progressing     Problem: INFECTION - ADULT  Goal: Absence or prevention of progression during hospitalization  Description: INTERVENTIONS:  - Assess and monitor for signs and symptoms of infection  - Monitor lab/diagnostic results  - Monitor all insertion sites, i.e. indwelling lines, tubes, and drains  - Monitor endotracheal if appropriate and nasal secretions for changes in amount and color  - [place] appropriate cooling/warming therapies per order  - Administer medications as ordered  - Instruct and encourage patient and family to use good hand hygiene technique  - Identify and instruct in appropriate isolation precautions for identified infection/condition  Outcome: Progressing     Problem: SAFETY ADULT  Goal: Patient will remain free of falls  Description: INTERVENTIONS:  - Educate patient/family on patient safety including physical limitations  - Instruct patient to call for assistance with activity   - Consult OT/PT to assist with strengthening/mobility   - Keep Call bell within reach  - Keep bed low and locked with side rails adjusted as appropriate  - Keep care items and personal belongings within reach  - Initiate and maintain comfort rounds  - Make Fall Risk Sign visible to staff  - Offer Toileting every  Hours, in advance of need  - Initiate/Maintain alarm  - Obtain necessary fall risk management equipment:   - Apply yellow socks and  bracelet for high fall risk patients  - Consider moving patient to room near nurses station  Outcome: Progressing     Problem: DISCHARGE PLANNING  Goal: Discharge to home or other facility with appropriate resources  Description: INTERVENTIONS:  - Identify barriers to discharge w/patient and caregiver  - Arrange for needed discharge resources and transportation as appropriate  - Identify discharge learning needs (meds, wound care, etc.)  - Arrange for interpretive services to assist at discharge as needed  - Refer to Case Management Department for coordinating discharge planning if the patient needs post-hospital services based on physician/advanced practitioner order or complex needs related to functional status, cognitive ability, or social support system  Outcome: Progressing     Problem: Knowledge Deficit  Goal: Patient/family/caregiver demonstrates understanding of disease process, treatment plan, medications, and discharge instructions  Description: Complete learning assessment and assess knowledge base.  Interventions:  - Provide teaching at level of understanding  - Provide teaching via preferred learning methods  Outcome: Progressing     Problem: Nutrition/Hydration-ADULT  Goal: Nutrient/Hydration intake appropriate for improving, restoring or maintaining nutritional needs  Description: Monitor and assess patient's nutrition/hydration status for malnutrition. Collaborate with interdisciplinary team and initiate plan and interventions as ordered.  Monitor patient's weight and dietary intake as ordered or per policy. Utilize nutrition screening tool and intervene as necessary. Determine patient's food preferences and provide high-protein, high-caloric foods as appropriate.     INTERVENTIONS:  - Monitor oral intake, urinary output, labs, and treatment plans  - Assess nutrition and hydration status and recommend course of action  - Evaluate amount of meals eaten  - Assist patient with eating if necessary   -  Allow adequate time for meals  - Recommend/ encourage appropriate diets, oral nutritional supplements, and vitamin/mineral supplements  - Order, calculate, and assess calorie counts as needed  - Recommend, monitor, and adjust tube feedings and TPN/PPN based on assessed needs  - Assess need for intravenous fluids  - Provide specific nutrition/hydration education as appropriate  - Include patient/family/caregiver in decisions related to nutrition  Outcome: Progressing     Problem: Potential for Falls  Goal: Patient will remain free of falls  Description: INTERVENTIONS:  - Educate patient/family on patient safety including physical limitations  - Instruct patient to call for assistance with activity   - Consult OT/PT to assist with strengthening/mobility   - Keep Call bell within reach  - Keep bed low and locked with side rails adjusted as appropriate  - Keep care items and personal belongings within reach  - Initiate and maintain comfort rounds  - Make Fall Risk Sign visible to staff  - Offer Toileting every  Hours, in advance of need  - Initiate/Maintain alarm  - Obtain necessary fall risk management equipment  - Apply yellow socks and bracelet for high fall risk patients  - Consider moving patient to room near nurses station  Outcome: Progressing     Problem: RESPIRATORY - ADULT  Goal: Achieves optimal ventilation and oxygenation  Description: INTERVENTIONS:  - Assess for changes in respiratory status  - Assess for changes in mentation and behavior  - Position to facilitate oxygenation and minimize respiratory effort  - Oxygen administered by appropriate delivery if ordered  - Initiate smoking cessation education as indicated  - Encourage broncho-pulmonary hygiene including cough, deep breathe, Incentive Spirometry  - Assess the need for suctioning and aspirate as needed  - Assess and instruct to report SOB or any respiratory difficulty  - Respiratory Therapy support as indicated  Outcome: Progressing

## 2024-04-20 NOTE — ASSESSMENT & PLAN NOTE
"Patient reported night sweats, recent weight loss, current every day smoker, severe back and flank pain.  CT chest: \" Right hilar and perihilar 5.5 x 6.3 x 5.2 cm mass consistent lung malignancy. Right hilar, mediastinal and right supraclavicular lymphadenopathy compatible with metastatic disease. Airspace consolidation in the right middle lobe adjacent to the hilum suggesting postobstructive pneumonia. Nodular thickening along the axial interstitium in the right lower lobe and the right major fissure suggesting lymphangitic spread of tumor. Diffuse hepatic metastases. Periportal, gastroesophageal and para-aortic lymphadenopathy\"  Complained of hip pain -- CT A/P displayed sclerotic lesion in the pelvis.  Liver biopsy pathology report positive for small cell carcinoma.    Plan  Started chemotherapy on 4/19 . Continue chemotherapy  4/19 started on Decadron 4 mg IV  Palliative and oncology on board-recommendations appreciated  "

## 2024-04-20 NOTE — ASSESSMENT & PLAN NOTE
Continue pain regimen.  Oxycodone 15 mg every 4 hours scheduled, IV Dilaudid 0.3 mg as needed every 3 hours  Palliative on board

## 2024-04-21 LAB
ANION GAP SERPL CALCULATED.3IONS-SCNC: 4 MMOL/L (ref 4–13)
ANISOCYTOSIS BLD QL SMEAR: PRESENT
BASOPHILS # BLD AUTO: 0.03 THOUSANDS/ÂΜL (ref 0–0.1)
BASOPHILS NFR BLD AUTO: 0 % (ref 0–1)
BUN SERPL-MCNC: 29 MG/DL (ref 5–25)
CALCIUM SERPL-MCNC: 8 MG/DL (ref 8.4–10.2)
CHLORIDE SERPL-SCNC: 101 MMOL/L (ref 96–108)
CO2 SERPL-SCNC: 29 MMOL/L (ref 21–32)
CREAT SERPL-MCNC: 0.85 MG/DL (ref 0.6–1.3)
EOSINOPHIL # BLD AUTO: 0 THOUSAND/ÂΜL (ref 0–0.61)
EOSINOPHIL NFR BLD AUTO: 0 % (ref 0–6)
ERYTHROCYTE [DISTWIDTH] IN BLOOD BY AUTOMATED COUNT: 16.2 % (ref 11.6–15.1)
GFR SERPL CREATININE-BSD FRML MDRD: 93 ML/MIN/1.73SQ M
GLUCOSE SERPL-MCNC: 121 MG/DL (ref 65–140)
HCT VFR BLD AUTO: 39.9 % (ref 36.5–49.3)
HGB BLD-MCNC: 12.8 G/DL (ref 12–17)
IMM GRANULOCYTES # BLD AUTO: 0.3 THOUSAND/UL (ref 0–0.2)
IMM GRANULOCYTES NFR BLD AUTO: 3 % (ref 0–2)
LYMPHOCYTES # BLD AUTO: 0.21 THOUSANDS/ÂΜL (ref 0.6–4.47)
LYMPHOCYTES NFR BLD AUTO: 2 % (ref 14–44)
MCH RBC QN AUTO: 28.4 PG (ref 26.8–34.3)
MCHC RBC AUTO-ENTMCNC: 32.1 G/DL (ref 31.4–37.4)
MCV RBC AUTO: 89 FL (ref 82–98)
MONOCYTES # BLD AUTO: 0.24 THOUSAND/ÂΜL (ref 0.17–1.22)
MONOCYTES NFR BLD AUTO: 2 % (ref 4–12)
NEUTROPHILS # BLD AUTO: 9.69 THOUSANDS/ÂΜL (ref 1.85–7.62)
NEUTS SEG NFR BLD AUTO: 93 % (ref 43–75)
NRBC BLD AUTO-RTO: 0 /100 WBCS
PLATELET # BLD AUTO: 136 THOUSANDS/UL (ref 149–390)
PLATELET BLD QL SMEAR: ADEQUATE
PMV BLD AUTO: 10.1 FL (ref 8.9–12.7)
POTASSIUM SERPL-SCNC: 5.1 MMOL/L (ref 3.5–5.3)
RBC # BLD AUTO: 4.51 MILLION/UL (ref 3.88–5.62)
RBC MORPH BLD: PRESENT
SODIUM SERPL-SCNC: 134 MMOL/L (ref 135–147)
URATE SERPL-MCNC: 4.6 MG/DL (ref 3.5–8.5)
WBC # BLD AUTO: 10.47 THOUSAND/UL (ref 4.31–10.16)

## 2024-04-21 PROCEDURE — 84550 ASSAY OF BLOOD/URIC ACID: CPT | Performed by: INTERNAL MEDICINE

## 2024-04-21 PROCEDURE — 99232 SBSQ HOSP IP/OBS MODERATE 35: CPT | Performed by: HOSPITALIST

## 2024-04-21 PROCEDURE — 80048 BASIC METABOLIC PNL TOTAL CA: CPT

## 2024-04-21 PROCEDURE — 94760 N-INVAS EAR/PLS OXIMETRY 1: CPT

## 2024-04-21 PROCEDURE — 94640 AIRWAY INHALATION TREATMENT: CPT

## 2024-04-21 PROCEDURE — 85025 COMPLETE CBC W/AUTO DIFF WBC: CPT

## 2024-04-21 RX ORDER — SODIUM CHLORIDE 9 MG/ML
20 INJECTION, SOLUTION INTRAVENOUS ONCE
Status: COMPLETED | OUTPATIENT
Start: 2024-04-21 | End: 2024-04-21

## 2024-04-21 RX ORDER — LEVALBUTEROL INHALATION SOLUTION 1.25 MG/3ML
1.25 SOLUTION RESPIRATORY (INHALATION)
Status: DISCONTINUED | OUTPATIENT
Start: 2024-04-21 | End: 2024-04-22 | Stop reason: HOSPADM

## 2024-04-21 RX ORDER — SIMETHICONE 80 MG
80 TABLET,CHEWABLE ORAL EVERY 6 HOURS PRN
Status: DISCONTINUED | OUTPATIENT
Start: 2024-04-21 | End: 2024-04-22 | Stop reason: HOSPADM

## 2024-04-21 RX ADMIN — METHOCARBAMOL TABLETS 750 MG: 750 TABLET, COATED ORAL at 00:44

## 2024-04-21 RX ADMIN — AMPICILLIN SODIUM AND SULBACTAM SODIUM 3 G: 100; 50 INJECTION, POWDER, FOR SOLUTION INTRAVENOUS at 03:00

## 2024-04-21 RX ADMIN — HYDROMORPHONE HYDROCHLORIDE 0.5 MG: 1 INJECTION, SOLUTION INTRAMUSCULAR; INTRAVENOUS; SUBCUTANEOUS at 10:14

## 2024-04-21 RX ADMIN — AMPICILLIN SODIUM AND SULBACTAM SODIUM 3 G: 100; 50 INJECTION, POWDER, FOR SOLUTION INTRAVENOUS at 09:08

## 2024-04-21 RX ADMIN — HYDROMORPHONE HYDROCHLORIDE 0.5 MG: 1 INJECTION, SOLUTION INTRAMUSCULAR; INTRAVENOUS; SUBCUTANEOUS at 00:43

## 2024-04-21 RX ADMIN — HYDROMORPHONE HYDROCHLORIDE 0.5 MG: 1 INJECTION, SOLUTION INTRAMUSCULAR; INTRAVENOUS; SUBCUTANEOUS at 06:46

## 2024-04-21 RX ADMIN — PANTOPRAZOLE SODIUM 40 MG: 40 TABLET, DELAYED RELEASE ORAL at 06:44

## 2024-04-21 RX ADMIN — OXYCODONE HYDROCHLORIDE 15 MG: 10 TABLET ORAL at 09:08

## 2024-04-21 RX ADMIN — OXYCODONE HYDROCHLORIDE 15 MG: 10 TABLET ORAL at 18:15

## 2024-04-21 RX ADMIN — SODIUM CHLORIDE 20 ML/HR: 0.9 INJECTION, SOLUTION INTRAVENOUS at 10:15

## 2024-04-21 RX ADMIN — HYDROMORPHONE HYDROCHLORIDE 0.5 MG: 1 INJECTION, SOLUTION INTRAMUSCULAR; INTRAVENOUS; SUBCUTANEOUS at 16:25

## 2024-04-21 RX ADMIN — AMPICILLIN SODIUM AND SULBACTAM SODIUM 3 G: 100; 50 INJECTION, POWDER, FOR SOLUTION INTRAVENOUS at 14:44

## 2024-04-21 RX ADMIN — HYDROMORPHONE HYDROCHLORIDE 0.5 MG: 1 INJECTION, SOLUTION INTRAMUSCULAR; INTRAVENOUS; SUBCUTANEOUS at 12:59

## 2024-04-21 RX ADMIN — ENOXAPARIN SODIUM 40 MG: 60 INJECTION SUBCUTANEOUS at 09:16

## 2024-04-21 RX ADMIN — IPRATROPIUM BROMIDE 0.5 MG: 0.5 SOLUTION RESPIRATORY (INHALATION) at 07:16

## 2024-04-21 RX ADMIN — IPRATROPIUM BROMIDE 0.5 MG: 0.5 SOLUTION RESPIRATORY (INHALATION) at 20:08

## 2024-04-21 RX ADMIN — GABAPENTIN 100 MG: 100 CAPSULE ORAL at 18:15

## 2024-04-21 RX ADMIN — OXYCODONE HYDROCHLORIDE 15 MG: 10 TABLET ORAL at 21:19

## 2024-04-21 RX ADMIN — ONDANSETRON: 2 INJECTION INTRAMUSCULAR; INTRAVENOUS at 10:54

## 2024-04-21 RX ADMIN — GABAPENTIN 100 MG: 100 CAPSULE ORAL at 09:08

## 2024-04-21 RX ADMIN — ETOPOSIDE 100.6 MG: 20 INJECTION, SOLUTION INTRAVENOUS at 11:53

## 2024-04-21 RX ADMIN — GABAPENTIN 100 MG: 100 CAPSULE ORAL at 21:20

## 2024-04-21 RX ADMIN — LEVALBUTEROL HYDROCHLORIDE 1.25 MG: 1.25 SOLUTION RESPIRATORY (INHALATION) at 20:08

## 2024-04-21 RX ADMIN — DEXAMETHASONE SODIUM PHOSPHATE 4 MG: 4 INJECTION INTRA-ARTICULAR; INTRALESIONAL; INTRAMUSCULAR; INTRAVENOUS; SOFT TISSUE at 09:08

## 2024-04-21 RX ADMIN — AMPICILLIN SODIUM AND SULBACTAM SODIUM 3 G: 100; 50 INJECTION, POWDER, FOR SOLUTION INTRAVENOUS at 20:16

## 2024-04-21 RX ADMIN — ALLOPURINOL 200 MG: 100 TABLET ORAL at 09:08

## 2024-04-21 RX ADMIN — OXYCODONE HYDROCHLORIDE 15 MG: 10 TABLET ORAL at 14:38

## 2024-04-21 RX ADMIN — LEVALBUTEROL HYDROCHLORIDE 1.25 MG: 1.25 SOLUTION RESPIRATORY (INHALATION) at 07:16

## 2024-04-21 NOTE — ASSESSMENT & PLAN NOTE
Continue pain regimen.  Oxycodone 15 mg every 4 hours scheduled, IV Dilaudid 0.3 mg as needed every 3 hours  Nursing made aware to bridge oral medications with IV medications  Palliative on board

## 2024-04-21 NOTE — PLAN OF CARE
Problem: PAIN - ADULT  Goal: Verbalizes/displays adequate comfort level or baseline comfort level  Description: Interventions:  - Encourage patient to monitor pain and request assistance  - Assess pain using appropriate pain scale  - Administer analgesics based on type and severity of pain and evaluate response  - Implement non-pharmacological measures as appropriate and evaluate response  - Consider cultural and social influences on pain and pain management  - Notify physician/advanced practitioner if interventions unsuccessful or patient reports new pain  Outcome: Progressing     Problem: INFECTION - ADULT  Goal: Absence or prevention of progression during hospitalization  Description: INTERVENTIONS:  - Assess and monitor for signs and symptoms of infection  - Monitor lab/diagnostic results  - Monitor all insertion sites, i.e. indwelling lines, tubes, and drains  - Monitor endotracheal if appropriate and nasal secretions for changes in amount and color  - [place] appropriate cooling/warming therapies per order  - Administer medications as ordered  - Instruct and encourage patient and family to use good hand hygiene technique  - Identify and instruct in appropriate isolation precautions for identified infection/condition  Outcome: Progressing     Problem: SAFETY ADULT  Goal: Patient will remain free of falls  Description: INTERVENTIONS:  - Educate patient/family on patient safety including physical limitations  - Instruct patient to call for assistance with activity   - Consult OT/PT to assist with strengthening/mobility   - Keep Call bell within reach  - Keep bed low and locked with side rails adjusted as appropriate  - Keep care items and personal belongings within reach  - Initiate and maintain comfort rounds  - Make Fall Risk Sign visible to staff  - Offer Toileting every  Hours, in advance of need  - Initiate/Maintain alarm  - Obtain necessary fall risk management equipment:   - Apply yellow socks and  bracelet for high fall risk patients  - Consider moving patient to room near nurses station  Outcome: Progressing     Problem: DISCHARGE PLANNING  Goal: Discharge to home or other facility with appropriate resources  Description: INTERVENTIONS:  - Identify barriers to discharge w/patient and caregiver  - Arrange for needed discharge resources and transportation as appropriate  - Identify discharge learning needs (meds, wound care, etc.)  - Arrange for interpretive services to assist at discharge as needed  - Refer to Case Management Department for coordinating discharge planning if the patient needs post-hospital services based on physician/advanced practitioner order or complex needs related to functional status, cognitive ability, or social support system  Outcome: Progressing     Problem: Knowledge Deficit  Goal: Patient/family/caregiver demonstrates understanding of disease process, treatment plan, medications, and discharge instructions  Description: Complete learning assessment and assess knowledge base.  Interventions:  - Provide teaching at level of understanding  - Provide teaching via preferred learning methods  Outcome: Progressing     Problem: Nutrition/Hydration-ADULT  Goal: Nutrient/Hydration intake appropriate for improving, restoring or maintaining nutritional needs  Description: Monitor and assess patient's nutrition/hydration status for malnutrition. Collaborate with interdisciplinary team and initiate plan and interventions as ordered.  Monitor patient's weight and dietary intake as ordered or per policy. Utilize nutrition screening tool and intervene as necessary. Determine patient's food preferences and provide high-protein, high-caloric foods as appropriate.     INTERVENTIONS:  - Monitor oral intake, urinary output, labs, and treatment plans  - Assess nutrition and hydration status and recommend course of action  - Evaluate amount of meals eaten  - Assist patient with eating if necessary   -  Allow adequate time for meals  - Recommend/ encourage appropriate diets, oral nutritional supplements, and vitamin/mineral supplements  - Order, calculate, and assess calorie counts as needed  - Recommend, monitor, and adjust tube feedings and TPN/PPN based on assessed needs  - Assess need for intravenous fluids  - Provide specific nutrition/hydration education as appropriate  - Include patient/family/caregiver in decisions related to nutrition  Outcome: Progressing     Problem: Potential for Falls  Goal: Patient will remain free of falls  Description: INTERVENTIONS:  - Educate patient/family on patient safety including physical limitations  - Instruct patient to call for assistance with activity   - Consult OT/PT to assist with strengthening/mobility   - Keep Call bell within reach  - Keep bed low and locked with side rails adjusted as appropriate  - Keep care items and personal belongings within reach  - Initiate and maintain comfort rounds  - Make Fall Risk Sign visible to staff  - Offer Toileting every  Hours, in advance of need  - Initiate/Maintain alarm  - Obtain necessary fall risk management equipment:   - Apply yellow socks and bracelet for high fall risk patients  - Consider moving patient to room near nurses station  Outcome: Progressing     Problem: RESPIRATORY - ADULT  Goal: Achieves optimal ventilation and oxygenation  Description: INTERVENTIONS:  - Assess for changes in respiratory status  - Assess for changes in mentation and behavior  - Position to facilitate oxygenation and minimize respiratory effort  - Oxygen administered by appropriate delivery if ordered  - Initiate smoking cessation education as indicated  - Encourage broncho-pulmonary hygiene including cough, deep breathe, Incentive Spirometry  - Assess the need for suctioning and aspirate as needed  - Assess and instruct to report SOB or any respiratory difficulty  - Respiratory Therapy support as indicated  Outcome: Progressing     Problem:  SKIN/TISSUE INTEGRITY - ADULT  Goal: Skin Integrity remains intact(Skin Breakdown Prevention)  Description: Assess:  -Perform Janes assessment every   -Clean and moisturize skin every   -Inspect skin when repositioning, toileting, and assisting with ADLS  -Assess under medical devices such as  every   -Assess extremities for adequate circulation and sensation     Bed Management:  -Have minimal linens on bed & keep smooth, unwrinkled  -Change linens as needed when moist or perspiring  -Avoid sitting or lying in one position for more than  hours while in bed  -Keep HOB at degrees     Toileting:  -Offer bedside commode  -Assess for incontinence every   -Use incontinent care products after each incontinent episode such as     Activity:  -Mobilize patient  times a day  -Encourage activity and walks on unit  -Encourage or provide ROM exercises   -Turn and reposition patient every Hours  -Use appropriate equipment to lift or move patient in bed  -Instruct/ Assist with weight shifting every  when out of bed in chair  -Consider limitation of chair time  hour intervals    Skin Care:  -Avoid use of baby powder, tape, friction and shearing, hot water or constrictive clothing  -Relieve pressure over bony prominences using   -Do not massage red bony areas    Next Steps:  -Teach patient strategies to minimize risks such as    -Consider consults to  interdisciplinary teams such as   Outcome: Progressing  Goal: Incision(s), wounds(s) or drain site(s) healing without S/S of infection  Description: INTERVENTIONS  - Assess and document dressing, incision, wound bed, drain sites and surrounding tissue  - Provide patient and family education  - Perform skin care/dressing changes ever  Outcome: Progressing

## 2024-04-21 NOTE — ASSESSMENT & PLAN NOTE
Presentation: Patient originally presented to [facility] on 04/11/2024 due to complaints of increased shortness of breath, chest tightness, night sweats and chills. Patient was found to have consolidation in right middle lobe with right hilar/mediastinal mass with metastasis suspected to be lung primary along with postobstructive pneumonia.   Urine strep/legionella negative.  COVID/Influenza/RSV negative.  BC grew Staph hominis likely contaminant per ID.    Plan:  Continue IV Unasyn per ID -- 10 day course of abx through 04/25/2024.  Plan to transition to Augmentin on discharge per ID.  Monitor CBC  Appreciate ID recommendations.

## 2024-04-21 NOTE — PROGRESS NOTES
"[facility]  Progress Note  Name: Ceasar March I  MRN: [id number]  Unit/Bed#: S -01 I Date of Admission: 4/18/2024   Date of Service: 4/21/2024 I Hospital Day: 3    Assessment/Plan   * Pneumonia of right middle lobe due to infectious organism  Assessment & Plan  Presentation: Patient originally presented to \[facility]\"" on 04/11/2024 due to complaints of increased shortness of breath, chest tightness, night sweats and chills. Patient was found to have consolidation in right middle lobe with right hilar/mediastinal mass with metastasis suspected to be lung primary along with postobstructive pneumonia.   Urine strep/legionella negative.  COVID/Influenza/RSV negative.  BC grew Staph hominis likely contaminant per ID.    Plan:  Continue IV Unasyn per ID -- 10 day course of abx through 04/25/2024.  Plan to transition to Augmentin on discharge per ID.  Monitor CBC  Appreciate ID recommendations.    Small cell lung cancer with metastasis to liver  Assessment & Plan  Patient reported night sweats, recent weight loss, current every day smoker, severe back and flank pain.  CT chest: \" Right hilar and perihilar 5.5 x 6.3 x 5.2 cm mass consistent lung malignancy. Right hilar, mediastinal and right supraclavicular lymphadenopathy compatible with metastatic disease. Airspace consolidation in the right middle lobe adjacent to the hilum suggesting postobstructive pneumonia. Nodular thickening along the axial interstitium in the right lower lobe and the right major fissure suggesting lymphangitic spread of tumor. Diffuse hepatic metastases. Periportal, gastroesophageal and para-aortic lymphadenopathy\"  Complained of hip pain -- CT A/P displayed sclerotic lesion in the pelvis.  Liver biopsy pathology report positive for small cell carcinoma.    Plan  Started chemotherapy on 4/19 . Continue chemotherapy  4/19 started on Decadron 4 mg IV  Palliative and oncology on board-recommendations appreciated  Will " "speak to oncology regarding plans for chemotherapy and their threshold for discharge     Cancer related pain  Assessment & Plan  Continue pain regimen.  Oxycodone 15 mg every 4 hours scheduled, IV Dilaudid 0.3 mg as needed every 3 hours  Nursing made aware to bridge oral medications with IV medications  Palliative on board    Liver mass  Assessment & Plan  Likely 2/2 metastases.  S/P IR liver biopsy on 04/15/2024.  Pathology report positive for small cell carcinoma.   Transaminitis likely 2/2 liver mets.    Continue allopurinol  If uric acid trends up, start rasburicase 3 mg 1 dose only per oncology  Trend CMP.    Severe protein-calorie malnutrition (HCC)  Assessment & Plan  Malnutrition Findings:   Adult malnutrition type: Chronic illness.  Adult degree of malnutrition: Other severe protein calorie malnutrition.  Malnutrition characteristics: Inadequate energy, weight loss.    360 statement: Malnutrition related to chronic illness as evidenced by >7.5% body weight loss in 3 months, <75% energy intake compared to estimated energy needs for >1 month. To treat with oral diet as tolerated.     BMI Findings:        Body mass index is 23.73 kg/m3      Bacteremia  Assessment & Plan  2/2 BC grew Staph hominis.  Suspect contaminant per ID.    Transaminitis  Assessment & Plan  Lab Results   Component Value Date     (H) 04/20/2024     (H) 04/20/2024    TBILI 11.18 (H) 04/20/2024    ALKPHOS 716 (H) 04/20/2024    Suspect 2/2 liver metastasis.  Avoid hepatotoxins.  S/P IR guided liver biopsy: Pathology report noted for poorly differentiated neoplasm with extensive necrosis; concerning for small cell carcinoma.  Continue Allopurinol 200 mg daily.    Lung mass  Assessment & Plan  Patient reported night sweats, recent weight loss, current every day smoker, severe back and flank pain.  CT chest: \" Right hilar and perihilar 5.5 x 6.3 x 5.2 cm mass consistent lung malignancy. Right hilar, mediastinal and right " "supraclavicular lymphadenopathy compatible with metastatic disease. Airspace consolidation in the right middle lobe adjacent to the hilum suggesting postobstructive pneumonia. Nodular thickening along the axial interstitium in the right lower lobe and the right major fissure suggesting lymphangitic spread of tumor. Diffuse hepatic metastases. Periportal, gastroesophageal and para-aortic lymphadenopathy\"  Complained of hip pain -- CT A/P displayed sclerotic lesion in the pelvis.  Liver biopsy pathology report positive for small cell carcinoma.  Transferred to [facility] for chemotherapy.  Oncology consult.     Tobacco abuse  Assessment & Plan  Reported an average of 1-1.5 PPD.  NRT.  Encouraged smoking cessation.               VTE Pharmacologic Prophylaxis: VTE Score: 5 High Risk (Score >/= 5) - Pharmacological DVT Prophylaxis Ordered: enoxaparin (Lovenox). Sequential Compression Devices Ordered.    Mobility:   Basic Mobility Inpatient Raw Score: 22  JH-HLM Goal: 7: Walk 25 feet or more  JH-HLM Achieved: 6: Walk 10 steps or more  JH-HLM Goal achieved. Continue to encourage appropriate mobility.    Patient Centered Rounds: I performed bedside rounds with nursing staff today.  Discussions with Specialists or Other Care Team Provider:     Education and Discussions with Family / Patient: Attempted to update  (daughter) via phone. Unable to contact.    Current Length of Stay: 3 day(s)  Current Patient Status: Inpatient   Discharge Plan: Anticipate discharge in 24-48 hrs to home.    Code Status: Level 3 - DNAR and DNI    Subjective:   No overnight events reported.  Patient still reporting significant pain that is newly felt on the left side expanding across the abdomen.  His breathing has improved and he denies fever or chest pain.     Objective:     Vitals:   Temp (24hrs), Av.5 °F (36.4 °C), Min:97.3 °F (36.3 °C), Max:97.8 °F (36.6 °C)    Temp:  [97.3 °F (36.3 °C)-97.8 °F (36.6 °C)] 97.8 °F (36.6 °C)  HR:  " [69-88] 86  Resp:  [16-18] 18  BP: (108-143)/(82-83) 127/83  SpO2:  [93 %-98 %] 95 %  Body mass index is 23.74 kg/m².     Input and Output Summary (last 24 hours):     Intake/Output Summary (Last 24 hours) at 4/21/2024 1154  Last data filed at 4/21/2024 0830  Gross per 24 hour   Intake 480 ml   Output 675 ml   Net -195 ml       Physical Exam:   Physical Exam  Vitals and nursing note reviewed.   Constitutional:       General: He is not in acute distress.     Appearance: He is ill-appearing. He is not toxic-appearing or diaphoretic.   HENT:      Head: Normocephalic.      Nose: Nose normal.   Eyes:      General: No scleral icterus.     Extraocular Movements: Extraocular movements intact.      Conjunctiva/sclera: Conjunctivae normal.      Pupils: Pupils are equal, round, and reactive to light.   Cardiovascular:      Rate and Rhythm: Normal rate and regular rhythm.      Pulses: Normal pulses.      Heart sounds: Normal heart sounds.   Pulmonary:      Effort: Pulmonary effort is normal.      Breath sounds: Normal breath sounds. No wheezing, rhonchi or rales.   Abdominal:      General: Bowel sounds are normal. There is no distension.      Palpations: Abdomen is soft.      Tenderness: There is abdominal tenderness.   Musculoskeletal:         General: Normal range of motion.      Cervical back: Normal range of motion.      Right lower leg: No edema.      Left lower leg: No edema.   Skin:     General: Skin is warm and dry.      Coloration: Skin is jaundiced.   Neurological:      Mental Status: He is alert and oriented to person, place, and time.   Psychiatric:         Mood and Affect: Mood normal.         Behavior: Behavior normal.          Additional Data:     Labs:  Results from last 7 days   Lab Units 04/21/24  0433 04/20/24  0545   WBC Thousand/uL 10.47* 14.88*   HEMOGLOBIN g/dL 12.8 14.5   HEMATOCRIT % 39.9 44.7   PLATELETS Thousands/uL 136* 160   BANDS PCT %  --  6   SEGS PCT % 93*  --    LYMPHO PCT % 2* 10*   MONO PCT %  2* 1*   EOS PCT % 0 0     Results from last 7 days   Lab Units 04/21/24  0433 04/20/24  0545   SODIUM mmol/L 134* 136   POTASSIUM mmol/L 5.1 5.1   CHLORIDE mmol/L 101 99   CO2 mmol/L 29 27   BUN mg/dL 29* 24   CREATININE mg/dL 0.85 0.87   ANION GAP mmol/L 4 10   CALCIUM mg/dL 8.0* 8.4   ALBUMIN g/dL  --  2.7*   TOTAL BILIRUBIN mg/dL  --  11.18*   ALK PHOS U/L  --  716*   ALT U/L  --  227*   AST U/L  --  304*   GLUCOSE RANDOM mg/dL 121 102     Results from last 7 days   Lab Units 04/15/24  0432   INR  1.12             Results from last 7 days   Lab Units 04/20/24  0545 04/19/24  0444 04/18/24  0454 04/16/24  0428   PROCALCITONIN ng/ml 18.66* 16.96* 13.31* 16.48*       Lines/Drains:  Invasive Devices       Peripheral Intravenous Line  Duration             Peripheral IV 04/19/24 Right Forearm 2 days                          Imaging: No pertinent imaging reviewed.    Recent Cultures (last 7 days):         Last 24 Hours Medication List:   Current Facility-Administered Medications   Medication Dose Route Frequency Provider Last Rate    acetaminophen  650 mg Oral Q6H PRN Herberth Pittman MD      albuterol  2 puff Inhalation Q4H PRN Herberth Pittman MD      allopurinol  200 mg Oral Daily Herberth Pittman MD      alteplase  2 mg Intracatheter Q1MIN PRN Therese Aguilar MD      alteplase  2 mg Intracatheter Q1MIN PRN Therese Aguilar MD      alteplase  2 mg Intracatheter Q1MIN PRN Therese Aguilar MD      ampicillin-sulbactam  3 g Intravenous Q6H Herberth Pittman MD 3 g (04/21/24 0908)    calcium carbonate  1,000 mg Oral Daily PRN Herberth Pittman MD      dexamethasone  4 mg Intravenous Daily Jennifer Paige Bloch, CRNP      Diclofenac Sodium  2 g Topical 4x Daily Herberth Pittman MD      docusate sodium  100 mg Oral BID Josr Kay MD      enoxaparin  40 mg Subcutaneous Daily Herberth Pittman MD      etoposide  50 mg/m2 (Treatment Plan Recorded) Intravenous Once Therese Aguilar .6 mg (04/21/24 1153)    gabapentin  100  mg Oral TID Herberth Pittman MD      guaiFENesin  600 mg Oral BID Herberth Pittman MD      HYDROmorphone  0.5 mg Intravenous Q3H PRN Jennifer Paige Bloch, CRNP      ipratropium  0.5 mg Nebulization BID Roshan Medina MD      levalbuterol  1.25 mg Nebulization TID Roshan Medina MD      methocarbamol  750 mg Oral Q6H KRYSTYNA Herberth Pittman MD      naloxone  0.04 mg Intravenous Q1MIN PRN Herberth Pittman MD      nicotine  1 patch Transdermal Daily Herberth Pittman MD      ondansetron  4 mg Intravenous Q6H PRN Herberth Pittman MD      oxyCODONE  15 mg Oral Q4H Jennifer Paige Bloch, CRNP      pantoprazole  40 mg Oral Early Morning Herberth Pittman MD      polyethylene glycol  17 g Oral Daily PRN Herberth Pittman MD      polyethylene glycol  17 g Oral Daily Josr Kay MD      senna  2 tablet Oral BID Jennifer Paige Bloch, CRNP          Today, Patient Was Seen By: Jairon Fuentes MD    **Please Note: This note may have been constructed using a voice recognition system.**

## 2024-04-21 NOTE — ASSESSMENT & PLAN NOTE
"Patient reported night sweats, recent weight loss, current every day smoker, severe back and flank pain.  CT chest: \" Right hilar and perihilar 5.5 x 6.3 x 5.2 cm mass consistent lung malignancy. Right hilar, mediastinal and right supraclavicular lymphadenopathy compatible with metastatic disease. Airspace consolidation in the right middle lobe adjacent to the hilum suggesting postobstructive pneumonia. Nodular thickening along the axial interstitium in the right lower lobe and the right major fissure suggesting lymphangitic spread of tumor. Diffuse hepatic metastases. Periportal, gastroesophageal and para-aortic lymphadenopathy\"  Complained of hip pain -- CT A/P displayed sclerotic lesion in the pelvis.  Liver biopsy pathology report positive for small cell carcinoma.  Transferred to [facility] for chemotherapy.  Oncology consult.   "

## 2024-04-21 NOTE — ASSESSMENT & PLAN NOTE
"Patient reported night sweats, recent weight loss, current every day smoker, severe back and flank pain.  CT chest: \" Right hilar and perihilar 5.5 x 6.3 x 5.2 cm mass consistent lung malignancy. Right hilar, mediastinal and right supraclavicular lymphadenopathy compatible with metastatic disease. Airspace consolidation in the right middle lobe adjacent to the hilum suggesting postobstructive pneumonia. Nodular thickening along the axial interstitium in the right lower lobe and the right major fissure suggesting lymphangitic spread of tumor. Diffuse hepatic metastases. Periportal, gastroesophageal and para-aortic lymphadenopathy\"  Complained of hip pain -- CT A/P displayed sclerotic lesion in the pelvis.  Liver biopsy pathology report positive for small cell carcinoma.    Plan  Started chemotherapy on 4/19 . Continue chemotherapy  4/19 started on Decadron 4 mg IV  Palliative and oncology on board-recommendations appreciated  Will speak to oncology regarding plans for chemotherapy and their threshold for discharge   "

## 2024-04-22 ENCOUNTER — TELEPHONE (OUTPATIENT)
Dept: HEMATOLOGY ONCOLOGY | Facility: CLINIC | Age: 64
End: 2024-04-22

## 2024-04-22 ENCOUNTER — TELEPHONE (OUTPATIENT)
Dept: OTHER | Facility: OTHER | Age: 64
End: 2024-04-22

## 2024-04-22 VITALS
OXYGEN SATURATION: 97 % | HEART RATE: 70 BPM | SYSTOLIC BLOOD PRESSURE: 133 MMHG | TEMPERATURE: 97.4 F | RESPIRATION RATE: 18 BRPM | HEIGHT: 72 IN | BODY MASS INDEX: 23.71 KG/M2 | WEIGHT: 175.04 LBS | DIASTOLIC BLOOD PRESSURE: 78 MMHG

## 2024-04-22 DIAGNOSIS — G89.3 CANCER RELATED PAIN: Primary | ICD-10-CM

## 2024-04-22 DIAGNOSIS — C34.90 SMALL CELL LUNG CANCER (HCC): Primary | ICD-10-CM

## 2024-04-22 DIAGNOSIS — D70.1 CHEMOTHERAPY-INDUCED NEUTROPENIA (HCC): ICD-10-CM

## 2024-04-22 DIAGNOSIS — Z51.5 PALLIATIVE CARE PATIENT: ICD-10-CM

## 2024-04-22 DIAGNOSIS — T45.1X5A CHEMOTHERAPY-INDUCED NEUTROPENIA (HCC): ICD-10-CM

## 2024-04-22 LAB
ALBUMIN SERPL BCP-MCNC: 2.3 G/DL (ref 3.5–5)
ALP SERPL-CCNC: 522 U/L (ref 34–104)
ALT SERPL W P-5'-P-CCNC: 160 U/L (ref 7–52)
ANION GAP SERPL CALCULATED.3IONS-SCNC: 4 MMOL/L (ref 4–13)
AST SERPL W P-5'-P-CCNC: 257 U/L (ref 13–39)
BILIRUB SERPL-MCNC: 9.95 MG/DL (ref 0.2–1)
BUN SERPL-MCNC: 25 MG/DL (ref 5–25)
CALCIUM ALBUM COR SERPL-MCNC: 9 MG/DL (ref 8.3–10.1)
CALCIUM SERPL-MCNC: 7.6 MG/DL (ref 8.4–10.2)
CHLORIDE SERPL-SCNC: 102 MMOL/L (ref 96–108)
CO2 SERPL-SCNC: 29 MMOL/L (ref 21–32)
CREAT SERPL-MCNC: 0.72 MG/DL (ref 0.6–1.3)
ERYTHROCYTE [DISTWIDTH] IN BLOOD BY AUTOMATED COUNT: 16.1 % (ref 11.6–15.1)
GFR SERPL CREATININE-BSD FRML MDRD: 99 ML/MIN/1.73SQ M
GLUCOSE SERPL-MCNC: 135 MG/DL (ref 65–140)
HCT VFR BLD AUTO: 37.5 % (ref 36.5–49.3)
HGB BLD-MCNC: 12.5 G/DL (ref 12–17)
MCH RBC QN AUTO: 28.7 PG (ref 26.8–34.3)
MCHC RBC AUTO-ENTMCNC: 33.3 G/DL (ref 31.4–37.4)
MCV RBC AUTO: 86 FL (ref 82–98)
PLATELET # BLD AUTO: 122 THOUSANDS/UL (ref 149–390)
PMV BLD AUTO: 10.2 FL (ref 8.9–12.7)
POTASSIUM SERPL-SCNC: 4.9 MMOL/L (ref 3.5–5.3)
PROT SERPL-MCNC: 4.5 G/DL (ref 6.4–8.4)
RBC # BLD AUTO: 4.35 MILLION/UL (ref 3.88–5.62)
SODIUM SERPL-SCNC: 135 MMOL/L (ref 135–147)
URATE SERPL-MCNC: 3.8 MG/DL (ref 3.5–8.5)
URATE SERPL-MCNC: 4.2 MG/DL (ref 3.5–8.5)
WBC # BLD AUTO: 9.1 THOUSAND/UL (ref 4.31–10.16)

## 2024-04-22 PROCEDURE — 84550 ASSAY OF BLOOD/URIC ACID: CPT | Performed by: HOSPITALIST

## 2024-04-22 PROCEDURE — 85027 COMPLETE CBC AUTOMATED: CPT

## 2024-04-22 PROCEDURE — 99232 SBSQ HOSP IP/OBS MODERATE 35: CPT | Performed by: INTERNAL MEDICINE

## 2024-04-22 PROCEDURE — 99239 HOSP IP/OBS DSCHRG MGMT >30: CPT | Performed by: HOSPITALIST

## 2024-04-22 PROCEDURE — 80053 COMPREHEN METABOLIC PANEL: CPT

## 2024-04-22 PROCEDURE — 84550 ASSAY OF BLOOD/URIC ACID: CPT | Performed by: INTERNAL MEDICINE

## 2024-04-22 PROCEDURE — 99232 SBSQ HOSP IP/OBS MODERATE 35: CPT | Performed by: NURSE PRACTITIONER

## 2024-04-22 RX ORDER — SENNOSIDES 8.6 MG
17.2 TABLET ORAL 2 TIMES DAILY
Qty: 120 TABLET | Refills: 0 | Status: SHIPPED | OUTPATIENT
Start: 2024-04-22 | End: 2024-05-22

## 2024-04-22 RX ORDER — OXYCODONE HYDROCHLORIDE 15 MG/1
15 TABLET ORAL EVERY 4 HOURS
Qty: 240 TABLET | Refills: 0 | Status: SHIPPED | OUTPATIENT
Start: 2024-04-22 | End: 2024-04-22

## 2024-04-22 RX ORDER — DOCUSATE SODIUM 100 MG/1
100 CAPSULE, LIQUID FILLED ORAL 2 TIMES DAILY
Qty: 60 CAPSULE | Refills: 0 | Status: SHIPPED | OUTPATIENT
Start: 2024-04-22 | End: 2024-05-22

## 2024-04-22 RX ORDER — ALLOPURINOL 200 MG/1
200 TABLET ORAL DAILY
Qty: 30 TABLET | Refills: 0 | Status: SHIPPED | OUTPATIENT
Start: 2024-04-22 | End: 2024-05-22

## 2024-04-22 RX ORDER — AMOXICILLIN AND CLAVULANATE POTASSIUM 875; 125 MG/1; MG/1
1 TABLET, FILM COATED ORAL EVERY 12 HOURS SCHEDULED
Qty: 6 TABLET | Refills: 0 | Status: SHIPPED | OUTPATIENT
Start: 2024-04-22 | End: 2024-04-25

## 2024-04-22 RX ORDER — DEXAMETHASONE 4 MG/1
4 TABLET ORAL
Qty: 30 TABLET | Refills: 0 | Status: SHIPPED | OUTPATIENT
Start: 2024-04-22

## 2024-04-22 RX ORDER — AMOXICILLIN AND CLAVULANATE POTASSIUM 875; 125 MG/1; MG/1
1 TABLET, FILM COATED ORAL EVERY 12 HOURS SCHEDULED
Status: DISCONTINUED | OUTPATIENT
Start: 2024-04-22 | End: 2024-04-22 | Stop reason: HOSPADM

## 2024-04-22 RX ORDER — METHOCARBAMOL 750 MG/1
750 TABLET, FILM COATED ORAL EVERY 6 HOURS SCHEDULED
Qty: 120 TABLET | Refills: 0 | Status: SHIPPED | OUTPATIENT
Start: 2024-04-22

## 2024-04-22 RX ORDER — POLYETHYLENE GLYCOL 3350 17 G/17G
17 POWDER, FOR SOLUTION ORAL DAILY PRN
Qty: 1 EACH | Refills: 0 | Status: SHIPPED | OUTPATIENT
Start: 2024-04-22 | End: 2024-05-22

## 2024-04-22 RX ORDER — OXYCODONE HYDROCHLORIDE 15 MG/1
15 TABLET ORAL EVERY 4 HOURS
Qty: 180 TABLET | Refills: 0 | Status: SHIPPED | OUTPATIENT
Start: 2024-04-22 | End: 2024-05-06

## 2024-04-22 RX ADMIN — AMPICILLIN SODIUM AND SULBACTAM SODIUM 3 G: 100; 50 INJECTION, POWDER, FOR SOLUTION INTRAVENOUS at 02:09

## 2024-04-22 RX ADMIN — OXYCODONE HYDROCHLORIDE 15 MG: 10 TABLET ORAL at 02:09

## 2024-04-22 RX ADMIN — OXYCODONE HYDROCHLORIDE 15 MG: 10 TABLET ORAL at 13:32

## 2024-04-22 RX ADMIN — PANTOPRAZOLE SODIUM 40 MG: 40 TABLET, DELAYED RELEASE ORAL at 05:02

## 2024-04-22 RX ADMIN — OXYCODONE HYDROCHLORIDE 15 MG: 10 TABLET ORAL at 09:13

## 2024-04-22 RX ADMIN — ENOXAPARIN SODIUM 40 MG: 60 INJECTION SUBCUTANEOUS at 09:13

## 2024-04-22 RX ADMIN — AMOXICILLIN AND CLAVULANATE POTASSIUM 1 TABLET: 875; 125 TABLET, FILM COATED ORAL at 13:32

## 2024-04-22 RX ADMIN — OXYCODONE HYDROCHLORIDE 15 MG: 10 TABLET ORAL at 05:01

## 2024-04-22 RX ADMIN — GABAPENTIN 100 MG: 100 CAPSULE ORAL at 09:13

## 2024-04-22 RX ADMIN — AMPICILLIN SODIUM AND SULBACTAM SODIUM 3 G: 100; 50 INJECTION, POWDER, FOR SOLUTION INTRAVENOUS at 09:13

## 2024-04-22 RX ADMIN — ALLOPURINOL 200 MG: 100 TABLET ORAL at 09:13

## 2024-04-22 NOTE — TELEPHONE ENCOUNTER
[phone] // Patient's daughter Anna is calling because patient's oxycodone prescription was sent for 40 days & pharmacy is only allowed to fill it for 30. They would like the prescription sent to [facility] on [address] in [place]. Patient is currently out of medication and in pain.

## 2024-04-22 NOTE — ASSESSMENT & PLAN NOTE
Social support:  Patient is finding comfort in his family  Happy to be going home today  Can't get any sleep here  Prefers daughter be contacted prior to wife.  Supportive listening provided  Normalized experience of patient/family  Provided anxiety containment  Provided anticipatory guidance  Encouraged self care    Follow up  Palliative Care will continue to follow and goals of care discussions will be ongoing.    Please reach out via [employer] if questions or concerns arise.    Care Coordination  Reviewed case with RNElizabeth    I have reviewed the patient's controlled substance dispensing history in the Prescription Drug Monitoring Program in compliance with the [facility] regulations before prescribing any controlled substances.    Decisional apparatus:  Patient is competent on exam today.  If competency is lost, patient's substitute decision maker would default to spouse by [place] Act 169.  ER contacts:   Name Relation Home Work Mobile   Anna March Daughter [phone]     Advance Directive/Living Will: none  POLST: none  POA Forms: none    We appreciate the invitation to be involved in this patient's care.  We will continue to follow throughout this hospitalization.  Please do not hesitate to reach our on call provider through our clinic answering service at [phone] should you have acute symptom control concerns.    Jennifer P. Bloch, MSN, CRNP, ACHPN  Palliative and Supportive Care  Clinic/Answering Service: [phone]  You can find me on [employer]!

## 2024-04-22 NOTE — ASSESSMENT & PLAN NOTE
New  diagnosis of small cell lung cancer - transferred to [facility] for initiation of chemotherapy     CT CAP- R Hilar mass with lymphangitic spread of tumor, Right hilar and perihilar heterogeneously enhancing mass measures 5.5 x 6.3 x 5.2 cm,  Innumerable hypoenhancing lesions throughout the liver measuring up to 3.1 cm

## 2024-04-22 NOTE — ASSESSMENT & PLAN NOTE
Presentation: Patient originally presented to \[facility]\"" on 04/11/2024 due to complaints of increased shortness of breath, chest tightness, night sweats and chills. Patient was found to have consolidation in right middle lobe with right hilar/mediastinal mass with metastasis suspected to be lung primary along with postobstructive pneumonia.   Urine strep/legionella negative.  COVID/Influenza/RSV negative.  BC grew Staph hominis likely contaminant per ID.    4/22 patient overall was stable on room air, denies having any shortness of breath.   Upon discharge continue Augmentin through 04/25/2024 per ID

## 2024-04-22 NOTE — DISCHARGE SUMMARY
"[facility]  Discharge- Ceasar March 1960, 63 y.o. male MRN: [id number]  Unit/Bed#: S -01 Encounter: [id number]  Primary Care Provider: No primary care provider on file.   Date and time admitted to hospital: 4/18/2024  7:05 PM    Small cell lung cancer with metastasis to liver  Assessment & Plan  Patient reported night sweats, recent weight loss, current every day smoker, severe back and flank pain.  CT chest: \" Right hilar and perihilar 5.5 x 6.3 x 5.2 cm mass consistent lung malignancy. Right hilar, mediastinal and right supraclavicular lymphadenopathy compatible with metastatic disease. Airspace consolidation in the right middle lobe adjacent to the hilum suggesting postobstructive pneumonia. Nodular thickening along the axial interstitium in the right lower lobe and the right major fissure suggesting lymphangitic spread of tumor. Diffuse hepatic metastases. Periportal, gastroesophageal and para-aortic lymphadenopathy\"  Complained of hip pain -- CT A/P displayed sclerotic lesion in the pelvis.  Liver biopsy pathology report positive for small cell carcinoma.    4/22 patient overall doing well status post chemotherapy x 3 days.  Patient cleared for discharge per oncology. Will need to follow-up with outpatient oncology  Continue allopurinol for 1 month and Decadron per oncology  Pain med recs per palliative    * Pneumonia of right middle lobe due to infectious organism  Assessment & Plan  Presentation: Patient originally presented to [facility] on 04/11/2024 due to complaints of increased shortness of breath, chest tightness, night sweats and chills. Patient was found to have consolidation in right middle lobe with right hilar/mediastinal mass with metastasis suspected to be lung primary along with postobstructive pneumonia.   Urine strep/legionella negative.  COVID/Influenza/RSV negative.  BC grew Staph hominis likely contaminant per ID.    4/22 patient overall was stable on " "room air, denies having any shortness of breath.   Upon discharge continue Augmentin through 04/25/2024 per ID    Cancer related pain  Assessment & Plan  Continue pain regimen.  Oxycodone 15 mg every 4 hours scheduled per palliative commendations    Liver mass  Assessment & Plan  Likely 2/2 metastases.  S/P IR liver biopsy on 04/15/2024.  Pathology report positive for small cell carcinoma.   Transaminitis likely 2/2 liver mets.  Continue allopurinol    Severe protein-calorie malnutrition (HCC)  Assessment & Plan  Malnutrition Findings:   Adult malnutrition type: Chronic illness.  Adult degree of malnutrition: Other severe protein calorie malnutrition.  Malnutrition characteristics: Inadequate energy, weight loss.    360 statement: Malnutrition related to chronic illness as evidenced by >7.5% body weight loss in 3 months, <75% energy intake compared to estimated energy needs for >1 month. To treat with oral diet as tolerated.     BMI Findings:        Body mass index is 23.73 kg/m3      Bacteremia  Assessment & Plan  2/2 BC grew Staph hominis.  Suspect contaminant per ID.    Transaminitis  Assessment & Plan  Lab Results   Component Value Date     (H) 04/22/2024     (H) 04/22/2024    TBILI 9.95 (H) 04/22/2024    ALKPHOS 522 (H) 04/22/2024    Suspect 2/2 liver metastasis.  Avoid hepatotoxins.  S/P IR guided liver biopsy: Pathology report noted for poorly differentiated neoplasm with extensive necrosis; concerning for small cell carcinoma.    4/22 LFTs trending down  Continue Allopurinol 200 mg daily X 1 month per oncology    Lung mass  Assessment & Plan  Patient reported night sweats, recent weight loss, current every day smoker, severe back and flank pain.  CT chest: \" Right hilar and perihilar 5.5 x 6.3 x 5.2 cm mass consistent lung malignancy. Right hilar, mediastinal and right supraclavicular lymphadenopathy compatible with metastatic disease. Airspace consolidation in the right middle lobe adjacent to " "the hilum suggesting postobstructive pneumonia. Nodular thickening along the axial interstitium in the right lower lobe and the right major fissure suggesting lymphangitic spread of tumor. Diffuse hepatic metastases. Periportal, gastroesophageal and para-aortic lymphadenopathy\"  Complained of hip pain -- CT A/P displayed sclerotic lesion in the pelvis.  Liver biopsy pathology report positive for small cell carcinoma.  Transferred to [facility] for chemotherapy.  Oncology consult.     Tobacco abuse  Assessment & Plan  Reported an average of 1-1.5 PPD.  NRT.  Encouraged smoking cessation.            Medical Problems       Resolved Problems  Date Reviewed: 4/22/2024   None       Discharging Resident: Josr Kay MD  Discharging Attending: Roshan Medina MD  PCP: No primary care provider on file.  Admission Date:   Admission Orders (From admission, onward)       Ordered        04/18/24 1922  INPATIENT ADMISSION  Once                          Discharge Date: 04/22/24    Consultations During Hospital Stay:  IP CONSULT TO INFECTIOUS DISEASES  IP CONSULT TO PALLIATIVE CARE  IP CONSULT TO ONCOLOGY     Procedures Performed:       Significant Findings / Test Results:   No orders to display    4/11: CT chest: \" Right hilar and perihilar 5.5 x 6.3 x 5.2 cm mass consistent lung malignancy. Right hilar, mediastinal and right supraclavicular lymphadenopathy compatible with metastatic disease. Airspace consolidation in the right middle lobe adjacent to the hilum suggesting postobstructive pneumonia. Nodular thickening along the axial interstitium in the right lower lobe and the right major fissure suggesting lymphangitic spread of tumor. Diffuse hepatic metastases. Periportal, gastroesophageal and para-aortic lymphadenopathy\"    4/16: CT spine: No evidence of pathologic fracture, lytic or blastic lesion in the thoracic or lumbar spine.    Incidental Findings:          Test Results Pending at Discharge (will require follow up):     "   Outpatient Tests Requested:      Complications:      Reason for Admission: Chemotherapy and postobstructive pneumonia    Hospital Course:   Ceasar March is a 63 y.o. male patient who originally presented to the hospital on 4/18/2024 with a PMH of cigarette smoker and asthma who originally presented to [facility] on 04/11/2024 due to complaints of increased shortness of breath, chest tightness, night sweats and chills. Patient was found to have consolidation in right middle lobe with right hilar/mediastinal mass with metastasis suspected to be lung primary along with postobstructive pneumonia. CT imaging displayed liver mass for which patient underwent liver biopsy on 04/15/2024. Pathology report indicated small cell carcinoma. Patient had two of two blood cultures growing contaminant per ID. Patient started on antibiotics to complete through 04/25/2024. Palliative care was consulted for pain and symptom management. There was a lengthy discussion with oncology, patient and patient's daughter with plan to transfer patient to [facility] for initiation of inpatient chemotherapy.  During the course of hospitalization patient received chemotherapy x 3 days.  Was also started on Decadron and allopurinol.  Patient tolerated chemotherapy well, electric starting down and patient feels well overall.  Currently on room air cleared for discharge per oncology.  Pain regime per palliative.         Please see above list of diagnoses and related plan for additional information.     Condition at Discharge: stable    Discharge Day Visit / Exam:   Subjective:    Vitals: Blood Pressure: 133/78 (04/22/24 1114)  Pulse: 70 (04/22/24 1114)  Temperature: (!) 97.4 °F (36.3 °C) (04/22/24 0712)  Temp Source: Oral (04/19/24 2154)  Respirations: 18 (04/22/24 1114)  Height: 6' (182.9 cm) (04/21/24 0716)  Weight - Scale: 79.4 kg (175 lb 0.7 oz) (04/21/24 0716)  SpO2: 97 % (04/22/24 1114)  Exam:   Physical Exam  Vitals and nursing note  reviewed.   Constitutional:       General: He is not in acute distress.     Appearance: He is not ill-appearing, toxic-appearing or diaphoretic.   HENT:      Head: Normocephalic.      Nose: Nose normal.   Eyes:      General: No scleral icterus.     Extraocular Movements: Extraocular movements intact.      Conjunctiva/sclera: Conjunctivae normal.      Pupils: Pupils are equal, round, and reactive to light.   Cardiovascular:      Rate and Rhythm: Normal rate and regular rhythm.      Pulses: Normal pulses.      Heart sounds: Normal heart sounds.   Pulmonary:      Effort: Pulmonary effort is normal.      Breath sounds: Normal breath sounds. No wheezing, rhonchi or rales.   Abdominal:      General: Bowel sounds are normal. There is no distension.      Palpations: Abdomen is soft.      Tenderness: There is abdominal tenderness.   Musculoskeletal:         General: Normal range of motion.      Cervical back: Normal range of motion.      Right lower leg: No edema.      Left lower leg: No edema.   Skin:     General: Skin is warm and dry.      Coloration: Skin is jaundiced.   Neurological:      Mental Status: He is alert and oriented to person, place, and time.   Psychiatric:         Mood and Affect: Mood normal.         Behavior: Behavior normal.          Discussion with Family: Attempted to update  (daughter) via phone. Unable to contact.    Discharge instructions/Information to patient and family:   See after visit summary for information provided to patient and family.      Provisions for Follow-Up Care:  See after visit summary for information related to follow-up care and any pertinent home health orders.      Mobility at time of Discharge:   Basic Mobility Inpatient Raw Score: 22  JH-HLM Goal: 7: Walk 25 feet or more  JH-HLM Achieved: 7: Walk 25 feet or more       Disposition:   Home    Planned Readmission: no    Discharge Medications:  See after visit summary for reconciled discharge medications provided  to patient and/or family.      **Please Note: This note may have been constructed using a voice recognition system**

## 2024-04-22 NOTE — DISCHARGE INSTR - AVS FIRST PAGE
Dear Ceasar March,     It was our pleasure to care for you here at [facility].  It is our hope that we were always able to exceed the expected standards for your care during your stay.  You were hospitalized due to pneumonia and chemotherapy.  You were cared for on the fourth floor by Josr Kay MD under the service of Roshan Medina MD with the [facility] who covers for your primary care physician (PCP), No primary care provider on file., while you were hospitalized.  If you have any questions or concerns related to this hospitalization, you may contact us at .  For follow up as well as any medication refills, we recommend that you follow up with your primary care physician.  A registered nurse will reach out to you by phone within a few days after your discharge to answer any additional questions that you may have after going home.  However, at this time we provide for you here, the most important instructions / recommendations at discharge:     Notable Medication Adjustments -   Continue taking Augmentin through 4/25/2024  Continue taking allopurinol 200 mg daily for 1 month  Continue taking Decadron 4 mg daily and follow-up with outpatient oncology  Continue bowel regimen with Colace, Senokot and MiraLAX as needed  Continue taking oxycodone 15mg every 4 hours as needed for pain  Resume all other home medications as prescribed  Testing Required after Discharge -       Important follow up information -   Please follow-up with your primary care doctor and outpatient oncologist  Other Instructions -   none  Please review this entire after visit summary as additional general instructions including medication list, appointments, activity, diet, any pertinent wound care, and other additional recommendations from your care team that may be provided for you.      Sincerely,     Josr Kay MD       Infectious Disease Discharge Instructions:  You  are being discharged on an oral antibiotic called Augmentin to complete your treatment for pneumonia. This antibiotic should be taken twice a day (every 12 hours). You will complete your 10 day course on Thursday, 4/25. Please take your antibiotics until they are gone. Can take with food to prevent stomach upset. Please call the infectious diseases office ([phone]) with any questions, concerns, or side effects such as nausea, vomiting, diarrhea, rashes, etc. Please go to the ER with sustained fevers over 100.4F, worsening shortness of breath, or chest pain.

## 2024-04-22 NOTE — PROGRESS NOTES
"[facility]  Progress Note  Name: Ceasar March I  MRN: [id number]  Unit/Bed#: S -01 I Date of Admission: 4/18/2024   Date of Service: 4/22/2024 I Hospital Day: 4    Assessment/Plan   Goals of care, counseling/discussion  Assessment & Plan  Level 3 code status  Disease focused care with DNR/DNI limits placed.   Concerns introduced today include:  Wants to attempt chemo  Understands prognosis - \"getting my affairs in order\"  Will continue discussions regarding GOC as patient's clinical presentation evolves.  Encouraged follow up with Palliative Medicine on an outpatient basis after discharge for continued symptom management.  Our office will contact patient to schedule a hospital follow up.         Small cell lung cancer with metastasis to liver  Assessment & Plan  Oncology following  Transferred to [facility] to initiate chemo    Palliative care patient  Assessment & Plan  Social support:  Patient is finding comfort in his family  Happy to be going home today  Can't get any sleep here  Prefers daughter be contacted prior to wife.  Supportive listening provided  Normalized experience of patient/family  Provided anxiety containment  Provided anticipatory guidance  Encouraged self care    Follow up  Palliative Care will continue to follow and goals of care discussions will be ongoing.    Please reach out via [employer] if questions or concerns arise.    Care Coordination  Reviewed case with RN, Elizabeth    I have reviewed the patient's controlled substance dispensing history in the Prescription Drug Monitoring Program in compliance with the [facility] regulations before prescribing any controlled substances.    Decisional apparatus:  Patient is competent on exam today.  If competency is lost, patient's substitute decision maker would default to spouse by [place] Act 169.  ER contacts:   Name Relation Home Work Mobile   Anna March Daughter [phone]     Advance Directive/Living " Will: none  POLST: none  POA Forms: none    We appreciate the invitation to be involved in this patient's care.  We will continue to follow throughout this hospitalization.  Please do not hesitate to reach our on call provider through our clinic answering service at [phone] should you have acute symptom control concerns.    Jennifer P. Bloch, ALEKSANDR, CRNP, ACHPN  Palliative and Supportive Care  Clinic/Answering Service: [phone]  You can find me on [employer]!       Cancer related pain  Assessment & Plan  Pain has improved significantly  Discharge home planned for today  Will follow Palliative Medicine outpt for ongoing symptom management  Continue dexamethasone 4mg once daily - Reviewed steroids with Dr. Aguilar and he is in agreement with plan - RX sent to pharmacy  Continue Oxycodone to 15 mg q4h scheduled with hold parameters - RX sent to pharmacy  Continue Robaxin 750mg q6h scheduled - RX sent to pharmacy  Continue Hydromorphone to 0.3 mg q3h PRN for breakthrough    Bowel regimen in place - last BM yesterday    Liver mass  Assessment & Plan  New diagnosis of small cell lung cancer - transferred to [facility] for initiation of chemotherapy     CT CAP- R Hilar mass with lymphangitic spread of tumor, Right hilar and perihilar heterogeneously enhancing mass measures 5.5 x 6.3 x 5.2 cm,  Innumerable hypoenhancing lesions throughout the liver measuring up to 3.1 cm   Biopsy completed 4/15/24 - [facility]  Outpatient medical oncology appointment scheduled 5/1 - transferred to [facility] for initiation of chemotherapy    Severe protein-calorie malnutrition (HCC)  Assessment & Plan  Appetite improved with initiation of steroids  Encouraged use of supplements if unable to consume enough protein, calories.   Continue dexamethasone 4mg once daily - Reviewed plan for steroids with Dr. Aguilar and he is in agreement with plan - RX sent to pharmacy          Lung mass  Assessment & Plan  New  diagnosis of small cell lung cancer -  transferred to [facility] for initiation of chemotherapy     CT CAP- R Hilar mass with lymphangitic spread of tumor, Right hilar and perihilar heterogeneously enhancing mass measures 5.5 x 6.3 x 5.2 cm,  Innumerable hypoenhancing lesions throughout the liver measuring up to 3.1 cm              24 Hour History  Chart reviewed before visit.    Patient is doing better than during Friday's visit  Feels pain is at a tolerable level  Unable to sleep, frequent interruptions  Lunch arrives while I am present and he is hungry, feels he ate better over the weekend  Understands he will need follow up with Palliative Medicine for ongoing symptom management    Review of Systems   All other systems reviewed and are negative.      Medications    Current Facility-Administered Medications:     acetaminophen (TYLENOL) tablet 650 mg, 650 mg, Oral, Q6H PRN, Herberth Pittman MD    albuterol (PROVENTIL HFA,VENTOLIN HFA) inhaler 2 puff, 2 puff, Inhalation, Q4H PRN, Herberth Pittman MD    allopurinol (ZYLOPRIM) tablet 200 mg, 200 mg, Oral, Daily, Herberth Pittman MD, 200 mg at 04/22/24 0913    alteplase (CATHFLO) injection 2 mg, 2 mg, Intracatheter, Q1MIN PRN, Therese Aguilar MD    alteplase (CATHFLO) injection 2 mg, 2 mg, Intracatheter, Q1MIN PRN, Therese Aguilar MD    alteplase (CATHFLO) injection 2 mg, 2 mg, Intracatheter, Q1MIN PRN, Therese Aguilar MD    amoxicillin-clavulanate (AUGMENTIN) 875-125 mg per tablet 1 tablet, 1 tablet, Oral, Q12H [facility], Ruth Barraza PA-C, 1 tablet at 04/22/24 1332    calcium carbonate (TUMS) chewable tablet 1,000 mg, 1,000 mg, Oral, Daily PRN, Herberth Pittman MD    dexamethasone (DECADRON) injection 4 mg, 4 mg, Intravenous, Daily, Jennifer Paige Bloch, CRNP, 4 mg at 04/21/24 0908    Diclofenac Sodium (VOLTAREN) 1 % topical gel 2 g, 2 g, Topical, 4x Daily, Herberth Pittman MD, 2 g at 04/19/24 1717    docusate sodium (COLACE) capsule 100 mg, 100 mg, Oral, BID, Josr Kay MD, 100 mg at 04/19/24 171     enoxaparin (LOVENOX) subcutaneous injection 40 mg, 40 mg, Subcutaneous, Daily, Herberth Pittman MD, 40 mg at 04/22/24 0913    gabapentin (NEURONTIN) capsule 100 mg, 100 mg, Oral, TID, Herberth Pittman MD, 100 mg at 04/22/24 0913    guaiFENesin (MUCINEX) 12 hr tablet 600 mg, 600 mg, Oral, BID, Herberth Pittman MD, 600 mg at 04/19/24 1713    HYDROmorphone (DILAUDID) injection 0.5 mg, 0.5 mg, Intravenous, Q3H PRN, Jennifer Paige Bloch, CRNP, 0.5 mg at 04/21/24 1625    ipratropium (ATROVENT) 0.02 % inhalation solution 0.5 mg, 0.5 mg, Nebulization, BID, Roshan Medina MD, 0.5 mg at 04/21/24 2008    levalbuterol (XOPENEX) inhalation solution 1.25 mg, 1.25 mg, Nebulization, BID, Roshan Medina MD, 1.25 mg at 04/21/24 2008    methocarbamol (ROBAXIN) tablet 750 mg, 750 mg, Oral, Q6H KRYSTYNA, Herberth iPttman MD, 750 mg at 04/21/24 0044    naloxone (NARCAN) 0.04 mg/mL syringe 0.04 mg, 0.04 mg, Intravenous, Q1MIN PRN, Herberth Pittman MD    nicotine (NICODERM CQ) 21 mg/24 hr TD 24 hr patch 1 patch, 1 patch, Transdermal, Daily, Herberth Pittman MD    ondansetron (ZOFRAN) injection 4 mg, 4 mg, Intravenous, Q6H PRN, Herberth Pittman MD    oxyCODONE (ROXICODONE) IR tablet 15 mg, 15 mg, Oral, Q4H, Jennifer Paige Bloch, CRNP, 15 mg at 04/22/24 1332    pantoprazole (PROTONIX) EC tablet 40 mg, 40 mg, Oral, Early Morning, Herberth Pittman MD, 40 mg at 04/22/24 0502    polyethylene glycol (MIRALAX) packet 17 g, 17 g, Oral, Daily PRN, Herberth Pittman MD    polyethylene glycol (MIRALAX) packet 17 g, 17 g, Oral, Daily, Josr Kay MD, 17 g at 04/20/24 0925    senna (SENOKOT) tablet 17.2 mg, 2 tablet, Oral, BID, Jennifer Paige Bloch, CRNP, 17.2 mg at 04/20/24 0921    simethicone (MYLICON) chewable tablet 80 mg, 80 mg, Oral, Q6H PRN, Jairon Fuentes MD    Objective  /78   Pulse 70   Temp (!) 97.4 °F (36.3 °C)   Resp 18   Ht 6' (1.829 m)   Wt 79.4 kg (175 lb 0.7 oz)   SpO2 97%   BMI 23.74 kg/m²   Physical Exam  Vitals and  nursing note reviewed.   Constitutional:       General: He is awake. He is not in acute distress.     Appearance: He is ill-appearing. He is not toxic-appearing.   HENT:      Head: Normocephalic and atraumatic.      Mouth/Throat:      Mouth: Mucous membranes are dry.   Eyes:      General: Scleral icterus present.         Right eye: No discharge.         Left eye: No discharge.   Cardiovascular:      Rate and Rhythm: Normal rate.   Pulmonary:      Effort: Pulmonary effort is normal. No tachypnea, bradypnea, accessory muscle usage or respiratory distress.   Abdominal:      General: There is no distension.   Musculoskeletal:      Cervical back: Normal range of motion. Normal range of motion.      Right lower leg: No edema.      Left lower leg: No edema.   Skin:     General: Skin is dry.      Coloration: Skin is jaundiced. Skin is not pale.      Findings: Bruising present.   Neurological:      Mental Status: He is alert and oriented to person, place, and time.      Cranial Nerves: No dysarthria or facial asymmetry.   Psychiatric:         Attention and Perception: Attention normal.         Mood and Affect: Mood and affect normal.         Speech: Speech normal.         Behavior: Behavior normal. Behavior is cooperative.         Thought Content: Thought content normal.         Cognition and Memory: Cognition and memory normal.         Judgment: Judgment normal.       Lab Results: I have personally reviewed pertinent labs., CBC:   Lab Results   Component Value Date    WBC 9.10 04/22/2024    HGB 12.5 04/22/2024    HCT 37.5 04/22/2024    MCV 86 04/22/2024     (L) 04/22/2024    RBC 4.35 04/22/2024    MCH 28.7 04/22/2024    MCHC 33.3 04/22/2024    RDW 16.1 (H) 04/22/2024    MPV 10.2 04/22/2024   , CMP:   Lab Results   Component Value Date    SODIUM 135 04/22/2024    K 4.9 04/22/2024     04/22/2024    CO2 29 04/22/2024    BUN 25 04/22/2024    CREATININE 0.72 04/22/2024    CALCIUM 7.6 (L) 04/22/2024     (H)  "04/22/2024     (H) 04/22/2024    ALKPHOS 522 (H) 04/22/2024    EGFR 99 04/22/2024     EKG, Pathology, and Other Studies: I have personally reviewed pertinent reports.    Counseling / Coordination of Care  Total floor / unit time spent today 30 minutes. Greater than 50% of total time was spent with the patient and / or family counseling and / or coordinating of care. A description of the counseling / coordination of care: Chart reviewed, provided medical updates, determined goals of care, discussed palliative care and symptom management, provided anticipatory guidance, determined competency and POA/HCA, determined social/family support, provided psychosocial support.     Jennifer P. Bloch, ALEKSANDR, CRNP, ACHPN  Palliative & Supportive Care    Portions of this document may have been created using dictation software and as such some \"sound alike\" terms may have been generated by the system. Do not hesitate to contact me with any questions or clarifications.      "

## 2024-04-22 NOTE — ASSESSMENT & PLAN NOTE
"Patient reported night sweats, recent weight loss, current every day smoker, severe back and flank pain.  CT chest: \" Right hilar and perihilar 5.5 x 6.3 x 5.2 cm mass consistent lung malignancy. Right hilar, mediastinal and right supraclavicular lymphadenopathy compatible with metastatic disease. Airspace consolidation in the right middle lobe adjacent to the hilum suggesting postobstructive pneumonia. Nodular thickening along the axial interstitium in the right lower lobe and the right major fissure suggesting lymphangitic spread of tumor. Diffuse hepatic metastases. Periportal, gastroesophageal and para-aortic lymphadenopathy\"  Complained of hip pain -- CT A/P displayed sclerotic lesion in the pelvis.  Liver biopsy pathology report positive for small cell carcinoma.  Transferred to [facility] for chemotherapy.  Oncology consult.   "

## 2024-04-22 NOTE — ASSESSMENT & PLAN NOTE
Presentation: Patient originally presented to [facility] on 04/11/2024 due to complaints of increased shortness of breath, chest tightness, night sweats and chills. Patient was found to have consolidation in right middle lobe with right hilar/mediastinal mass with metastasis suspected to be lung primary along with postobstructive pneumonia.   Urine strep/legionella negative.  COVID/Influenza/RSV negative.  BC grew Staph hominis likely contaminant per ID.    4/22 patient overall was stable on room air, denies having any shortness of breath.   Upon discharge continue Augmentin through 04/25/2024 per ID

## 2024-04-22 NOTE — PLAN OF CARE
Problem: PAIN - ADULT  Goal: Verbalizes/displays adequate comfort level or baseline comfort level  Description: Interventions:  - Encourage patient to monitor pain and request assistance  - Assess pain using appropriate pain scale  - Administer analgesics based on type and severity of pain and evaluate response  - Implement non-pharmacological measures as appropriate and evaluate response  - Consider cultural and social influences on pain and pain management  - Notify physician/advanced practitioner if interventions unsuccessful or patient reports new pain  Outcome: Progressing     Problem: INFECTION - ADULT  Goal: Absence or prevention of progression during hospitalization  Description: INTERVENTIONS:  - Assess and monitor for signs and symptoms of infection  - Monitor lab/diagnostic results  - Monitor all insertion sites, i.e. indwelling lines, tubes, and drains  - Monitor endotracheal if appropriate and nasal secretions for changes in amount and color  - [place] appropriate cooling/warming therapies per order  - Administer medications as ordered  - Instruct and encourage patient and family to use good hand hygiene technique  - Identify and instruct in appropriate isolation precautions for identified infection/condition  Outcome: Progressing     Problem: SAFETY ADULT  Goal: Patient will remain free of falls  Description: INTERVENTIONS:  - Educate patient/family on patient safety including physical limitations  - Instruct patient to call for assistance with activity   - Consult OT/PT to assist with strengthening/mobility   - Keep Call bell within reach  - Keep bed low and locked with side rails adjusted as appropriate  - Keep care items and personal belongings within reach  - Initiate and maintain comfort rounds  - Make Fall Risk Sign visible to staff  - Apply yellow socks and bracelet for high fall risk patients  - Consider moving patient to room near nurses station  Outcome: Progressing     Problem: DISCHARGE  PLANNING  Goal: Discharge to home or other facility with appropriate resources  Description: INTERVENTIONS:  - Identify barriers to discharge w/patient and caregiver  - Arrange for needed discharge resources and transportation as appropriate  - Identify discharge learning needs (meds, wound care, etc.)  - Arrange for interpretive services to assist at discharge as needed  - Refer to Case Management Department for coordinating discharge planning if the patient needs post-hospital services based on physician/advanced practitioner order or complex needs related to functional status, cognitive ability, or social support system  Outcome: Progressing     Problem: Knowledge Deficit  Goal: Patient/family/caregiver demonstrates understanding of disease process, treatment plan, medications, and discharge instructions  Description: Complete learning assessment and assess knowledge base.  Interventions:  - Provide teaching at level of understanding  - Provide teaching via preferred learning methods  Outcome: Progressing     Problem: Nutrition/Hydration-ADULT  Goal: Nutrient/Hydration intake appropriate for improving, restoring or maintaining nutritional needs  Description: Monitor and assess patient's nutrition/hydration status for malnutrition. Collaborate with interdisciplinary team and initiate plan and interventions as ordered.  Monitor patient's weight and dietary intake as ordered or per policy. Utilize nutrition screening tool and intervene as necessary. Determine patient's food preferences and provide high-protein, high-caloric foods as appropriate.     INTERVENTIONS:  - Monitor oral intake, urinary output, labs, and treatment plans  - Assess nutrition and hydration status and recommend course of action  - Evaluate amount of meals eaten  - Assist patient with eating if necessary   - Allow adequate time for meals  - Recommend/ encourage appropriate diets, oral nutritional supplements, and vitamin/mineral supplements  -  Order, calculate, and assess calorie counts as needed  - Recommend, monitor, and adjust tube feedings and TPN/PPN based on assessed needs  - Assess need for intravenous fluids  - Provide specific nutrition/hydration education as appropriate  - Include patient/family/caregiver in decisions related to nutrition  Outcome: Progressing     Problem: Potential for Falls  Goal: Patient will remain free of falls  Description: INTERVENTIONS:  - Educate patient/family on patient safety including physical limitations  - Instruct patient to call for assistance with activity   - Consult OT/PT to assist with strengthening/mobility   - Keep Call bell within reach  - Keep bed low and locked with side rails adjusted as appropriate  - Keep care items and personal belongings within reach  - Initiate and maintain comfort rounds  - Make Fall Risk Sign visible to staff  - Apply yellow socks and bracelet for high fall risk patients  - Consider moving patient to room near nurses station  Outcome: Progressing     Problem: RESPIRATORY - ADULT  Goal: Achieves optimal ventilation and oxygenation  Description: INTERVENTIONS:  - Assess for changes in respiratory status  - Assess for changes in mentation and behavior  - Position to facilitate oxygenation and minimize respiratory effort  - Oxygen administered by appropriate delivery if ordered  - Initiate smoking cessation education as indicated  - Encourage broncho-pulmonary hygiene including cough, deep breathe, Incentive Spirometry  - Assess the need for suctioning and aspirate as needed  - Assess and instruct to report SOB or any respiratory difficulty  - Respiratory Therapy support as indicated  Outcome: Progressing     Problem: SKIN/TISSUE INTEGRITY - ADULT  Goal: Skin Integrity remains intact(Skin Breakdown Prevention)  Description: Assess:  -Assess extremities for adequate circulation and sensation     Bed Management:  -Have minimal linens on bed & keep smooth, unwrinkled  -Change linens as  needed when moist or perspiring    Toileting:  -Offer bedside commode  -Encourage activity and walks on unit  -Encourage or provide ROM exercises     Skin Care:  -Avoid use of baby powder, tape, friction and shearing, hot water or constrictive clothing  Outcome: Progressing  Goal: Incision(s), wounds(s) or drain site(s) healing without S/S of infection  Description: INTERVENTIONS  - Assess and document dressing, incision, wound bed, drain sites and surrounding tissue  - Provide patient and family education  Outcome: Progressing

## 2024-04-22 NOTE — ASSESSMENT & PLAN NOTE
Appetite improved with initiation of steroids  Encouraged use of supplements if unable to consume enough protein, calories.   Continue dexamethasone 4mg once daily - Reviewed plan for steroids with Dr. Aguilar and he is in agreement with plan - RX sent to pharmacy

## 2024-04-22 NOTE — TELEPHONE ENCOUNTER
Per Dr. Aguilar, patient will be discharged today or tomorrow morning. Pt needs Neulasta injection outpatient tomorrow.     MO INFUSION: Can you please schedule patient for Neulasta injection tomorrow afternoon.

## 2024-04-22 NOTE — ASSESSMENT & PLAN NOTE
Pain has improved significantly  Discharge home planned for today  Will follow Palliative Medicine outpt for ongoing symptom management  Continue dexamethasone 4mg once daily - Reviewed steroids with Dr. Aguilar and he is in agreement with plan - RX sent to pharmacy  Continue Oxycodone to 15 mg q4h scheduled with hold parameters - RX sent to pharmacy  Continue Robaxin 750mg q6h scheduled - RX sent to pharmacy  Continue Hydromorphone to 0.3 mg q3h PRN for breakthrough    Bowel regimen in place - last BM yesterday

## 2024-04-22 NOTE — ASSESSMENT & PLAN NOTE
New diagnosis of small cell lung cancer - transferred to [facility] for initiation of chemotherapy     CT CAP- R Hilar mass with lymphangitic spread of tumor, Right hilar and perihilar heterogeneously enhancing mass measures 5.5 x 6.3 x 5.2 cm,  Innumerable hypoenhancing lesions throughout the liver measuring up to 3.1 cm   Biopsy completed 4/15/24 - SCLC  Outpatient medical oncology appointment scheduled 5/1 - transferred to [facility] for initiation of chemotherapy   Visit Information Date & Time Provider Department Dept. Phone Encounter #  
 10/24/2017  8:00 AM Evelina Esteban, [facility] [phone] [id number] Upcoming Health Maintenance Date Due Pneumococcal 19-64 Medium Risk (1 of 1 - PPSV23) 10/20/1987 DTaP/Tdap/Td series (1 - Tdap) 10/20/1989 PAP AKA CERVICAL CYTOLOGY 10/20/1989 INFLUENZA AGE 9 TO ADULT 8/1/2017 Allergies as of 10/24/2017  Review Complete On: 10/24/2017 By: Evelina Esteban MD  
  
 Severity Noted Reaction Type Reactions Codeine High 05/25/2017    Anaphylaxis Pcn [Penicillins]  05/25/2017    Rash Current Immunizations  Never Reviewed Name Date Influenza Vaccine (Quad) PF  Incomplete Not reviewed this visit You Were Diagnosed With   
  
 Codes Comments Routine general medical examination at a health care facility    -  Primary ICD-10-CM: Z00.00 ICD-9-CM: V70.0 Essential hypertension     ICD-10-CM: I10 
ICD-9-CM: 401.9 Prediabetes     ICD-10-CM: R73.03 
ICD-9-CM: 790.29 Mixed hypercholesterolemia and hypertriglyceridemia     ICD-10-CM: E78.2 ICD-9-CM: 272.2 Tobacco use     ICD-10-CM: Z72.0 ICD-9-CM: 305.1 Ganglion of wrist, unspecified laterality     ICD-10-CM: F17.286 ICD-9-CM: 727.41 Fibromyalgia     ICD-10-CM: M79.7 ICD-9-CM: 729.1 Radicular low back pain     ICD-10-CM: M54.10 ICD-9-CM: 724.4 Encounter for immunization     ICD-10-CM: F18 ICD-9-CM: V03.89 Vitals BP Pulse Temp Resp Height(growth percentile) Weight(growth percentile) 140/84 (BP 1 Location: Left arm, BP Patient Position: Sitting) 69 97.7 °F (36.5 °C) (Oral) 19 5' 1.67\" (1.566 m) 234 lb (106.1 kg) SpO2 BMI OB Status Smoking Status 97% 43.26 kg/m2 Postmenopausal Current Every Day Smoker Vitals History BMI and BSA Data Body Mass Index Body Surface Area  
 43.26 kg/m 2 2.15 m 2 Preferred Pharmacy Pharmacy Name Phone [phone] Your Updated Medication List  
  
   
This list is accurate as of: 10/24/17  8:34 AM.  Always use your most recent med list.  
  
  
  
  
 gabapentin 300 mg capsule Commonly known as:  NEURONTIN Take 300 mg by mouth three (3) times daily. meloxicam 15 mg tablet Commonly known as:  MOBIC Take 1 Tab by mouth daily. We Performed the Following INFLUENZA VIRUS VAC QUAD,SPLIT,PRESV FREE SYRINGE IM [id number] CPT(R)] CT IMMUNIZ ADMIN,1 SINGLE/COMB VAC/TOXOID [id number] CPT(R)] REFERRAL TO PHYSICAL THERAPY [GEU11 Custom] Comments:  
 Eval and treat Referral Information Referral ID Referred By Referred To  
  
 [id number] Victor Hugo [address]   
   [address] [facility], [address] [place] [address] Phone: [phone] Fax: [phone] Visits Status Start Date End Date 1 New Request 10/24/17 10/24/18 If your referral has a status of pending review or denied, additional information will be sent to support the outcome of this decision. Patient Instructions Stopping Smoking: Care Instructions Your Care Instructions Cigarette smokers crave the nicotine in cigarettes. Giving it up is much harder than simply changing a habit. Your body has to stop craving the nicotine. It is hard to quit, but you can do it. There are many tools that people use to quit smoking. You may find that combining tools works best for you. There are several steps to quitting. First you get ready to quit. Then you get support to help you. After that, you learn new skills and behaviors to become a nonsmoker. For many people, a necessary step is getting and using medicine. Your doctor will help you set up the plan that best meets your needs. You may want to attend a smoking cessation program to help you quit smoking. When you choose a program, look for one that has proven success.  Ask your doctor for ideas. You will greatly increase your chances of success if you take medicine as well as get counseling or join a cessation program. 
Some of the changes you feel when you first quit tobacco are uncomfortable. Your body will miss the nicotine at first, and you may feel short-tempered and grumpy. You may have trouble sleeping or concentrating. Medicine can help you deal with these symptoms. You may struggle with changing your smoking habits and rituals. The last step is the tricky one: Be prepared for the smoking urge to continue for a time. This is a lot to deal with, but keep at it. You will feel better. Follow-up care is a key part of your treatment and safety. Be sure to make and go to all appointments, and call your doctor if you are having problems. Its also a good idea to know your test results and keep a list of the medicines you take. How can you care for yourself at home? · Ask your family, friends, and coworkers for support. You have a better chance of quitting if you have help and support. · Join a support group, such as Nicotine Anonymous, for people who are trying to quit smoking. · Consider signing up for a smoking cessation program, such as the American Lung Association's Freedom from Smoking program. 
· Set a quit date. Pick your date carefully so that it is not right in the middle of a big deadline or stressful time. Once you quit, do not even take a puff. Get rid of all ashtrays and lighters after your last cigarette. Clean your house and your clothes so that they do not smell of smoke. · Learn how to be a nonsmoker. Think about ways you can avoid those things that make you reach for a cigarette. ¨ Avoid situations that put you at greatest risk for smoking. For some people, it is hard to have a drink with friends without smoking. For others, they might skip a coffee break with coworkers who smoke. ¨ Change your daily routine.  Take a different route to work or eat a meal in a different place. · Cut down on stress. Calm yourself or release tension by doing an activity you enjoy, such as reading a book, taking a hot bath, or gardening. · Talk to your doctor or pharmacist about nicotine replacement therapy, which replaces the nicotine in your body. You still get nicotine but you do not use tobacco. Nicotine replacement products help you slowly reduce the amount of nicotine you need. These products come in several forms, many of them available over-the-counter: ¨ Nicotine patches ¨ Nicotine gum and lozenges ¨ Nicotine inhaler · Ask your doctor about bupropion (Wellbutrin) or varenicline (Chantix), which are prescription medicines. They do not contain nicotine. They help you by reducing withdrawal symptoms, such as stress and anxiety. · Some people find hypnosis, acupuncture, and massage helpful for ending the smoking habit. · Eat a healthy diet and get regular exercise. Having healthy habits will help your body move past its craving for nicotine. · Be prepared to keep trying. Most people are not successful the first few times they try to quit. Do not get mad at yourself if you smoke again. Make a list of things you learned and think about when you want to try again, such as next week, next month, or next year. Where can you learn more? Go to http://andrews-amanda.info/. Enter N546 in the search box to learn more about \"Stopping Smoking: Care Instructions. \" Current as of: March 20, 2017 Content Version: 11.3 © 7609-5759 [employer]. Care instructions adapted under license by [employer] (which disclaims liability or warranty for this information). If you have questions about a medical condition or this instruction, always ask your healthcare professional. [place] [address] any warranty or liability for your use of this information. Make an appointment with:  
 
[facility] [address] [facility], [place] [address]  Audrey Elton Jernigan [address] Psychotherapy [place]esside Cochran, [address] [facility] 
[phone] You have been referred to gynecology. Please call one of the preferred providers listed below and schedule your appointment. Once you have scheduled your appointment, please call the office at [phone] and leave the details of your appointment (provider you will be seeing, appointment date and time) on the voice mail. [address] [facility],[address] [facility] Waldemar Rios, [address] [facility], [address] [place] [address],   [address] 
phone: [phone] Introducing [facility]! Cesar Carreno introduces Fitnet patient portal. Now you can access parts of your medical record, email your doctor's office, and request medication refills online. 1. In your internet browser, go to [email or web address] 2. Click on the First Time User? Click Here link in the Sign In box. You will see the New Member Sign Up page. 3. Enter your Fitnet Access Code exactly as it appears below. You will not need to use this code after youve completed the sign-up process. If you do not sign up before the expiration date, you must request a new code. · Fitnet Access Code: [email or web address] Expires: 1/22/2018  8:34 AM 
 
4. Enter the last four digits of your Social Security Number (xxxx) and Date of Birth (mm/dd/yyyy) as indicated and click Submit. You will be taken to the next sign-up page. 5. Create a [employer] ID. This will be your Fitnet login ID and cannot be changed, so think of one that is secure and easy to remember. 6. Create a Fitnet password. You can change your password at any time. 7. Enter your Password Reset Question and Answer. This can be used at a later time if you forget your password. 8. Enter your e-mail address. You will receive e-mail notification when new information is available in [address] 9. Click Sign Up. You can now view and download portions of your medical record. 10. Click the Download Summary menu link to download a portable copy of your medical information. If you have questions, please visit the Frequently Asked Questions section of the OpenTable website. Remember, OpenTable is NOT to be used for urgent needs. For medical emergencies, dial 911. Now available from your iPhone and Android! Please provide this summary of care documentation to your next provider. Your primary care clinician is listed as Eliot 13. If you have any questions after today's visit, please call [phone].

## 2024-04-22 NOTE — ASSESSMENT & PLAN NOTE
Likely 2/2 metastases.  S/P IR liver biopsy on 04/15/2024.  Pathology report positive for small cell carcinoma.   Transaminitis likely 2/2 liver mets.  Continue allopurinol

## 2024-04-22 NOTE — PLAN OF CARE
Problem: PAIN - ADULT  Goal: Verbalizes/displays adequate comfort level or baseline comfort level  Description: Interventions:  - Encourage patient to monitor pain and request assistance  - Assess pain using appropriate pain scale  - Administer analgesics based on type and severity of pain and evaluate response  - Implement non-pharmacological measures as appropriate and evaluate response  - Consider cultural and social influences on pain and pain management  - Notify physician/advanced practitioner if interventions unsuccessful or patient reports new pain  Outcome: Progressing     Problem: INFECTION - ADULT  Goal: Absence or prevention of progression during hospitalization  Description: INTERVENTIONS:  - Assess and monitor for signs and symptoms of infection  - Monitor lab/diagnostic results  - Monitor all insertion sites, i.e. indwelling lines, tubes, and drains  - Monitor endotracheal if appropriate and nasal secretions for changes in amount and color  - Hope appropriate cooling/warming therapies per order  - Administer medications as ordered  - Instruct and encourage patient and family to use good hand hygiene technique  - Identify and instruct in appropriate isolation precautions for identified infection/condition  Outcome: Progressing     Problem: Nutrition/Hydration-ADULT  Goal: Nutrient/Hydration intake appropriate for improving, restoring or maintaining nutritional needs  Description: Monitor and assess patient's nutrition/hydration status for malnutrition. Collaborate with interdisciplinary team and initiate plan and interventions as ordered.  Monitor patient's weight and dietary intake as ordered or per policy. Utilize nutrition screening tool and intervene as necessary. Determine patient's food preferences and provide high-protein, high-caloric foods as appropriate.     INTERVENTIONS:  - Monitor oral intake, urinary output, labs, and treatment plans  - Assess nutrition and hydration status and  recommend course of action  - Evaluate amount of meals eaten  - Assist patient with eating if necessary   - Allow adequate time for meals  - Recommend/ encourage appropriate diets, oral nutritional supplements, and vitamin/mineral supplements  - Order, calculate, and assess calorie counts as needed  - Recommend, monitor, and adjust tube feedings and TPN/PPN based on assessed needs  - Assess need for intravenous fluids  - Provide specific nutrition/hydration education as appropriate  - Include patient/family/caregiver in decisions related to nutrition  Outcome: Progressing     Problem: RESPIRATORY - ADULT  Goal: Achieves optimal ventilation and oxygenation  Description: INTERVENTIONS:  - Assess for changes in respiratory status  - Assess for changes in mentation and behavior  - Position to facilitate oxygenation and minimize respiratory effort  - Oxygen administered by appropriate delivery if ordered  - Initiate smoking cessation education as indicated  - Encourage broncho-pulmonary hygiene including cough, deep breathe, Incentive Spirometry  - Assess the need for suctioning and aspirate as needed  - Assess and instruct to report SOB or any respiratory difficulty  - Respiratory Therapy support as indicated  Outcome: Progressing

## 2024-04-22 NOTE — PROGRESS NOTES
Progress Note - Infectious Disease   Ceasar March 63 y.o. male MRN: [id number]  Unit/Bed#: S -01 Encounter: [id number]      Impression/Plan:  1.  RML postobstructive pneumonia, secondary to obstructing large right-sided lung mass.  Patient had minimal improvement on ceftriaxone but clinically improving on Unasyn.  Patient's respiratory status is improved, now on room air.  WBC normalized.  Procalcitonin stable.  Stop Unasyn  Transition to PO Augmentin 875/125mg q12h to complete 10d course through 4/25.   Monitor temperature and vitals  Continue to monitor respiratory symptoms      2.  Positive blood cultures. Although there was growth of Staphylococcus hominis in both admission blood cultures, it appears that this was a single blood draw only from the left arm.  In addition, there was Dermobacter growing in 1 out of 2 sets also, suggesting skin contaminants. Patient has no indwelling intravascular foreign body.  He is systemically well, without sepsis or systemic toxicity.  Repeat blood cultures resulted NG. This is more consistent with contaminated blood draw than true bacteremia.  No antibiotic needed for this indication.     3.  Low back pain.  CT of T-spine and L-spine did not show any metastases or discitis/vertebral osteomyelitis.  Monitor low back pain.  Pain control per primary service.     4.  Right-sided lung mass with hilar mediastinal lymphadenopathy.  Especially in a smoker, this is concerning for primary lung malignancy. Now transferred to [facility] for initiation of chemotherapy. First dose chemotherapy on 4/19.   Management per Oncology     5.  Multiple liver masses, suggestive of metastases.  Patient is status post liver biopsy on 4/15.  Pathology report indicated small cell carcinoma.   Oncology follow-up.     6.  Elevated LFTs, most likely secondary to liver masses.  Abdominal exam is benign. LFTs stable. Tbili remains elevated.   Monitor LFTs.    Above plan was discussed in detail with  patient at bedside.   Above plan was discussed in detail with primary service, who agrees with the plan to transition from IV Unasyn to PO Augmentin to complete 10d antibiotic course as above.    OK for discharge from ID standpoint. ID consult service will sign off at this time. Please call with any additional questions or concerns.     Antibiotics:  Unasyn    Subjective:  Patient reports that he feels well. His only complaint is feeling tired because he isn't getting much sleep in the hospital. Denies having fevers, chills, N/V/D, CP, SOB. Now on room air. Reports decreased swelling in lower extremities as well. No new infectious symptoms.    Objective:  Vitals:  Temp:  [97.4 °F (36.3 °C)-97.8 °F (36.6 °C)] 97.4 °F (36.3 °C)  HR:  [71-86] 71  Resp:  [15-18] 15  BP: (116-127)/(72-83) 126/80  SpO2:  [93 %-96 %] 96 %  Temp (24hrs), Av.5 °F (36.4 °C), Min:97.4 °F (36.3 °C), Max:97.8 °F (36.6 °C)  Current: Temperature: (!) 97.4 °F (36.3 °C)    Physical Exam:   General Appearance:  Chronically ill appearing, alert, interactive, nontoxic, no acute distress.   Throat: Oropharynx moist without lesions. Poor dentition.   Lungs:   Rhonchi noted over LLL, slight expiratory wheezes, respirations unlabored. On room air.    Heart:  RRR; no murmur, rub or gallop.   Abdomen:   Soft, distended, non-tender, positive bowel sounds.     Extremities: No clubbing or cyanosis, no edema.   Skin: No new rashes, lesions, or draining wounds noted on exposed skin. +Jaundice     Labs, Imaging, & Other studies:   All pertinent labs and imaging studies were personally reviewed  Results from last 7 days   Lab Units 24  04324  0433 04/20/24  0545   WBC Thousand/uL 9.10 10.47* 14.88*   HEMOGLOBIN g/dL 12.5 12.8 14.5   PLATELETS Thousands/uL 122* 136* 160     Results from last 7 days   Lab Units 24  0434 24  0433 24  0545 24  0444   POTASSIUM mmol/L 4.9   < > 5.1 4.7   CHLORIDE mmol/L 102   < > 99 101   CO2  mmol/L 29   < > 27 19*   BUN mg/dL 25   < > 24 19   CREATININE mg/dL 0.72   < > 0.87 0.79   EGFR ml/min/1.73sq m 99   < > 92 96   CALCIUM mg/dL 7.6*   < > 8.4 8.0*   AST U/L 257*  --  304* 306*   ALT U/L 160*  --  227* 221*   ALK PHOS U/L 522*  --  716* 742*    < > = values in this interval not displayed.         Results from last 7 days   Lab Units 04/20/24  0545 04/19/24  0444 04/18/24  0454 04/16/24  0428   PROCALCITONIN ng/ml 18.66* 16.96* 13.31* 16.48*                     Imaging Studies:  I have personally reviewed pertinent imaging study reports and images in PACS.       Ruth Barraza PA-C  [facility]

## 2024-04-22 NOTE — ASSESSMENT & PLAN NOTE
Lab Results   Component Value Date     (H) 04/22/2024     (H) 04/22/2024    TBILI 9.95 (H) 04/22/2024    ALKPHOS 522 (H) 04/22/2024    Suspect 2/2 liver metastasis.  Avoid hepatotoxins.  S/P IR guided liver biopsy: Pathology report noted for poorly differentiated neoplasm with extensive necrosis; concerning for small cell carcinoma.    4/22 LFTs trending down  Continue Allopurinol 200 mg daily X 1 month per oncology

## 2024-04-22 NOTE — ASSESSMENT & PLAN NOTE
"Patient reported night sweats, recent weight loss, current every day smoker, severe back and flank pain.  CT chest: \" Right hilar and perihilar 5.5 x 6.3 x 5.2 cm mass consistent lung malignancy. Right hilar, mediastinal and right supraclavicular lymphadenopathy compatible with metastatic disease. Airspace consolidation in the right middle lobe adjacent to the hilum suggesting postobstructive pneumonia. Nodular thickening along the axial interstitium in the right lower lobe and the right major fissure suggesting lymphangitic spread of tumor. Diffuse hepatic metastases. Periportal, gastroesophageal and para-aortic lymphadenopathy\"  Complained of hip pain -- CT A/P displayed sclerotic lesion in the pelvis.  Liver biopsy pathology report positive for small cell carcinoma.\  4/22 discontinue Decadron    4/22 patient overall doing well status post chemotherapy x 3 days.  Patient cleared for discharge per oncology. Will need to follow-up with outpatient oncology  Continue allopurinol for 1 month and Decadron per oncology  Pain med recs per palliative  "

## 2024-04-22 NOTE — ASSESSMENT & PLAN NOTE
"Patient reported night sweats, recent weight loss, current every day smoker, severe back and flank pain.  CT chest: \" Right hilar and perihilar 5.5 x 6.3 x 5.2 cm mass consistent lung malignancy. Right hilar, mediastinal and right supraclavicular lymphadenopathy compatible with metastatic disease. Airspace consolidation in the right middle lobe adjacent to the hilum suggesting postobstructive pneumonia. Nodular thickening along the axial interstitium in the right lower lobe and the right major fissure suggesting lymphangitic spread of tumor. Diffuse hepatic metastases. Periportal, gastroesophageal and para-aortic lymphadenopathy\"  Complained of hip pain -- CT A/P displayed sclerotic lesion in the pelvis.  Liver biopsy pathology report positive for small cell carcinoma.  Transferred to [facility] for chemotherapy.  Oncology consult.   "

## 2024-04-22 NOTE — ASSESSMENT & PLAN NOTE
"Patient reported night sweats, recent weight loss, current every day smoker, severe back and flank pain.  CT chest: \" Right hilar and perihilar 5.5 x 6.3 x 5.2 cm mass consistent lung malignancy. Right hilar, mediastinal and right supraclavicular lymphadenopathy compatible with metastatic disease. Airspace consolidation in the right middle lobe adjacent to the hilum suggesting postobstructive pneumonia. Nodular thickening along the axial interstitium in the right lower lobe and the right major fissure suggesting lymphangitic spread of tumor. Diffuse hepatic metastases. Periportal, gastroesophageal and para-aortic lymphadenopathy\"  Complained of hip pain -- CT A/P displayed sclerotic lesion in the pelvis.  Liver biopsy pathology report positive for small cell carcinoma.    4/22 patient overall doing well status post chemotherapy x 3 days.  Patient cleared for discharge per oncology. Will need to follow-up with outpatient oncology  Continue allopurinol for 1 month and Decadron per oncology  Pain med recs per palliative  "

## 2024-04-23 ENCOUNTER — PATIENT OUTREACH (OUTPATIENT)
Dept: CASE MANAGEMENT | Facility: HOSPITAL | Age: 64
End: 2024-04-23

## 2024-04-23 ENCOUNTER — HOSPITAL ENCOUNTER (OUTPATIENT)
Dept: INFUSION CENTER | Facility: CLINIC | Age: 64
Discharge: HOME | End: 2024-04-23
Payer: COMMERCIAL

## 2024-04-23 DIAGNOSIS — D70.1 CHEMOTHERAPY-INDUCED NEUTROPENIA (HCC): Primary | ICD-10-CM

## 2024-04-23 DIAGNOSIS — Z51.5 PALLIATIVE CARE PATIENT: ICD-10-CM

## 2024-04-23 DIAGNOSIS — T45.1X5A CHEMOTHERAPY-INDUCED NEUTROPENIA (HCC): Primary | ICD-10-CM

## 2024-04-23 DIAGNOSIS — C34.90 SMALL CELL LUNG CANCER (HCC): ICD-10-CM

## 2024-04-23 DIAGNOSIS — G89.3 CANCER RELATED PAIN: ICD-10-CM

## 2024-04-23 PROCEDURE — 96372 THER/PROPH/DIAG INJ SC/IM: CPT

## 2024-04-23 RX ORDER — OXYCODONE HYDROCHLORIDE 15 MG/1
15 TABLET ORAL EVERY 4 HOURS
Qty: 180 TABLET | Refills: 0 | Status: CANCELLED | OUTPATIENT
Start: 2024-04-23

## 2024-04-23 RX ADMIN — PEGFILGRASTIM 6 MG: 6 INJECTION SUBCUTANEOUS at 13:35

## 2024-04-23 NOTE — TELEPHONE ENCOUNTER
Called and spoke to daughter Anna who stated someone had helped them since this transpired after hours. No additional script for pain meds are needed at this time. Daughter is instructed to call if she had any further questions or concerns.

## 2024-04-23 NOTE — TELEPHONE ENCOUNTER
Pt scheduled at [facility] today 1:00 for injection. VM left for patient asking for a call back to discuss details. Call back number provided.

## 2024-04-23 NOTE — TELEPHONE ENCOUNTER
Spoke with Daughter Anna, she is aware and agreeable to infusion appointment today [facility] 1:00 PM for Neulasta.

## 2024-04-23 NOTE — PROGRESS NOTES
Pt presents for neulasta injection. Pt offers no complaints. Injection placed in right abdomen, tolerated well. AVS declined. Pt stated he is seeing Dr. Guadarrama next week prior to setting up next appointments.

## 2024-04-23 NOTE — UTILIZATION REVIEW
NOTIFICATION OF ADMISSION DISCHARGE   This is a Notification of Discharge from [facility]. Please be advised that this patient has been discharge from our facility. Below you will find the admission and discharge date and time including the patient’s disposition.   UTILIZATION REVIEW CONTACT:  Nury Smallwood  Utilization   Network Utilization Review Department  Phone: [phone] carefully listen to the prompts. All voicemails are confidential.  Email: [email or web address]     ADMISSION INFORMATION  PRESENTATION DATE: 4/18/2024  7:05 PM  OBERVATION ADMISSION DATE:   INPATIENT ADMISSION DATE: 4/18/24  7:05 PM   DISCHARGE DATE: 4/22/2024  3:55 PM   DISPOSITION:Home with Home Health Care    Network Utilization Review Department  ATTENTION: Please call with any questions or concerns to [phone] and carefully listen to the prompts so that you are directed to the right person. All voicemails are confidential.   For Discharge needs, contact Care Management DC Support Team at [phone] opt. [phone]  Send all requests for admission clinical reviews, approved or denied determinations and any other requests to dedicated fax number below belonging to the campus where the patient is receiving treatment. List of dedicated fax numbers for the Facilities:  FACILITY NAME UR FAX NUMBER   ADMISSION DENIALS (Administrative/Medical Necessity) [phone]   DISCHARGE SUPPORT TEAM ([facility]) [phone]   PARENT CHILD HEALTH (Maternity/NICU/Pediatrics) [phone]   [facility] [phone]   [facility] [phone]   [facility] [phone]   [facility] [phone]   [facility] [phone]   [facility] [phone]   [facility] [phone]   [facility]  [phone]   [facility] [phone]   [facility] [phone]   [facility] [phone]   [facility] [phone]

## 2024-04-23 NOTE — PROGRESS NOTES
OncSW referral received, chart review completed.  Pt completed inpt chemo and will f/u with Dr. Guadarrama for further tx, MSW will outreach after that appointment.  No immediate needs noted after chart review of his hospital stay.

## 2024-04-24 ENCOUNTER — NURSE TRIAGE (OUTPATIENT)
Dept: PALLIATIVE MEDICINE | Facility: CLINIC | Age: 64
End: 2024-04-24

## 2024-04-24 ENCOUNTER — TELEPHONE (OUTPATIENT)
Dept: HEMATOLOGY ONCOLOGY | Facility: CLINIC | Age: 64
End: 2024-04-24

## 2024-04-24 NOTE — TELEPHONE ENCOUNTER
Appointment Confirmation   Who are you speaking with? Child   If it is not the patient, are they listed on an active communication consent form? Yes   Which provider is the appointment scheduled with?  Dr Guadarrama   When is the appointment scheduled?  Please list date and time 05/01/2024 @ 9AM    At which location is the appointment scheduled to take place? [place]   Did caller verbalize understanding of appointment details? Yes     Patient would not prefer early appointments.

## 2024-04-26 ENCOUNTER — TELEPHONE (OUTPATIENT)
Dept: OTHER | Facility: OTHER | Age: 64
End: 2024-04-26

## 2024-04-26 NOTE — TELEPHONE ENCOUNTER
Daughter called stating pain regimen pt is on is not working well and she would like to speak with someone.  She is Anna, phone [phone]

## 2024-04-29 NOTE — TELEPHONE ENCOUNTER
Follow up call placed to dgt Anna. She does report that she did receive a call back Friday. Per dgt Oxycodone was increased to 1.5 tablets( 22.5 mg)  every 4 hours as needed. Per dgt pain is improved but pt still reports by 4th hour the pain is increasing and takes another dose.   Prior to increase in dose rated pain 7/10.    Pt scheduled for HFU 5/6 [place]  Medications  Gabapentin 100 mg every 8 hours YES  Robaxin 750 mg NO ( states did not get filled and was not needed.. Problem getting filled?)  Dexamethasone 4 mg daily YES    Daughter is asking about starting a long acting pain med as pt is verbalizing he still needs a higher dose of the Oxycodone IR.  MMJ was also discussed. Dgt and pt will consider and discuss at upcoming office visit.     Thank you

## 2024-04-29 NOTE — TELEPHONE ENCOUNTER
Late note for phone call on Friday, April 26. Patient's daughter notified on-call provider of uncontrolled pain. Patient still has over a week of oxycodone 15 mg tablets available. Advised to increase to a tab and a half (22.5 mg) every 4 hours as needed.

## 2024-04-30 ENCOUNTER — PATIENT OUTREACH (OUTPATIENT)
Dept: HEMATOLOGY ONCOLOGY | Facility: CLINIC | Age: 64
End: 2024-04-30

## 2024-04-30 NOTE — PROGRESS NOTES
I reached out and spoke with Ceasar as he has started Tx to review for any barriers to care and offer supportive services as needed. He will be meeting with Dr. Guadarrama tomorrow 5/01. I reviewed and updated the members assigned to the care team in [facility].   He knows the members of the care team as well as how and when to contact them with any needs. His daughter Anna has been helping him managing all of this as well.   He verbalizes managing the schedules well.   He reports that he was able to drive prior to his hospitalization but has not driven since he's been home. His daughter Anna will be driving him and accompanying him to his upcoming appnts.    He is already established with Palliative Care. He reports that his current pain management is not working though. He is scheduled to follow up with Palliative Care on 5/6/24 but he is not sure that he can stand his current pain level till then. I will  route a message to the Palliative team to make them aware.   Patients states that they are eating and drinking as per usual with no unintentional weight loss.     Patient states he is well supported by family and friends.  Community support groups discussed including the Cancer Support Community of the [facility]. Patient declined information at this time.   Patient feels they have adequate insurance coverage and deny any financial concerns at this time.     Discussed the plan for follow-up with the patient. Based on their individual needs I will follow up with them in about 2 weeks.   I have provided my direct contact information to the patient and welcome them to contact me if their needs as discussed above change. They were appreciative for the call.

## 2024-05-01 ENCOUNTER — TELEPHONE (OUTPATIENT)
Dept: HEMATOLOGY ONCOLOGY | Facility: CLINIC | Age: 64
End: 2024-05-01

## 2024-05-01 ENCOUNTER — APPOINTMENT (OUTPATIENT)
Dept: LAB | Facility: HOSPITAL | Age: 64
End: 2024-05-01
Payer: COMMERCIAL

## 2024-05-01 ENCOUNTER — OFFICE VISIT (OUTPATIENT)
Dept: HEMATOLOGY ONCOLOGY | Facility: CLINIC | Age: 64
End: 2024-05-01
Payer: COMMERCIAL

## 2024-05-01 VITALS
SYSTOLIC BLOOD PRESSURE: 128 MMHG | DIASTOLIC BLOOD PRESSURE: 78 MMHG | RESPIRATION RATE: 18 BRPM | HEART RATE: 68 BPM | OXYGEN SATURATION: 98 % | HEIGHT: 72 IN | TEMPERATURE: 97.6 F | BODY MASS INDEX: 25.33 KG/M2 | WEIGHT: 187 LBS

## 2024-05-01 DIAGNOSIS — C34.90 SMALL CELL LUNG CANCER (HCC): Primary | ICD-10-CM

## 2024-05-01 DIAGNOSIS — T50.905A THROMBOCYTOPENIA DUE TO DRUGS: ICD-10-CM

## 2024-05-01 DIAGNOSIS — Z72.0 TOBACCO ABUSE: Chronic | ICD-10-CM

## 2024-05-01 DIAGNOSIS — D69.59 THROMBOCYTOPENIA DUE TO DRUGS: ICD-10-CM

## 2024-05-01 DIAGNOSIS — Z72.0 TOBACCO ABUSE: ICD-10-CM

## 2024-05-01 DIAGNOSIS — R74.01 TRANSAMINITIS: ICD-10-CM

## 2024-05-01 DIAGNOSIS — G89.3 CANCER RELATED PAIN: ICD-10-CM

## 2024-05-01 LAB
BASOPHILS # BLD MANUAL: 0 THOUSAND/UL (ref 0–0.1)
BASOPHILS NFR MAR MANUAL: 0 % (ref 0–1)
EOSINOPHIL # BLD MANUAL: 0 THOUSAND/UL (ref 0–0.4)
EOSINOPHIL NFR BLD MANUAL: 0 % (ref 0–6)
ERYTHROCYTE [DISTWIDTH] IN BLOOD BY AUTOMATED COUNT: 20 % (ref 11.6–15.1)
HCT VFR BLD AUTO: 34.3 % (ref 36.5–49.3)
HGB BLD-MCNC: 11.3 G/DL (ref 12–17)
HYPERCHROMIA BLD QL SMEAR: PRESENT
LDH SERPL-CCNC: 1061 U/L (ref 140–271)
LYMPHOCYTES # BLD AUTO: 2.19 THOUSAND/UL (ref 0.6–4.47)
LYMPHOCYTES # BLD AUTO: 36 % (ref 14–44)
MCH RBC QN AUTO: 27.6 PG (ref 26.8–34.3)
MCHC RBC AUTO-ENTMCNC: 32.9 G/DL (ref 31.4–37.4)
MCV RBC AUTO: 84 FL (ref 82–98)
MONOCYTES # BLD AUTO: 0.73 THOUSAND/UL (ref 0–1.22)
MONOCYTES NFR BLD: 12 % (ref 4–12)
NEUTROPHILS # BLD MANUAL: 3.17 THOUSAND/UL (ref 1.85–7.62)
NEUTS BAND NFR BLD MANUAL: 7 % (ref 0–8)
NEUTS SEG NFR BLD AUTO: 45 % (ref 43–75)
NRBC BLD AUTO-RTO: 1 /100 WBC (ref 0–2)
PLATELET # BLD AUTO: 30 THOUSANDS/UL (ref 149–390)
PLATELET BLD QL SMEAR: ABNORMAL
RBC # BLD AUTO: 4.09 MILLION/UL (ref 3.88–5.62)
WBC # BLD AUTO: 6.09 THOUSAND/UL (ref 4.31–10.16)

## 2024-05-01 PROCEDURE — 99214 OFFICE O/P EST MOD 30 MIN: CPT | Performed by: INTERNAL MEDICINE

## 2024-05-01 PROCEDURE — 80053 COMPREHEN METABOLIC PANEL: CPT

## 2024-05-01 PROCEDURE — 36415 COLL VENOUS BLD VENIPUNCTURE: CPT

## 2024-05-01 PROCEDURE — 85007 BL SMEAR W/DIFF WBC COUNT: CPT

## 2024-05-01 PROCEDURE — 85027 COMPLETE CBC AUTOMATED: CPT

## 2024-05-01 PROCEDURE — 83615 LACTATE (LD) (LDH) ENZYME: CPT

## 2024-05-01 RX ORDER — FENTANYL 12.5 UG/1
1 PATCH TRANSDERMAL
Qty: 5 PATCH | Refills: 0 | Status: SHIPPED | OUTPATIENT
Start: 2024-05-01 | End: 2024-05-06

## 2024-05-01 RX ORDER — ANTACID TABLETS 500 MG/1
1 TABLET, CHEWABLE ORAL DAILY
COMMUNITY

## 2024-05-01 NOTE — TELEPHONE ENCOUNTER
Please schedule patient to have cycle 2 on 5/13 per Dr Guadarrama.   Please have patient scheduled on 5/13.    Monthly or less

## 2024-05-01 NOTE — PROGRESS NOTES
Out patient consent obtained. Plan changed to Dr. Guadarrama. Patient going to pursue second opinion at [facility] but continue with treatment as scheduled.    Chemotherapy and you book given to patient with office handouts.     Labs for 5/13 cycle printed and handed to patient.

## 2024-05-01 NOTE — PROGRESS NOTES
"  Hematology/Oncology Outpatient Follow- up Note  Ceasar March 63 y.o. male MRN: @ Encounter: [id number]        Date:  5/1/2024        Assessment / Plan:    1 stage IV small cell lung cancer  2 liver metastasis extensive  3 jaundice secondary to #2  4 pain secondary to #2  5 thrombocytopenia secondary to chemo.  Plan: Patient will get a CBC CMP today.  It is noted that he has thrombocytopenia secondary to chemo.  He will come back here 5/9 for laboratory work.  If all goes well he will get carboplatin/-16 beginning 5/13.  At that time we will also be looking at the possibility of adding immunotherapy.  Also beyond that he is asking and we will recommend the as he wishes to get a second opinion at [facility].  His prognosis is guarded long-term.  Hopefully he will get at least some response to these treatments.        HPI: 63-year-old gentleman with a history of small cell cancer along with diffuse metastatic disease to liver.  He has pain in the right upper quadrant and right flank area around the lower ribs upper abdominal area.  Hurts worse on palpation.  He is using oxycodone 22.5 mg every 4 hours with some relief.  He is able to eat.  He has no fever or chills.  He still coughs and some shortness of breath but improved.  He is due again for treatment on 5/10/2024.  He was asked to begin again 5/13/2024.  He remains with marked elevation of total bilirubin of 7.2 and all improved.  His LDH total is also quite elevated at 1000.    Interval History: Note 04/22/2024:  Small cell lung cancer with metastasis to liver  Assessment & Plan  Patient reported night sweats, recent weight loss, current every day smoker, severe back and flank pain.  CT chest: \" Right hilar and perihilar 5.5 x 6.3 x 5.2 cm mass consistent lung malignancy. Right hilar, mediastinal and right supraclavicular lymphadenopathy compatible with metastatic disease. Airspace consolidation in the right middle lobe adjacent to the hilum " "suggesting postobstructive pneumonia. Nodular thickening along the axial interstitium in the right lower lobe and the right major fissure suggesting lymphangitic spread of tumor. Diffuse hepatic metastases. Periportal, gastroesophageal and para-aortic lymphadenopathy\"  Complained of hip pain -- CT A/P displayed sclerotic lesion in the pelvis.  Liver biopsy pathology report positive for small cell carcinoma.     4/22 patient overall doing well status post chemotherapy x 3 days.  Patient cleared for discharge per oncology. Will need to follow-up with outpatient oncology  Continue allopurinol for 1 month and Decadron per oncology  Pain med recs per palliative  Note that the patient has become quite jaundice bilirubin of 2.6-9.2 prior to receiving his treatments.  He received cisplatin and day 1 through 3 etopside.    Cancer Staging:  Cancer Staging   No matching staging information was found for the patient.      Molecular Testing:     Previous Hematologic/ Oncologic History:    Oncology History   Small cell lung cancer with metastasis to liver   4/19/2024 Initial Diagnosis    Small cell lung cancer in adult (HCC)     4/19/2024 -  Chemotherapy    alteplase (CATHFLO), 2 mg, Intracatheter, Every 1 Minute as needed, 1 of 6 cycles  pegfilgrastim (NEULASTA), 6 mg, Subcutaneous, Once, 1 of 6 cycles  Administration: 6 mg (4/23/2024)  fosaprepitant (EMEND) IVPB, 150 mg, Intravenous, Once, 1 of 6 cycles  Administration: 150 mg (4/19/2024)  etoposide (TOPOSAR), 50 mg/m2 = 100.6 mg (50 % of original dose 100 mg/m2), Intravenous, Once, 1 of 6 cycles  Dose modification: 50 mg/m2 (original dose 100 mg/m2, Cycle 1, Reason: Anticipated Tolerance)  Administration: 100.6 mg (4/19/2024), 100.6 mg (4/20/2024), 100.6 mg (4/21/2024)  CARBOplatin (PARAPLATIN) IVPB (GOG AUC DOSING), 669.5 mg, Intravenous, Once, 1 of 6 cycles  Administration: 669.5 mg (4/19/2024)         Current Hematologic/ Oncologic Treatment:       Cycle 1         Test " Results:    Imaging: CT spine thoracic & lumbar wo contrast    Result Date: 4/17/2024  Narrative: CT THORACIC AND LUMBAR SPINE INDICATION:   Back and hip pain.. COMPARISON:/11/24. TECHNIQUE:  Contiguous axial images were obtained. Sagittal and coronal reconstructions were performed. Radiation dose length product (DLP) for this visit:  1195 mGy-cm .  This examination, like all CT scans performed in the [facility], was performed utilizing techniques to minimize radiation dose exposure, including the use of iterative reconstruction and automated exposure control. IMAGE QUALITY:  Diagnostic. FINDINGS: ALIGNMENT: Normal alignment of the thoracic and lumbar spine. VERTEBRAE:  No evidence of thoracic or lumbar spine fracture, lytic or blastic lesion. DEGENERATIVE CHANGES: Mild multilevel degenerative disc disease. Endplate degenerative change in the lower thoracic spine. No significant disc degenerative change in the lumbar spine PARASPINAL SOFT TISSUES: No mass or fluid collection. OTHER: Partially imaged right hilar mass and postobstructive pneumonia, further described on the recent chest CT. Mediastinal, peritoneal and retroperitoneal lymphadenopathy, partially visualized. Hepatic metastases.     Impression: No evidence of pathologic fracture, lytic or blastic lesion in the thoracic or lumbar spine. Workstation performed: [id number]     IR biopsy liver mass    Result Date: 4/15/2024  Narrative: CT-scan guided needle biopsy of a liver mass History: Lung mass with liver metastases Conscious sedation time: 30 mins Technique: This examination, like all CT scans performed in the [facility], was performed utilizing techniques to minimize radiation dose exposure, including the use of iterative reconstruction and automated exposure control. The patient was brought to the CT scanner and placed supine on the table. After axial images were obtained through the region of interest an area of the skin  was then marked, prepped, and draped in usual sterile fashion. Lidocaine was administered to the  skin and a small skin incision was made. A 17-gauge cannula was advanced up to the lesion, and through this, an 18-gauge core biopsy specimen was obtained. At the end of the procedure, D-Stat was used for hemostasis through the cannula as it was removed. The patient tolerated the procedure well and suffered no complications.     Impression: Impression: Successful percutaneous core biopsy of a left lobe liver mass. A full pathology report will follow. Workstation performed: [id number]     Echo complete w/ contrast if indicated    Result Date: 4/14/2024  Narrative:   Very technically difficult study due to body habitus.   Left Ventricle: Left ventricular cavity size is normal. Wall thickness is normal. The left ventricular ejection fraction is 60%. Systolic function is normal. Wall motion is normal. Diastolic function is normal.   Right Ventricle: Right ventricular cavity size is possibly mildly dilated.   Tricuspid Valve: There is trace regurgitation.     CT chest abdomen pelvis w contrast    Result Date: 4/11/2024  Narrative: CT CHEST, ABDOMEN AND PELVIS WITH IV CONTRAST INDICATION: Weakness, weight loss. COMPARISON: None. TECHNIQUE: CT examination of the chest, abdomen and pelvis was performed. Multiplanar 2D reformatted images were created from the source data. This examination, like all CT scans performed in the [facility], was performed utilizing techniques to minimize radiation dose exposure, including the use of iterative reconstruction and automated exposure control. Radiation dose length product (DLP) for this visit: 721 mGy-cm IV Contrast: 100 mL of iohexol (OMNIPAQUE) Enteric Contrast: Not administered. FINDINGS: CHEST LUNGS: Airspace consolidation in the right middle lobe contiguous with the hilar mass, suggesting postobstructive pneumonia. Nodular opacification along the axial interstitium  in the right lower lobe and nodular thickening along the right major fissure likely reflects lymphangitic spread of tumor. Severe narrowing of right middle lobe segmental and subsegmental bronchi. PLEURA: No effusion. HEART/GREAT VESSELS: Normal heart size. No thoracic aortic aneurysm. Proximal segmental branches of the right pulmonary artery in the right middle and upper lobes are narrowed by the encasing mass. MEDIASTINUM AND LEWIS: Right hilar and perihilar heterogeneously enhancing mass measures 5.5 x 6.3 x 5.2 cm. High right paratracheal lymph nodes measure up to 1.7 cm in short axis diameter. Subcentimeter prevascular lymph nodes. Subcarinal lymph node measures 2 cm in short axis diameter. Left paraesophageal 1.4 cm lymph node. Right supraclavicular 1.4 cm lymph node. CHEST WALL AND LOWER NECK: Unremarkable. ABDOMEN LIVER/BILIARY TREE: Innumerable hypoenhancing lesions throughout the liver measuring up to 3.1 cm. Hepatomegaly. GALLBLADDER: Cholecystectomy. SPLEEN: Unremarkable. PANCREAS: Unremarkable. ADRENAL GLANDS: Unremarkable. KIDNEYS/URETERS: Symmetric nephrographic phase enhancement of the kidneys. No obstructive uropathy. STOMACH AND BOWEL: Diverticulosis without evidence of diverticulitis or colitis. Ventral herniorrhaphy APPENDIX: Normal appendix. ABDOMINOPELVIC CAVITY: Partially confluent hypoenhancing periportal lymph nodes measuring up to 2.3 cm. Paracaval lymph node measures 1.5 cm in short axis diameter. Gastroesophageal junction lymph nodes measure up to 1 cm subcentimeter para-aortic lymph nodes. VESSELS: No abdominal aortic aneurysm. Patent portal, splenic and mesenteric veins PELVIS REPRODUCTIVE ORGANS: Enlarged prostate. URINARY BLADDER: Unremarkable. ABDOMINAL WALL/INGUINAL REGIONS: Fat-containing left inguinal 2 cm hernia.. BONES: Subtle subcentimeter sclerotic lesions in the pelvis. No evidence of fracture..     Impression: 1. Right hilar and perihilar 5.5 x 6.3 x 5.2 cm mass consistent  "lung malignancy. 2. Right hilar, mediastinal and right supraclavicular lymphadenopathy compatible with metastatic disease. 3. Airspace consolidation in the right middle lobe adjacent to the hilum suggesting postobstructive pneumonia. 4. Nodular thickening along the axial interstitium in the right lower lobe and the right major fissure suggesting lymphangitic spread of tumor. 5. Diffuse hepatic metastases. 6. Periportal, gastroesophageal and para-aortic lymphadenopathy The study was marked in EPIC for immediate notification. Workstation performed: [id number]             Labs:   Lab Results   Component Value Date    WBC 6.09 05/01/2024    HGB 11.3 (L) 05/01/2024    HCT 34.3 (L) 05/01/2024    MCV 84 05/01/2024    PLT 30 (L) 05/01/2024     Lab Results   Component Value Date    K 3.9 05/01/2024     05/01/2024    CO2 26 05/01/2024    BUN 18 05/01/2024    CREATININE 0.71 05/01/2024    CALCIUM 7.7 (L) 05/01/2024    CORRECTEDCA 8.8 05/01/2024    AST 55 (H) 05/01/2024     (H) 05/01/2024    ALKPHOS 507 (H) 05/01/2024    EGFR 100 05/01/2024     LDH is 1000.  Total bilirubin 7.12.    No results found for: \"SPEP\", \"UPEP\"    No results found for: \"PSA\"    No results found for: \"CEA\"    No results found for: \"\"    No results found for: \"AFP\"    No results found for: \"IRON\", \"TIBC\", \"FERRITIN\"    No results found for: \"BUUVHQJN00\"      ROS: Review of Systems   Constitutional: Negative.    HENT: Negative.     Eyes: Negative.    Respiratory: Negative.     Cardiovascular: Negative.    Gastrointestinal:  Positive for abdominal distention and abdominal pain.   Endocrine: Negative.    Genitourinary: Negative.    Musculoskeletal: Negative.    Skin: Negative.    Allergic/Immunologic: Negative.    Neurological: Negative.    Hematological: Negative.      Complaining of pain right upper quadrant and right flank area.    Current Medications: Reviewed  Allergies: Reviewed  PMH/FH/SH:  Reviewed      Physical Exam:    Body surface " area is 2.07 meters squared.    Wt Readings from Last 3 Encounters:   05/01/24 84.8 kg (187 lb)   04/21/24 79.4 kg (175 lb 0.7 oz)   04/14/24 79.4 kg (175 lb)        Temp Readings from Last 3 Encounters:   05/01/24 97.6 °F (36.4 °C) (Temporal)   04/22/24 (!) 97.4 °F (36.3 °C)   04/18/24 97.5 °F (36.4 °C)        BP Readings from Last 3 Encounters:   05/01/24 128/78   04/22/24 133/78   04/18/24 123/80         Pulse Readings from Last 3 Encounters:   05/01/24 68   04/22/24 70   04/18/24 97     @LASTSAO2(3)@      Physical Exam  Constitutional:       Appearance: Normal appearance. He is normal weight.   HENT:      Head: Normocephalic and atraumatic.   Eyes:      Extraocular Movements: Extraocular movements intact.      Conjunctiva/sclera: Conjunctivae normal.      Pupils: Pupils are equal, round, and reactive to light.   Cardiovascular:      Rate and Rhythm: Normal rate and regular rhythm.      Heart sounds: Normal heart sounds.   Pulmonary:      Effort: Pulmonary effort is normal.      Breath sounds: Normal breath sounds.   Abdominal:      General: Abdomen is flat. Bowel sounds are normal.      Palpations: Abdomen is soft.   Musculoskeletal:         General: Normal range of motion.      Cervical back: Normal range of motion and neck supple.   Skin:     General: Skin is warm and dry.   Neurological:      General: No focal deficit present.      Mental Status: He is alert and oriented to person, place, and time. Mental status is at baseline.     His eyegrounds are jaundiced.  His abdomen shows a markedly distended liver at least 6 to 7 cm below the costal margin nodular and tender      Goals and Barriers:  Current Goal: Prolong Survival from Cancer.   Barriers: None.      Patient's Capacity to Self Care:  Patient is able to self care.    Code Status: [unfilled]  Advance Directive and Living Will:      Power of :

## 2024-05-01 NOTE — PROGRESS NOTES
Patient continues to have significant pain uncontrolled by current regimen of oxycodone 22.5 mg Q 4 hours as needed.  Patient reports the least amount of pain he is in is 6/10 with Q 4 hour use of oxycodone.  Discussed initiation of long acting opioids with patient and daughter Anna.  Fentanyl patch 12.5 mcg to be initiated.  Patient is aware of potential side effects.  Discussed onset of patch is about 12 hours after application.  Patient will call the office with any further questions or concerns.

## 2024-05-02 LAB
CARIS GENOMIC LOH - EXOME: NORMAL
CARIS MSI - EXOME: NORMAL
CARIS TMB - EXOME: NORMAL

## 2024-05-06 ENCOUNTER — OFFICE VISIT (OUTPATIENT)
Dept: PALLIATIVE MEDICINE | Facility: CLINIC | Age: 64
End: 2024-05-06
Payer: COMMERCIAL

## 2024-05-06 VITALS
DIASTOLIC BLOOD PRESSURE: 78 MMHG | SYSTOLIC BLOOD PRESSURE: 116 MMHG | BODY MASS INDEX: 23.7 KG/M2 | TEMPERATURE: 97.8 F | RESPIRATION RATE: 18 BRPM | OXYGEN SATURATION: 97 % | WEIGHT: 175 LBS | HEIGHT: 72 IN | HEART RATE: 89 BPM

## 2024-05-06 DIAGNOSIS — Z71.89 COUNSELING REGARDING ADVANCE CARE PLANNING AND GOALS OF CARE: ICD-10-CM

## 2024-05-06 DIAGNOSIS — Z51.5 PALLIATIVE CARE PATIENT: ICD-10-CM

## 2024-05-06 DIAGNOSIS — E43 SEVERE PROTEIN-CALORIE MALNUTRITION (HCC): ICD-10-CM

## 2024-05-06 DIAGNOSIS — G89.3 CANCER RELATED PAIN: ICD-10-CM

## 2024-05-06 DIAGNOSIS — C34.90 SMALL CELL LUNG CANCER (HCC): Primary | ICD-10-CM

## 2024-05-06 PROCEDURE — 99214 OFFICE O/P EST MOD 30 MIN: CPT | Performed by: NURSE PRACTITIONER

## 2024-05-06 RX ORDER — OXYCODONE HYDROCHLORIDE 15 MG/1
15-30 TABLET ORAL EVERY 4 HOURS
Qty: 180 TABLET | Refills: 0 | Status: SHIPPED | OUTPATIENT
Start: 2024-05-06

## 2024-05-06 RX ORDER — FENTANYL 25 UG/1
1 PATCH TRANSDERMAL
Qty: 5 PATCH | Refills: 0 | Status: SHIPPED | OUTPATIENT
Start: 2024-05-06

## 2024-05-06 NOTE — PROGRESS NOTES
Outpatient Follow-Up - Palliative and Supportive Care   Ceasar March 63 y.o. male [id number]    Assessment & Plan  Problem List Items Addressed This Visit       Severe protein-calorie malnutrition (HCC)     Malnutrition Findings:   I Findings:     Body mass index is 23.73 kg/m².     -  patient reports 20 pound weight loss in past 3 months.  - poor appetite  -  would benefit from outpatient oncology nutrition, consult placed   -  Encouraged use of supplements if unable to consume enough protein, calories.            Relevant Orders    Ambulatory referral to [facility] for Oncology    Cancer related pain     Patient experiencing 9/20 pain in abdomen and back.    Oxycodone 15 mg Q 4 hours prn, taking Q 4 hours, increase to Oxycodone 15-30 mg as pain is not controlled  INcrease Fentanyl patch to 25 mcg.   Discussed onset of new dosing and not to use oxycodone 30 mg until response to increased fentanyl patch dosing is known.         Relevant Medications    fentaNYL (DURAGESIC) 25 mcg/hr    oxyCODONE (ROXICODONE) 15 mg immediate release tablet    Palliative care patient     Introduced palliative care to the patient during inpatient consult 4/15  Appropriate for outpatient follow up for symptom management, support and advanced care planning in metastatic cancer.  Psychosocial support  Prefers daughter be contacted prior to wife.  Lives with wife and daughter, Anna.         Relevant Medications    oxyCODONE (ROXICODONE) 15 mg immediate release tablet    Other Relevant Orders    Ambulatory referral to [facility] for Oncology    Counseling regarding advance care planning and goals of care    Small cell lung cancer with metastasis to liver - Primary     CT CAP- R Hilar mass with lymphangitic spread of tumor, Right hilar and perihilar heterogeneously enhancing mass measures 5.5 x 6.3 x 5.2 cm,  Innumerable hypoenhancing lesions throughout the liver  Biopsy completed 4/15/24, small cell cancer  Started  chemotherapy inpatient          Relevant Medications    fentaNYL (DURAGESIC) 25 mcg/hr       Medications adjusted this encounter:  Requested Prescriptions     Signed Prescriptions Disp Refills    fentaNYL (DURAGESIC) 25 mcg/hr 5 patch 0     Sig: Place 1 patch on the skin over 72 hours every third day Max Daily Amount: 1 patch    oxyCODONE (ROXICODONE) 15 mg immediate release tablet 180 tablet 0     Sig: Take 1-2 tablets (15-30 mg total) by mouth every 4 (four) hours Max Daily Amount: 180 mg     Orders Placed This Encounter   Procedures    Ambulatory referral to [facility] Services for Oncology     Medications Discontinued During This Encounter   Medication Reason    fentaNYL (DURAGESIC) 12 mcg/hr TD 72 hr patch     oxyCODONE (ROXICODONE) 15 mg immediate release tablet          Ceasar March was seen today for symptoms and planning cares related to above illnesses.  I have reviewed the patient's controlled substance dispensing history in the Prescription Drug Monitoring Program in compliance with the [facility] regulations before prescribing any controlled substances.    They are invited to continue to follow with us.  If there are questions or concerns, please contact us through our clinic/answering service 24 hours a day, seven days a week.    CHRISTIANA Hensley  [facility] Palliative and Supportive [facility]  [phone]      Visit Information    Accompanied By: daughter Anna     Source of History: Self, daughter    History Limitations: None      History of Present Illness      Ceasar March is a 63 y.o. male who presents in follow up of symptoms related to metastatic small cell cancer.  Pertinent issues include: symptom management, pain, neoplasm related, breathlessness, depression or anxiety, assessment of goals of care, support in serious illness.     The patient presents with his daughter Anna.  She is very up to date on condition and manages the patient medications for him,.      He is having significant pain  requiring oxycodone 22.5 mg.  He repots taking this every 4 hours.  He gets some improvement in pain but still reports pain 6-8/10 after 22.5 mg.  New pain regimen as above.        Ceasar no onset lactose intolerance.  He is unable to drink milk at this time as he develops severe bloating and abdominal discomfort.  Once he cut dairy from his diet he had some improvement in abdominal symptom.  He is moving his bowels.        Past medical, surgical, social, and family histories are reviewed and pertinent updates are made.    Review of Systems   Constitutional:  Positive for appetite change and fatigue.   Gastrointestinal:  Positive for abdominal pain.          Vital Signs    /78 (BP Location: Right arm, Patient Position: Sitting, Cuff Size: Standard)   Pulse 89   Temp 97.8 °F (36.6 °C) (Tympanic)   Resp 18   Ht 6' (1.829 m)   Wt 79.4 kg (175 lb)   SpO2 97%   BMI 23.73 kg/m²     Physical Exam and Objective Data  Physical Exam  Constitutional:       General: He is awake.      Appearance: He is ill-appearing.   HENT:      Head: Atraumatic.   Cardiovascular:      Rate and Rhythm: Normal rate.   Pulmonary:      Effort: No respiratory distress or retractions.   Musculoskeletal:      Cervical back: Full passive range of motion without pain.   Skin:     General: Skin is warm and dry.      Coloration: Skin is jaundiced.   Neurological:      General: No focal deficit present.      Mental Status: He is alert and oriented to person, place, and time.   Psychiatric:         Attention and Perception: Attention normal.         Behavior: Behavior is cooperative.         Cognition and Memory: Cognition and memory normal.           35+  minutes was spent face to face with his daughter with greater than 50% of the time spent in counseling or coordination of care including discussions of etiology of diagnosis, risks and benefits of treatment, treatment instructions, new opioid regimen, support, follow up requirements, and  compliance with treatment regimen.  All of the patient's or agent's questions were answered during this discussion.

## 2024-05-06 NOTE — ASSESSMENT & PLAN NOTE
Patient experiencing 9/20 pain in abdomen and back.    Oxycodone 22.5 mg Q 4 hours prn, taking Q 4 hours, increase to Oxycodone 15-30 mg as pain is not controlled  INcrease Fentanyl patch to 25 mcg.   Discussed onset of new dosing and not to use oxycodone 30 mg until response to increased fentanyl patch dosing is known.

## 2024-05-06 NOTE — ASSESSMENT & PLAN NOTE
Malnutrition Findings:   I Findings:     Body mass index is 23.73 kg/m².     -  patient reports 20 pound weight loss in past 3 months.  - poor appetite  -  would benefit from outpatient oncology nutrition, consult placed   -  Encouraged use of supplements if unable to consume enough protein, calories.

## 2024-05-06 NOTE — ASSESSMENT & PLAN NOTE
CT CAP- R Hilar mass with lymphangitic spread of tumor, Right hilar and perihilar heterogeneously enhancing mass measures 5.5 x 6.3 x 5.2 cm,  Innumerable hypoenhancing lesions throughout the liver  Biopsy completed 4/15/24, small cell cancer  Started chemotherapy inpatient

## 2024-05-07 RX ORDER — SODIUM CHLORIDE 9 MG/ML
20 INJECTION, SOLUTION INTRAVENOUS ONCE
Status: CANCELLED | OUTPATIENT
Start: 2024-05-14

## 2024-05-07 RX ORDER — SODIUM CHLORIDE 9 MG/ML
20 INJECTION, SOLUTION INTRAVENOUS ONCE
Status: CANCELLED | OUTPATIENT
Start: 2024-05-13

## 2024-05-07 RX ORDER — SODIUM CHLORIDE 9 MG/ML
20 INJECTION, SOLUTION INTRAVENOUS ONCE
Status: CANCELLED | OUTPATIENT
Start: 2024-05-15

## 2024-05-08 LAB
ALBUMIN SERPL BCP-MCNC: 2.6 G/DL (ref 3.5–5)
ALP SERPL-CCNC: 507 U/L (ref 34–104)
ALT SERPL W P-5'-P-CCNC: 124 U/L (ref 7–52)
ANION GAP SERPL CALCULATED.3IONS-SCNC: 4 MMOL/L (ref 4–13)
AST SERPL W P-5'-P-CCNC: 55 U/L (ref 13–39)
BILIRUB SERPL-MCNC: 7.12 MG/DL (ref 0.2–1)
BUN SERPL-MCNC: 18 MG/DL (ref 5–25)
CALCIUM ALBUM COR SERPL-MCNC: 8.8 MG/DL (ref 8.3–10.1)
CALCIUM SERPL-MCNC: 7.7 MG/DL (ref 8.4–10.2)
CHLORIDE SERPL-SCNC: 105 MMOL/L (ref 96–108)
CO2 SERPL-SCNC: 26 MMOL/L (ref 21–32)
CREAT SERPL-MCNC: 0.71 MG/DL (ref 0.6–1.3)
GFR SERPL CREATININE-BSD FRML MDRD: 100 ML/MIN/1.73SQ M
GLUCOSE SERPL-MCNC: 89 MG/DL (ref 65–140)
POTASSIUM SERPL-SCNC: 3.9 MMOL/L (ref 3.5–5.3)
PROT SERPL-MCNC: 4.9 G/DL (ref 6.4–8.4)
SODIUM SERPL-SCNC: 135 MMOL/L (ref 135–147)

## 2024-05-09 ENCOUNTER — APPOINTMENT (EMERGENCY)
Dept: CT IMAGING | Facility: HOSPITAL | Age: 64
End: 2024-05-09
Payer: COMMERCIAL

## 2024-05-09 ENCOUNTER — HOSPITAL ENCOUNTER (EMERGENCY)
Facility: HOSPITAL | Age: 64
Discharge: HOME | End: 2024-05-09
Attending: EMERGENCY MEDICINE
Payer: COMMERCIAL

## 2024-05-09 VITALS
DIASTOLIC BLOOD PRESSURE: 64 MMHG | SYSTOLIC BLOOD PRESSURE: 101 MMHG | RESPIRATION RATE: 12 BRPM | OXYGEN SATURATION: 98 % | TEMPERATURE: 97.8 F | HEART RATE: 72 BPM

## 2024-05-09 DIAGNOSIS — R10.9 ACUTE LEFT FLANK PAIN: Primary | ICD-10-CM

## 2024-05-09 DIAGNOSIS — D72.829 LEUKOCYTOSIS: ICD-10-CM

## 2024-05-09 LAB
ALBUMIN SERPL BCP-MCNC: 2.9 G/DL (ref 3.5–5)
ALP SERPL-CCNC: 537 U/L (ref 34–104)
ALT SERPL W P-5'-P-CCNC: 84 U/L (ref 7–52)
ANION GAP SERPL CALCULATED.3IONS-SCNC: 4 MMOL/L (ref 4–13)
ANISOCYTOSIS BLD QL SMEAR: PRESENT
AST SERPL W P-5'-P-CCNC: 48 U/L (ref 13–39)
BASOPHILS # BLD MANUAL: 0 THOUSAND/UL (ref 0–0.1)
BASOPHILS NFR MAR MANUAL: 0 % (ref 0–1)
BILIRUB SERPL-MCNC: 3.17 MG/DL (ref 0.2–1)
BILIRUB UR QL STRIP: NEGATIVE
BUN SERPL-MCNC: 16 MG/DL (ref 5–25)
CALCIUM ALBUM COR SERPL-MCNC: 8.7 MG/DL (ref 8.3–10.1)
CALCIUM SERPL-MCNC: 7.8 MG/DL (ref 8.4–10.2)
CHLORIDE SERPL-SCNC: 107 MMOL/L (ref 96–108)
CLARITY UR: CLEAR
CO2 SERPL-SCNC: 25 MMOL/L (ref 21–32)
COLOR UR: YELLOW
CREAT SERPL-MCNC: 0.94 MG/DL (ref 0.6–1.3)
EOSINOPHIL # BLD MANUAL: 0 THOUSAND/UL (ref 0–0.4)
EOSINOPHIL NFR BLD MANUAL: 0 % (ref 0–6)
ERYTHROCYTE [DISTWIDTH] IN BLOOD BY AUTOMATED COUNT: 19.6 % (ref 11.6–15.1)
GFR SERPL CREATININE-BSD FRML MDRD: 86 ML/MIN/1.73SQ M
GLUCOSE SERPL-MCNC: 125 MG/DL (ref 65–140)
GLUCOSE UR STRIP-MCNC: NEGATIVE MG/DL
HCT VFR BLD AUTO: 30.5 % (ref 36.5–49.3)
HGB BLD-MCNC: 9.9 G/DL (ref 12–17)
HGB UR QL STRIP.AUTO: NEGATIVE
HYPERCHROMIA BLD QL SMEAR: PRESENT
KETONES UR STRIP-MCNC: NEGATIVE MG/DL
LEUKOCYTE ESTERASE UR QL STRIP: NEGATIVE
LIPASE SERPL-CCNC: 14 U/L (ref 11–82)
LYMPHOCYTES # BLD AUTO: 1.55 THOUSAND/UL (ref 0.6–4.47)
LYMPHOCYTES # BLD AUTO: 8 % (ref 14–44)
MCH RBC QN AUTO: 28.7 PG (ref 26.8–34.3)
MCHC RBC AUTO-ENTMCNC: 32.5 G/DL (ref 31.4–37.4)
MCV RBC AUTO: 88 FL (ref 82–98)
METAMYELOCYTE ABSOLUTE CT: 0.97 THOUSAND/UL (ref 0–0.1)
METAMYELOCYTES NFR BLD MANUAL: 5 % (ref 0–1)
MONOCYTES # BLD AUTO: 0.19 THOUSAND/UL (ref 0–1.22)
MONOCYTES NFR BLD: 1 % (ref 4–12)
MYELOCYTE ABSOLUTE CT: 0.78 THOUSAND/UL (ref 0–0.1)
MYELOCYTES NFR BLD MANUAL: 4 % (ref 0–1)
NEUTROPHILS # BLD MANUAL: 15.9 THOUSAND/UL (ref 1.85–7.62)
NEUTS BAND NFR BLD MANUAL: 11 % (ref 0–8)
NEUTS SEG NFR BLD AUTO: 71 % (ref 43–75)
NITRITE UR QL STRIP: NEGATIVE
PATHOLOGY REVIEW: YES
PH UR STRIP.AUTO: 5 [PH]
PLATELET # BLD AUTO: 171 THOUSANDS/UL (ref 149–390)
PLATELET BLD QL SMEAR: ADEQUATE
PMV BLD AUTO: 9.9 FL (ref 8.9–12.7)
POTASSIUM SERPL-SCNC: 4.3 MMOL/L (ref 3.5–5.3)
PROT SERPL-MCNC: 5.5 G/DL (ref 6.4–8.4)
PROT UR STRIP-MCNC: NEGATIVE MG/DL
RBC # BLD AUTO: 3.45 MILLION/UL (ref 3.88–5.62)
SODIUM SERPL-SCNC: 136 MMOL/L (ref 135–147)
SP GR UR STRIP.AUTO: 1.02 (ref 1–1.03)
UROBILINOGEN UR STRIP-ACNC: 2 MG/DL
WBC # BLD AUTO: 19.39 THOUSAND/UL (ref 4.31–10.16)

## 2024-05-09 PROCEDURE — 85007 BL SMEAR W/DIFF WBC COUNT: CPT | Performed by: EMERGENCY MEDICINE

## 2024-05-09 PROCEDURE — 36415 COLL VENOUS BLD VENIPUNCTURE: CPT | Performed by: EMERGENCY MEDICINE

## 2024-05-09 PROCEDURE — 96361 HYDRATE IV INFUSION ADD-ON: CPT

## 2024-05-09 PROCEDURE — 99285 EMERGENCY DEPT VISIT HI MDM: CPT | Performed by: EMERGENCY MEDICINE

## 2024-05-09 PROCEDURE — 74177 CT ABD & PELVIS W/CONTRAST: CPT

## 2024-05-09 PROCEDURE — 85027 COMPLETE CBC AUTOMATED: CPT | Performed by: EMERGENCY MEDICINE

## 2024-05-09 PROCEDURE — 99285 EMERGENCY DEPT VISIT HI MDM: CPT

## 2024-05-09 PROCEDURE — 83690 ASSAY OF LIPASE: CPT | Performed by: EMERGENCY MEDICINE

## 2024-05-09 PROCEDURE — 80053 COMPREHEN METABOLIC PANEL: CPT | Performed by: EMERGENCY MEDICINE

## 2024-05-09 PROCEDURE — 81003 URINALYSIS AUTO W/O SCOPE: CPT | Performed by: EMERGENCY MEDICINE

## 2024-05-09 PROCEDURE — 96374 THER/PROPH/DIAG INJ IV PUSH: CPT

## 2024-05-09 RX ORDER — HYDROMORPHONE HCL/PF 1 MG/ML
1 SYRINGE (ML) INJECTION ONCE
Status: COMPLETED | OUTPATIENT
Start: 2024-05-09 | End: 2024-05-09

## 2024-05-09 RX ADMIN — SODIUM CHLORIDE 500 ML: 0.9 INJECTION, SOLUTION INTRAVENOUS at 16:41

## 2024-05-09 RX ADMIN — IOHEXOL 100 ML: 350 INJECTION, SOLUTION INTRAVENOUS at 17:37

## 2024-05-09 RX ADMIN — HYDROMORPHONE HYDROCHLORIDE 1 MG: 1 INJECTION, SOLUTION INTRAMUSCULAR; INTRAVENOUS; SUBCUTANEOUS at 16:44

## 2024-05-09 NOTE — ED PROVIDER NOTES
History  Chief Complaint   Patient presents with    Flank Pain     Patient reports left flank pain for 4-5 days.  Patient denies any other symptoms.  Patient has stage IV lung CA     63-year-old male with history of recently diagnosed metastatic lung cancer and asthma presenting with left flank pain.  Patient reports progressively worsening pain around left flank area for the last few days.  Patient reports this is a new pain for him the last few weeks.  Denies any radiation.  Worsened pain with twisting turning and palpation.  Denies any nausea vomiting diarrhea.  Denies any abdominal pain.  Denies any chest pain.  Reports baseline shortness of breath.  Denies any urinary changes.  Reports finished his first round of chemotherapy over the course of 3 days about 2 weeks ago.  Denies any other complaints.  Chart reviewed.    Past Medical History:  No date: Asthma  No date: Non-small cell lung cancer metastatic to liver (HCC)  Family History: non-contributory  Social History          Prior to Admission Medications   Prescriptions Last Dose Informant Patient Reported? Taking?   Calcium Carbonate 500 MG CHEW  Self Yes No   Sig: Chew 1 tablet daily   Patient not taking: Reported on 5/6/2024   albuterol (PROVENTIL HFA,VENTOLIN HFA) 90 mcg/act inhaler  Self Yes No   Sig: Inhale 2 puffs   allopurinol 200 MG TABS  Self No No   Sig: Take 200 mg by mouth daily   Patient not taking: Reported on 5/6/2024   dexamethasone (DECADRON) 4 mg tablet  Self No No   Sig: Take 1 tablet (4 mg total) by mouth daily with breakfast 1 tab po at breakfast   docusate sodium (COLACE) 100 mg capsule  Self No No   Sig: Take 1 capsule (100 mg total) by mouth 2 (two) times a day   Patient not taking: Reported on 5/6/2024   fentaNYL (DURAGESIC) 25 mcg/hr   No No   Sig: Place 1 patch on the skin over 72 hours every third day Max Daily Amount: 1 patch   gabapentin (NEURONTIN) 100 mg capsule  Self No No   Sig: Take 1 capsule (100 mg total) by mouth 3  (three) times a day   methocarbamol (ROBAXIN) 750 mg tablet  Self No No   Sig: Take 1 tablet (750 mg total) by mouth every 6 (six) hours   Patient not taking: Reported on 5/6/2024   ondansetron (ZOFRAN) 4 mg tablet  Self No No   Sig: Take 1 tablet (4 mg total) by mouth every 8 (eight) hours as needed for nausea or vomiting   Patient not taking: Reported on 5/6/2024   oxyCODONE (ROXICODONE) 15 mg immediate release tablet   No No   Sig: Take 1-2 tablets (15-30 mg total) by mouth every 4 (four) hours Max Daily Amount: 180 mg   polyethylene glycol (MIRALAX) 17 g packet  Self No No   Sig: Take 17 g by mouth daily as needed (As needed for constipation)   Patient not taking: Reported on 5/6/2024   senna (SENOKOT) 8.6 mg  Self No No   Sig: Take 2 tablets (17.2 mg total) by mouth 2 (two) times a day   Patient not taking: Reported on 5/6/2024      Facility-Administered Medications: None       Past Medical History:   Diagnosis Date    Asthma     Non-small cell lung cancer metastatic to liver (HCC)        Past Surgical History:   Procedure Laterality Date    ELBOW SURGERY Right     HERNIA REPAIR      IR BIOPSY LIVER MASS  04/15/2024       History reviewed. No pertinent family history.  I have reviewed and agree with the history as documented.    E-Cigarette/Vaping    E-Cigarette Use Never User      E-Cigarette/Vaping Substances    Nicotine No     THC No     CBD No     Flavoring No     Other No     Unknown No      Social History     Tobacco Use    Smoking status: Some Days     Current packs/day: 0.75     Types: Cigarettes    Smokeless tobacco: Never   Vaping Use    Vaping status: Never Used   Substance Use Topics    Alcohol use: Never    Drug use: Not Currently     Types: Marijuana       Review of Systems   Constitutional:  Negative for appetite change, chills, diaphoresis, fever and unexpected weight change.   HENT:  Negative for congestion and rhinorrhea.    Eyes:  Negative for photophobia and visual disturbance.    Respiratory:  Negative for cough, chest tightness and shortness of breath.    Cardiovascular:  Negative for chest pain, palpitations and leg swelling.   Gastrointestinal:  Negative for abdominal distention, abdominal pain, blood in stool, constipation, diarrhea, nausea and vomiting.   Genitourinary:  Positive for flank pain. Negative for dysuria and hematuria.   Musculoskeletal:  Negative for back pain, joint swelling, neck pain and neck stiffness.   Skin:  Negative for color change, pallor, rash and wound.   Neurological:  Negative for dizziness, syncope, weakness, light-headedness and headaches.   Psychiatric/Behavioral:  Negative for agitation.    All other systems reviewed and are negative.      Physical Exam  Physical Exam  Vitals and nursing note reviewed.   Constitutional:       General: He is not in acute distress.     Appearance: He is well-developed. He is ill-appearing. He is not toxic-appearing or diaphoretic.      Comments: Chronically ill-appearing   HENT:      Head: Normocephalic and atraumatic.      Nose: Nose normal. No congestion or rhinorrhea.      Mouth/Throat:      Mouth: Mucous membranes are moist.      Pharynx: Oropharynx is clear. No oropharyngeal exudate or posterior oropharyngeal erythema.   Eyes:      General: No scleral icterus.        Right eye: No discharge.         Left eye: No discharge.      Extraocular Movements: Extraocular movements intact.      Conjunctiva/sclera: Conjunctivae normal.      Pupils: Pupils are equal, round, and reactive to light.   Neck:      Vascular: No JVD.      Trachea: No tracheal deviation.      Comments: Supple. Normal range of motion.   Cardiovascular:      Rate and Rhythm: Normal rate and regular rhythm.      Heart sounds: Normal heart sounds. No murmur heard.     No friction rub. No gallop.      Comments: Normal rate and regular rhythm  Pulmonary:      Effort: Pulmonary effort is normal. No respiratory distress.      Breath sounds: No stridor. No  wheezing or rales.   Chest:      Chest wall: No tenderness.   Abdominal:      General: Bowel sounds are normal. There is no distension.      Palpations: Abdomen is soft.      Tenderness: There is no abdominal tenderness. There is no right CVA tenderness, left CVA tenderness, guarding or rebound.      Comments: Soft, nontender, nondistended.  Normal bowel sounds throughout   Musculoskeletal:         General: Tenderness present. No swelling, deformity or signs of injury. Normal range of motion.      Cervical back: Normal range of motion and neck supple. No rigidity. No muscular tenderness.      Right lower leg: No edema.      Left lower leg: No edema.      Comments: Left mid thoracic tenderness near lower ribs   Lymphadenopathy:      Cervical: No cervical adenopathy.   Skin:     General: Skin is warm and dry.      Coloration: Skin is not pale.      Findings: No erythema or rash.   Neurological:      General: No focal deficit present.      Mental Status: He is alert. Mental status is at baseline.      Sensory: No sensory deficit.      Motor: No weakness or abnormal muscle tone.      Coordination: Coordination normal.      Gait: Gait normal.      Comments: Alert.  Strength and sensation grossly intact.  Ambulatory without difficulty at baseline.    Psychiatric:         Behavior: Behavior normal.         Thought Content: Thought content normal.         Vital Signs  ED Triage Vitals   Temperature Pulse Respirations Blood Pressure SpO2   05/09/24 1608 05/09/24 1608 05/09/24 1608 05/09/24 1608 05/09/24 1608   97.8 °F (36.6 °C) 80 12 96/62 98 %      Temp Source Heart Rate Source Patient Position - Orthostatic VS BP Location FiO2 (%)   05/09/24 1608 05/09/24 1608 05/09/24 1608 05/09/24 1608 --   Oral Monitor Sitting Left arm       Pain Score       05/09/24 1644       5           Vitals:    05/09/24 1608 05/09/24 1800   BP: 96/62 101/64   Pulse: 80 72   Patient Position - Orthostatic VS: Sitting Lying         Visual  Acuity      ED Medications  Medications   sodium chloride 0.9 % bolus 500 mL (0 mL Intravenous Stopped 5/9/24 1741)   HYDROmorphone (DILAUDID) injection 1 mg (1 mg Intravenous Given 5/9/24 1644)   iohexol (OMNIPAQUE) 350 MG/ML injection (MULTI-DOSE) 100 mL (100 mL Intravenous Given 5/9/24 1737)       Diagnostic Studies  Results Reviewed       Procedure Component Value Units Date/Time    UA w Reflex to Microscopic w Reflex to Culture [[id number]]  (Abnormal) Collected: 05/09/24 1851    Lab Status: Final result Specimen: Urine, Clean Catch Updated: 05/09/24 1904     Color, UA Yellow     Clarity, UA Clear     Specific Gravity, UA 1.019     pH, UA 5.0     Leukocytes, UA Negative     Nitrite, UA Negative     Protein, UA Negative mg/dl      Glucose, UA Negative mg/dl      Ketones, UA Negative mg/dl      Urobilinogen, UA 2.0 mg/dl      Bilirubin, UA Negative     Occult Blood, UA Negative    CBC and differential [[id number]]  (Abnormal) Collected: 05/09/24 1640    Lab Status: Final result Specimen: Blood from Arm, Right Updated: 05/09/24 1745     WBC 19.39 Thousand/uL      RBC 3.45 Million/uL      Hemoglobin 9.9 g/dL      Hematocrit 30.5 %      MCV 88 fL      MCH 28.7 pg      MCHC 32.5 g/dL      RDW 19.6 %      MPV 9.9 fL      Platelets 171 Thousands/uL     Narrative:      This is an appended report.  These results have been appended to a previously verified report.    Manual Differential(PHLEBS Do Not Order) [[id number]]  (Abnormal) Collected: 05/09/24 1640    Lab Status: Final result Specimen: Blood from Arm, Right Updated: 05/09/24 1745     Segmented % 71 %      Bands % 11 %      Lymphocytes % 8 %      Monocytes % 1 %      Eosinophils % 0 %      Basophils % 0 %      Metamyelocytes % 5 %      Myelocytes % 4 %      Absolute Neutrophils 15.90 Thousand/uL      Absolute Lymphocytes 1.55 Thousand/uL      Absolute Monocytes 0.19 Thousand/uL      Absolute Eosinophils 0.00 Thousand/uL      Absolute Basophils 0.00 Thousand/uL       Absolute Metamyelocytes 0.97 Thousand/uL      Absolute Myelocytes 0.78 Thousand/uL      Total Counted --     Platelet Estimate Adequate     Pathology Review Yes     Anisocytosis Present     Hypochromia Present    Path Slide Review [[id number]] Collected: 05/09/24 1640    Lab Status: In process Specimen: Blood from Arm, Right Updated: 05/09/24 1742    Comprehensive metabolic panel [[id number]]  (Abnormal) Collected: 05/09/24 1640    Lab Status: Final result Specimen: Blood from Arm, Right Updated: 05/09/24 1707     Sodium 136 mmol/L      Potassium 4.3 mmol/L      Chloride 107 mmol/L      CO2 25 mmol/L      ANION GAP 4 mmol/L      BUN 16 mg/dL      Creatinine 0.94 mg/dL      Glucose 125 mg/dL      Calcium 7.8 mg/dL      Corrected Calcium 8.7 mg/dL      AST 48 U/L      ALT 84 U/L      Alkaline Phosphatase 537 U/L      Total Protein 5.5 g/dL      Albumin 2.9 g/dL      Total Bilirubin 3.17 mg/dL      eGFR 86 ml/min/1.73sq m     Narrative:      National Kid[employer] Disease Foundation guidelines for Chronic Kidney Disease (CKD):     Stage 1 with normal or high GFR (GFR > 90 mL/min/1.73 square meters)    Stage 2 Mild CKD (GFR = 60-89 mL/min/1.73 square meters)    Stage 3A Moderate CKD (GFR = 45-59 mL/min/1.73 square meters)    Stage 3B Moderate CKD (GFR = 30-44 mL/min/1.73 square meters)    Stage 4 Severe CKD (GFR = 15-29 mL/min/1.73 square meters)    Stage 5 End Stage CKD (GFR <15 mL/min/1.73 square meters)  Note: GFR calculation is accurate only with a steady state creatinine    Lipase [[id number]]  (Normal) Collected: 05/09/24 1640    Lab Status: Final result Specimen: Blood from Arm, Right Updated: 05/09/24 1707     Lipase 14 u/L                    CT abdomen pelvis with contrast   Final Result by Vic Golden DO (05/09 1921)      No definite acute findings in the abdomen or pelvis. Circumferential bladder wall thickening, without perivesical inflammation, appearance possibly related to underdistention. If there is  clinical concern for cystitis, correlate with urinalysis.      Moderate improvement in diffuse hepatic metastases.      Decreasing periportal adenopathy.      Small amount of new pelvic free fluid, etiology unclear.      Unchanged appearance of mixed attenuation osseous foci, possibly indicative of metastatic disease.      Colonic diverticulosis without diverticulitis.         Workstation performed: [id number]                    Procedures  Procedures         ED Course                                             Medical Decision Making  63-year-old male with history of recently diagnosed metastatic lung cancer and asthma presenting with left flank pain.  Reproducible pain with tenderness and twisting turning.  Possible musculoskeletal component.  Given risk factors and immunocompromise, plan for labs including abdominal labs plus CT imaging and UA.  Symptom management with IV pain medication and fluids.  Reassess.    Symptoms significant improved with medications.  Labs notable for leukocytosis of 19 and bandemia.  UA no acute process.  Imaging no significant acute process.  No obvious infectious source.  Patient very well-appearing.  Discussed leukocytosis and bandemia with patient and daughter at bedside.  Shared decision making, plan for close monitoring outpatient. Discussed results and recommendations. Advised follow up PCP and oncology. Medication recommendations. Given instructions and return precautions. Patient/family at bedside acknowledged understanding of all written and verbal instructions and return precautions. Discharged.     Amount and/or Complexity of Data Reviewed  Labs: ordered.  Radiology: ordered.    Risk  Prescription drug management.             Disposition  Final diagnoses:   Acute left flank pain   Leukocytosis     Time reflects when diagnosis was documented in both MDM as applicable and the Disposition within this note       Time User Action Codes Description Comment    5/9/2024  7:48 PM  Craig Garcia Add [R10.9] Acute left flank pain     5/9/2024  7:48 PM Craig Garcia Add [D72.829] Leukocytosis           ED Disposition       ED Disposition   Discharge    Condition   Stable    Date/Time   Thu May 9, 2024  7:48 PM    Comment   Ceasar March discharge to home/self care.                   Follow-up Information       Follow up With Specialties Details Why Contact Info    [employer]  Call  for [phone]              Discharge Medication List as of 5/9/2024  7:49 PM        CONTINUE these medications which have NOT CHANGED    Details   albuterol (PROVENTIL HFA,VENTOLIN HFA) 90 mcg/act inhaler Inhale 2 puffs, Historical Med      allopurinol 200 MG TABS Take 200 mg by mouth daily, Starting Mon 4/22/2024, Until Wed 5/22/2024, Normal      Calcium Carbonate 500 MG CHEW Chew 1 tablet daily, Historical Med      dexamethasone (DECADRON) 4 mg tablet Take 1 tablet (4 mg total) by mouth daily with breakfast 1 tab po at breakfast, Starting Mon 4/22/2024, Normal      docusate sodium (COLACE) 100 mg capsule Take 1 capsule (100 mg total) by mouth 2 (two) times a day, Starting Mon 4/22/2024, Until Wed 5/22/2024, Normal      fentaNYL (DURAGESIC) 25 mcg/hr Place 1 patch on the skin over 72 hours every third day Max Daily Amount: 1 patch, Starting Mon 5/6/2024, Normal      gabapentin (NEURONTIN) 100 mg capsule Take 1 capsule (100 mg total) by mouth 3 (three) times a day, Starting Tue 4/16/2024, Normal      methocarbamol (ROBAXIN) 750 mg tablet Take 1 tablet (750 mg total) by mouth every 6 (six) hours, Starting Mon 4/22/2024, Normal      ondansetron (ZOFRAN) 4 mg tablet Take 1 tablet (4 mg total) by mouth every 8 (eight) hours as needed for nausea or vomiting, Starting Tue 4/16/2024, Normal      oxyCODONE (ROXICODONE) 15 mg immediate release tablet Take 1-2 tablets (15-30 mg total) by mouth every 4 (four) hours Max Daily Amount: 180 mg, Starting Mon 5/6/2024, Normal      polyethylene glycol (MIRALAX) 17 g packet Take 17  g by mouth daily as needed (As needed for constipation), Starting Mon 4/22/2024, Until Wed 5/22/2024 at 2359, Normal      senna (SENOKOT) 8.6 mg Take 2 tablets (17.2 mg total) by mouth 2 (two) times a day, Starting Mon 4/22/2024, Until Wed 5/22/2024, Normal             No discharge procedures on file.    PDMP Review         Value Time User    PDMP Reviewed  Yes 5/6/2024  3:39 PM April D CHRISTIANA Aguillon            ED Provider  Electronically Signed by             Craig Garcia MD  05/09/24 8759

## 2024-05-09 NOTE — DISCHARGE INSTRUCTIONS
Please follow up PCP and oncology.  Please closely monitor for signs and symptoms of infection.  Recommend tylenol 650 mg and ibuprofen 600 mg every 6 hours as needed for pain. Please return for severe chest pain, significant shortness of breath, severely worsening symptoms, or any other concerning signs or symptoms. Please refer to the following documents for additional instructions and return precautions.

## 2024-05-10 ENCOUNTER — APPOINTMENT (OUTPATIENT)
Dept: LAB | Facility: CLINIC | Age: 64
End: 2024-05-10
Payer: COMMERCIAL

## 2024-05-10 ENCOUNTER — TELEPHONE (OUTPATIENT)
Dept: NUTRITION | Facility: CLINIC | Age: 64
End: 2024-05-10

## 2024-05-10 DIAGNOSIS — C34.90 SMALL CELL LUNG CANCER (HCC): ICD-10-CM

## 2024-05-10 DIAGNOSIS — T45.1X5A CHEMOTHERAPY-INDUCED NEUTROPENIA (HCC): ICD-10-CM

## 2024-05-10 DIAGNOSIS — D70.1 CHEMOTHERAPY-INDUCED NEUTROPENIA (HCC): ICD-10-CM

## 2024-05-10 DIAGNOSIS — R74.01 TRANSAMINITIS: ICD-10-CM

## 2024-05-10 DIAGNOSIS — Z72.0 TOBACCO ABUSE: Chronic | ICD-10-CM

## 2024-05-10 LAB
ALBUMIN SERPL BCP-MCNC: 2.8 G/DL (ref 3.5–5)
ALP SERPL-CCNC: 544 U/L (ref 34–104)
ALT SERPL W P-5'-P-CCNC: 84 U/L (ref 7–52)
ANION GAP SERPL CALCULATED.3IONS-SCNC: 7 MMOL/L (ref 4–13)
ANISOCYTOSIS BLD QL SMEAR: PRESENT
AST SERPL W P-5'-P-CCNC: 47 U/L (ref 13–39)
BASOPHILS # BLD MANUAL: 0 THOUSAND/UL (ref 0–0.1)
BASOPHILS NFR MAR MANUAL: 0 % (ref 0–1)
BILIRUB SERPL-MCNC: 3.14 MG/DL (ref 0.2–1)
BUN SERPL-MCNC: 12 MG/DL (ref 5–25)
CALCIUM ALBUM COR SERPL-MCNC: 8.6 MG/DL (ref 8.3–10.1)
CALCIUM SERPL-MCNC: 7.6 MG/DL (ref 8.4–10.2)
CHLORIDE SERPL-SCNC: 104 MMOL/L (ref 96–108)
CO2 SERPL-SCNC: 27 MMOL/L (ref 21–32)
CREAT SERPL-MCNC: 0.71 MG/DL (ref 0.6–1.3)
EOSINOPHIL # BLD MANUAL: 0 THOUSAND/UL (ref 0–0.4)
EOSINOPHIL NFR BLD MANUAL: 0 % (ref 0–6)
ERYTHROCYTE [DISTWIDTH] IN BLOOD BY AUTOMATED COUNT: 20.1 % (ref 11.6–15.1)
GFR SERPL CREATININE-BSD FRML MDRD: 100 ML/MIN/1.73SQ M
GLUCOSE P FAST SERPL-MCNC: 76 MG/DL (ref 65–99)
HCT VFR BLD AUTO: 33.7 % (ref 36.5–49.3)
HGB BLD-MCNC: 10.6 G/DL (ref 12–17)
HYPERCHROMIA BLD QL SMEAR: PRESENT
LDH SERPL-CCNC: 475 U/L (ref 140–271)
LYMPHOCYTES # BLD AUTO: 14 % (ref 14–44)
LYMPHOCYTES # BLD AUTO: 2.36 THOUSAND/UL (ref 0.6–4.47)
MCH RBC QN AUTO: 29 PG (ref 26.8–34.3)
MCHC RBC AUTO-ENTMCNC: 31.5 G/DL (ref 31.4–37.4)
MCV RBC AUTO: 92 FL (ref 82–98)
METAMYELOCYTE ABSOLUTE CT: 0.84 THOUSAND/UL (ref 0–0.1)
METAMYELOCYTES NFR BLD MANUAL: 5 % (ref 0–1)
MONOCYTES # BLD AUTO: 1.01 THOUSAND/UL (ref 0–1.22)
MONOCYTES NFR BLD: 6 % (ref 4–12)
MYELOCYTE ABSOLUTE CT: 0.51 THOUSAND/UL (ref 0–0.1)
MYELOCYTES NFR BLD MANUAL: 3 % (ref 0–1)
NEUTROPHILS # BLD MANUAL: 12.16 THOUSAND/UL (ref 1.85–7.62)
NEUTS BAND NFR BLD MANUAL: 10 % (ref 0–8)
NEUTS SEG NFR BLD AUTO: 62 % (ref 43–75)
PLATELET # BLD AUTO: 195 THOUSANDS/UL (ref 149–390)
PLATELET BLD QL SMEAR: ADEQUATE
PMV BLD AUTO: 10.4 FL (ref 8.9–12.7)
POTASSIUM SERPL-SCNC: 4.1 MMOL/L (ref 3.5–5.3)
PROT SERPL-MCNC: 5.6 G/DL (ref 6.4–8.4)
RBC # BLD AUTO: 3.65 MILLION/UL (ref 3.88–5.62)
RBC MORPH BLD: PRESENT
SODIUM SERPL-SCNC: 138 MMOL/L (ref 135–147)
URATE SERPL-MCNC: 5.4 MG/DL (ref 3.5–8.5)
WBC # BLD AUTO: 16.89 THOUSAND/UL (ref 4.31–10.16)

## 2024-05-10 PROCEDURE — 80053 COMPREHEN METABOLIC PANEL: CPT

## 2024-05-10 PROCEDURE — 83615 LACTATE (LD) (LDH) ENZYME: CPT

## 2024-05-10 PROCEDURE — 36415 COLL VENOUS BLD VENIPUNCTURE: CPT

## 2024-05-10 PROCEDURE — 84550 ASSAY OF BLOOD/URIC ACID: CPT

## 2024-05-10 PROCEDURE — 85027 COMPLETE CBC AUTOMATED: CPT

## 2024-05-10 PROCEDURE — 85007 BL SMEAR W/DIFF WBC COUNT: CPT

## 2024-05-10 NOTE — TELEPHONE ENCOUNTER
Received notification by Palliative Care on 5/10/24 that pt has triggered for oncology nutrition care (reason for referral: Ambulatory referral for protein calorie malnutrition ).    Contacted Ceasar to establish care. Spoke with Ceasar's daughter Anna today. Introduced self and oncology nutrition services. Anna declines nutrition consult at this time. She explains that Ceasar is doing okay, his appetite is good and he is eating three meals per day with snacks. She has been helping to manage his nutrition/ PO intakes. She took down RD contact info and will reach out with any nutrition needs.

## 2024-05-13 ENCOUNTER — HOSPITAL ENCOUNTER (OUTPATIENT)
Dept: INFUSION CENTER | Facility: CLINIC | Age: 64
Discharge: HOME | End: 2024-05-13
Payer: COMMERCIAL

## 2024-05-13 ENCOUNTER — TELEPHONE (OUTPATIENT)
Dept: HEMATOLOGY ONCOLOGY | Facility: CLINIC | Age: 64
End: 2024-05-13

## 2024-05-13 ENCOUNTER — PATIENT OUTREACH (OUTPATIENT)
Dept: CASE MANAGEMENT | Facility: HOSPITAL | Age: 64
End: 2024-05-13

## 2024-05-13 VITALS
TEMPERATURE: 96.4 F | HEART RATE: 89 BPM | HEIGHT: 72 IN | DIASTOLIC BLOOD PRESSURE: 57 MMHG | WEIGHT: 176.4 LBS | BODY MASS INDEX: 23.89 KG/M2 | RESPIRATION RATE: 17 BRPM | SYSTOLIC BLOOD PRESSURE: 101 MMHG

## 2024-05-13 DIAGNOSIS — D70.1 CHEMOTHERAPY-INDUCED NEUTROPENIA (HCC): Primary | ICD-10-CM

## 2024-05-13 DIAGNOSIS — C34.90 SMALL CELL LUNG CANCER (HCC): ICD-10-CM

## 2024-05-13 DIAGNOSIS — T45.1X5A CHEMOTHERAPY-INDUCED NEUTROPENIA (HCC): Primary | ICD-10-CM

## 2024-05-13 PROCEDURE — 85060 BLOOD SMEAR INTERPRETATION: CPT | Performed by: STUDENT IN AN ORGANIZED HEALTH CARE EDUCATION/TRAINING PROGRAM

## 2024-05-13 RX ORDER — SODIUM CHLORIDE 9 MG/ML
20 INJECTION, SOLUTION INTRAVENOUS ONCE
Status: COMPLETED | OUTPATIENT
Start: 2024-05-13 | End: 2024-05-13

## 2024-05-13 RX ADMIN — CARBOPLATIN 731 MG: 10 INJECTION, SOLUTION INTRAVENOUS at 10:19

## 2024-05-13 RX ADMIN — DEXAMETHASONE SODIUM PHOSPHATE: 10 INJECTION, SOLUTION INTRAMUSCULAR; INTRAVENOUS at 09:12

## 2024-05-13 RX ADMIN — SODIUM CHLORIDE 20 ML/HR: 0.9 INJECTION, SOLUTION INTRAVENOUS at 09:12

## 2024-05-13 RX ADMIN — ETOPOSIDE 201 MG: 20 INJECTION, SOLUTION INTRAVENOUS at 10:56

## 2024-05-13 RX ADMIN — FOSAPREPITANT 150 MG: 150 INJECTION, POWDER, LYOPHILIZED, FOR SOLUTION INTRAVENOUS at 09:34

## 2024-05-13 NOTE — PROGRESS NOTES
Pt here for chemotherapy infusion, offering no complaints. Peripheral IV placed with positive blood return noted. Tolerated infusion without incident. Peripheral IV removed. AVS declined. Walked out in stable condition. Next appointment on 5/14/24 at 1:30pm.

## 2024-05-13 NOTE — PROGRESS NOTES
Biopsychosocial and Barriers Assessment    Cancer Diagnosis: SCLC, stage IV, liver mets  Home/Cell Phone: [phone]  Emergency Contact: Anna polanco  Marital Status:   Interpretation concerns, speaks another language, preferred language: speaks English  Cultural concerns: none noted  Ability to read or write: independent    Caregiver/Support: wife, daughter  Children: adult daughter  Child/Elder care: NA    Housing: lives local to [facility], 2 story home  Home Setup:   Lives With: wife, adult daughter  Daily Living Activities: previously independent, occasional assist at this time depending on the day  Durable Medical Equipment: none  Ambulation: independent    Preferred Pharmacy: [facility]  High co-pays with insurance:  [employer] balance, will provide hospital FA application  High co-pays with medication coverage:  No medication coverage: NA    Primary Care Provider: none listed  Hx of Home Health Care: none  Hx of Short term rehab: none  Mental Health Hx: none  Substance Abuse Hx: none  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, 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   Status/Location: not a   Ability to pay bills: some concerns, see note  POA/LW/AD:  Transportation Plan/Concerns: daughter will assist       What do you know about your Cancer Diagnosis    What has your doctor told you about your cancer diagnosis:    What has your doctor told you about your cancer treatment:    What specific concerns do you have about your diagnosis and treatment:    Have you been made aware of any hair loss associated with treatment:    Additional Comments:      MSW Met with pt and his daughter this morning in the infusion center, he is here for his first outpatient tx but notes that he had treatment while admitted.  He says that he did have a hard time with side effects, however he also at that time had pneumonia and was septic among other issues.  He is hoping this cycle will go better.   "They tell me that he is doing well at home since being discharged from the hospital.    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  He is not sure of any STD benefits or options for [employer].  He and his daughter have already started the SSD application and we discussed that process at length today.  He plans to go to lunch with his boss in the near future to discuss his options with work, he would like to work part time during treatment if he's able.  His wife does work and he says that their budget will be extremely tight if he is not working at all.  His daughter works part time at a local farm as well.  Pt is on his wife's insurance so there is no concern for loss of coverage if he cannot work.  I do note a $2400 outstanding balance at this time and will take him the hospital FA application today as well.    Pt says that emotionally he is doing fine and scored his DT \"1/2 most days, 5 on days when I think about having cancer\".  He says that his wife and daughter are having a harder time emotionally than he is.  We discussed options for support for all of them, they agreed to let me know if they'd like referrals or more information in the future.    At this time, pt denies any needs or concerns but was appreciative of the introduction.  I will remain available to assist or support them as needed moving forward.  "

## 2024-05-14 ENCOUNTER — HOSPITAL ENCOUNTER (OUTPATIENT)
Dept: INFUSION CENTER | Facility: CLINIC | Age: 64
Discharge: HOME | End: 2024-05-14
Payer: COMMERCIAL

## 2024-05-14 VITALS
RESPIRATION RATE: 18 BRPM | HEART RATE: 72 BPM | TEMPERATURE: 96.6 F | SYSTOLIC BLOOD PRESSURE: 95 MMHG | HEIGHT: 72 IN | WEIGHT: 178.4 LBS | DIASTOLIC BLOOD PRESSURE: 55 MMHG | BODY MASS INDEX: 24.16 KG/M2

## 2024-05-14 DIAGNOSIS — E83.51 HYPOCALCEMIA: Primary | ICD-10-CM

## 2024-05-14 DIAGNOSIS — T45.1X5A CHEMOTHERAPY-INDUCED NEUTROPENIA (HCC): Primary | ICD-10-CM

## 2024-05-14 DIAGNOSIS — C34.90 SMALL CELL LUNG CANCER (HCC): ICD-10-CM

## 2024-05-14 DIAGNOSIS — D70.1 CHEMOTHERAPY-INDUCED NEUTROPENIA (HCC): Primary | ICD-10-CM

## 2024-05-14 RX ORDER — SODIUM CHLORIDE 9 MG/ML
20 INJECTION, SOLUTION INTRAVENOUS ONCE
Status: COMPLETED | OUTPATIENT
Start: 2024-05-14 | End: 2024-05-14

## 2024-05-14 RX ADMIN — SODIUM CHLORIDE 20 ML/HR: 0.9 INJECTION, SOLUTION INTRAVENOUS at 14:11

## 2024-05-14 RX ADMIN — DEXAMETHASONE SODIUM PHOSPHATE: 10 INJECTION, SOLUTION INTRAMUSCULAR; INTRAVENOUS at 14:11

## 2024-05-14 RX ADMIN — ETOPOSIDE 201 MG: 20 INJECTION, SOLUTION INTRAVENOUS at 14:51

## 2024-05-14 NOTE — PROGRESS NOTES
Pt to clinic for day 2 etoposide, c/o nausea but states this is resolved with PO meds at home, tolerated infusion without complications, aware of next appointment 5/15/24 at 12:30PM, PIV removed, avs declined.

## 2024-05-14 NOTE — TELEPHONE ENCOUNTER
Forms completed, waiting on Dr. Guadarrama to be back in office 5/20 to sign. Family aware office will reach out when they are signed and ready for .

## 2024-05-15 ENCOUNTER — HOSPITAL ENCOUNTER (OUTPATIENT)
Dept: INFUSION CENTER | Facility: CLINIC | Age: 64
End: 2024-05-15

## 2024-05-15 ENCOUNTER — HOSPITAL ENCOUNTER (OUTPATIENT)
Dept: INFUSION CENTER | Facility: CLINIC | Age: 64
Discharge: HOME | End: 2024-05-15
Payer: COMMERCIAL

## 2024-05-15 VITALS
HEART RATE: 73 BPM | WEIGHT: 178.6 LBS | RESPIRATION RATE: 18 BRPM | SYSTOLIC BLOOD PRESSURE: 102 MMHG | HEIGHT: 72 IN | DIASTOLIC BLOOD PRESSURE: 56 MMHG | TEMPERATURE: 96 F | BODY MASS INDEX: 24.19 KG/M2

## 2024-05-15 DIAGNOSIS — T45.1X5A CHEMOTHERAPY-INDUCED NEUTROPENIA (HCC): Primary | ICD-10-CM

## 2024-05-15 DIAGNOSIS — E83.51 HYPOCALCEMIA: Primary | ICD-10-CM

## 2024-05-15 DIAGNOSIS — D70.1 CHEMOTHERAPY-INDUCED NEUTROPENIA (HCC): Primary | ICD-10-CM

## 2024-05-15 DIAGNOSIS — C34.90 SMALL CELL LUNG CANCER (HCC): ICD-10-CM

## 2024-05-15 PROCEDURE — 96413 CHEMO IV INFUSION 1 HR: CPT

## 2024-05-15 PROCEDURE — 96367 TX/PROPH/DG ADDL SEQ IV INF: CPT

## 2024-05-15 RX ORDER — SODIUM CHLORIDE 9 MG/ML
20 INJECTION, SOLUTION INTRAVENOUS ONCE
Status: COMPLETED | OUTPATIENT
Start: 2024-05-15 | End: 2024-05-15

## 2024-05-15 RX ADMIN — DEXAMETHASONE SODIUM PHOSPHATE: 10 INJECTION, SOLUTION INTRAMUSCULAR; INTRAVENOUS at 12:44

## 2024-05-15 RX ADMIN — SODIUM CHLORIDE 20 ML/HR: 0.9 INJECTION, SOLUTION INTRAVENOUS at 12:44

## 2024-05-15 RX ADMIN — ETOPOSIDE 201 MG: 20 INJECTION, SOLUTION INTRAVENOUS at 13:22

## 2024-05-15 NOTE — PROGRESS NOTES
Pt here for day 3 of etoposide infusion, offering complaints of nausea and fatigue resolves with PO meds at home. Peripheral IV placed with positive blood return noted. Tolerated infusion without incident. Peripheral IV removed. AVS declined. Walked out in stable condition. Next appointment on 5/16/24 at 2:30pm.

## 2024-05-16 ENCOUNTER — HOSPITAL ENCOUNTER (OUTPATIENT)
Dept: INFUSION CENTER | Facility: CLINIC | Age: 64
Discharge: HOME | End: 2024-05-16
Payer: COMMERCIAL

## 2024-05-16 ENCOUNTER — TELEPHONE (OUTPATIENT)
Dept: PALLIATIVE MEDICINE | Facility: CLINIC | Age: 64
End: 2024-05-16

## 2024-05-16 ENCOUNTER — TELEPHONE (OUTPATIENT)
Dept: HEMATOLOGY ONCOLOGY | Facility: CLINIC | Age: 64
End: 2024-05-16

## 2024-05-16 VITALS
TEMPERATURE: 96 F | RESPIRATION RATE: 18 BRPM | DIASTOLIC BLOOD PRESSURE: 50 MMHG | SYSTOLIC BLOOD PRESSURE: 93 MMHG | HEART RATE: 63 BPM

## 2024-05-16 DIAGNOSIS — C34.90 SMALL CELL LUNG CANCER (HCC): ICD-10-CM

## 2024-05-16 DIAGNOSIS — D70.1 CHEMOTHERAPY-INDUCED NEUTROPENIA (HCC): Primary | ICD-10-CM

## 2024-05-16 DIAGNOSIS — T45.1X5A CHEMOTHERAPY-INDUCED NEUTROPENIA (HCC): Primary | ICD-10-CM

## 2024-05-16 PROCEDURE — 96372 THER/PROPH/DIAG INJ SC/IM: CPT

## 2024-05-16 RX ADMIN — PEGFILGRASTIM 6 MG: 6 INJECTION SUBCUTANEOUS at 14:47

## 2024-05-16 NOTE — TELEPHONE ENCOUNTER
Sent opioid agreement out, to be signed and mailed back. Called patient and spoke to daughter. She will bring it into office one the patient sign the opioid agreement.

## 2024-05-16 NOTE — PROGRESS NOTES
Infusion staff reach out stating Ceasar and his daughter state he's been dizzy over the past 3 days that is progressively getting worse, he is also have cold sweats.  BP was 93/50 HR63 not taking any meds for his BP. It was asked if Infusion had room for hydration if it is ordered and they have availability at 4pm.Patient doesn't want any hydration to be given.  Hamida was made aware. Pt was educated by Infusion Nurse to watch for fever and if symptoms are worse to go to ER.

## 2024-05-16 NOTE — TELEPHONE ENCOUNTER
Patient Call    Who are you speaking with? Child    If it is not the patient, are they listed on an active communication consent form? Yes   What is the reason for this call? Pt is weak and is sweating a lot. He is resting so she asked if he can have a later infusion appt   Does this require a call back? Yes   If a call back is required, please list best call back number [phone]    If a call back is required, advise that a message will be forwarded to their care team and someone will return their call as soon as possible.   Did you relay this information to the patient? Yes

## 2024-05-16 NOTE — TELEPHONE ENCOUNTER
Called patient and spoke with daughter who stated they were driving in for his appt now with infusion.

## 2024-05-16 NOTE — PROGRESS NOTES
Pt to clinic for neulasta injection. Pt reports dizziness over the past 3 days that is progressively getting worse and cold sweats. Pt denies fevers and vitals signs stable. Office RN, Lupe Ryan notified. IV Hydration recommended per NELLY Galindo. Pt refusing hydration at this time. Pt told to increase his fluid intake and if symptoms get worse to go to the ER. Tolerated injection in RLQ without complications. AVS declined. Pt aware of next appointment on 6/3/24 at 1200.

## 2024-05-18 PROBLEM — J18.9 PNEUMONIA OF RIGHT MIDDLE LOBE DUE TO INFECTIOUS ORGANISM: Status: RESOLVED | Noted: 2024-04-12 | Resolved: 2024-05-18

## 2024-05-20 DIAGNOSIS — G89.3 CANCER RELATED PAIN: ICD-10-CM

## 2024-05-20 DIAGNOSIS — C34.90 SMALL CELL LUNG CANCER (HCC): ICD-10-CM

## 2024-05-21 RX ORDER — GABAPENTIN 100 MG/1
100 CAPSULE ORAL 3 TIMES DAILY
Qty: 90 CAPSULE | Refills: 0 | Status: SHIPPED | OUTPATIENT
Start: 2024-05-21

## 2024-05-21 RX ORDER — DEXAMETHASONE 4 MG/1
4 TABLET ORAL
Qty: 30 TABLET | Refills: 0 | Status: SHIPPED | OUTPATIENT
Start: 2024-05-21

## 2024-05-21 NOTE — TELEPHONE ENCOUNTER
Last appointment:05/06/24    Next scheduled appointment:06/19/24    Ph[facility]      PDMP review:

## 2024-05-23 DIAGNOSIS — C34.90 SMALL CELL LUNG CANCER (HCC): Primary | ICD-10-CM

## 2024-05-24 RX ORDER — ALBUTEROL SULFATE 90 UG/1
2 AEROSOL, METERED RESPIRATORY (INHALATION) EVERY 6 HOURS PRN
Qty: 6.7 G | Refills: 0 | Status: SHIPPED | OUTPATIENT
Start: 2024-05-24

## 2024-05-26 DIAGNOSIS — C34.90 SMALL CELL LUNG CANCER (HCC): ICD-10-CM

## 2024-05-26 DIAGNOSIS — G89.3 CANCER RELATED PAIN: ICD-10-CM

## 2024-05-28 ENCOUNTER — TELEPHONE (OUTPATIENT)
Dept: HEMATOLOGY ONCOLOGY | Facility: CLINIC | Age: 64
End: 2024-05-28

## 2024-05-28 ENCOUNTER — NURSE TRIAGE (OUTPATIENT)
Dept: OTHER | Facility: OTHER | Age: 64
End: 2024-05-28

## 2024-05-28 DIAGNOSIS — G89.3 CANCER RELATED PAIN: ICD-10-CM

## 2024-05-28 DIAGNOSIS — Z51.5 PALLIATIVE CARE PATIENT: ICD-10-CM

## 2024-05-28 DIAGNOSIS — C34.90 SMALL CELL LUNG CANCER (HCC): ICD-10-CM

## 2024-05-28 RX ORDER — FENTANYL 25 UG/1
1 PATCH TRANSDERMAL
Qty: 10 PATCH | Refills: 0 | Status: SHIPPED | OUTPATIENT
Start: 2024-05-28

## 2024-05-28 RX ORDER — OXYCODONE HYDROCHLORIDE 15 MG/1
15-30 TABLET ORAL EVERY 4 HOURS
Qty: 180 TABLET | Refills: 0 | Status: SHIPPED | OUTPATIENT
Start: 2024-05-28

## 2024-05-28 RX ORDER — OXYCODONE HYDROCHLORIDE 15 MG/1
15-30 TABLET ORAL EVERY 4 HOURS
Qty: 180 TABLET | Refills: 0 | Status: SHIPPED | OUTPATIENT
Start: 2024-05-28 | End: 2024-05-28 | Stop reason: SDUPTHER

## 2024-05-28 RX ORDER — FENTANYL 25 UG/1
1 PATCH TRANSDERMAL
Qty: 10 PATCH | Refills: 0 | Status: SHIPPED | OUTPATIENT
Start: 2024-05-28 | End: 2024-05-28 | Stop reason: SDUPTHER

## 2024-05-28 NOTE — TELEPHONE ENCOUNTER
"Answer Assessment - Initial Assessment Questions  1. DRUG NAME: \"What medicine do you need to have refilled?\"      Fentayl patch      oxycodone    2. REFILLS REMAINING: \"How many refills are remaining?\" (Note: The label on the medicine or pill bottle will show how many refills are remaining. If there are no refills remaining, then a renewal may be needed.)      Refill sent in today but pharmacy is out. Fentanyl patch due to be changed today and is primary relief for pt    3. EXPIRATION DATE: \"What is the expiration date?\" (Note: The label states when the prescription will , and thus can no longer be refilled.)      NA    4. PRESCRIBING HCP: \"Who prescribed it?\" Reason: If prescribed by specialist, call should be referred to that group.      Palliative    Protocols used: Medication Refill and Renewal Call-ADULT-    "

## 2024-05-28 NOTE — TELEPHONE ENCOUNTER
Patient Call    Who are you speaking with? Child    If it is not the patient, are they listed on an active communication consent form? Yes   What is the reason for this call? Reschedule infusions   Does this require a call back? Yes   If a call back is required, please list best call back number [phone]    If a call back is required, advise that a message will be forwarded to their care team and someone will return their call as soon as possible.   Did you relay this information to the patient? Yes

## 2024-05-29 ENCOUNTER — TELEPHONE (OUTPATIENT)
Dept: HEMATOLOGY ONCOLOGY | Facility: CLINIC | Age: 64
End: 2024-05-29

## 2024-05-29 NOTE — TELEPHONE ENCOUNTER
Reached out and spoke to daughter this morning. Made her aware that provider was aware they were going for a second opinion and the appt on 5/31 was to follow up on pt for the following start of treatment. Encouraged appt the week prior to patient starting his treatment and she verbalized understanding. Will work on the schedule to see where we can get patient on preferably the 6th or 7th. Informed her would send a message through my chart and would be seen in portal.

## 2024-06-03 ENCOUNTER — HOSPITAL ENCOUNTER (OUTPATIENT)
Dept: INFUSION CENTER | Facility: CLINIC | Age: 64
End: 2024-06-03

## 2024-06-06 ENCOUNTER — TELEPHONE (OUTPATIENT)
Dept: HEMATOLOGY ONCOLOGY | Facility: CLINIC | Age: 64
End: 2024-06-06

## 2024-06-06 ENCOUNTER — OFFICE VISIT (OUTPATIENT)
Dept: HEMATOLOGY ONCOLOGY | Facility: CLINIC | Age: 64
End: 2024-06-06
Payer: COMMERCIAL

## 2024-06-06 VITALS
OXYGEN SATURATION: 98 % | RESPIRATION RATE: 18 BRPM | SYSTOLIC BLOOD PRESSURE: 142 MMHG | HEART RATE: 61 BPM | HEIGHT: 72 IN | DIASTOLIC BLOOD PRESSURE: 74 MMHG | TEMPERATURE: 96.5 F | WEIGHT: 181 LBS | BODY MASS INDEX: 24.52 KG/M2

## 2024-06-06 DIAGNOSIS — R42 DIZZINESS: ICD-10-CM

## 2024-06-06 DIAGNOSIS — C34.90 SMALL CELL LUNG CANCER (HCC): Primary | ICD-10-CM

## 2024-06-06 DIAGNOSIS — G89.3 CANCER RELATED PAIN: ICD-10-CM

## 2024-06-06 PROCEDURE — 99213 OFFICE O/P EST LOW 20 MIN: CPT | Performed by: INTERNAL MEDICINE

## 2024-06-06 NOTE — PROGRESS NOTES
Hematology/Oncology Outpatient Follow- up Note  Ceasar March 63 y.o. male MRN: @ Encounter: [id number]        Date:  6/6/2024        Assessment / Plan:    1 stage IV small cell lung cancer  2 extensive liver metastasis exam slightly better  3 jaundiced looks better waiting for results from labs tomorrow  4 pain secondary to #2  5 thrombocytopenia secondary to chemo await results of labs  6 dizziness.  Will be getting an MRI of the brain hopefully next week.  Plan: Tecentriq will be added to his chemotherapy regimen.  MRI of the brain will be done.  He will come back in 6 weeks.  He is going to have 2 more cycles of treatment before then.  At the time of his return we will get a CAT scan chest abdomen pelvis determine where he stands.  If he has had a fairly maximal response we will try and use Tecentriq alone from there.  Daughter was present during discussion.      HPI: 63-year-old gentleman here for follow-up.  He has stage IV small cell lung cancer with his extensive liver metastasis he is actually feeling better he is gaining weight he has pain primarily in the right upper quadrant and in the epigastric area but better controlled.  He was seen at [facility].  Their recommendation was to add Tecentriq to his carboplatinum etoposide.  He will start that coming Monday for his third cycle  They also recommended to obtain a MRI of the brain.  Will go ahead and order that.  Hopefully get that next week.  His only complaint regarding the neurologic problems is occasional dizziness and slight change in gait.  In the room his gait was fine.  He also complained of shortness of breath with a lot of exertion.  His sats here are 98%.  I advised him to get a pulse oximetry which he has at home.  If he can document sats dropping below 90% we can try and look into getting oxygen for him.      Interval History: Note from 5/1/2024:  Assessment / Plan:    1 stage IV small cell lung cancer  2 liver metastasis  "extensive  3 jaundice secondary to #2  4 pain secondary to #2  5 thrombocytopenia secondary to chemo.  Plan: Patient will get a CBC CMP today.  It is noted that he has thrombocytopenia secondary to chemo.  He will come back here 5/9 for laboratory work.  If all goes well he will get carboplatin/-16 beginning 5/13.  At that time we will also be looking at the possibility of adding immunotherapy.  Also beyond that he is asking and we will recommend the as he wishes to get a second opinion at [facility].  His prognosis is guarded long-term.  Hopefully he will get at least some response to these treatments.  HPI: 63-year-old gentleman with a history of small cell cancer along with diffuse metastatic disease to liver.  He has pain in the right upper quadrant and right flank area around the lower ribs upper abdominal area.  Hurts worse on palpation.  He is using oxycodone 22.5 mg every 4 hours with some relief.  He is able to eat.  He has no fever or chills.  He still coughs and some shortness of breath but improved.  He is due again for treatment on 5/10/2024.  He was asked to begin again 5/13/2024.  He remains with marked elevation of total bilirubin of 7.2 and all improved.  His LDH total is also quite elevated at 1000.  Interval History: Note 04/22/2024:  Small cell lung cancer with metastasis to liver  Assessment & Plan  Patient reported night sweats, recent weight loss, current every day smoker, severe back and flank pain.  CT chest: \" Right hilar and perihilar 5.5 x 6.3 x 5.2 cm mass consistent lung malignancy. Right hilar, mediastinal and right supraclavicular lymphadenopathy compatible with metastatic disease. Airspace consolidation in the right middle lobe adjacent to the hilum suggesting postobstructive pneumonia. Nodular thickening along the axial interstitium in the right lower lobe and the right major fissure suggesting lymphangitic spread of tumor. Diffuse hepatic metastases. Periportal, " "gastroesophageal and para-aortic lymphadenopathy\"  Complained of hip pain -- CT A/P displayed sclerotic lesion in the pelvis.  Liver biopsy pathology report positive for small cell carcinoma.   4/22 patient overall doing well status post chemotherapy x 3 days.  Patient cleared for discharge per oncology. Will need to follow-up with outpatient oncology  Continue allopurinol for 1 month and Decadron per oncology  Pain med recs per palliative  Note that the patient has become quite jaundice bilirubin of 2.6-9.2 prior to receiving his treatments.  He received cisplatin and day 1 through 3 etopside.      Cancer Staging:  Cancer Staging   No matching staging information was found for the patient.      Molecular Testing:     Previous Hematologic/ Oncologic History:    Oncology History   Small cell lung cancer with metastasis to liver   4/19/2024 Initial Diagnosis    Small cell lung cancer in adult (HCC)     4/19/2024 -  Chemotherapy    alteplase (CATHFLO), 2 mg, Intracatheter, Every 1 Minute as needed, 2 of 6 cycles  pegfilgrastim (NEULASTA), 6 mg, Subcutaneous, Once, 2 of 6 cycles  Administration: 6 mg (4/23/2024), 6 mg (5/16/2024)  fosaprepitant (EMEND) IVPB, 150 mg, Intravenous, Once, 2 of 6 cycles  Administration: 150 mg (4/19/2024), 150 mg (5/13/2024)  atezolizumab (TECENTRIQ) IVPB, 1,200 mg (original dose ), Intravenous, Once, 0 of 4 cycles  Dose modification: 1,200 mg (Cycle 3, Reason: Anticipated Tolerance)  etoposide (TOPOSAR), 50 mg/m2 = 100.6 mg (50 % of original dose 100 mg/m2), Intravenous, Once, 2 of 6 cycles  Dose modification: 50 mg/m2 (original dose 100 mg/m2, Cycle 1, Reason: Anticipated Tolerance)  Administration: 100.6 mg (4/19/2024), 100.6 mg (4/20/2024), 201 mg (5/13/2024), 201 mg (5/14/2024), 100.6 mg (4/21/2024), 201 mg (5/15/2024)  CARBOplatin (PARAPLATIN) IVPB (GOG AUC DOSING), 669.5 mg, Intravenous, Once, 2 of 6 cycles  Administration: 669.5 mg (4/19/2024), 731 mg (5/13/2024)         Current " Hematologic/ Oncologic Treatment:       Cycle 1         Test Results:    Imaging: CT abdomen pelvis with contrast    Result Date: 5/9/2024  Narrative: CT ABDOMEN AND PELVIS WITH IV CONTRAST INDICATION: Metastatic lung cancer, left flank pain and tenderness. COMPARISON: CT chest, abdomen and pelvis 4/11/2024 TECHNIQUE: CT examination of the abdomen and pelvis was performed. Multiplanar 2D reformatted images were created from the source data. This examination, like all CT scans performed in the [facility], was performed utilizing techniques to minimize radiation dose exposure, including the use of iterative reconstruction and automated exposure control. Radiation dose length product (DLP) for this visit: 803 mGy-cm IV Contrast: 100 mL of iohexol (OMNIPAQUE) Enteric Contrast: Not administered. FINDINGS: ABDOMEN LOWER CHEST: No clinically significant abnormality in the visualized lower chest. LIVER/BILIARY TREE: Extensive hepatic lesions consistent with recently biopsy-proven metastatic disease have moderately improved in the interim. GALLBLADDER: Post cholecystectomy. SPLEEN: Unremarkable. PANCREAS: Mild fatty replacement of the pancreas without acute findings. Mild fatty replacement of the pancreas without acute findings. ADRENAL GLANDS: Unremarkable. KIDNEYS/URETERS: Unremarkable. No hydronephrosis. STOMACH AND BOWEL: Evaluation of the stomach is limited by underdistention.  No focal pathology seen within limitations. Small bowel is normal caliber. Colonic diverticulosis without evidence of diverticulitis. APPENDIX: No findings to suggest appendicitis. ABDOMINOPELVIC CAVITY: No pneumoperitoneum. Decreasing adenopathy in the naa hepatis now with subcentimeter lymph nodes (series 2 images 36-39). Small amount of pelvic free fluid is new from previous study, etiology unclear. VESSELS: Unremarkable for patient's age. PELVIS REPRODUCTIVE ORGANS: Enlarged prostate. URINARY BLADDER: Circumferential  "bladder wall thickening maybe accentuated by underdistention. No perivesical inflammation. ABDOMINAL WALL/INGUINAL REGIONS: Footprint of previous ventral hernia repair. Left inguinal hernia containing fat and small amount of fluid. BONES: No acute fracture. Spurring along the anterior right SI joint. Scattered sclerotic/groundglass and lucent osseous densities, most notably in the pelvis, may reflect metastatic disease.     Impression: No definite acute findings in the abdomen or pelvis. Circumferential bladder wall thickening, without perivesical inflammation, appearance possibly related to underdistention. If there is clinical concern for cystitis, correlate with urinalysis. Moderate improvement in diffuse hepatic metastases. Decreasing periportal adenopathy. Small amount of new pelvic free fluid, etiology unclear. Unchanged appearance of mixed attenuation osseous foci, possibly indicative of metastatic disease. Colonic diverticulosis without diverticulitis. Workstation performed: [id number]             Labs:   Lab Results   Component Value Date    WBC 16.89 (H) 05/10/2024    HGB 10.6 (L) 05/10/2024    HCT 33.7 (L) 05/10/2024    MCV 92 05/10/2024     05/10/2024     Lab Results   Component Value Date    K 4.1 05/10/2024     05/10/2024    CO2 27 05/10/2024    BUN 12 05/10/2024    CREATININE 0.71 05/10/2024    GLUF 76 05/10/2024    CALCIUM 7.6 (L) 05/10/2024    CORRECTEDCA 8.6 05/10/2024    AST 47 (H) 05/10/2024    ALT 84 (H) 05/10/2024    ALKPHOS 544 (H) 05/10/2024    EGFR 100 05/10/2024         No results found for: \"SPEP\", \"UPEP\"    No results found for: \"PSA\"    No results found for: \"CEA\"    No results found for: \"\"    No results found for: \"AFP\"    No results found for: \"IRON\", \"TIBC\", \"FERRITIN\"    No results found for: \"XXLGQNCK76\"      ROS: Review of Systems   Constitutional: Negative.    HENT: Negative.     Eyes: Negative.    Respiratory: Negative.     Cardiovascular: Negative.  "   Gastrointestinal: Negative.    Endocrine: Negative.    Genitourinary: Negative.    Musculoskeletal: Negative.    Skin: Negative.    Allergic/Immunologic: Negative.    Neurological: Negative.    Hematological: Negative.          Current Medications: Reviewed  Allergies: Reviewed  PMH/FH/SH:  Reviewed      Physical Exam:    Body surface area is 2.04 meters squared.    Wt Readings from Last 3 Encounters:   06/06/24 82.1 kg (181 lb)   05/15/24 81 kg (178 lb 9.6 oz)   05/14/24 80.9 kg (178 lb 6.4 oz)        Temp Readings from Last 3 Encounters:   06/06/24 (!) 96.5 °F (35.8 °C) (Temporal)   05/16/24 (!) 96 °F (35.6 °C) (Temporal)   05/15/24 (!) 96 °F (35.6 °C) (Temporal)        BP Readings from Last 3 Encounters:   06/06/24 142/74   05/16/24 93/50   05/15/24 102/56         Pulse Readings from Last 3 Encounters:   06/06/24 61   05/16/24 63   05/15/24 73     @LASTSAO2(3)@      Physical Exam  Constitutional:       Appearance: Normal appearance. He is normal weight.   HENT:      Head: Normocephalic and atraumatic.   Eyes:      Extraocular Movements: Extraocular movements intact.      Conjunctiva/sclera: Conjunctivae normal.      Pupils: Pupils are equal, round, and reactive to light.   Cardiovascular:      Rate and Rhythm: Normal rate and regular rhythm.      Heart sounds: Normal heart sounds.   Pulmonary:      Breath sounds: Normal breath sounds.   Abdominal:      General: Abdomen is flat. Bowel sounds are normal.      Palpations: Abdomen is soft.   Musculoskeletal:         General: Normal range of motion.      Cervical back: Normal range of motion and neck supple.   Skin:     General: Skin is warm and dry.   Neurological:      General: No focal deficit present.      Mental Status: He is alert and oriented to person, place, and time. Mental status is at baseline.     His gait is fairly stable here in the room he does use a cane for support.      Goals and Barriers:  Current Goal: Prolong Survival from Cancer.   Barriers:  None.      Patient's Capacity to Self Care:  Patient is able to self care.    Code Status: [unfilled]  Advance Directive and Living Will:      Power of :

## 2024-06-06 NOTE — TELEPHONE ENCOUNTER
Dr. Guadarrama is adding Tecentriq to his orders for Monday- already sent message to onc financial and it is approved. Making team aware because will alter scheduling. ty

## 2024-06-07 ENCOUNTER — APPOINTMENT (OUTPATIENT)
Dept: LAB | Facility: CLINIC | Age: 64
End: 2024-06-07
Payer: COMMERCIAL

## 2024-06-07 DIAGNOSIS — T45.1X5A CHEMOTHERAPY-INDUCED NEUTROPENIA (HCC): ICD-10-CM

## 2024-06-07 DIAGNOSIS — D70.1 CHEMOTHERAPY-INDUCED NEUTROPENIA (HCC): ICD-10-CM

## 2024-06-07 DIAGNOSIS — C34.90 SMALL CELL LUNG CANCER (HCC): ICD-10-CM

## 2024-06-07 LAB
ALBUMIN SERPL BCP-MCNC: 3.5 G/DL (ref 3.5–5)
ALP SERPL-CCNC: 169 U/L (ref 34–104)
ALT SERPL W P-5'-P-CCNC: 32 U/L (ref 7–52)
ANION GAP SERPL CALCULATED.3IONS-SCNC: 9 MMOL/L (ref 4–13)
ANISOCYTOSIS BLD QL SMEAR: PRESENT
AST SERPL W P-5'-P-CCNC: 24 U/L (ref 13–39)
BASOPHILS # BLD MANUAL: 0 THOUSAND/UL (ref 0–0.1)
BASOPHILS NFR MAR MANUAL: 0 % (ref 0–1)
BILIRUB SERPL-MCNC: 1.1 MG/DL (ref 0.2–1)
BUN SERPL-MCNC: 12 MG/DL (ref 5–25)
BURR CELLS BLD QL SMEAR: PRESENT
CALCIUM SERPL-MCNC: 9.1 MG/DL (ref 8.4–10.2)
CHLORIDE SERPL-SCNC: 105 MMOL/L (ref 96–108)
CO2 SERPL-SCNC: 27 MMOL/L (ref 21–32)
CREAT SERPL-MCNC: 0.9 MG/DL (ref 0.6–1.3)
DACRYOCYTES BLD QL SMEAR: PRESENT
EOSINOPHIL # BLD MANUAL: 0 THOUSAND/UL (ref 0–0.4)
EOSINOPHIL NFR BLD MANUAL: 0 % (ref 0–6)
ERYTHROCYTE [DISTWIDTH] IN BLOOD BY AUTOMATED COUNT: 25.2 % (ref 11.6–15.1)
GFR SERPL CREATININE-BSD FRML MDRD: 91 ML/MIN/1.73SQ M
GLUCOSE P FAST SERPL-MCNC: 79 MG/DL (ref 65–99)
HCT VFR BLD AUTO: 33.8 % (ref 36.5–49.3)
HGB BLD-MCNC: 10.6 G/DL (ref 12–17)
LYMPHOCYTES # BLD AUTO: 0.48 THOUSAND/UL (ref 0.6–4.47)
LYMPHOCYTES # BLD AUTO: 4 % (ref 14–44)
MACROCYTES BLD QL AUTO: PRESENT
MCH RBC QN AUTO: 31.3 PG (ref 26.8–34.3)
MCHC RBC AUTO-ENTMCNC: 31.4 G/DL (ref 31.4–37.4)
MCV RBC AUTO: 100 FL (ref 82–98)
MONOCYTES # BLD AUTO: 0.96 THOUSAND/UL (ref 0–1.22)
MONOCYTES NFR BLD: 8 % (ref 4–12)
NEUTROPHILS # BLD MANUAL: 10.6 THOUSAND/UL (ref 1.85–7.62)
NEUTS SEG NFR BLD AUTO: 88 % (ref 43–75)
OVALOCYTES BLD QL SMEAR: PRESENT
PLATELET # BLD AUTO: 266 THOUSANDS/UL (ref 149–390)
PLATELET BLD QL SMEAR: ADEQUATE
PMV BLD AUTO: 9.2 FL (ref 8.9–12.7)
POTASSIUM SERPL-SCNC: 5 MMOL/L (ref 3.5–5.3)
PROT SERPL-MCNC: 6.1 G/DL (ref 6.4–8.4)
RBC # BLD AUTO: 3.39 MILLION/UL (ref 3.88–5.62)
RBC MORPH BLD: PRESENT
SODIUM SERPL-SCNC: 141 MMOL/L (ref 135–147)
WBC # BLD AUTO: 12.04 THOUSAND/UL (ref 4.31–10.16)

## 2024-06-07 PROCEDURE — 36415 COLL VENOUS BLD VENIPUNCTURE: CPT

## 2024-06-07 PROCEDURE — 80053 COMPREHEN METABOLIC PANEL: CPT

## 2024-06-07 PROCEDURE — 85027 COMPLETE CBC AUTOMATED: CPT

## 2024-06-07 PROCEDURE — 85007 BL SMEAR W/DIFF WBC COUNT: CPT

## 2024-06-09 RX ORDER — SODIUM CHLORIDE 9 MG/ML
20 INJECTION, SOLUTION INTRAVENOUS ONCE
Status: CANCELLED | OUTPATIENT
Start: 2024-06-11

## 2024-06-09 RX ORDER — SODIUM CHLORIDE 9 MG/ML
20 INJECTION, SOLUTION INTRAVENOUS ONCE
Status: CANCELLED | OUTPATIENT
Start: 2024-06-12

## 2024-06-09 RX ORDER — SODIUM CHLORIDE 9 MG/ML
20 INJECTION, SOLUTION INTRAVENOUS ONCE
Status: CANCELLED | OUTPATIENT
Start: 2024-06-10

## 2024-06-10 ENCOUNTER — HOSPITAL ENCOUNTER (OUTPATIENT)
Dept: INFUSION CENTER | Facility: CLINIC | Age: 64
Discharge: HOME | End: 2024-06-10
Payer: COMMERCIAL

## 2024-06-10 VITALS
TEMPERATURE: 97.6 F | HEART RATE: 78 BPM | HEIGHT: 72 IN | RESPIRATION RATE: 18 BRPM | BODY MASS INDEX: 25.38 KG/M2 | DIASTOLIC BLOOD PRESSURE: 55 MMHG | WEIGHT: 187.39 LBS | SYSTOLIC BLOOD PRESSURE: 106 MMHG

## 2024-06-10 DIAGNOSIS — C34.90 SMALL CELL LUNG CANCER (HCC): ICD-10-CM

## 2024-06-10 DIAGNOSIS — D70.1 CHEMOTHERAPY-INDUCED NEUTROPENIA (HCC): Primary | ICD-10-CM

## 2024-06-10 DIAGNOSIS — T45.1X5A CHEMOTHERAPY-INDUCED NEUTROPENIA (HCC): Primary | ICD-10-CM

## 2024-06-10 DIAGNOSIS — C34.90 SMALL CELL LUNG CANCER (HCC): Primary | ICD-10-CM

## 2024-06-10 LAB — TSH SERPL DL<=0.05 MIU/L-ACNC: 1 UIU/ML (ref 0.45–4.5)

## 2024-06-10 PROCEDURE — 84443 ASSAY THYROID STIM HORMONE: CPT | Performed by: INTERNAL MEDICINE

## 2024-06-10 RX ORDER — SODIUM CHLORIDE 9 MG/ML
20 INJECTION, SOLUTION INTRAVENOUS ONCE
Status: COMPLETED | OUTPATIENT
Start: 2024-06-10 | End: 2024-06-10

## 2024-06-10 RX ADMIN — SODIUM CHLORIDE 201 MG: 0.9 INJECTION, SOLUTION INTRAVENOUS at 12:06

## 2024-06-10 RX ADMIN — ATEZOLIZUMAB 1200 MG: 1200 INJECTION, SOLUTION INTRAVENOUS at 10:27

## 2024-06-10 RX ADMIN — SODIUM CHLORIDE 20 ML/HR: 0.9 INJECTION, SOLUTION INTRAVENOUS at 09:10

## 2024-06-10 RX ADMIN — CARBOPLATIN 603 MG: 10 INJECTION, SOLUTION INTRAVENOUS at 11:29

## 2024-06-10 RX ADMIN — DEXAMETHASONE SODIUM PHOSPHATE: 10 INJECTION, SOLUTION INTRAMUSCULAR; INTRAVENOUS at 09:13

## 2024-06-10 RX ADMIN — SODIUM CHLORIDE 150 MG: 0.9 INJECTION, SOLUTION INTRAVENOUS at 09:42

## 2024-06-10 NOTE — PROGRESS NOTES
Patient presents for chemotherapy offering complaints of fatigue and intermittent hand cramping. Tecentriq consent obtained by office RN at start of visit. Tecentriq education completed and educational papers provided. patient tolerated treatment without incident. AVS declined. Next appointment 6/11/24 at 1500 reviewed.

## 2024-06-11 ENCOUNTER — HOSPITAL ENCOUNTER (OUTPATIENT)
Dept: INFUSION CENTER | Facility: CLINIC | Age: 64
Discharge: HOME | End: 2024-06-11
Payer: COMMERCIAL

## 2024-06-11 VITALS
WEIGHT: 183.6 LBS | HEART RATE: 72 BPM | SYSTOLIC BLOOD PRESSURE: 107 MMHG | RESPIRATION RATE: 18 BRPM | TEMPERATURE: 96.8 F | BODY MASS INDEX: 24.87 KG/M2 | DIASTOLIC BLOOD PRESSURE: 56 MMHG | HEIGHT: 72 IN

## 2024-06-11 DIAGNOSIS — T45.1X5A CHEMOTHERAPY-INDUCED NEUTROPENIA (HCC): Primary | ICD-10-CM

## 2024-06-11 DIAGNOSIS — D70.1 CHEMOTHERAPY-INDUCED NEUTROPENIA (HCC): Primary | ICD-10-CM

## 2024-06-11 DIAGNOSIS — C34.90 SMALL CELL LUNG CANCER (HCC): ICD-10-CM

## 2024-06-11 PROCEDURE — 96413 CHEMO IV INFUSION 1 HR: CPT

## 2024-06-11 PROCEDURE — 96367 TX/PROPH/DG ADDL SEQ IV INF: CPT

## 2024-06-11 RX ORDER — SODIUM CHLORIDE 9 MG/ML
20 INJECTION, SOLUTION INTRAVENOUS ONCE
Status: COMPLETED | OUTPATIENT
Start: 2024-06-11 | End: 2024-06-11

## 2024-06-11 RX ADMIN — DEXAMETHASONE SODIUM PHOSPHATE: 10 INJECTION, SOLUTION INTRAMUSCULAR; INTRAVENOUS at 15:25

## 2024-06-11 RX ADMIN — SODIUM CHLORIDE 201 MG: 0.9 INJECTION, SOLUTION INTRAVENOUS at 15:58

## 2024-06-11 RX ADMIN — SODIUM CHLORIDE 20 ML/HR: 0.9 INJECTION, SOLUTION INTRAVENOUS at 15:27

## 2024-06-11 NOTE — PROGRESS NOTES
Patient arrives to infusion center for Etoposide Chemotherapy. Patient offers no complaints today. PIV established. Patient tolerated entire infusion without complication. PIV removed. AVS offered.     Next appointment: 6/12/24 @ 1400

## 2024-06-12 ENCOUNTER — HOSPITAL ENCOUNTER (OUTPATIENT)
Dept: INFUSION CENTER | Facility: CLINIC | Age: 64
Discharge: HOME | End: 2024-06-12
Payer: COMMERCIAL

## 2024-06-12 VITALS
WEIGHT: 184 LBS | TEMPERATURE: 98.2 F | HEIGHT: 72 IN | DIASTOLIC BLOOD PRESSURE: 58 MMHG | BODY MASS INDEX: 24.92 KG/M2 | SYSTOLIC BLOOD PRESSURE: 107 MMHG | HEART RATE: 64 BPM | RESPIRATION RATE: 16 BRPM

## 2024-06-12 DIAGNOSIS — C34.90 SMALL CELL LUNG CANCER (HCC): ICD-10-CM

## 2024-06-12 DIAGNOSIS — T45.1X5A CHEMOTHERAPY-INDUCED NEUTROPENIA (HCC): Primary | ICD-10-CM

## 2024-06-12 DIAGNOSIS — D70.1 CHEMOTHERAPY-INDUCED NEUTROPENIA (HCC): Primary | ICD-10-CM

## 2024-06-12 PROCEDURE — 96367 TX/PROPH/DG ADDL SEQ IV INF: CPT

## 2024-06-12 PROCEDURE — 96413 CHEMO IV INFUSION 1 HR: CPT

## 2024-06-12 RX ORDER — SODIUM CHLORIDE 9 MG/ML
20 INJECTION, SOLUTION INTRAVENOUS ONCE
Status: COMPLETED | OUTPATIENT
Start: 2024-06-12 | End: 2024-06-12

## 2024-06-12 RX ADMIN — SODIUM CHLORIDE 201 MG: 0.9 INJECTION, SOLUTION INTRAVENOUS at 15:27

## 2024-06-12 RX ADMIN — SODIUM CHLORIDE 20 ML/HR: 0.9 INJECTION, SOLUTION INTRAVENOUS at 14:30

## 2024-06-12 RX ADMIN — DEXAMETHASONE SODIUM PHOSPHATE: 10 INJECTION, SOLUTION INTRAMUSCULAR; INTRAVENOUS at 14:40

## 2024-06-12 NOTE — PROGRESS NOTES
Pt here for chemo, offers no complaints, VS stable,  Pt aware of their next appt, 6/13 at 2:30.  AVS declined. Infusion completed w/o complications, pt D/C home

## 2024-06-13 ENCOUNTER — HOSPITAL ENCOUNTER (OUTPATIENT)
Dept: INFUSION CENTER | Facility: CLINIC | Age: 64
Discharge: HOME | End: 2024-06-13
Payer: COMMERCIAL

## 2024-06-13 VITALS — DIASTOLIC BLOOD PRESSURE: 69 MMHG | SYSTOLIC BLOOD PRESSURE: 92 MMHG | HEART RATE: 77 BPM

## 2024-06-13 DIAGNOSIS — C34.90 SMALL CELL LUNG CANCER (HCC): ICD-10-CM

## 2024-06-13 DIAGNOSIS — T45.1X5A CHEMOTHERAPY-INDUCED NEUTROPENIA (HCC): Primary | ICD-10-CM

## 2024-06-13 DIAGNOSIS — R42 DIZZINESS: ICD-10-CM

## 2024-06-13 DIAGNOSIS — R42 DIZZINESS: Primary | ICD-10-CM

## 2024-06-13 DIAGNOSIS — D70.1 CHEMOTHERAPY-INDUCED NEUTROPENIA (HCC): Primary | ICD-10-CM

## 2024-06-13 PROCEDURE — 96360 HYDRATION IV INFUSION INIT: CPT

## 2024-06-13 PROCEDURE — 96372 THER/PROPH/DIAG INJ SC/IM: CPT

## 2024-06-13 RX ADMIN — PEGFILGRASTIM 6 MG: 6 INJECTION SUBCUTANEOUS at 15:23

## 2024-06-13 RX ADMIN — SODIUM CHLORIDE 500 ML: 0.9 INJECTION, SOLUTION INTRAVENOUS at 15:58

## 2024-06-13 NOTE — PROGRESS NOTES
Pt tolerated remainder of infusion without complications. Stated he was feeling better following infusion. Aware of next appointment on 7/1/24 at 1PM. PIV removed. AVS declined.

## 2024-06-13 NOTE — PROGRESS NOTES
Patient was in infusion today and complaining of dizziness, BP92/69 HR 77. Discussed with Hamida Landa PAC and as long as pt was not having difficulty breathing we could give 500ml over 1 hour. Pt is agreeable and orders placed.

## 2024-06-13 NOTE — PROGRESS NOTES
Patient here for Neulasta injection, he is complaining of dizziness today.  His BP is 92/69 and HR 77.  He is amenable to get IVF today.  She is putting in 500 ml's over 1 hour.  Neulasta shot given in the RLQ abdomen.  Right AC IV placed with positive blood return.  Currently tolerating IVF.  Report given to Genie CLAY.

## 2024-06-14 ENCOUNTER — PATIENT MESSAGE (OUTPATIENT)
Dept: PALLIATIVE MEDICINE | Facility: CLINIC | Age: 64
End: 2024-06-14

## 2024-06-17 ENCOUNTER — TELEPHONE (OUTPATIENT)
Dept: PALLIATIVE MEDICINE | Facility: CLINIC | Age: 64
End: 2024-06-17

## 2024-06-17 NOTE — TELEPHONE ENCOUNTER
Daughter called about her concern that the patient's blood pressure has been very low and she stated he has almost fallen a few times due to dizziness. Patient has appointment Wednesday with Palliative care.   Please advise

## 2024-06-17 NOTE — TELEPHONE ENCOUNTER
I returned call to daughter/Anna.  She states over the last month or so, the patient has had worsening dizziness/loss of balance which they believe is associated with low blood pressure.  They stated he had an episode of dizziness recently and his systolic blood pressure was less than 100.  Uncertain of diastolic number.  They have not been able to take the blood pressure in different positions at this time.  The patient has already told his daughter he does not want to go to the ER and he feels well enough to keep his appointments this week however they just want the medical team to be aware.    He has excellent oral intake and is drinking at least 2 or 3 bottles of water per day in addition to some fluid with electrolytes.  They do not believe he is dehydrated.  He is not on any blood pressure medication.    The worst dizziness is when he bends over to pick something up.    I instructed them to take it easy is much as possible.  He should not  anything from the floor.  He should be careful with transitions from lying to sitting to standing, etc.  With any worsening of course we recommend he present to the ER.    While there are some medications that might help improve blood pressure such as midodrine, I do not want to promise that his care team will believe this is the right choice for him when he is evaluated later this week.  Family expresses understanding.    We discussed that opioids can cause hypotension and that immediate release opioids typically impact blood pressure more than long-acting opioids.  His fentanyl was increased last month however they report he has been using less oxycodone than in the past.  If his pain levels diminish, we could eventually consider reducing his fentanyl dose again however his pain might levels require this medication right now.    He has received IV fluid hydration through oncology in the past does not believe it was significantly helpful and, as above, they do not  believe he is deep hydrated.    Are welcome to call back with any questions or concerns.

## 2024-06-18 ENCOUNTER — OFFICE VISIT (OUTPATIENT)
Dept: HEMATOLOGY ONCOLOGY | Facility: CLINIC | Age: 64
End: 2024-06-18
Payer: COMMERCIAL

## 2024-06-18 VITALS
BODY MASS INDEX: 23.43 KG/M2 | OXYGEN SATURATION: 97 % | HEART RATE: 63 BPM | HEIGHT: 72 IN | WEIGHT: 173 LBS | DIASTOLIC BLOOD PRESSURE: 62 MMHG | TEMPERATURE: 97.5 F | RESPIRATION RATE: 18 BRPM | SYSTOLIC BLOOD PRESSURE: 102 MMHG

## 2024-06-18 DIAGNOSIS — D69.59 THROMBOCYTOPENIA DUE TO DRUGS: ICD-10-CM

## 2024-06-18 DIAGNOSIS — T45.1X5A CHEMOTHERAPY-INDUCED NEUTROPENIA (HCC): ICD-10-CM

## 2024-06-18 DIAGNOSIS — R74.01 TRANSAMINITIS: ICD-10-CM

## 2024-06-18 DIAGNOSIS — T50.905A THROMBOCYTOPENIA DUE TO DRUGS: ICD-10-CM

## 2024-06-18 DIAGNOSIS — C34.90 SMALL CELL LUNG CANCER (HCC): Primary | ICD-10-CM

## 2024-06-18 DIAGNOSIS — D70.1 CHEMOTHERAPY-INDUCED NEUTROPENIA (HCC): ICD-10-CM

## 2024-06-18 PROCEDURE — 99213 OFFICE O/P EST LOW 20 MIN: CPT | Performed by: INTERNAL MEDICINE

## 2024-06-18 NOTE — H&P (VIEW-ONLY)
Hematology/Oncology Outpatient Follow- up Note  Ceasar March 63 y.o. male MRN: @ Encounter: [id number]        Date:  6/18/2024        Assessment / Plan:    1 stage IV small cell lung cancer  2 extensive liver metastasis with less abdominal pain  3 jaundice which is corrected  4 dizziness  Plan: She will be getting the MRI coming up.  Port-A-Cath will be placed.  Tecentriq was added.  Prognosis long-term is quite guarded.  Will await results of the MRI of the brain.  He will push fluids as well.  Long-term outlook very guarded.  He has chemotherapy due on 7/1.      HPI: 63-year-old gentleman here for follow-up.  His bilirubin has not come down to 1.1.  Has had improvement with normalization of SGOT and SGPT alkaline phosphatase is come down considerably to was in the 160 range.  His biggest complaint is dizziness lightheadedness.  His blood pressure has been on the low side.  He is not complaining of headache.  I am concerned about that this is lightheadedness he is going to push fluids.  He also is due for an MRI of the brain and we will see what that shows.  He is certainly acutely needed treatment is to try and shrink liver which we did and try and control bilirubin which was done.  Hopefully the brain will be negative if positive we will for radiation.  Otherwise he is looking to have a [id number] placed and we will put in for that as well.      Interval History: Note from 6/6/2024:  Assessment / Plan:    1 stage IV small cell lung cancer  2 extensive liver metastasis exam slightly better  3 jaundiced looks better waiting for results from labs tomorrow  4 pain secondary to #2  5 thrombocytopenia secondary to chemo await results of labs  6 dizziness.  Will be getting an MRI of the brain hopefully next week.  Plan: Tecentriq will be added to his chemotherapy regimen.  MRI of the brain will be done.  He will come back in 6 weeks.  He is going to have 2 more cycles of treatment before then.  At the time of  his return we will get a CAT scan chest abdomen pelvis determine where he stands.  If he has had a fairly maximal response we will try and use Tecentriq alone from there.  Daughter was present during discussion.  HPI: 63-year-old gentleman here for follow-up.  He has stage IV small cell lung cancer with his extensive liver metastasis he is actually feeling better he is gaining weight he has pain primarily in the right upper quadrant and in the epigastric area but better controlled.  He was seen at [facility].  Their recommendation was to add Tecentriq to his carboplatinum etoposide.  He will start that coming Monday for his third cycle  They also recommended to obtain a MRI of the brain.  Will go ahead and order that.  Hopefully get that next week.  His only complaint regarding the neurologic problems is occasional dizziness and slight change in gait.  In the room his gait was fine.  He also complained of shortness of breath with a lot of exertion.  His sats here are 98%.  I advised him to get a pulse oximetry which he has at home.  If he can document sats dropping below 90% we can try and look into getting oxygen for him.  Interval History: Note from 5/1/2024:  Assessment / Plan:    1 stage IV small cell lung cancer  2 liver metastasis extensive  3 jaundice secondary to #2  4 pain secondary to #2  5 thrombocytopenia secondary to chemo.  Plan: Patient will get a CBC CMP today.  It is noted that he has thrombocytopenia secondary to chemo.  He will come back here 5/9 for laboratory work.  If all goes well he will get carboplatin/-16 beginning 5/13.  At that time we will also be looking at the possibility of adding immunotherapy.  Also beyond that he is asking and we will recommend the as he wishes to get a second opinion at [facility].  His prognosis is guarded long-term.  Hopefully he will get at least some response to these treatments.  HPI: 63-year-old gentleman with a history of small  "cell cancer along with diffuse metastatic disease to liver.  He has pain in the right upper quadrant and right flank area around the lower ribs upper abdominal area.  Hurts worse on palpation.  He is using oxycodone 22.5 mg every 4 hours with some relief.  He is able to eat.  He has no fever or chills.  He still coughs and some shortness of breath but improved.  He is due again for treatment on 5/10/2024.  He was asked to begin again 5/13/2024.  He remains with marked elevation of total bilirubin of 7.2 and all improved.  His LDH total is also quite elevated at 1000.  Interval History: Note 04/22/2024:  Small cell lung cancer with metastasis to liver  Assessment & Plan  Patient reported night sweats, recent weight loss, current every day smoker, severe back and flank pain.  CT chest: \" Right hilar and perihilar 5.5 x 6.3 x 5.2 cm mass consistent lung malignancy. Right hilar, mediastinal and right supraclavicular lymphadenopathy compatible with metastatic disease. Airspace consolidation in the right middle lobe adjacent to the hilum suggesting postobstructive pneumonia. Nodular thickening along the axial interstitium in the right lower lobe and the right major fissure suggesting lymphangitic spread of tumor. Diffuse hepatic metastases. Periportal, gastroesophageal and para-aortic lymphadenopathy\"  Complained of hip pain -- CT A/P displayed sclerotic lesion in the pelvis.  Liver biopsy pathology report positive for small cell carcinoma.   4/22 patient overall doing well status post chemotherapy x 3 days.  Patient cleared for discharge per oncology. Will need to follow-up with outpatient oncology  Continue allopurinol for 1 month and Decadron per oncology  Pain med recs per palliative  Note that the patient has become quite jaundice bilirubin of 2.6-9.2 prior to receiving his treatments.  He received cisplatin and day 1 through 3 etopside.      Cancer Staging:  Cancer Staging   No matching staging information was found " for the patient.      Molecular Testing:     Previous Hematologic/ Oncologic History:    Oncology History   Small cell lung cancer with metastasis to liver   4/19/2024 Initial Diagnosis    Small cell lung cancer in adult (HCC)     4/19/2024 - 6/13/2024 Chemotherapy    alteplase (CATHFLO), 2 mg, Intracatheter, Every 1 Minute as needed, 3 of 6 cycles  pegfilgrastim (NEULASTA), 6 mg, Subcutaneous, Once, 3 of 6 cycles  Administration: 6 mg (4/23/2024), 6 mg (5/16/2024), 6 mg (6/13/2024)  fosaprepitant (EMEND) IVPB, 150 mg, Intravenous, Once, 3 of 6 cycles  Administration: 150 mg (4/19/2024), 150 mg (5/13/2024), 150 mg (6/10/2024)  atezolizumab (TECENTRIQ) IVPB, 1,200 mg (100 % of original dose 1,200 mg), Intravenous, Once, 1 of 4 cycles  Dose modification: 1,200 mg (original dose 1,200 mg, Cycle 3, Reason: Anticipated Tolerance)  Administration: 1,200 mg (6/10/2024)  etoposide (TOPOSAR), 50 mg/m2 = 100.6 mg (50 % of original dose 100 mg/m2), Intravenous, Once, 3 of 6 cycles  Dose modification: 50 mg/m2 (original dose 100 mg/m2, Cycle 1, Reason: Anticipated Tolerance)  Administration: 100.6 mg (4/19/2024), 100.6 mg (4/20/2024), 201 mg (5/13/2024), 201 mg (5/14/2024), 100.6 mg (4/21/2024), 201 mg (5/15/2024), 201 mg (6/10/2024), 201 mg (6/11/2024), 201 mg (6/12/2024)  CARBOplatin (PARAPLATIN) IVPB (GOG AUC DOSING), 669.5 mg, Intravenous, Once, 3 of 6 cycles  Administration: 669.5 mg (4/19/2024), 731 mg (5/13/2024), 603 mg (6/10/2024)     7/1/2024 -  Chemotherapy    alteplase (CATHFLO), 2 mg, Intracatheter, Every 1 Minute as needed, 0 of 12 cycles  pegfilgrastim-apgf (Nyvepria), 6 mg, Subcutaneous, Once, 0 of 4 cycles  fosaprepitant (EMEND) IVPB, 150 mg, Intravenous, Once, 0 of 4 cycles  atezolizumab (TECENTRIQ) IVPB, 1,200 mg, Intravenous, Once, 0 of 12 cycles  etoposide (TOPOSAR), 100 mg/m2, Intravenous, Once, 0 of 4 cycles  CARBOplatin (PARAPLATIN) IVPB (GOG AUC DOSING), , Intravenous, Once, 0 of 4 cycles    "      Current Hematologic/ Oncologic Treatment:       Cycle 1         Test Results:    Imaging: No results found.          Labs:   Lab Results   Component Value Date    WBC 12.04 (H) 06/07/2024    HGB 10.6 (L) 06/07/2024    HCT 33.8 (L) 06/07/2024     (H) 06/07/2024     06/07/2024     Lab Results   Component Value Date    K 5.0 06/07/2024     06/07/2024    CO2 27 06/07/2024    BUN 12 06/07/2024    CREATININE 0.90 06/07/2024    GLUF 79 06/07/2024    CALCIUM 9.1 06/07/2024    CORRECTEDCA 8.6 05/10/2024    AST 24 06/07/2024    ALT 32 06/07/2024    ALKPHOS 169 (H) 06/07/2024    EGFR 91 06/07/2024         No results found for: \"SPEP\", \"UPEP\"    No results found for: \"PSA\"    No results found for: \"CEA\"    No results found for: \"\"    No results found for: \"AFP\"    No results found for: \"IRON\", \"TIBC\", \"FERRITIN\"    No results found for: \"[id number]\"      ROS: Review of Systems   Constitutional: Negative.    HENT: Negative.     Eyes: Negative.    Respiratory: Negative.     Cardiovascular: Negative.    Gastrointestinal: Negative.    Endocrine: Negative.    Genitourinary: Negative.    Musculoskeletal: Negative.    Skin: Negative.    Allergic/Immunologic: Negative.    Neurological:  Positive for dizziness.   Hematological: Negative.      Major complaint is dizziness some difficulty because at with lightheadedness and sometimes with gait.    Current Medications: Reviewed  Allergies: Reviewed  PMH/FH/SH:  Reviewed      Physical Exam:    Body surface area is 2 meters squared.    Wt Readings from Last 3 Encounters:   06/18/24 78.5 kg (173 lb)   06/12/24 83.5 kg (184 lb)   06/11/24 83.3 kg (183 lb 9.6 oz)        Temp Readings from Last 3 Encounters:   06/18/24 97.5 °F (36.4 °C) (Temporal)   06/12/24 98.2 °F (36.8 °C) (Temporal)   06/11/24 (!) 96.8 °F (36 °C) (Temporal)        BP Readings from Last 3 Encounters:   06/18/24 102/62   06/13/24 92/69   06/12/24 107/58         Pulse Readings from Last 3 " Encounters:   06/18/24 63   06/13/24 77   06/12/24 64     @LASTSAO2(3)@      Physical Exam  Constitutional:       Appearance: Normal appearance. He is normal weight.   HENT:      Head: Normocephalic and atraumatic.   Eyes:      Extraocular Movements: Extraocular movements intact.      Conjunctiva/sclera: Conjunctivae normal.      Pupils: Pupils are equal, round, and reactive to light.   Cardiovascular:      Rate and Rhythm: Normal rate and regular rhythm.      Heart sounds: Normal heart sounds.   Pulmonary:      Effort: Pulmonary effort is normal.      Breath sounds: Normal breath sounds.   Abdominal:      General: Abdomen is flat. Bowel sounds are normal.      Palpations: Abdomen is soft.   Musculoskeletal:         General: Normal range of motion.      Cervical back: Normal range of motion and neck supple.   Skin:     General: Skin is warm and dry.   Neurological:      General: No focal deficit present.      Mental Status: He is alert and oriented to person, place, and time. Mental status is at baseline.     Using a cane to walk and walk in the room slightly imbalanced.  Planes of being mildly lightheaded when doing so.      Goals and Barriers:  Current Goal: Prolong Survival from Cancer.   Barriers: None.      Patient's Capacity to Self Care:  Patient is able to self care.    Code Status: [unfilled]  Advance Directive and Living Will:      Power of :

## 2024-06-18 NOTE — PROGRESS NOTES
Hematology/Oncology Outpatient Follow- up Note  Ceasar March 63 y.o. male MRN: @ Encounter: [id number]        Date:  6/18/2024        Assessment / Plan:    1 stage IV small cell lung cancer  2 extensive liver metastasis with less abdominal pain  3 jaundice which is corrected  4 dizziness  Plan: She will be getting the MRI coming up.  Port-A-Cath will be placed.  Tecentriq was added.  Prognosis long-term is quite guarded.  Will await results of the MRI of the brain.  He will push fluids as well.  Long-term outlook very guarded.  He has chemotherapy due on 7/1.      HPI: 63-year-old gentleman here for follow-up.  His bilirubin has not come down to 1.1.  Has had improvement with normalization of SGOT and SGPT alkaline phosphatase is come down considerably to was in the 160 range.  His biggest complaint is dizziness lightheadedness.  His blood pressure has been on the low side.  He is not complaining of headache.  I am concerned about that this is lightheadedness he is going to push fluids.  He also is due for an MRI of the brain and we will see what that shows.  He is certainly acutely needed treatment is to try and shrink liver which we did and try and control bilirubin which was done.  Hopefully the brain will be negative if positive we will for radiation.  Otherwise he is looking to have a Kyexoh-y-Eonl placed and we will put in for that as well.      Interval History: Note from 6/6/2024:  Assessment / Plan:    1 stage IV small cell lung cancer  2 extensive liver metastasis exam slightly better  3 jaundiced looks better waiting for results from labs tomorrow  4 pain secondary to #2  5 thrombocytopenia secondary to chemo await results of labs  6 dizziness.  Will be getting an MRI of the brain hopefully next week.  Plan: Tecentriq will be added to his chemotherapy regimen.  MRI of the brain will be done.  He will come back in 6 weeks.  He is going to have 2 more cycles of treatment before then.  At the time of  his return we will get a CAT scan chest abdomen pelvis determine where he stands.  If he has had a fairly maximal response we will try and use Tecentriq alone from there.  Daughter was present during discussion.  HPI: 63-year-old gentleman here for follow-up.  He has stage IV small cell lung cancer with his extensive liver metastasis he is actually feeling better he is gaining weight he has pain primarily in the right upper quadrant and in the epigastric area but better controlled.  He was seen at [facility].  Their recommendation was to add Tecentriq to his carboplatinum etoposide.  He will start that coming Monday for his third cycle  They also recommended to obtain a MRI of the brain.  Will go ahead and order that.  Hopefully get that next week.  His only complaint regarding the neurologic problems is occasional dizziness and slight change in gait.  In the room his gait was fine.  He also complained of shortness of breath with a lot of exertion.  His sats here are 98%.  I advised him to get a pulse oximetry which he has at home.  If he can document sats dropping below 90% we can try and look into getting oxygen for him.  Interval History: Note from 5/1/2024:  Assessment / Plan:    1 stage IV small cell lung cancer  2 liver metastasis extensive  3 jaundice secondary to #2  4 pain secondary to #2  5 thrombocytopenia secondary to chemo.  Plan: Patient will get a CBC CMP today.  It is noted that he has thrombocytopenia secondary to chemo.  He will come back here 5/9 for laboratory work.  If all goes well he will get carboplatin/-16 beginning 5/13.  At that time we will also be looking at the possibility of adding immunotherapy.  Also beyond that he is asking and we will recommend the as he wishes to get a second opinion at [facility].  His prognosis is guarded long-term.  Hopefully he will get at least some response to these treatments.  HPI: 63-year-old gentleman with a history of small  "cell cancer along with diffuse metastatic disease to liver.  He has pain in the right upper quadrant and right flank area around the lower ribs upper abdominal area.  Hurts worse on palpation.  He is using oxycodone 22.5 mg every 4 hours with some relief.  He is able to eat.  He has no fever or chills.  He still coughs and some shortness of breath but improved.  He is due again for treatment on 5/10/2024.  He was asked to begin again 5/13/2024.  He remains with marked elevation of total bilirubin of 7.2 and all improved.  His LDH total is also quite elevated at 1000.  Interval History: Note 04/22/2024:  Small cell lung cancer with metastasis to liver  Assessment & Plan  Patient reported night sweats, recent weight loss, current every day smoker, severe back and flank pain.  CT chest: \" Right hilar and perihilar 5.5 x 6.3 x 5.2 cm mass consistent lung malignancy. Right hilar, mediastinal and right supraclavicular lymphadenopathy compatible with metastatic disease. Airspace consolidation in the right middle lobe adjacent to the hilum suggesting postobstructive pneumonia. Nodular thickening along the axial interstitium in the right lower lobe and the right major fissure suggesting lymphangitic spread of tumor. Diffuse hepatic metastases. Periportal, gastroesophageal and para-aortic lymphadenopathy\"  Complained of hip pain -- CT A/P displayed sclerotic lesion in the pelvis.  Liver biopsy pathology report positive for small cell carcinoma.   4/22 patient overall doing well status post chemotherapy x 3 days.  Patient cleared for discharge per oncology. Will need to follow-up with outpatient oncology  Continue allopurinol for 1 month and Decadron per oncology  Pain med recs per palliative  Note that the patient has become quite jaundice bilirubin of 2.6-9.2 prior to receiving his treatments.  He received cisplatin and day 1 through 3 etopside.      Cancer Staging:  Cancer Staging   No matching staging information was found " for the patient.      Molecular Testing:     Previous Hematologic/ Oncologic History:    Oncology History   Small cell lung cancer with metastasis to liver   4/19/2024 Initial Diagnosis    Small cell lung cancer in adult (HCC)     4/19/2024 - 6/13/2024 Chemotherapy    alteplase (CATHFLO), 2 mg, Intracatheter, Every 1 Minute as needed, 3 of 6 cycles  pegfilgrastim (NEULASTA), 6 mg, Subcutaneous, Once, 3 of 6 cycles  Administration: 6 mg (4/23/2024), 6 mg (5/16/2024), 6 mg (6/13/2024)  fosaprepitant (EMEND) IVPB, 150 mg, Intravenous, Once, 3 of 6 cycles  Administration: 150 mg (4/19/2024), 150 mg (5/13/2024), 150 mg (6/10/2024)  atezolizumab (TECENTRIQ) IVPB, 1,200 mg (100 % of original dose 1,200 mg), Intravenous, Once, 1 of 4 cycles  Dose modification: 1,200 mg (original dose 1,200 mg, Cycle 3, Reason: Anticipated Tolerance)  Administration: 1,200 mg (6/10/2024)  etoposide (TOPOSAR), 50 mg/m2 = 100.6 mg (50 % of original dose 100 mg/m2), Intravenous, Once, 3 of 6 cycles  Dose modification: 50 mg/m2 (original dose 100 mg/m2, Cycle 1, Reason: Anticipated Tolerance)  Administration: 100.6 mg (4/19/2024), 100.6 mg (4/20/2024), 201 mg (5/13/2024), 201 mg (5/14/2024), 100.6 mg (4/21/2024), 201 mg (5/15/2024), 201 mg (6/10/2024), 201 mg (6/11/2024), 201 mg (6/12/2024)  CARBOplatin (PARAPLATIN) IVPB (GOG AUC DOSING), 669.5 mg, Intravenous, Once, 3 of 6 cycles  Administration: 669.5 mg (4/19/2024), 731 mg (5/13/2024), 603 mg (6/10/2024)     7/1/2024 -  Chemotherapy    alteplase (CATHFLO), 2 mg, Intracatheter, Every 1 Minute as needed, 0 of 12 cycles  pegfilgrastim-apgf (Nyvepria), 6 mg, Subcutaneous, Once, 0 of 4 cycles  fosaprepitant (EMEND) IVPB, 150 mg, Intravenous, Once, 0 of 4 cycles  atezolizumab (TECENTRIQ) IVPB, 1,200 mg, Intravenous, Once, 0 of 12 cycles  etoposide (TOPOSAR), 100 mg/m2, Intravenous, Once, 0 of 4 cycles  CARBOplatin (PARAPLATIN) IVPB (GOG AUC DOSING), , Intravenous, Once, 0 of 4 cycles    "      Current Hematologic/ Oncologic Treatment:       Cycle 1         Test Results:    Imaging: No results found.          Labs:   Lab Results   Component Value Date    WBC 12.04 (H) 06/07/2024    HGB 10.6 (L) 06/07/2024    HCT 33.8 (L) 06/07/2024     (H) 06/07/2024     06/07/2024     Lab Results   Component Value Date    K 5.0 06/07/2024     06/07/2024    CO2 27 06/07/2024    BUN 12 06/07/2024    CREATININE 0.90 06/07/2024    GLUF 79 06/07/2024    CALCIUM 9.1 06/07/2024    CORRECTEDCA 8.6 05/10/2024    AST 24 06/07/2024    ALT 32 06/07/2024    ALKPHOS 169 (H) 06/07/2024    EGFR 91 06/07/2024         No results found for: \"SPEP\", \"UPEP\"    No results found for: \"PSA\"    No results found for: \"CEA\"    No results found for: \"\"    No results found for: \"AFP\"    No results found for: \"IRON\", \"TIBC\", \"FERRITIN\"    No results found for: \"GPQKKAPH22\"      ROS: Review of Systems   Constitutional: Negative.    HENT: Negative.     Eyes: Negative.    Respiratory: Negative.     Cardiovascular: Negative.    Gastrointestinal: Negative.    Endocrine: Negative.    Genitourinary: Negative.    Musculoskeletal: Negative.    Skin: Negative.    Allergic/Immunologic: Negative.    Neurological:  Positive for dizziness.   Hematological: Negative.      Major complaint is dizziness some difficulty because at with lightheadedness and sometimes with gait.    Current Medications: Reviewed  Allergies: Reviewed  PMH/FH/SH:  Reviewed      Physical Exam:    Body surface area is 2 meters squared.    Wt Readings from Last 3 Encounters:   06/18/24 78.5 kg (173 lb)   06/12/24 83.5 kg (184 lb)   06/11/24 83.3 kg (183 lb 9.6 oz)        Temp Readings from Last 3 Encounters:   06/18/24 97.5 °F (36.4 °C) (Temporal)   06/12/24 98.2 °F (36.8 °C) (Temporal)   06/11/24 (!) 96.8 °F (36 °C) (Temporal)        BP Readings from Last 3 Encounters:   06/18/24 102/62   06/13/24 92/69   06/12/24 107/58         Pulse Readings from Last 3 " Encounters:   06/18/24 63   06/13/24 77   06/12/24 64     @LASTSAO2(3)@      Physical Exam  Constitutional:       Appearance: Normal appearance. He is normal weight.   HENT:      Head: Normocephalic and atraumatic.   Eyes:      Extraocular Movements: Extraocular movements intact.      Conjunctiva/sclera: Conjunctivae normal.      Pupils: Pupils are equal, round, and reactive to light.   Cardiovascular:      Rate and Rhythm: Normal rate and regular rhythm.      Heart sounds: Normal heart sounds.   Pulmonary:      Effort: Pulmonary effort is normal.      Breath sounds: Normal breath sounds.   Abdominal:      General: Abdomen is flat. Bowel sounds are normal.      Palpations: Abdomen is soft.   Musculoskeletal:         General: Normal range of motion.      Cervical back: Normal range of motion and neck supple.   Skin:     General: Skin is warm and dry.   Neurological:      General: No focal deficit present.      Mental Status: He is alert and oriented to person, place, and time. Mental status is at baseline.     Using a cane to walk and walk in the room slightly imbalanced.  Planes of being mildly lightheaded when doing so.      Goals and Barriers:  Current Goal: Prolong Survival from Cancer.   Barriers: None.      Patient's Capacity to Self Care:  Patient is able to self care.    Code Status: [unfilled]  Advance Directive and Living Will:      Power of :

## 2024-06-19 ENCOUNTER — TELEPHONE (OUTPATIENT)
Dept: HEMATOLOGY ONCOLOGY | Facility: CLINIC | Age: 64
End: 2024-06-19

## 2024-06-19 ENCOUNTER — OFFICE VISIT (OUTPATIENT)
Dept: PALLIATIVE MEDICINE | Facility: CLINIC | Age: 64
End: 2024-06-19
Payer: COMMERCIAL

## 2024-06-19 VITALS
HEART RATE: 90 BPM | BODY MASS INDEX: 23.57 KG/M2 | TEMPERATURE: 97.9 F | OXYGEN SATURATION: 98 % | RESPIRATION RATE: 18 BRPM | HEIGHT: 72 IN | DIASTOLIC BLOOD PRESSURE: 58 MMHG | WEIGHT: 174 LBS | SYSTOLIC BLOOD PRESSURE: 100 MMHG

## 2024-06-19 DIAGNOSIS — Z71.89 COUNSELING REGARDING ADVANCE CARE PLANNING AND GOALS OF CARE: ICD-10-CM

## 2024-06-19 DIAGNOSIS — T45.1X5A CHEMOTHERAPY-INDUCED NEUTROPENIA (HCC): ICD-10-CM

## 2024-06-19 DIAGNOSIS — Z51.5 PALLIATIVE CARE PATIENT: ICD-10-CM

## 2024-06-19 DIAGNOSIS — G89.3 CANCER RELATED PAIN: ICD-10-CM

## 2024-06-19 DIAGNOSIS — D70.1 CHEMOTHERAPY-INDUCED NEUTROPENIA (HCC): ICD-10-CM

## 2024-06-19 DIAGNOSIS — E43 SEVERE PROTEIN-CALORIE MALNUTRITION (HCC): ICD-10-CM

## 2024-06-19 DIAGNOSIS — C34.90 SMALL CELL LUNG CANCER (HCC): Primary | ICD-10-CM

## 2024-06-19 PROCEDURE — 99214 OFFICE O/P EST MOD 30 MIN: CPT | Performed by: NURSE PRACTITIONER

## 2024-06-19 RX ORDER — DEXAMETHASONE 4 MG/1
4 TABLET ORAL
Qty: 30 TABLET | Refills: 0 | Status: SHIPPED | OUTPATIENT
Start: 2024-06-19

## 2024-06-19 RX ORDER — OXYCODONE HYDROCHLORIDE 15 MG/1
15-30 TABLET ORAL EVERY 4 HOURS
Qty: 180 TABLET | Refills: 0 | Status: SHIPPED | OUTPATIENT
Start: 2024-06-19

## 2024-06-19 RX ORDER — GABAPENTIN 100 MG/1
100 CAPSULE ORAL 3 TIMES DAILY
Qty: 90 CAPSULE | Refills: 0 | Status: SHIPPED | OUTPATIENT
Start: 2024-06-19

## 2024-06-19 NOTE — ASSESSMENT & PLAN NOTE
CT CAP- R Hilar mass with lymphangitic spread of tumor, Right hilar and perihilar heterogeneously enhancing mass measures 5.5 x 6.3 x 5.2 cm,  Innumerable hypoenhancing lesions throughout the liver  Biopsy completed 4/15/24, small cell cancer  Tolerating chemotherapy well with exception of hypotension likely multifactorial.   MRI brain pending

## 2024-06-19 NOTE — ASSESSMENT & PLAN NOTE
Limits set at DNR/DNI, per previous conversation.  Patient is in process of completing medical POA paperwork and well as addressing financial POA.  Daughter Anna is helping.  Offered completion of [place] Advanced Directive, he is going to discuss with Anna.

## 2024-06-19 NOTE — ASSESSMENT & PLAN NOTE
BMI Findings  There is no height or weight on file to calculate BMI.   -  patient reports 20 pound weight loss in past 3 months.  - poor appetite  -  would benefit from outpatient oncology nutrition, consult placed   -  Encouraged use of supplements if unable to consume enough protein, calories.     REports adequate appetite.

## 2024-06-19 NOTE — TELEPHONE ENCOUNTER
This patient  had added on the tecentriq to plan. Updated to the plan that has the immunotherapy together with chemo. Informing you so the appts can be properly linked. ty

## 2024-06-19 NOTE — ASSESSMENT & PLAN NOTE
Patient experiencing 5/10 pain in abdomen and back.    increased to Oxycodone 15-30 mg at last visit, Taking 22.5 mg Q 4-6 hours   INcrease Fentanyl patch to 25 mcg at last visit, pain better controlled.

## 2024-06-19 NOTE — PROGRESS NOTES
Ambulatory Visit  Name: Ceasar March      : 1960      MRN: [id number]  Encounter Provider: CHRISTIANA Hensley  Encounter Date: 2024   Encounter department: [facility] PALLIATIVE CARE [place]    Assessment & Plan   1. Small cell lung cancer with metastasis to liver  Assessment & Plan:  CT CAP- R Hilar mass with lymphangitic spread of tumor, Right hilar and perihilar heterogeneously enhancing mass measures 5.5 x 6.3 x 5.2 cm,  Innumerable hypoenhancing lesions throughout the liver  Biopsy completed 4/15/24, small cell cancer  Tolerating chemotherapy well with exception of hypotension likely multifactorial.   MRI brain pending  Orders:  -     dexamethasone (DECADRON) 4 mg tablet; Take 1 tablet (4 mg total) by mouth daily with breakfast 1 tab po at breakfast  2. Counseling regarding advance care planning and goals of care  Assessment & Plan:    Limits set at DNR/DNI, per previous conversation.  Patient is in process of completing medical POA paperwork and well as addressing financial POA.  Daughter Anna is helping.  Offered completion of [place] Advanced Directive, he is going to discuss with Anna.    3. Cancer related pain  Assessment & Plan:  Patient experiencing 5/10 pain in abdomen and back.    increased to Oxycodone 15-30 mg at last visit, Taking 22.5 mg Q 4-6 hours   INcrease Fentanyl patch to 25 mcg at last visit, pain better controlled.      Orders:  -     gabapentin (NEURONTIN) 100 mg capsule; Take 1 capsule (100 mg total) by mouth 3 (three) times a day  -     oxyCODONE (ROXICODONE) 15 mg immediate release tablet; Take 1-2 tablets (15-30 mg total) by mouth every 4 (four) hours Max Daily Amount: 180 mg  4. Severe protein-calorie malnutrition (HCC)  Assessment & Plan:    BMI Findings  There is no height or weight on file to calculate BMI.   -  patient reports 20 pound weight loss in past 3 months.  - poor appetite  -  would benefit from outpatient oncology nutrition, consult placed   -   Encouraged use of supplements if unable to consume enough protein, calories.     REports adequate appetite.  5. Palliative care patient  Assessment & Plan:  Introduced palliative care to the patient during inpatient consult 4/15  Appropriate for outpatient follow up for symptom management, support and advanced care planning in metastatic cancer.  Psychosocial support  Prefers daughter be contacted prior to wife.  Lives with wife and daughter, Anna.  Orders:  -     oxyCODONE (ROXICODONE) 15 mg immediate release tablet; Take 1-2 tablets (15-30 mg total) by mouth every 4 (four) hours Max Daily Amount: 180 mg      Decisional apparatus: Patient is competent on my exam today. If competence is lost, patient's substitute decision maker would default to wife by [place] Act 169.     PDMP Review: I have reviewed the patient's controlled substance dispensing history in the Prescription Drug Monitoring Program in compliance with the [facility] regulations before prescribing any controlled substances.    History of Present Illness     Ceasar March is a 63 y.o. male who presents for symptom management in setting of metastatic small cell cancer.  OVerall doing well. He is symptomatic with hypotension.  When the patient changes positions he experiences visual changes and dizziness.  Patient to followup with PCP.  Oncology aware.  Encouraged adequate hydration.       Pertinent Medical History           Objective     /58 (BP Location: Right arm, Patient Position: Sitting, Cuff Size: Standard)   Pulse 90   Temp 97.9 °F (36.6 °C) (Tympanic)   Resp 18   Ht 6' (1.829 m)   Wt 78.9 kg (174 lb)   SpO2 98%   BMI 23.60 kg/m²     Physical Exam  Vitals and nursing note reviewed.   Constitutional:       General: He is not in acute distress.     Appearance: He is well-developed. He is ill-appearing.   HENT:      Head: Normocephalic and atraumatic.   Eyes:      Conjunctiva/sclera: Conjunctivae normal.   Cardiovascular:      Rate and Rhythm:  Normal rate and regular rhythm.      Heart sounds: No murmur heard.  Pulmonary:      Effort: Pulmonary effort is normal. No respiratory distress or retractions.   Abdominal:      Palpations: Abdomen is soft.      Tenderness: There is no abdominal tenderness.   Musculoskeletal:         General: No swelling.      Cervical back: Neck supple.   Skin:     General: Skin is warm and dry.      Capillary Refill: Capillary refill takes less than 2 seconds.   Neurological:      General: No focal deficit present.      Mental Status: He is alert and oriented to person, place, and time.   Psychiatric:         Attention and Perception: Attention normal.         Mood and Affect: Mood normal.         Behavior: Behavior is cooperative.         Cognition and Memory: Cognition and memory normal.         Recent labs:  Lab Results   Component Value Date/Time    SODIUM 141 06/07/2024 10:35 AM    SODIUM 140 09/08/2023 02:55 PM    K 5.0 06/07/2024 10:35 AM    K 4.4 09/08/2023 02:55 PM    BUN 12 06/07/2024 10:35 AM    BUN 17 09/08/2023 02:55 PM    CREATININE 0.90 06/07/2024 10:35 AM    CREATININE 1.17 09/08/2023 02:55 PM    GLUC 125 05/09/2024 04:40 PM    GLUC 73 09/08/2023 02:55 PM    CALCIUM 9.1 06/07/2024 10:35 AM    CALCIUM 9.0 09/08/2023 02:55 PM    AST 24 06/07/2024 10:35 AM    AST 18 09/08/2023 02:55 PM    ALT 32 06/07/2024 10:35 AM    ALT 15 09/08/2023 02:55 PM    ALB 3.5 06/07/2024 10:35 AM    ALB 4.1 09/08/2023 02:55 PM    TP 6.1 (L) 06/07/2024 10:35 AM    TP 6.8 09/08/2023 02:55 PM    EGFR 91 06/07/2024 10:35 AM    EGFR 71 09/08/2023 02:55 PM    EGFR 60 (L) 04/04/2019 05:10 PM     Lab Results   Component Value Date/Time    HGB 10.6 (L) 06/07/2024 10:35 AM    WBC 12.04 (H) 06/07/2024 10:35 AM     06/07/2024 10:35 AM    INR 1.12 04/15/2024 04:32 AM     Lab Results   Component Value Date/Time    IOY4XOVYVYFO 0.996 06/10/2024 09:13 AM       Recent Imaging:  Procedure: CT abdomen pelvis with contrast    Result Date:  5/9/2024  Narrative: CT ABDOMEN AND PELVIS WITH IV CONTRAST INDICATION: Metastatic lung cancer, left flank pain and tenderness. COMPARISON: CT chest, abdomen and pelvis 4/11/2024 TECHNIQUE: CT examination of the abdomen and pelvis was performed. Multiplanar 2D reformatted images were created from the source data. This examination, like all CT scans performed in the [facility], was performed utilizing techniques to minimize radiation dose exposure, including the use of iterative reconstruction and automated exposure control. Radiation dose length product (DLP) for this visit: 803 mGy-cm IV Contrast: 100 mL of iohexol (OMNIPAQUE) Enteric Contrast: Not administered. FINDINGS: ABDOMEN LOWER CHEST: No clinically significant abnormality in the visualized lower chest. LIVER/BILIARY TREE: Extensive hepatic lesions consistent with recently biopsy-proven metastatic disease have moderately improved in the interim. GALLBLADDER: Post cholecystectomy. SPLEEN: Unremarkable. PANCREAS: Mild fatty replacement of the pancreas without acute findings. Mild fatty replacement of the pancreas without acute findings. ADRENAL GLANDS: Unremarkable. KIDNEYS/URETERS: Unremarkable. No hydronephrosis. STOMACH AND BOWEL: Evaluation of the stomach is limited by underdistention.  No focal pathology seen within limitations. Small bowel is normal caliber. Colonic diverticulosis without evidence of diverticulitis. APPENDIX: No findings to suggest appendicitis. ABDOMINOPELVIC CAVITY: No pneumoperitoneum. Decreasing adenopathy in the naa hepatis now with subcentimeter lymph nodes (series 2 images 36-39). Small amount of pelvic free fluid is new from previous study, etiology unclear. VESSELS: Unremarkable for patient's age. PELVIS REPRODUCTIVE ORGANS: Enlarged prostate. URINARY BLADDER: Circumferential bladder wall thickening maybe accentuated by underdistention. No perivesical inflammation. ABDOMINAL WALL/INGUINAL REGIONS: Footprint of  previous ventral hernia repair. Left inguinal hernia containing fat and small amount of fluid. BONES: No acute fracture. Spurring along the anterior right SI joint. Scattered sclerotic/groundglass and lucent osseous densities, most notably in the pelvis, may reflect metastatic disease.     Impression: No definite acute findings in the abdomen or pelvis. Circumferential bladder wall thickening, without perivesical inflammation, appearance possibly related to underdistention. If there is clinical concern for cystitis, correlate with urinalysis. Moderate improvement in diffuse hepatic metastases. Decreasing periportal adenopathy. Small amount of new pelvic free fluid, etiology unclear. Unchanged appearance of mixed attenuation osseous foci, possibly indicative of metastatic disease. Colonic diverticulosis without diverticulitis. Workstation performed: [id number]     Procedure: CT spine thoracic & lumbar wo contrast    Result Date: 4/17/2024  Narrative: CT THORACIC AND LUMBAR SPINE INDICATION:   Back and hip pain.. COMPARISON:/11/24. TECHNIQUE:  Contiguous axial images were obtained. Sagittal and coronal reconstructions were performed. Radiation dose length product (DLP) for this visit:  1195 mGy-cm .  This examination, like all CT scans performed in the [facility], was performed utilizing techniques to minimize radiation dose exposure, including the use of iterative reconstruction and automated exposure control. IMAGE QUALITY:  Diagnostic. FINDINGS: ALIGNMENT: Normal alignment of the thoracic and lumbar spine. VERTEBRAE:  No evidence of thoracic or lumbar spine fracture, lytic or blastic lesion. DEGENERATIVE CHANGES: Mild multilevel degenerative disc disease. Endplate degenerative change in the lower thoracic spine. No significant disc degenerative change in the lumbar spine PARASPINAL SOFT TISSUES: No mass or fluid collection. OTHER: Partially imaged right hilar mass and postobstructive pneumonia,  further described on the recent chest CT. Mediastinal, peritoneal and retroperitoneal lymphadenopathy, partially visualized. Hepatic metastases.     Impression: No evidence of pathologic fracture, lytic or blastic lesion in the thoracic or lumbar spine. Workstation performed: [id number]     Procedure: IR biopsy liver mass    Result Date: 4/15/2024  Narrative: CT-scan guided needle biopsy of a liver mass History: Lung mass with liver metastases Conscious sedation time: 30 mins Technique: This examination, like all CT scans performed in the [facility], was performed utilizing techniques to minimize radiation dose exposure, including the use of iterative reconstruction and automated exposure control. The patient was brought to the CT scanner and placed supine on the table. After axial images were obtained through the region of interest an area of the skin was then marked, prepped, and draped in usual sterile fashion. Lidocaine was administered to the  skin and a small skin incision was made. A 17-gauge cannula was advanced up to the lesion, and through this, an 18-gauge core biopsy specimen was obtained. At the end of the procedure, D-Stat was used for hemostasis through the cannula as it was removed. The patient tolerated the procedure well and suffered no complications.     Impression: Impression: Successful percutaneous core biopsy of a left lobe liver mass. A full pathology report will follow. Workstation performed: [id number]     Procedure: Echo complete w/ contrast if indicated    Result Date: 4/14/2024  Narrative:   Very technically difficult study due to body habitus.   Left Ventricle: Left ventricular cavity size is normal. Wall thickness is normal. The left ventricular ejection fraction is 60%. Systolic function is normal. Wall motion is normal. Diastolic function is normal.   Right Ventricle: Right ventricular cavity size is possibly mildly dilated.   Tricuspid Valve: There is trace  regurgitation.     Procedure: CT chest abdomen pelvis w contrast    Result Date: 4/11/2024  Narrative: CT CHEST, ABDOMEN AND PELVIS WITH IV CONTRAST INDICATION: Weakness, weight loss. COMPARISON: None. TECHNIQUE: CT examination of the chest, abdomen and pelvis was performed. Multiplanar 2D reformatted images were created from the source data. This examination, like all CT scans performed in the [facility], was performed utilizing techniques to minimize radiation dose exposure, including the use of iterative reconstruction and automated exposure control. Radiation dose length product (DLP) for this visit: 721 mGy-cm IV Contrast: 100 mL of iohexol (OMNIPAQUE) Enteric Contrast: Not administered. FINDINGS: CHEST LUNGS: Airspace consolidation in the right middle lobe contiguous with the hilar mass, suggesting postobstructive pneumonia. Nodular opacification along the axial interstitium in the right lower lobe and nodular thickening along the right major fissure likely reflects lymphangitic spread of tumor. Severe narrowing of right middle lobe segmental and subsegmental bronchi. PLEURA: No effusion. HEART/GREAT VESSELS: Normal heart size. No thoracic aortic aneurysm. Proximal segmental branches of the right pulmonary artery in the right middle and upper lobes are narrowed by the encasing mass. MEDIASTINUM AND LEWIS: Right hilar and perihilar heterogeneously enhancing mass measures 5.5 x 6.3 x 5.2 cm. High right paratracheal lymph nodes measure up to 1.7 cm in short axis diameter. Subcentimeter prevascular lymph nodes. Subcarinal lymph node measures 2 cm in short axis diameter. Left paraesophageal 1.4 cm lymph node. Right supraclavicular 1.4 cm lymph node. CHEST WALL AND LOWER NECK: Unremarkable. ABDOMEN LIVER/BILIARY TREE: Innumerable hypoenhancing lesions throughout the liver measuring up to 3.1 cm. Hepatomegaly. GALLBLADDER: Cholecystectomy. SPLEEN: Unremarkable. PANCREAS: Unremarkable. ADRENAL GLANDS:  Unremarkable. KIDNEYS/URETERS: Symmetric nephrographic phase enhancement of the kidneys. No obstructive uropathy. STOMACH AND BOWEL: Diverticulosis without evidence of diverticulitis or colitis. Ventral herniorrhaphy APPENDIX: Normal appendix. ABDOMINOPELVIC CAVITY: Partially confluent hypoenhancing periportal lymph nodes measuring up to 2.3 cm. Paracaval lymph node measures 1.5 cm in short axis diameter. Gastroesophageal junction lymph nodes measure up to 1 cm subcentimeter para-aortic lymph nodes. VESSELS: No abdominal aortic aneurysm. Patent portal, splenic and mesenteric veins PELVIS REPRODUCTIVE ORGANS: Enlarged prostate. URINARY BLADDER: Unremarkable. ABDOMINAL WALL/INGUINAL REGIONS: Fat-containing left inguinal 2 cm hernia.. BONES: Subtle subcentimeter sclerotic lesions in the pelvis. No evidence of fracture..     Impression: 1. Right hilar and perihilar 5.5 x 6.3 x 5.2 cm mass consistent lung malignancy. 2. Right hilar, mediastinal and right supraclavicular lymphadenopathy compatible with metastatic disease. 3. Airspace consolidation in the right middle lobe adjacent to the hilum suggesting postobstructive pneumonia. 4. Nodular thickening along the axial interstitium in the right lower lobe and the right major fissure suggesting lymphangitic spread of tumor. 5. Diffuse hepatic metastases. 6. Periportal, gastroesophageal and para-aortic lymphadenopathy The study was marked in EPIC for immediate notification. Workstation performed: [id number]        Administrative Statements   I have spent a total time of 30+  minutes on 06/19/24 In caring for this patient including Diagnostic results, Risks and benefits of tx options, Instructions for management, Patient and family education, Importance of tx compliance, Risk factor reductions, Impressions, Counseling / Coordination of care, Documenting in the medical record, Reviewing / ordering tests, medicine, procedures  , and Obtaining or reviewing history  .

## 2024-06-20 ENCOUNTER — HOSPITAL ENCOUNTER (OUTPATIENT)
Dept: MRI IMAGING | Facility: HOSPITAL | Age: 64
End: 2024-06-20
Attending: INTERNAL MEDICINE
Payer: COMMERCIAL

## 2024-06-20 DIAGNOSIS — C34.90 SMALL CELL LUNG CANCER (HCC): ICD-10-CM

## 2024-06-20 DIAGNOSIS — R42 DIZZINESS: ICD-10-CM

## 2024-06-20 DIAGNOSIS — G89.3 CANCER RELATED PAIN: ICD-10-CM

## 2024-06-20 PROCEDURE — 70553 MRI BRAIN STEM W/O & W/DYE: CPT

## 2024-06-20 PROCEDURE — A9585 GADOBUTROL INJECTION: HCPCS | Performed by: INTERNAL MEDICINE

## 2024-06-20 RX ORDER — GADOBUTROL 604.72 MG/ML
7 INJECTION INTRAVENOUS
Status: COMPLETED | OUTPATIENT
Start: 2024-06-20 | End: 2024-06-20

## 2024-06-20 RX ADMIN — GADOBUTROL 7 ML: 604.72 INJECTION INTRAVENOUS at 22:32

## 2024-06-24 ENCOUNTER — TELEPHONE (OUTPATIENT)
Dept: HEMATOLOGY ONCOLOGY | Facility: CLINIC | Age: 64
End: 2024-06-24

## 2024-06-24 RX ORDER — SODIUM CHLORIDE 9 MG/ML
30 INJECTION, SOLUTION INTRAVENOUS CONTINUOUS
Status: CANCELLED | OUTPATIENT
Start: 2024-06-24

## 2024-06-24 RX ORDER — CEFAZOLIN SODIUM 1 G/50ML
1000 SOLUTION INTRAVENOUS ONCE
Status: CANCELLED | OUTPATIENT
Start: 2024-06-24 | End: 2024-06-24

## 2024-06-24 NOTE — TELEPHONE ENCOUNTER
Called patient as requested by Dr. Guadarrama to make patient and family aware the MRI brain done was negative for brain mets. Daughter is appreciative of call and aware.

## 2024-06-25 ENCOUNTER — APPOINTMENT (OUTPATIENT)
Dept: RADIOLOGY | Facility: CLINIC | Age: 64
End: 2024-06-25
Payer: COMMERCIAL

## 2024-06-25 ENCOUNTER — OFFICE VISIT (OUTPATIENT)
Dept: FAMILY MEDICINE CLINIC | Facility: CLINIC | Age: 64
End: 2024-06-25
Payer: COMMERCIAL

## 2024-06-25 VITALS
TEMPERATURE: 97.9 F | OXYGEN SATURATION: 97 % | SYSTOLIC BLOOD PRESSURE: 102 MMHG | HEART RATE: 83 BPM | DIASTOLIC BLOOD PRESSURE: 68 MMHG | HEIGHT: 72 IN | BODY MASS INDEX: 23.78 KG/M2 | WEIGHT: 175.6 LBS

## 2024-06-25 DIAGNOSIS — J40 BRONCHITIS: Primary | ICD-10-CM

## 2024-06-25 DIAGNOSIS — E43 SEVERE PROTEIN-CALORIE MALNUTRITION (HCC): ICD-10-CM

## 2024-06-25 DIAGNOSIS — Z00.00 ROUTINE PHYSICAL EXAMINATION: ICD-10-CM

## 2024-06-25 DIAGNOSIS — J45.901 EXACERBATION OF ASTHMA, UNSPECIFIED ASTHMA SEVERITY, UNSPECIFIED WHETHER PERSISTENT: ICD-10-CM

## 2024-06-25 DIAGNOSIS — J40 BRONCHITIS: ICD-10-CM

## 2024-06-25 DIAGNOSIS — G89.3 CANCER RELATED PAIN: ICD-10-CM

## 2024-06-25 DIAGNOSIS — C34.90 SMALL CELL LUNG CANCER (HCC): ICD-10-CM

## 2024-06-25 DIAGNOSIS — Z72.0 TOBACCO ABUSE: Chronic | ICD-10-CM

## 2024-06-25 DIAGNOSIS — R05.2 SUBACUTE COUGH: ICD-10-CM

## 2024-06-25 PROBLEM — K40.91 RECURRENT INGUINAL HERNIA OF RIGHT SIDE WITHOUT OBSTRUCTION OR GANGRENE: Status: ACTIVE | Noted: 2020-02-21

## 2024-06-25 LAB
SARS-COV-2 AG UPPER RESP QL IA: NEGATIVE
VALID CONTROL: NORMAL

## 2024-06-25 PROCEDURE — 99204 OFFICE O/P NEW MOD 45 MIN: CPT

## 2024-06-25 PROCEDURE — 87811 SARS-COV-2 COVID19 W/OPTIC: CPT

## 2024-06-25 PROCEDURE — 99396 PREV VISIT EST AGE 40-64: CPT

## 2024-06-25 PROCEDURE — 71046 X-RAY EXAM CHEST 2 VIEWS: CPT

## 2024-06-25 RX ORDER — PREDNISONE 20 MG/1
40 TABLET ORAL DAILY
Qty: 10 TABLET | Refills: 0 | Status: SHIPPED | OUTPATIENT
Start: 2024-06-25 | End: 2024-06-30

## 2024-06-25 RX ORDER — AMOXICILLIN AND CLAVULANATE POTASSIUM 875; 125 MG/1; MG/1
1 TABLET, FILM COATED ORAL EVERY 12 HOURS SCHEDULED
Qty: 14 TABLET | Refills: 0 | Status: SHIPPED | OUTPATIENT
Start: 2024-06-25 | End: 2024-07-02

## 2024-06-25 NOTE — PROGRESS NOTES
Adult Annual Physical  Name: Ceasar March      : 1960      MRN: [id number]  Encounter Provider: Marilyn Polo PA-C  Encounter Date: 2024   Encounter department: [facility]    Assessment & Plan   1. Bronchitis  Assessment & Plan:  - cough, congestion, wheezing x one week   - rapid covid negative today  - physical exam revealed b/l maxillary sinus tenderness, scattered wheezing in lung fields, o2 97% on room air   - given patient being high risk, sent for stat CXR to rule out pneumonia infection, treating with broad spectrum abx Augmentin x 7 days (discussed side effects)   - also suspect he is having an acute exacerbation of his asthma given wheezing, productive cough, and increased use of albuterol inhaler; therefore advised to hold his decadron for the next five days and instead prescribed five day prednisone burst to open up lungs (discussed side effects)   - did contact pt's oncologist, Dr. Guadarrama to let him know of pt's current illness and my treatment plan   - did advise pt to monitor symptoms at home and if worsen go to ER; if O2 drops below 90% at home advised ER  - pt will call with any questions/concerns   Orders:  -     amoxicillin-clavulanate (AUGMENTIN) 875-125 mg per tablet; Take 1 tablet by mouth every 12 (twelve) hours for 7 days  -     XR chest pa & lateral; Future; Expected date: 2024  -     predniSONE 20 mg tablet; Take 2 tablets (40 mg total) by mouth daily for 5 days  2. Exacerbation of asthma, unspecified asthma severity, unspecified whether persistent  -     amoxicillin-clavulanate (AUGMENTIN) 875-125 mg per tablet; Take 1 tablet by mouth every 12 (twelve) hours for 7 days  -     XR chest pa & lateral; Future; Expected date: 2024  -     predniSONE 20 mg tablet; Take 2 tablets (40 mg total) by mouth daily for 5 days  3. Subacute cough  -     POCT Rapid Covid Ag  4. Small cell lung cancer with metastasis to liver  Assessment & Plan:  - CT  "showed \" Right hilar and perihilar 5.5 x 6.3 x 5.2 cm mass consistent lung malignancy. Right hilar, mediastinal and right supraclavicular lymphadenopathy compatible with metastatic disease\"   - biopsy completed on 4/15/24 confirmed small cell carcinoma, stage IV with metastasis to the liver   - recent MRI brain showed no intracranial metastasis   - currently follows with Oncology who manages his chemotherapy which he has been tolerating well; gets labs completed every four weeks before chemo therapy   - follows with palliative care who manages his pain with oxycodone, fentanyl patches, and gabapentin   5. Tobacco abuse  Assessment & Plan:  - patient reports he has been smoking since he was 16 years old   - still current smoker; reports no interest in quitting at this time   6. Severe protein-calorie malnutrition (HCC)  Assessment & Plan:  - patient reports oncology nutrition contacted him however he declined as he feels his appetite is good; he has been gaining weight back and reports he eats three meals a day with frequent snacking     7. Routine physical examination  Assessment & Plan:  - presents to establish care and for routine physical   - UTD on labs through oncology which get them routinely before chemotherapy  - did recommend lipid panel, pt declines  - declines prostate and colon cancer screening at this time     Immunizations and preventive care screenings were discussed with patient today. Appropriate education was printed on patient's after visit summary.    Counseling:  Dental Health: discussed importance of regular tooth brushing, flossing, and dental visits.  Exercise: the importance of regular exercise/physical activity was discussed. Recommend exercise 3-5 times per week for at least 30 minutes.          History of Present Illness       Patient presents to establish care.   Patient went to ED in 4/2024 for back pain and shortness of breath. Ended up being admitted for pneumonia and was also found " "to have stage IV small cell lung cancer with diffuse metastasis. He has been following with  hematology/oncology as well as being seen by [facility]. Per CT chest from 4/2024; \" Right hilar and perihilar 5.5 x 6.3 x 5.2 cm mass consistent lung malignancy. Right hilar, mediastinal and right supraclavicular lymphadenopathy compatible with metastatic disease.\"  Patient is currently undergoing chemotherapy; has completed four of six rounds. He meets with his oncologist, Dr. Guadarrama every six weeks. Next Apt is at the end of next month. He recently had an MRI of his brain completed which showed no intracranial metastasis.   Patient's bilirubin was quite elevated with significant jaundice, however bilirubin back down to 1.10 and he is no longer jaundiced. Being managed by oncology.     Patient is also following with palliative care. He last saw them on 6/19. They are managing his pain with oxycodone, fentanyl patches, and gabapentin. Reports his pain is well controlled for the most part. He has a follow up appt with them next month.     Patient reports today he is here because he believes he has a sinus infection. Reports one week history of cough, congestion, sinus pressure/pain. He has a hx of asthma and feels like he has been wheezing more as well and having to use his albuterol inhaler. Has not been taking any medication. Denies fevers. Daughter sick with similar symptoms.   Patient reports shortness of breath on exertion. This is not new since he has been sick. This started when he was diagnosed with the lung cancer. Reports he asked oncologist about supplemental oxygen and they told him he did not qualify as his oxygen stays stable in the 90s on exertion.       Adult Annual Physical:  Patient presents for annual physical.     Diet and Physical Activity:  - Diet/Nutrition: well balanced diet and consuming 3-5 servings of fruits/vegetables daily.  - Exercise: walking.    Depression Screening:  - PHQ-2 " Score: 2    General Health:  - Sleep: sleeps well and 4-6 hours of sleep on average.  - Hearing: normal hearing bilateral ears.  - Vision: wears glasses and goes for regular eye exams.  - Dental: no dental visits for > 1 year.     Health:    - Urinary symptoms: none.     Review of Systems   Constitutional:  Positive for fatigue. Negative for appetite change, chills, diaphoresis and fever.   HENT:  Positive for congestion, ear pain, postnasal drip, rhinorrhea and sinus pain.    Respiratory:  Positive for cough, shortness of breath (on exertion) and wheezing. Negative for chest tightness.    Cardiovascular:  Negative for chest pain and palpitations.   Neurological:  Negative for dizziness, light-headedness and headaches.       Past Medical History   Past Medical History:   Diagnosis Date    Asthma     Non-small cell lung cancer metastatic to liver (HCC)      Past Surgical History:   Procedure Laterality Date    ELBOW SURGERY Right     HERNIA REPAIR      IR BIOPSY LIVER MASS  04/15/2024     No family history on file.  Current Outpatient Medications on File Prior to Visit   Medication Sig Dispense Refill    albuterol (PROVENTIL HFA,VENTOLIN HFA) 90 mcg/act inhaler Inhale 2 puffs every 6 (six) hours as needed for wheezing 6.7 g 0    dexamethasone (DECADRON) 4 mg tablet Take 1 tablet (4 mg total) by mouth daily with breakfast 1 tab po at breakfast 30 tablet 0    fentaNYL (DURAGESIC) 25 mcg/hr Place 1 patch on the skin over 72 hours every third day Max Daily Amount: 1 patch 10 patch 0    gabapentin (NEURONTIN) 100 mg capsule Take 1 capsule (100 mg total) by mouth 3 (three) times a day 90 capsule 0    oxyCODONE (ROXICODONE) 15 mg immediate release tablet Take 1-2 tablets (15-30 mg total) by mouth every 4 (four) hours Max Daily Amount: 180 mg 180 tablet 0    allopurinol 200 MG TABS Take 200 mg by mouth daily (Patient not taking: Reported on 5/6/2024) 30 tablet 0    Calcium Carbonate 500 MG CHEW Chew 1 tablet daily (Patient  not taking: Reported on 5/6/2024)      ondansetron (ZOFRAN) 4 mg tablet Take 1 tablet (4 mg total) by mouth every 8 (eight) hours as needed for nausea or vomiting (Patient not taking: Reported on 5/6/2024) 10 tablet 0    polyethylene glycol (MIRALAX) 17 g packet Take 17 g by mouth daily as needed (As needed for constipation) (Patient not taking: Reported on 5/6/2024) 1 each 0    senna (SENOKOT) 8.6 mg Take 2 tablets (17.2 mg total) by mouth 2 (two) times a day (Patient not taking: Reported on 5/6/2024) 120 tablet 0     No current facility-administered medications on file prior to visit.     Allergies   Allergen Reactions    Dog Epithelium (Canis Lupus Familiaris) Itching      Current Outpatient Medications on File Prior to Visit   Medication Sig Dispense Refill    albuterol (PROVENTIL HFA,VENTOLIN HFA) 90 mcg/act inhaler Inhale 2 puffs every 6 (six) hours as needed for wheezing 6.7 g 0    dexamethasone (DECADRON) 4 mg tablet Take 1 tablet (4 mg total) by mouth daily with breakfast 1 tab po at breakfast 30 tablet 0    fentaNYL (DURAGESIC) 25 mcg/hr Place 1 patch on the skin over 72 hours every third day Max Daily Amount: 1 patch 10 patch 0    gabapentin (NEURONTIN) 100 mg capsule Take 1 capsule (100 mg total) by mouth 3 (three) times a day 90 capsule 0    oxyCODONE (ROXICODONE) 15 mg immediate release tablet Take 1-2 tablets (15-30 mg total) by mouth every 4 (four) hours Max Daily Amount: 180 mg 180 tablet 0    allopurinol 200 MG TABS Take 200 mg by mouth daily (Patient not taking: Reported on 5/6/2024) 30 tablet 0    Calcium Carbonate 500 MG CHEW Chew 1 tablet daily (Patient not taking: Reported on 5/6/2024)      ondansetron (ZOFRAN) 4 mg tablet Take 1 tablet (4 mg total) by mouth every 8 (eight) hours as needed for nausea or vomiting (Patient not taking: Reported on 5/6/2024) 10 tablet 0    polyethylene glycol (MIRALAX) 17 g packet Take 17 g by mouth daily as needed (As needed for constipation) (Patient not taking:  Reported on 5/6/2024) 1 each 0    senna (SENOKOT) 8.6 mg Take 2 tablets (17.2 mg total) by mouth 2 (two) times a day (Patient not taking: Reported on 5/6/2024) 120 tablet 0     No current facility-administered medications on file prior to visit.      Social History     Tobacco Use    Smoking status: Some Days     Current packs/day: 0.75     Types: Cigarettes    Smokeless tobacco: Never   Vaping Use    Vaping status: Never Used   Substance and Sexual Activity    Alcohol use: Never    Drug use: Not Currently     Types: Marijuana    Sexual activity: Not on file       Objective     /68   Pulse 83   Temp 97.9 °F (36.6 °C)   Ht 6' (1.829 m)   Wt 79.7 kg (175 lb 9.6 oz)   SpO2 97%   BMI 23.82 kg/m²     Physical Exam  Vitals reviewed.   Constitutional:       General: He is not in acute distress.     Appearance: Normal appearance. He is not ill-appearing or diaphoretic.   HENT:      Head: Normocephalic and atraumatic.      Right Ear: Tympanic membrane, ear canal and external ear normal. There is no impacted cerumen.      Left Ear: Tympanic membrane, ear canal and external ear normal. There is no impacted cerumen.      Nose: Congestion present. No rhinorrhea.      Right Sinus: Maxillary sinus tenderness present.      Left Sinus: Maxillary sinus tenderness present.      Mouth/Throat:      Mouth: Mucous membranes are moist.      Pharynx: Oropharynx is clear. No oropharyngeal exudate or posterior oropharyngeal erythema.   Eyes:      General:         Right eye: No discharge.         Left eye: No discharge.      Conjunctiva/sclera: Conjunctivae normal.   Cardiovascular:      Rate and Rhythm: Normal rate and regular rhythm.      Pulses: Normal pulses.      Heart sounds: Normal heart sounds. No murmur heard.  Pulmonary:      Effort: Pulmonary effort is normal. No respiratory distress.      Breath sounds: Wheezing (scattered) present. No rhonchi or rales.   Musculoskeletal:      Cervical back: Normal range of motion and  neck supple.      Right lower leg: No edema.      Left lower leg: No edema.   Lymphadenopathy:      Cervical: No cervical adenopathy.   Skin:     General: Skin is warm.   Neurological:      General: No focal deficit present.      Mental Status: He is alert.       Administrative Statements

## 2024-06-25 NOTE — ASSESSMENT & PLAN NOTE
- patient reports oncology nutrition contacted him however he declined as he feels his appetite is good; he has been gaining weight back and reports he eats three meals a day with frequent snacking

## 2024-06-25 NOTE — ASSESSMENT & PLAN NOTE
"- CT showed \" Right hilar and perihilar 5.5 x 6.3 x 5.2 cm mass consistent lung malignancy. Right hilar, mediastinal and right supraclavicular lymphadenopathy compatible with metastatic disease\"   - biopsy completed on 4/15/24 confirmed small cell carcinoma, stage IV with metastasis to the liver   - recent MRI brain showed no intracranial metastasis   - currently follows with Oncology who manages his chemotherapy which he has been tolerating well; gets labs completed every four weeks before chemo therapy   - follows with palliative care who manages his pain with oxycodone, fentanyl patches, and gabapentin   "

## 2024-06-25 NOTE — ASSESSMENT & PLAN NOTE
- patient reports he has been smoking since he was 16 years old   - still current smoker; reports no interest in quitting at this time

## 2024-06-25 NOTE — ASSESSMENT & PLAN NOTE
- cough, congestion, wheezing x one week   - rapid covid negative today  - physical exam revealed b/l maxillary sinus tenderness, scattered wheezing in lung fields, o2 97% on room air   - given patient being high risk, sent for stat CXR to rule out pneumonia infection, treating with broad spectrum abx Augmentin x 7 days (discussed side effects)   - also suspect he is having an acute exacerbation of his asthma given wheezing, productive cough, and increased use of albuterol inhaler; therefore advised to hold his decadron for the next five days and instead prescribed five day prednisone burst to open up lungs (discussed side effects)   - did contact pt's oncologist, Dr. Guadarrama to let him know of pt's current illness and my treatment plan   - did advise pt to monitor symptoms at home and if worsen go to ER; if O2 drops below 90% at home advised ER  - pt will call with any questions/concerns

## 2024-06-25 NOTE — ASSESSMENT & PLAN NOTE
- presents to establish care and for routine physical   - UTD on labs through oncology which get them routinely before chemotherapy  - did recommend lipid panel, pt declines  - declines prostate and colon cancer screening at this time

## 2024-06-26 DIAGNOSIS — T45.1X5A CHEMOTHERAPY-INDUCED NEUTROPENIA (HCC): ICD-10-CM

## 2024-06-26 DIAGNOSIS — D70.1 CHEMOTHERAPY-INDUCED NEUTROPENIA (HCC): ICD-10-CM

## 2024-06-26 DIAGNOSIS — C34.90 SMALL CELL LUNG CANCER (HCC): Primary | ICD-10-CM

## 2024-06-26 RX ORDER — FENTANYL 25 UG/1
1 PATCH TRANSDERMAL
Qty: 10 PATCH | Refills: 0 | Status: SHIPPED | OUTPATIENT
Start: 2024-06-26

## 2024-06-26 NOTE — TELEPHONE ENCOUNTER
Last appointment:06/19/24    Next scheduled appointment:07/31/24    Pharmacy[facility]      PDMP review:

## 2024-06-26 NOTE — TELEPHONE ENCOUNTER
Health Maintenance Due   Topic Date Due   • Shingles Vaccine (1 of 2) Never done   • Breast Cancer Screening  06/13/2013   • DM/CKD Microalbumin  06/05/2021   • COVID-19 Vaccine (2 - Moderna 3-dose series) 12/24/2021       Patient is due for topics listed above, she wishes to proceed with Immunization(s) COVID-19, but is not proceeding with Immunization(s) Shingles at this time. The following has occurred: Education provided for Immunization(s) Shingles.     No. This is managed by his palliative care providers. Needs to be sent to them.

## 2024-06-27 ENCOUNTER — HOSPITAL ENCOUNTER (OUTPATIENT)
Dept: NON INVASIVE DIAGNOSTICS | Facility: HOSPITAL | Age: 64
Discharge: HOME | End: 2024-06-27
Attending: INTERNAL MEDICINE
Payer: COMMERCIAL

## 2024-06-27 VITALS
HEIGHT: 72 IN | RESPIRATION RATE: 17 BRPM | WEIGHT: 174.16 LBS | HEART RATE: 57 BPM | BODY MASS INDEX: 23.59 KG/M2 | TEMPERATURE: 97.4 F | SYSTOLIC BLOOD PRESSURE: 151 MMHG | OXYGEN SATURATION: 99 % | DIASTOLIC BLOOD PRESSURE: 81 MMHG

## 2024-06-27 DIAGNOSIS — C34.90 SMALL CELL LUNG CANCER (HCC): ICD-10-CM

## 2024-06-27 LAB
ALBUMIN SERPL BCG-MCNC: 3.2 G/DL (ref 3.5–5)
ALP SERPL-CCNC: 145 U/L (ref 34–104)
ALT SERPL W P-5'-P-CCNC: 36 U/L (ref 7–52)
ANION GAP SERPL CALCULATED.3IONS-SCNC: 7 MMOL/L (ref 4–13)
AST SERPL W P-5'-P-CCNC: 21 U/L (ref 13–39)
BASOPHILS # BLD MANUAL: 0 THOUSAND/UL (ref 0–0.1)
BASOPHILS NFR MAR MANUAL: 0 % (ref 0–1)
BILIRUB SERPL-MCNC: 0.46 MG/DL (ref 0.2–1)
BUN SERPL-MCNC: 11 MG/DL (ref 5–25)
CALCIUM ALBUM COR SERPL-MCNC: 9.1 MG/DL (ref 8.3–10.1)
CALCIUM SERPL-MCNC: 8.5 MG/DL (ref 8.4–10.2)
CHLORIDE SERPL-SCNC: 107 MMOL/L (ref 96–108)
CO2 SERPL-SCNC: 27 MMOL/L (ref 21–32)
CREAT SERPL-MCNC: 0.75 MG/DL (ref 0.6–1.3)
DACRYOCYTES BLD QL SMEAR: PRESENT
EOSINOPHIL # BLD MANUAL: 0 THOUSAND/UL (ref 0–0.4)
EOSINOPHIL NFR BLD MANUAL: 0 % (ref 0–6)
ERYTHROCYTE [DISTWIDTH] IN BLOOD BY AUTOMATED COUNT: 22.6 % (ref 11.6–15.1)
GFR SERPL CREATININE-BSD FRML MDRD: 98 ML/MIN/1.73SQ M
GLUCOSE SERPL-MCNC: 80 MG/DL (ref 65–140)
HCT VFR BLD AUTO: 29.4 % (ref 36.5–49.3)
HGB BLD-MCNC: 9.6 G/DL (ref 12–17)
HYPERCHROMIA BLD QL SMEAR: PRESENT
LIPASE SERPL-CCNC: 9 U/L (ref 11–82)
LYMPHOCYTES # BLD AUTO: 2.86 THOUSAND/UL (ref 0.6–4.47)
LYMPHOCYTES # BLD AUTO: 30 % (ref 14–44)
MACROCYTES BLD QL AUTO: PRESENT
MCH RBC QN AUTO: 33.2 PG (ref 26.8–34.3)
MCHC RBC AUTO-ENTMCNC: 32.7 G/DL (ref 31.4–37.4)
MCV RBC AUTO: 102 FL (ref 82–98)
METAMYELOCYTE ABSOLUTE CT: 0.1 THOUSAND/UL (ref 0–0.1)
METAMYELOCYTES NFR BLD MANUAL: 1 % (ref 0–1)
MONOCYTES # BLD AUTO: 0.48 THOUSAND/UL (ref 0–1.22)
MONOCYTES NFR BLD: 5 % (ref 4–12)
MYELOCYTE ABSOLUTE CT: 0.76 THOUSAND/UL (ref 0–0.1)
MYELOCYTES NFR BLD MANUAL: 8 % (ref 0–1)
NEUTROPHILS # BLD MANUAL: 5.34 THOUSAND/UL (ref 1.85–7.62)
NEUTS BAND NFR BLD MANUAL: 2 % (ref 0–8)
NEUTS SEG NFR BLD AUTO: 54 % (ref 43–75)
NRBC BLD AUTO-RTO: 1 /100 WBC (ref 0–2)
PLATELET # BLD AUTO: 44 THOUSANDS/UL (ref 149–390)
PLATELET BLD QL SMEAR: ADEQUATE
PMV BLD AUTO: 9.5 FL (ref 8.9–12.7)
POTASSIUM SERPL-SCNC: 3.7 MMOL/L (ref 3.5–5.3)
PROT SERPL-MCNC: 5.5 G/DL (ref 6.4–8.4)
RBC # BLD AUTO: 2.89 MILLION/UL (ref 3.88–5.62)
RBC MORPH BLD: PRESENT
SODIUM SERPL-SCNC: 141 MMOL/L (ref 135–147)
T4 FREE SERPL-MCNC: 0.88 NG/DL (ref 0.61–1.12)
TSH SERPL DL<=0.05 MIU/L-ACNC: 0.44 UIU/ML (ref 0.45–4.5)
WBC # BLD AUTO: 9.54 THOUSAND/UL (ref 4.31–10.16)

## 2024-06-27 PROCEDURE — 76937 US GUIDE VASCULAR ACCESS: CPT | Performed by: RADIOLOGY

## 2024-06-27 PROCEDURE — 77001 FLUOROGUIDE FOR VEIN DEVICE: CPT | Performed by: RADIOLOGY

## 2024-06-27 PROCEDURE — 36561 INSERT TUNNELED CV CATH: CPT | Performed by: RADIOLOGY

## 2024-06-27 PROCEDURE — 84439 ASSAY OF FREE THYROXINE: CPT | Performed by: INTERNAL MEDICINE

## 2024-06-27 PROCEDURE — 85027 COMPLETE CBC AUTOMATED: CPT | Performed by: INTERNAL MEDICINE

## 2024-06-27 PROCEDURE — 80053 COMPREHEN METABOLIC PANEL: CPT | Performed by: INTERNAL MEDICINE

## 2024-06-27 PROCEDURE — 85007 BL SMEAR W/DIFF WBC COUNT: CPT | Performed by: INTERNAL MEDICINE

## 2024-06-27 PROCEDURE — C1788 PORT, INDWELLING, IMP: HCPCS

## 2024-06-27 PROCEDURE — 36561 INSERT TUNNELED CV CATH: CPT

## 2024-06-27 PROCEDURE — 84443 ASSAY THYROID STIM HORMONE: CPT | Performed by: INTERNAL MEDICINE

## 2024-06-27 PROCEDURE — 83690 ASSAY OF LIPASE: CPT | Performed by: INTERNAL MEDICINE

## 2024-06-27 RX ORDER — MIDAZOLAM HYDROCHLORIDE 2 MG/2ML
INJECTION, SOLUTION INTRAMUSCULAR; INTRAVENOUS AS NEEDED
Status: COMPLETED | OUTPATIENT
Start: 2024-06-27 | End: 2024-06-27

## 2024-06-27 RX ORDER — SODIUM CHLORIDE 9 MG/ML
20 INJECTION, SOLUTION INTRAVENOUS ONCE
Status: CANCELLED | OUTPATIENT
Start: 2024-07-01

## 2024-06-27 RX ORDER — CALCIUM CARBONATE 750 MG/1
1 TABLET, CHEWABLE ORAL DAILY
COMMUNITY

## 2024-06-27 RX ORDER — DIPHENHYDRAMINE HYDROCHLORIDE 50 MG/ML
INJECTION INTRAMUSCULAR; INTRAVENOUS AS NEEDED
Status: COMPLETED | OUTPATIENT
Start: 2024-06-27 | End: 2024-06-27

## 2024-06-27 RX ORDER — SODIUM CHLORIDE 9 MG/ML
20 INJECTION, SOLUTION INTRAVENOUS ONCE
Status: CANCELLED | OUTPATIENT
Start: 2024-07-02

## 2024-06-27 RX ORDER — SODIUM CHLORIDE 9 MG/ML
30 INJECTION, SOLUTION INTRAVENOUS CONTINUOUS
Status: DISCONTINUED | OUTPATIENT
Start: 2024-06-27 | End: 2024-06-28 | Stop reason: HOSPADM

## 2024-06-27 RX ORDER — SODIUM CHLORIDE 9 MG/ML
20 INJECTION, SOLUTION INTRAVENOUS ONCE
Status: CANCELLED | OUTPATIENT
Start: 2024-07-03

## 2024-06-27 RX ORDER — FENTANYL CITRATE 50 UG/ML
INJECTION, SOLUTION INTRAMUSCULAR; INTRAVENOUS AS NEEDED
Status: COMPLETED | OUTPATIENT
Start: 2024-06-27 | End: 2024-06-27

## 2024-06-27 RX ORDER — CEFAZOLIN SODIUM 1 G/50ML
1000 SOLUTION INTRAVENOUS ONCE
Status: COMPLETED | OUTPATIENT
Start: 2024-06-27 | End: 2024-06-27

## 2024-06-27 RX ADMIN — Medication 20 ML: at 09:58

## 2024-06-27 RX ADMIN — FENTANYL CITRATE 50 MCG: 50 INJECTION, SOLUTION INTRAMUSCULAR; INTRAVENOUS at 09:57

## 2024-06-27 RX ADMIN — CEFAZOLIN SODIUM 1000 MG: 1 SOLUTION INTRAVENOUS at 08:43

## 2024-06-27 RX ADMIN — MIDAZOLAM HYDROCHLORIDE 1 MG: 1 INJECTION, SOLUTION INTRAMUSCULAR; INTRAVENOUS at 09:52

## 2024-06-27 RX ADMIN — MIDAZOLAM HYDROCHLORIDE 1 MG: 1 INJECTION, SOLUTION INTRAMUSCULAR; INTRAVENOUS at 09:56

## 2024-06-27 RX ADMIN — Medication 5 ML: at 09:54

## 2024-06-27 RX ADMIN — DIPHENHYDRAMINE HYDROCHLORIDE 25 MG: 50 INJECTION, SOLUTION INTRAMUSCULAR; INTRAVENOUS at 09:57

## 2024-06-27 RX ADMIN — DIPHENHYDRAMINE HYDROCHLORIDE 25 MG: 50 INJECTION, SOLUTION INTRAMUSCULAR; INTRAVENOUS at 09:54

## 2024-06-27 RX ADMIN — SODIUM CHLORIDE 30 ML/HR: 0.9 INJECTION, SOLUTION INTRAVENOUS at 08:32

## 2024-06-27 RX ADMIN — FENTANYL CITRATE 50 MCG: 50 INJECTION, SOLUTION INTRAMUSCULAR; INTRAVENOUS at 09:52

## 2024-06-27 NOTE — INTERVAL H&P NOTE
The history and physical were reviewed, along with progress notes, and the patient was examined. There are no changes since it was written.    /76   Pulse 58   Temp 97.6 °F (36.4 °C) (Temporal)   Resp 20   Ht 6' (1.829 m)   Wt 79 kg (174 lb 2.6 oz)   SpO2 100%   BMI 23.62 kg/m²        Sally Green MD/June 27, 2024/9:13 AM

## 2024-06-27 NOTE — DISCHARGE INSTRUCTIONS
Implanted Venous Access Port     WHAT YOU NEED TO KNOW:   An implanted venous access port is a device used to give treatments and take blood. It may also be called a central venous access device (CVAD). The port is a small container that is placed under your skin, usually in your upper chest. The port is attached to a catheter that enters a large vein.   DISCHARGE INSTRUCTIONS:   Resume your normal diet. Small sips of flat soda will help with mild nausea.  Prevent an infection:   Wash your hands often.  Use soap and water. Clean your hands before and after you care for your port. Remind everyone who cares for your port to wash their hands.   Check your skin for infection every day.  Look for redness, swelling, or fluid oozing from the port site.  Care for your port:   1. You may shower beginning 48 hours after procedure.     2.  Leave glue in place.    3. It is normal for some bruising to occur.    4. Use Tylenol for pain.    5. Limit use of arm on the side that your port was placed. Lift nothing heavier than 5 pounds for 1 week, and then gradually increase activity as tolerated.    6. DO NOT apply ointment, lotion or cream to port site until incision is healed. Allow glue to fall off. DO NOT attempt to peel glue from skin even it it begins to flake.     7. After the port incision is healed you may swim, bathe.  Notify the Interventional Radiologist if you have any of the followin. Fever above 101 F    2. Increased redness or swelling after 1st day.     3. Increased pain after 1st day.    4. Any sign of infection (drainage from port site, skin separation, hot to touch).    5. Persistent nausea or vomiting.    Contact Interventional Radiology at [phone] ([facility] PATIENTS: Contact Interventional Radiology at [phone]) ([facility] PATIENTS: Contact Interventional Radiology at [phone]).

## 2024-06-27 NOTE — BRIEF OP NOTE (RAD/CATH)
INTERVENTIONAL RADIOLOGY PROCEDURE NOTE    Date: 6/27/2024    Procedure:   Procedure Summary       Date: 06/27/24 Room / Location: [facility] Cardiac Cath Lab    Anesthesia Start:  Anesthesia Stop:     Procedure: IR PORT PLACEMENT Diagnosis:       Small cell lung cancer (HCC)      (patient on chemotherapy stave IV small cell needs port)    Scheduled Providers:  Responsible Provider:     Anesthesia Type: Not recorded ASA Status: Not recorded            Preoperative diagnosis:   1. Small cell lung cancer (HCC)         Postoperative diagnosis: Same.    Surgeon: Sally Green MD     Assistant: None. No qualified resident was available.    Blood loss: 1 mL    Specimens: None    Findings: Successful image guided placement of right chest port via right internal jugular vein.  Tip in RA, okay to use.    Complications: None immediate.    Anesthesia: conscious sedation

## 2024-07-01 ENCOUNTER — APPOINTMENT (OUTPATIENT)
Dept: LAB | Facility: HOSPITAL | Age: 64
End: 2024-07-01
Payer: COMMERCIAL

## 2024-07-01 ENCOUNTER — TELEPHONE (OUTPATIENT)
Dept: SURGICAL ONCOLOGY | Facility: CLINIC | Age: 64
End: 2024-07-01

## 2024-07-01 ENCOUNTER — HOSPITAL ENCOUNTER (OUTPATIENT)
Dept: INFUSION CENTER | Facility: CLINIC | Age: 64
Discharge: HOME | End: 2024-07-01
Payer: COMMERCIAL

## 2024-07-01 VITALS
HEIGHT: 72 IN | SYSTOLIC BLOOD PRESSURE: 114 MMHG | WEIGHT: 178.6 LBS | TEMPERATURE: 96.9 F | RESPIRATION RATE: 18 BRPM | DIASTOLIC BLOOD PRESSURE: 62 MMHG | HEART RATE: 73 BPM | BODY MASS INDEX: 24.19 KG/M2

## 2024-07-01 DIAGNOSIS — C34.90 SMALL CELL LUNG CANCER (HCC): Primary | ICD-10-CM

## 2024-07-01 DIAGNOSIS — T45.1X5A CHEMOTHERAPY-INDUCED NEUTROPENIA (HCC): ICD-10-CM

## 2024-07-01 DIAGNOSIS — D70.1 CHEMOTHERAPY-INDUCED NEUTROPENIA (HCC): ICD-10-CM

## 2024-07-01 DIAGNOSIS — C34.90 SMALL CELL LUNG CANCER (HCC): ICD-10-CM

## 2024-07-01 LAB
ALBUMIN SERPL BCG-MCNC: 3.5 G/DL (ref 3.5–5)
ALP SERPL-CCNC: 145 U/L (ref 34–104)
ALT SERPL W P-5'-P-CCNC: 39 U/L (ref 7–52)
ANION GAP SERPL CALCULATED.3IONS-SCNC: 5 MMOL/L (ref 4–13)
AST SERPL W P-5'-P-CCNC: 27 U/L (ref 13–39)
BASOPHILS # BLD MANUAL: 0 THOUSAND/UL (ref 0–0.1)
BASOPHILS NFR MAR MANUAL: 0 % (ref 0–1)
BILIRUB SERPL-MCNC: 0.54 MG/DL (ref 0.2–1)
BUN SERPL-MCNC: 14 MG/DL (ref 5–25)
CALCIUM SERPL-MCNC: 8.8 MG/DL (ref 8.4–10.2)
CHLORIDE SERPL-SCNC: 102 MMOL/L (ref 96–108)
CO2 SERPL-SCNC: 30 MMOL/L (ref 21–32)
CREAT SERPL-MCNC: 0.84 MG/DL (ref 0.6–1.3)
EOSINOPHIL # BLD MANUAL: 0 THOUSAND/UL (ref 0–0.4)
EOSINOPHIL NFR BLD MANUAL: 0 % (ref 0–6)
ERYTHROCYTE [DISTWIDTH] IN BLOOD BY AUTOMATED COUNT: 22.3 % (ref 11.6–15.1)
GFR SERPL CREATININE-BSD FRML MDRD: 93 ML/MIN/1.73SQ M
GLUCOSE SERPL-MCNC: 128 MG/DL (ref 65–140)
HCT VFR BLD AUTO: 33.3 % (ref 36.5–49.3)
HGB BLD-MCNC: 10.5 G/DL (ref 12–17)
HYPERCHROMIA BLD QL SMEAR: PRESENT
LIPASE SERPL-CCNC: 9 U/L (ref 11–82)
LYMPHOCYTES # BLD AUTO: 1.3 THOUSAND/UL (ref 0.6–4.47)
LYMPHOCYTES # BLD AUTO: 11 % (ref 14–44)
MACROCYTES BLD QL AUTO: PRESENT
MCH RBC QN AUTO: 32.4 PG (ref 26.8–34.3)
MCHC RBC AUTO-ENTMCNC: 31.5 G/DL (ref 31.4–37.4)
MCV RBC AUTO: 103 FL (ref 82–98)
MONOCYTES # BLD AUTO: 0.97 THOUSAND/UL (ref 0–1.22)
MONOCYTES NFR BLD: 9 % (ref 4–12)
NEUTROPHILS # BLD MANUAL: 8.54 THOUSAND/UL (ref 1.85–7.62)
NEUTS BAND NFR BLD MANUAL: 3 % (ref 0–8)
NEUTS SEG NFR BLD AUTO: 76 % (ref 43–75)
PLATELET # BLD AUTO: 99 THOUSANDS/UL (ref 149–390)
PLATELET BLD QL SMEAR: ABNORMAL
PMV BLD AUTO: 9.8 FL (ref 8.9–12.7)
POTASSIUM SERPL-SCNC: 3.6 MMOL/L (ref 3.5–5.3)
PROT SERPL-MCNC: 5.8 G/DL (ref 6.4–8.4)
RBC # BLD AUTO: 3.24 MILLION/UL (ref 3.88–5.62)
SODIUM SERPL-SCNC: 137 MMOL/L (ref 135–147)
T4 FREE SERPL-MCNC: 0.76 NG/DL (ref 0.61–1.12)
TSH SERPL DL<=0.05 MIU/L-ACNC: 0.41 UIU/ML (ref 0.45–4.5)
VARIANT LYMPHS # BLD AUTO: 1 %
WBC # BLD AUTO: 10.81 THOUSAND/UL (ref 4.31–10.16)

## 2024-07-01 PROCEDURE — 85007 BL SMEAR W/DIFF WBC COUNT: CPT

## 2024-07-01 PROCEDURE — 84439 ASSAY OF FREE THYROXINE: CPT

## 2024-07-01 PROCEDURE — 96417 CHEMO IV INFUS EACH ADDL SEQ: CPT

## 2024-07-01 PROCEDURE — 83690 ASSAY OF LIPASE: CPT

## 2024-07-01 PROCEDURE — 84443 ASSAY THYROID STIM HORMONE: CPT

## 2024-07-01 PROCEDURE — 80053 COMPREHEN METABOLIC PANEL: CPT

## 2024-07-01 PROCEDURE — 36415 COLL VENOUS BLD VENIPUNCTURE: CPT

## 2024-07-01 PROCEDURE — 96413 CHEMO IV INFUSION 1 HR: CPT

## 2024-07-01 PROCEDURE — 85027 COMPLETE CBC AUTOMATED: CPT

## 2024-07-01 PROCEDURE — 96367 TX/PROPH/DG ADDL SEQ IV INF: CPT

## 2024-07-01 RX ORDER — LIDOCAINE AND PRILOCAINE 25; 25 MG/G; MG/G
CREAM TOPICAL AS NEEDED
Qty: 30 G | Refills: 1 | Status: SHIPPED | OUTPATIENT
Start: 2024-07-01

## 2024-07-01 RX ORDER — SODIUM CHLORIDE 9 MG/ML
20 INJECTION, SOLUTION INTRAVENOUS ONCE
Status: COMPLETED | OUTPATIENT
Start: 2024-07-01 | End: 2024-07-01

## 2024-07-01 RX ADMIN — SODIUM CHLORIDE 150 MG: 0.9 INJECTION, SOLUTION INTRAVENOUS at 16:26

## 2024-07-01 RX ADMIN — CARBOPLATIN 500 MG: 10 INJECTION, SOLUTION INTRAVENOUS at 15:52

## 2024-07-01 RX ADMIN — ATEZOLIZUMAB 1200 MG: 1200 INJECTION, SOLUTION INTRAVENOUS at 15:16

## 2024-07-01 RX ADMIN — DEXAMETHASONE SODIUM PHOSPHATE: 10 INJECTION, SOLUTION INTRAMUSCULAR; INTRAVENOUS at 14:16

## 2024-07-01 RX ADMIN — FOSAPREPITANT 150 MG: 150 INJECTION, POWDER, LYOPHILIZED, FOR SOLUTION INTRAVENOUS at 14:38

## 2024-07-01 RX ADMIN — SODIUM CHLORIDE 20 ML/HR: 0.9 INJECTION, SOLUTION INTRAVENOUS at 14:15

## 2024-07-01 NOTE — PROGRESS NOTES
Pt tolerated rest of infusion without incident. Port a cath flushed positive blood return noted, de accessed without difficult. AVS declined, next appointment 72/24 at 3pm.

## 2024-07-01 NOTE — TELEPHONE ENCOUNTER
Received Paperwork   What type of form Handicap Plaque   Scanned blank form into patient's Epic chart Yes   Method received form  In Person drop off   Provider responsible for form Dr. Guadarrama   Informed patient our office turn around time for completing patient forms is 5-7 business days. Yes, informed patient of turn around time     Comments Call when completed

## 2024-07-01 NOTE — PROGRESS NOTES
Pt presents for chemo therapy, reports on amoxicillin for sinus infection, Lupe Rn made aware per provider ok to treat.   Med time out:   Etopside reduced from 100mg/m2 to 75mg/m2  Carboplatin reduced to 500mg not calculated based on AUC.  Pt tolerating infusion, hand off report provided to Sisi.

## 2024-07-02 ENCOUNTER — HOSPITAL ENCOUNTER (OUTPATIENT)
Dept: INFUSION CENTER | Facility: CLINIC | Age: 64
Discharge: HOME | End: 2024-07-02
Payer: COMMERCIAL

## 2024-07-02 VITALS
BODY MASS INDEX: 24.06 KG/M2 | DIASTOLIC BLOOD PRESSURE: 66 MMHG | WEIGHT: 177.6 LBS | RESPIRATION RATE: 18 BRPM | HEIGHT: 72 IN | HEART RATE: 80 BPM | TEMPERATURE: 96.3 F | SYSTOLIC BLOOD PRESSURE: 117 MMHG

## 2024-07-02 DIAGNOSIS — C34.90 SMALL CELL LUNG CANCER (HCC): Primary | ICD-10-CM

## 2024-07-02 DIAGNOSIS — D70.1 CHEMOTHERAPY-INDUCED NEUTROPENIA (HCC): ICD-10-CM

## 2024-07-02 DIAGNOSIS — T45.1X5A CHEMOTHERAPY-INDUCED NEUTROPENIA (HCC): ICD-10-CM

## 2024-07-02 PROCEDURE — 96413 CHEMO IV INFUSION 1 HR: CPT

## 2024-07-02 PROCEDURE — 96367 TX/PROPH/DG ADDL SEQ IV INF: CPT

## 2024-07-02 RX ORDER — SODIUM CHLORIDE 9 MG/ML
20 INJECTION, SOLUTION INTRAVENOUS ONCE
Status: COMPLETED | OUTPATIENT
Start: 2024-07-02 | End: 2024-07-02

## 2024-07-02 RX ADMIN — SODIUM CHLORIDE 20 ML/HR: 0.9 INJECTION, SOLUTION INTRAVENOUS at 15:37

## 2024-07-02 RX ADMIN — DEXAMETHASONE SODIUM PHOSPHATE: 10 INJECTION, SOLUTION INTRAMUSCULAR; INTRAVENOUS at 15:37

## 2024-07-02 RX ADMIN — SODIUM CHLORIDE 150 MG: 0.9 INJECTION, SOLUTION INTRAVENOUS at 16:03

## 2024-07-02 NOTE — PROGRESS NOTES
Patient arrives to infusion center for Etoposide Chemotherapy. Patient offers no complaints today. Port accessed, patient tolerated entire infusion without complication. Port de-accessed. AVS offered.     Next appointment: 7/3/24 @ 1500

## 2024-07-03 ENCOUNTER — HOSPITAL ENCOUNTER (OUTPATIENT)
Dept: INFUSION CENTER | Facility: CLINIC | Age: 64
Discharge: HOME | End: 2024-07-03
Payer: COMMERCIAL

## 2024-07-03 VITALS
WEIGHT: 175.6 LBS | BODY MASS INDEX: 23.78 KG/M2 | DIASTOLIC BLOOD PRESSURE: 60 MMHG | SYSTOLIC BLOOD PRESSURE: 113 MMHG | HEART RATE: 71 BPM | HEIGHT: 72 IN | RESPIRATION RATE: 17 BRPM | TEMPERATURE: 96.5 F

## 2024-07-03 DIAGNOSIS — D70.1 CHEMOTHERAPY-INDUCED NEUTROPENIA (HCC): ICD-10-CM

## 2024-07-03 DIAGNOSIS — T45.1X5A CHEMOTHERAPY-INDUCED NEUTROPENIA (HCC): ICD-10-CM

## 2024-07-03 DIAGNOSIS — C34.90 SMALL CELL LUNG CANCER (HCC): Primary | ICD-10-CM

## 2024-07-03 PROCEDURE — 96413 CHEMO IV INFUSION 1 HR: CPT

## 2024-07-03 PROCEDURE — 96367 TX/PROPH/DG ADDL SEQ IV INF: CPT

## 2024-07-03 PROCEDURE — 96361 HYDRATE IV INFUSION ADD-ON: CPT

## 2024-07-03 RX ORDER — SODIUM CHLORIDE 9 MG/ML
500 INJECTION, SOLUTION INTRAVENOUS CONTINUOUS
Status: DISCONTINUED | OUTPATIENT
Start: 2024-07-03 | End: 2024-07-06 | Stop reason: HOSPADM

## 2024-07-03 RX ORDER — SODIUM CHLORIDE 9 MG/ML
500 INJECTION, SOLUTION INTRAVENOUS CONTINUOUS
Status: CANCELLED
Start: 2024-07-03

## 2024-07-03 RX ORDER — SODIUM CHLORIDE 9 MG/ML
20 INJECTION, SOLUTION INTRAVENOUS ONCE
Status: COMPLETED | OUTPATIENT
Start: 2024-07-03 | End: 2024-07-03

## 2024-07-03 RX ADMIN — SODIUM CHLORIDE 150 MG: 0.9 INJECTION, SOLUTION INTRAVENOUS at 16:38

## 2024-07-03 RX ADMIN — DEXAMETHASONE SODIUM PHOSPHATE: 10 INJECTION, SOLUTION INTRAMUSCULAR; INTRAVENOUS at 15:33

## 2024-07-03 RX ADMIN — SODIUM CHLORIDE 500 ML/HR: 0.9 INJECTION, SOLUTION INTRAVENOUS at 15:36

## 2024-07-03 RX ADMIN — SODIUM CHLORIDE 20 ML/HR: 0.9 INJECTION, SOLUTION INTRAVENOUS at 15:33

## 2024-07-03 NOTE — PROGRESS NOTES
Pt here for etoposide infusion, offering complaints of dizziness, fatigue, and the sensation of facial pressure/swelling. Reached out to Lupe Ryan RN okay to treat per Dr. Guadarrama pt ordered 500ml bolus. Port a cath accessed with positive blood return noted. Tolerated infusion without incident. Port a cath flushed positive blood return noted de accessed without difficulty. AVS declined. Walked out in stable condition. Next appointment on 7/5/24 at 9am.

## 2024-07-03 NOTE — TELEPHONE ENCOUNTER
Paperwork Complete   What type of form  Handicap Plaque   Method returned to patient In person   Communicated to patient forms are completed Yes   Scanned completed form(s) into patient's Epic chart Yes   Additional Comments:      VM left for patient that forms are completed and available at  for

## 2024-07-05 ENCOUNTER — HOSPITAL ENCOUNTER (OUTPATIENT)
Dept: INFUSION CENTER | Facility: CLINIC | Age: 64
End: 2024-07-05
Payer: COMMERCIAL

## 2024-07-05 DIAGNOSIS — D70.1 CHEMOTHERAPY-INDUCED NEUTROPENIA (HCC): ICD-10-CM

## 2024-07-05 DIAGNOSIS — T45.1X5A CHEMOTHERAPY-INDUCED NEUTROPENIA (HCC): ICD-10-CM

## 2024-07-05 DIAGNOSIS — C34.90 SMALL CELL LUNG CANCER (HCC): Primary | ICD-10-CM

## 2024-07-05 PROCEDURE — 96372 THER/PROPH/DIAG INJ SC/IM: CPT

## 2024-07-05 RX ADMIN — PEGFILGRASTIM-CBQV 6 MG: 6 INJECTION, SOLUTION SUBCUTANEOUS at 09:12

## 2024-07-05 NOTE — PROGRESS NOTES
Patient here today for Udenyca injection, patient states he is very fatigued today from his chemo infusion.  Injection given sub -q in left abdomen, pt tolerated well.  Patient instructed to rest at home over the weekend and continue to drink fluids and eat. Pt aware of next appt on 7/22/24 @ 1230, declines AVS.

## 2024-07-10 ENCOUNTER — TELEPHONE (OUTPATIENT)
Dept: HEMATOLOGY ONCOLOGY | Facility: CLINIC | Age: 64
End: 2024-07-10

## 2024-07-10 ENCOUNTER — TELEPHONE (OUTPATIENT)
Age: 64
End: 2024-07-10

## 2024-07-10 NOTE — TELEPHONE ENCOUNTER
Called and spoke to Ivy who had requested a call back regarding the status of her dad. She states this last treatment was very difficult for him. Made her aware that provider will discuss the results of the scan at appt and I wasn't going to change any appt just yet in case other changes are made.  Made her aware that when Dr. Guadarrama returns on Monday I would discuss with him and give a call to follow up. She verbalizes understanding.

## 2024-07-12 ENCOUNTER — HOSPITAL ENCOUNTER (OUTPATIENT)
Dept: CT IMAGING | Facility: HOSPITAL | Age: 64
End: 2024-07-12
Attending: INTERNAL MEDICINE
Payer: COMMERCIAL

## 2024-07-12 DIAGNOSIS — G89.3 CANCER RELATED PAIN: ICD-10-CM

## 2024-07-12 DIAGNOSIS — R42 DIZZINESS: ICD-10-CM

## 2024-07-12 DIAGNOSIS — C34.90 SMALL CELL LUNG CANCER (HCC): ICD-10-CM

## 2024-07-12 PROCEDURE — 74177 CT ABD & PELVIS W/CONTRAST: CPT

## 2024-07-12 PROCEDURE — 71260 CT THORAX DX C+: CPT

## 2024-07-12 RX ADMIN — IOHEXOL 100 ML: 350 INJECTION, SOLUTION INTRAVENOUS at 09:52

## 2024-07-16 ENCOUNTER — TELEPHONE (OUTPATIENT)
Dept: HEMATOLOGY ONCOLOGY | Facility: CLINIC | Age: 64
End: 2024-07-16

## 2024-07-16 DIAGNOSIS — C34.90 SMALL CELL LUNG CANCER (HCC): Primary | ICD-10-CM

## 2024-07-16 NOTE — TELEPHONE ENCOUNTER
Called and spoke with Anna, daughter. She was made aware that in scans that were done on 7/12 an incidental finding of PE was found. Dr. Guadarrama was notified today of results  as it was read today.   Explained findings and that provider wants him to start on Xarelto 15mg  1 tab twice a day for 21 days. Daughter verbalizes understanding.  Script pended for provider. Pt will be seen later this week and is made aware that Dr. Guadarrama will discuss the scan further with them.

## 2024-07-18 ENCOUNTER — OFFICE VISIT (OUTPATIENT)
Dept: HEMATOLOGY ONCOLOGY | Facility: CLINIC | Age: 64
End: 2024-07-18
Payer: COMMERCIAL

## 2024-07-18 VITALS
RESPIRATION RATE: 15 BRPM | HEART RATE: 92 BPM | TEMPERATURE: 98 F | SYSTOLIC BLOOD PRESSURE: 124 MMHG | HEIGHT: 72 IN | WEIGHT: 179 LBS | OXYGEN SATURATION: 96 % | DIASTOLIC BLOOD PRESSURE: 76 MMHG | BODY MASS INDEX: 24.24 KG/M2

## 2024-07-18 DIAGNOSIS — C79.51 METASTASIS TO BONE (HCC): ICD-10-CM

## 2024-07-18 DIAGNOSIS — C34.90 SMALL CELL LUNG CANCER (HCC): Primary | ICD-10-CM

## 2024-07-18 PROCEDURE — 99214 OFFICE O/P EST MOD 30 MIN: CPT | Performed by: INTERNAL MEDICINE

## 2024-07-18 NOTE — PROGRESS NOTES
Hematology/Oncology Outpatient Follow- up Note  Ceasar March 63 y.o. male MRN: @ Encounter: [id number]        Date:  7/18/2024        Assessment / Plan:    1 stage IV small cell lung cancer  2 extensive liver metastasis with improved abdominal pain and shrinkage on CAT scan  3 some improvement in dizziness  4 bone metastasis  5 improvement in dizziness  Plan: Patient will go ahead with treatment 1 week from Monday on the 7/29.  He will also add Zometa to his regimen.  He will hopefully come back here to be seen in 4 weeks.  Long-term prognosis unfortunately remains very guarded.  No other major suggestions          HPI: 63-year-old gentleman with stage IV small cell lung cancer.  He has extensive liver metastasis which have improved significantly on CAT scan.  He has a large mass in his right lung which is also shrunk on CAT scan.  He has diffuse bone metastasis which look worse although it is unclear if some of that is sclerotic changes now.  He feels better bilirubin has stabilized he is fatigued and tired from his treatments.  However he is able to get around he can walk up to 200 feet without major shortness of breath.  He can eat.  He has to take 1 week off from treatment we will be agreeable with that otherwise no other major changes.      Interval History: Note from 6/18/2024  Assessment / Plan:    1 stage IV small cell lung cancer  2 extensive liver metastasis with less abdominal pain  3 jaundice which is corrected  4 dizziness  Plan: She will be getting the MRI coming up.  Port-A-Cath will be placed.  Tecentriq was added.  Prognosis long-term is quite guarded.  Will await results of the MRI of the brain.  He will push fluids as well.  Long-term outlook very guarded.  He has chemotherapy due on 7/1.  HPI: 63-year-old gentleman here for follow-up.  His bilirubin has not come down to 1.1.  Has had improvement with normalization of SGOT and SGPT alkaline phosphatase is come down considerably to was in the  160 range.  His biggest complaint is dizziness lightheadedness.  His blood pressure has been on the low side.  He is not complaining of headache.  I am concerned about that this is lightheadedness he is going to push fluids.  He also is due for an MRI of the brain and we will see what that shows.  He is certainly acutely needed treatment is to try and shrink liver which we did and try and control bilirubin which was done.  Hopefully the brain will be negative if positive we will for radiation.  Otherwise he is looking to have a Szacwm-n-Bcia placed and we will put in for that as well  Interval History: Note from 6/6/2024:  Assessment / Plan:    1 stage IV small cell lung cancer  2 extensive liver metastasis exam slightly better  3 jaundiced looks better waiting for results from labs tomorrow  4 pain secondary to #2  5 thrombocytopenia secondary to chemo await results of labs  6 dizziness.  Will be getting an MRI of the brain hopefully next week.  Plan: Tecentriq will be added to his chemotherapy regimen.  MRI of the brain will be done.  He will come back in 6 weeks.  He is going to have 2 more cycles of treatment before then.  At the time of his return we will get a CAT scan chest abdomen pelvis determine where he stands.  If he has had a fairly maximal response we will try and use Tecentriq alone from there.  Daughter was present during discussion.  HPI: 63-year-old gentleman here for follow-up.  He has stage IV small cell lung cancer with his extensive liver metastasis he is actually feeling better he is gaining weight he has pain primarily in the right upper quadrant and in the epigastric area but better controlled.  He was seen at [facility].  Their recommendation was to add Tecentriq to his carboplatinum etoposide.  He will start that coming Monday for his third cycle  They also recommended to obtain a MRI of the brain.  Will go ahead and order that.  Hopefully get that next week.  His only  complaint regarding the neurologic problems is occasional dizziness and slight change in gait.  In the room his gait was fine.  He also complained of shortness of breath with a lot of exertion.  His sats here are 98%.  I advised him to get a pulse oximetry which he has at home.  If he can document sats dropping below 90% we can try and look into getting oxygen for him.  Interval History: Note from 5/1/2024:  Assessment / Plan:    1 stage IV small cell lung cancer  2 liver metastasis extensive  3 jaundice secondary to #2  4 pain secondary to #2  5 thrombocytopenia secondary to chemo.  Plan: Patient will get a CBC CMP today.  It is noted that he has thrombocytopenia secondary to chemo.  He will come back here 5/9 for laboratory work.  If all goes well he will get carboplatin/-16 beginning 5/13.  At that time we will also be looking at the possibility of adding immunotherapy.  Also beyond that he is asking and we will recommend the as he wishes to get a second opinion at [facility].  His prognosis is guarded long-term.  Hopefully he will get at least some response to these treatments.  HPI: 63-year-old gentleman with a history of small cell cancer along with diffuse metastatic disease to liver.  He has pain in the right upper quadrant and right flank area around the lower ribs upper abdominal area.  Hurts worse on palpation.  He is using oxycodone 22.5 mg every 4 hours with some relief.  He is able to eat.  He has no fever or chills.  He still coughs and some shortness of breath but improved.  He is due again for treatment on 5/10/2024.  He was asked to begin again 5/13/2024.  He remains with marked elevation of total bilirubin of 7.2 and all improved.  His LDH total is also quite elevated at 1000.  Interval History: Note 04/22/2024:  Small cell lung cancer with metastasis to liver  Assessment & Plan  Patient reported night sweats, recent weight loss, current every day smoker, severe back and flank  "pain.  CT chest: \" Right hilar and perihilar 5.5 x 6.3 x 5.2 cm mass consistent lung malignancy. Right hilar, mediastinal and right supraclavicular lymphadenopathy compatible with metastatic disease. Airspace consolidation in the right middle lobe adjacent to the hilum suggesting postobstructive pneumonia. Nodular thickening along the axial interstitium in the right lower lobe and the right major fissure suggesting lymphangitic spread of tumor. Diffuse hepatic metastases. Periportal, gastroesophageal and para-aortic lymphadenopathy\"  Complained of hip pain -- CT A/P displayed sclerotic lesion in the pelvis.  Liver biopsy pathology report positive for small cell carcinoma.   4/22 patient overall doing well status post chemotherapy x 3 days.  Patient cleared for discharge per oncology. Will need to follow-up with outpatient oncology  Continue allopurinol for 1 month and Decadron per oncology  Pain med recs per palliative  Note that the patient has become quite jaundice bilirubin of 2.6-9.2 prior to receiving his treatments.  He received cisplatin and day 1 through 3 etopside.      Cancer Staging:  Cancer Staging   No matching staging information was found for the patient.      Molecular Testing:     Previous Hematologic/ Oncologic History:    Oncology History   Small cell lung cancer with metastasis to liver   4/19/2024 Initial Diagnosis    Small cell lung cancer in adult (HCC)     4/19/2024 - 6/13/2024 Chemotherapy    alteplase (CATHFLO), 2 mg, Intracatheter, Every 1 Minute as needed, 3 of 6 cycles  pegfilgrastim (NEULASTA), 6 mg, Subcutaneous, Once, 3 of 6 cycles  Administration: 6 mg (4/23/2024), 6 mg (5/16/2024), 6 mg (6/13/2024)  fosaprepitant (EMEND) IVPB, 150 mg, Intravenous, Once, 3 of 6 cycles  Administration: 150 mg (4/19/2024), 150 mg (5/13/2024), 150 mg (6/10/2024)  atezolizumab (TECENTRIQ) IVPB, 1,200 mg (100 % of original dose 1,200 mg), Intravenous, Once, 1 of 4 cycles  Dose modification: 1,200 mg " (original dose 1,200 mg, Cycle 3, Reason: Anticipated Tolerance)  Administration: 1,200 mg (6/10/2024)  etoposide (TOPOSAR), 50 mg/m2 = 100.6 mg (50 % of original dose 100 mg/m2), Intravenous, Once, 3 of 6 cycles  Dose modification: 50 mg/m2 (original dose 100 mg/m2, Cycle 1, Reason: Anticipated Tolerance)  Administration: 100.6 mg (4/19/2024), 100.6 mg (4/20/2024), 201 mg (5/13/2024), 201 mg (5/14/2024), 100.6 mg (4/21/2024), 201 mg (5/15/2024), 201 mg (6/10/2024), 201 mg (6/11/2024), 201 mg (6/12/2024)  CARBOplatin (PARAPLATIN) IVPB (GOG AUC DOSING), 669.5 mg, Intravenous, Once, 3 of 6 cycles  Administration: 669.5 mg (4/19/2024), 731 mg (5/13/2024), 603 mg (6/10/2024)     6/19/2024 -  Chemotherapy    alteplase (CATHFLO), 2 mg, Intracatheter, Every 1 Minute as needed, 4 of 12 cycles  pegfilgrastim-cbqv (UDENYCA), 6 mg, Subcutaneous, Once, 1 of 3 cycles  Administration: 6 mg (7/5/2024)  pegfilgrastim-apgf (Nyvepria), 6 mg, Subcutaneous, Once, 3 of 3 cycles  fosaprepitant (EMEND) IVPB, 150 mg, Intravenous, Once, 4 of 4 cycles  Administration: 150 mg (7/1/2024)  atezolizumab (TECENTRIQ) IVPB, 1,200 mg, Intravenous, Once, 4 of 12 cycles  Administration: 1,200 mg (7/1/2024)  etoposide (TOPOSAR), 100 mg/m2 = 200 mg, Intravenous, Once, 4 of 6 cycles  Dose modification: 75 mg/m2 (75 % of original dose 100 mg/m2, Cycle 4, Reason: Other (see comments), Comment: neutropenia), 75 mg/m2 (75 % of original dose 100 mg/m2, Cycle 4, Reason: Other (see comments))  Administration: 150 mg (7/1/2024), 150 mg (7/2/2024), 150 mg (7/3/2024)  CARBOplatin (PARAPLATIN) IVPB (GOG AUC DOSING), , Intravenous, Once, 4 of 6 cycles  Dose modification: 519 mg (75 % of original dose 692 mg, Cycle 4, Reason: Other (see comments), Comment: neutropenia), 500 mg (original dose 692 mg, Cycle 4, Reason: Neutropenia)  Administration: 500 mg (7/1/2024)         Current Hematologic/ Oncologic Treatment:       Cycle 1         Test Results:    Imaging: CT  chest abdomen pelvis w contrast    Result Date: 7/16/2024  Narrative: CT CHEST, ABDOMEN AND PELVIS WITH IV CONTRAST INDICATION: C34.90: Malignant neoplasm of unspecified part of unspecified bronchus or lung R42: Dizziness and giddiness G89.3: Neoplasm related pain (acute) (chronic). COMPARISON: 5/9/2024 and 4/11/2024. TECHNIQUE: CT examination of the chest, abdomen and pelvis was performed. Multiplanar 2D reformatted images were created from the source data. This examination, like all CT scans performed in the [facility], was performed utilizing techniques to minimize radiation dose exposure, including the use of iterative reconstruction and automated exposure control. Radiation dose length product (DLP) for this visit: 739 mGy-cm IV Contrast: 100 mL of iohexol (OMNIPAQUE) Enteric Contrast: Not administered. FINDINGS: CHEST LUNGS: Airspace disease in the right middle and lower lobes has significantly improved. There is residual linear density in the right and lower lobes likely representing atelectasis/scarring. There is a new 3 mm right upper lobe nodule on series 3 image 121, suspicious for metastasis. There are small cystic foci with surrounding density in the bilateral upper lobes and superior segment of the left lower lobe, increased from the prior study though possibly inflammatory versus cavitary metastasis. Irregular nodular densities in the bilateral apices are stable and likely secondary to pleural parenchymal scarring. No tracheal or endobronchial lesion. PLEURA: Unremarkable. HEART/GREAT VESSELS: There are pulmonary emboli, acute to subacute, within the right upper and lower lobe vessels. Additional small PEs may be present though the study was not tailored for evaluation. RV/LV ratio is less than 0.9. Heart is unremarkable for patient's age. No thoracic aortic aneurysm. MEDIASTINUM AND LEWIS: The hilar mass seen on the study from April 2024 has resolved. Mediastinal lymphadenopathy has  also resolved. CHEST WALL AND LOWER NECK: Tiny thyroid nodules of no clinical significance. ABDOMEN LIVER/BILIARY TREE: Again seen are innumerable treated metastasis throughout the liver. Overall, size and number of lesions has decreased from May 2024. This includes a 1.1 x 1.2 cm segment 3 lesion on series 2 image 121 which previously measured 2.7 x 2.1 cm, a 0.9 x 1.1 cm segment 8 lesion on series 2 image 118, previously 1.3 x 1.7 cm, and a 1.2 x 1.2 cm subcapsular segment 6 lesion on series 2 image 133 which previously measured 1.9 x 2.1 cm. There is lobulation of the liver contour in keeping with  pseudocirrhosis. GALLBLADDER: Post cholecystectomy. SPLEEN: Unremarkable. PANCREAS: Unremarkable. ADRENAL GLANDS: Unremarkable. KIDNEYS/URETERS: Unremarkable. No hydronephrosis. STOMACH AND BOWEL: Unremarkable. APPENDIX: No findings to suggest appendicitis. ABDOMINOPELVIC CAVITY: No ascites. No pneumoperitoneum. No lymphadenopathy. VESSELS: Unremarkable for patient's age. PELVIS REPRODUCTIVE ORGANS: Enlarged prostate. URINARY BLADDER: Unremarkable. ABDOMINAL WALL/INGUINAL REGIONS: Small fat and fluid containing left inguinal hernia. Status post right inguinal hernia repair. BONES: There has been interval progression of extensive sclerotic osseous metastasis with small lesions in all visualized bony structures, most severe in the thoracic and lumbar spine and pelvis.     Impression: 1.  Acute to subacute pulmonary embolism to the right upper and lower lobes. Normal RV/LV ratio. 2.  Resolution of right hilar mass/mediastinal lymphadenopathy and near complete resolution of right middle and lower lobe airspace disease with residual linear appearing densities most suggestive of atelectasis and/or scarring. 3. 4.  New solid nodule in the right upper lobe suspicious for metastasis. Multiple cystic nodules with surrounding density have progressed from the prior study and may be inflammatory versus cavitary metastasis. 5.   Improved liver metastasis with associated pseudocirrhosis. 6.  Marked progression of diffuse sclerotic osseous metastasis. I personally discussed this study with ALISTAIR SANCHEZ on 7/16/2024 10:50 AM. Workstation performed: [id number]     IR port placement    Result Date: 6/27/2024  Narrative: Chest port placement. Clinical History: Stage IV small cell carcinoma, in need of a port for chemotherapy. Contrast: None Fluoro time: 55SEC Number of Images: 1 Radiation dose: 6.39 mGy Conscious sedation time: 1HR Technique: The patient was brought to the interventional radiology suite and identified verbally and by wristband. The patient was placed supine on the table. The right internal jugular vein was evaluated as a potential access site with ultrasound. The vessel was found to be patent and compressible. The right neck and upper chest was prepped and draped in the usual sterile fashion. All elements of maximal sterile barrier technique were followed (cap, mask, sterile gown, sterile gloves, large sterile sheet, hand hygiene, and 2% chlorhexidine for cutaneous antisepsis). Lidocaine was administered to the skin and a small skin incision was made. Under ultrasound guidance, utilizing sterile ultrasound technique with sterile gel and a sterile probe cover, the right internal jugular vein was accessed using single wall Seldinger technique. Static images of real time needle entry into the vessel were obtained. A 0.018 wire was then advanced through the needle into the central venous system. The needle was removed, and a 5 Gabonese coaxial dilator was inserted. A heavy wire was inserted through the outer dilator and a 6 Gabonese peel-away sheath was inserted over the wire. A subcutaneous pocket was created in the skin of the upper chest for the reservoir utilizing a surgical incision. The port catheter was then tunneled under the skin of the upper chest. The catheter was advanced through the peel-away and the peel-away was removed.  The catheter tip was then positioned in the right atrium under fluoroscopic control. The catheter was connected to the port, and the port inserted into the subcutaneous pocket.  The port was sutured in place utilizing Vicryl suture.  The pocket was closed with Vicryl suture and Histoacryl.  The port was flushed with normal saline.     Impression: Impression: 1. Successful ultrasound and fluoroscopically guided placement of a chest port via the right internal jugular vein. 2. The tip of the catheter is in the right atrium and may be used immediately. Workstation performed: [id number]     XR chest pa & lateral    Result Date: 6/25/2024  Narrative: XR CHEST PA & LATERAL INDICATION: J40: Bronchitis, not specified as acute or chronic J45.901: Unspecified asthma with (acute) exacerbation. Wheezing, shortness of breath, cough. Recent diagnosis of metastatic small cell lung cancer. COMPARISON: Thoracic spine CT 4/16/2024, chest CT 4/11/2024, abdomen CT 5/9/2024. FINDINGS: Mild scarring/atelectasis at the site of recent postobstructive pneumonia in the right midlung. No pneumothorax or pleural effusion. Normal heart size, resolution of the right hilar mass. Bones are unremarkable for age. Upper abdomen normal. Cholecystectomy.     Impression: No acute cardiopulmonary disease. Mild scarring/atelectasis at the site of recent postobstructive pneumonia in the right midlung. Resolution of right hilar mass. Workstation performed: [id number]     MRI brain w wo contrast    Result Date: 6/24/2024  Narrative: MRI BRAIN WITH AND WITHOUT CONTRAST INDICATION: C34.90: Malignant neoplasm of unspecified part of unspecified bronchus or lung R42: Dizziness and giddiness G89.3: Neoplasm related pain (acute) (chronic). Patient with small cell lung cancer complaining of dizziness lightheadedness rule out brain metastasis COMPARISON:  None. TECHNIQUE: Multiplanar, multisequence imaging of the brain was performed before and after gadolinium  "administration. IV Contrast:  7 mL of Gadobutrol injection (SINGLE-DOSE) IMAGE QUALITY:   Diagnostic. FINDINGS: BRAIN PARENCHYMA:  There is no discrete mass, mass effect or midline shift. There is no intracranial hemorrhage.  Normal posterior fossa.  Diffusion imaging is unremarkable. No acute infarction. Small scattered hyperintensities on T2/FLAIR imaging are noted in the periventricular and subcortical white matter demonstrating an appearance that is statistically most likely to represent mild-to-moderate microangiopathic change. Small patchy T2/FLAIR hyperintense signal abnormality in the sorin, likely chronic microangiopathy. Small dilated perivascular spaces in bilateral cerebral hemispheres (worse in bilateral parietal deep white matter region). Postcontrast imaging of the brain demonstrates no abnormal enhancement. VENTRICLES:  Normal for the patient's age. SELLA AND PITUITARY GLAND:  Normal. ORBITS:  Normal. PARANASAL SINUSES: Mild mucosal thickening in left ethmoid and left maxillary sinuses. VASCULATURE:  Evaluation of the major intracranial vasculature demonstrates appropriate flow voids. CALVARIUM AND SKULL BASE: Small right mastoid effusion. EXTRACRANIAL SOFT TISSUES:  Normal.     Impression: No intracranial metastasis. No acute intracranial abnormality. Mild-to-moderate chronic microangiopathy. Workstation performed: [id number]             Labs:   Lab Results   Component Value Date    WBC 10.81 (H) 07/01/2024    HGB 10.5 (L) 07/01/2024    HCT 33.3 (L) 07/01/2024     (H) 07/01/2024    PLT 99 (L) 07/01/2024     Lab Results   Component Value Date    K 3.6 07/01/2024     07/01/2024    CO2 30 07/01/2024    BUN 14 07/01/2024    CREATININE 0.84 07/01/2024    GLUF 79 06/07/2024    CALCIUM 8.8 07/01/2024    CORRECTEDCA 9.1 06/27/2024    AST 27 07/01/2024    ALT 39 07/01/2024    ALKPHOS 145 (H) 07/01/2024    EGFR 93 07/01/2024         No results found for: \"SPEP\", \"UPEP\"    No results found for: " "\"PSA\"    No results found for: \"CEA\"    No results found for: \"\"    No results found for: \"AFP\"    No results found for: \"IRON\", \"TIBC\", \"FERRITIN\"    No results found for: \"[id number]\"      ROS: Review of Systems   Constitutional:  Positive for fatigue.   HENT: Negative.     Eyes: Negative.    Respiratory:  Positive for cough, shortness of breath and wheezing.    Cardiovascular: Negative.    Gastrointestinal: Negative.    Endocrine: Negative.    Genitourinary: Negative.    Musculoskeletal: Negative.    Skin: Negative.    Allergic/Immunologic: Negative.    Neurological: Negative.    Hematological: Negative.          Current Medications: Reviewed  Allergies: Reviewed  PMH/FH/SH:  Reviewed      Physical Exam:    Body surface area is 2.03 meters squared.    Wt Readings from Last 3 Encounters:   07/18/24 81.2 kg (179 lb)   07/03/24 79.7 kg (175 lb 9.6 oz)   07/02/24 80.6 kg (177 lb 9.6 oz)        Temp Readings from Last 3 Encounters:   07/18/24 98 °F (36.7 °C) (Temporal)   07/03/24 (!) 96.5 °F (35.8 °C)   07/02/24 (!) 96.3 °F (35.7 °C) (Temporal)        BP Readings from Last 3 Encounters:   07/18/24 124/76   07/03/24 113/60   07/02/24 117/66         Pulse Readings from Last 3 Encounters:   07/18/24 92   07/03/24 71   07/02/24 80     @LASTSAO2(3)@      Physical Exam  Constitutional:       Appearance: Normal appearance. He is normal weight.   HENT:      Head: Normocephalic and atraumatic.   Eyes:      Extraocular Movements: Extraocular movements intact.      Conjunctiva/sclera: Conjunctivae normal.      Pupils: Pupils are equal, round, and reactive to light.   Cardiovascular:      Rate and Rhythm: Normal rate and regular rhythm.      Heart sounds: Normal heart sounds.   Pulmonary:      Effort: Pulmonary effort is normal.      Breath sounds: Stridor present. Rhonchi present.   Abdominal:      General: Abdomen is flat. Bowel sounds are normal.      Palpations: Abdomen is soft.   Musculoskeletal:         General: Normal " range of motion.      Cervical back: Normal range of motion and neck supple.   Skin:     General: Skin is warm and dry.   Neurological:      General: No focal deficit present.      Mental Status: He is alert and oriented to person, place, and time. Mental status is at baseline.     He has less discomfort right upper quadrant on palpation.      Goals and Barriers:  Current Goal: Prolong Survival from Cancer.   Barriers: None.      Patient's Capacity to Self Care:  Patient is able to self care.    Code Status: [unfilled]  Advance Directive and Living Will:      Power of :

## 2024-07-19 DIAGNOSIS — Z51.5 PALLIATIVE CARE PATIENT: ICD-10-CM

## 2024-07-19 DIAGNOSIS — D70.1 CHEMOTHERAPY-INDUCED NEUTROPENIA (HCC): Primary | ICD-10-CM

## 2024-07-19 DIAGNOSIS — C34.90 SMALL CELL LUNG CANCER (HCC): ICD-10-CM

## 2024-07-19 DIAGNOSIS — T45.1X5A CHEMOTHERAPY-INDUCED NEUTROPENIA (HCC): Primary | ICD-10-CM

## 2024-07-19 DIAGNOSIS — G89.3 CANCER RELATED PAIN: ICD-10-CM

## 2024-07-19 RX ORDER — DEXAMETHASONE 4 MG/1
4 TABLET ORAL
Qty: 30 TABLET | Refills: 0 | Status: SHIPPED | OUTPATIENT
Start: 2024-07-19

## 2024-07-19 RX ORDER — GABAPENTIN 100 MG/1
100 CAPSULE ORAL 3 TIMES DAILY
Qty: 90 CAPSULE | Refills: 0 | Status: SHIPPED | OUTPATIENT
Start: 2024-07-19

## 2024-07-19 RX ORDER — OXYCODONE HYDROCHLORIDE 15 MG/1
15-30 TABLET ORAL EVERY 4 HOURS
Qty: 180 TABLET | Refills: 0 | Status: SHIPPED | OUTPATIENT
Start: 2024-07-19

## 2024-07-22 ENCOUNTER — TELEPHONE (OUTPATIENT)
Age: 64
End: 2024-07-22

## 2024-07-22 ENCOUNTER — TELEPHONE (OUTPATIENT)
Dept: HEMATOLOGY ONCOLOGY | Facility: CLINIC | Age: 64
End: 2024-07-22

## 2024-07-22 DIAGNOSIS — C34.90 SMALL CELL LUNG CANCER (HCC): ICD-10-CM

## 2024-07-22 DIAGNOSIS — G89.3 CANCER RELATED PAIN: ICD-10-CM

## 2024-07-22 RX ORDER — FENTANYL 50 UG/1
1 PATCH TRANSDERMAL
Qty: 5 PATCH | Refills: 0 | Status: SHIPPED | OUTPATIENT
Start: 2024-07-22 | End: 2024-08-01 | Stop reason: SDUPTHER

## 2024-07-22 NOTE — PROGRESS NOTES
Message received that patient having increased pain.  Called and spoke to chito Castillo.  Patient has been having increased pain in b/l groin and back. .States this is the same pain he had been having however increased in intensity.  Currently taking Oxycodone 22.5-30mg every four hours with pain only improving to 7/10.  Will increase fentanyl patch to 50 mcg.  Anna understands onset is about 12 hours.  Reviewed side effects and likely reduction in use of prn medication when increase in fentanyl patch effective.      Verbalized understanding.  Medication changes made.

## 2024-07-22 NOTE — TELEPHONE ENCOUNTER
Daughter of patient calling from [phone]     HIPAA of patient and daughter verified.    Need to reschedule treatments.

## 2024-07-22 NOTE — TELEPHONE ENCOUNTER
Pt daughter calling in because of pt being in pain.    Pt says he can bump up oxy's but they aren't doing anything for the pain right now.    Pt has been taking 2.5 15 mg oxy every 4 hours.    Pt daughter is asking for a call back from someone to  advise on next steps to take.    Pt reports to daughter that pain level is at 7 out of 10.    Please reach out to Anna at [phone]    Thank you.

## 2024-07-22 NOTE — TELEPHONE ENCOUNTER
Existing pt Dillon    Pt will be deferred one week originally scheduled for 7/22- please defer to 7/29 and will change dates

## 2024-07-23 ENCOUNTER — HOSPITAL ENCOUNTER (OUTPATIENT)
Dept: INFUSION CENTER | Facility: CLINIC | Age: 64
End: 2024-07-23

## 2024-07-25 ENCOUNTER — HOSPITAL ENCOUNTER (OUTPATIENT)
Dept: INFUSION CENTER | Facility: CLINIC | Age: 64
End: 2024-07-25

## 2024-07-25 ENCOUNTER — TELEPHONE (OUTPATIENT)
Age: 64
End: 2024-07-25

## 2024-07-25 PROBLEM — Z00.00 ROUTINE PHYSICAL EXAMINATION: Status: RESOLVED | Noted: 2024-06-25 | Resolved: 2024-07-25

## 2024-07-25 NOTE — TELEPHONE ENCOUNTER
Patients daughter, calling in regards to his infusion appts being rescheduled there is a note in chart from Lupe VAZ. Daughter states she didn't want July appt rescheduled. She can be reached at [phone]

## 2024-08-01 ENCOUNTER — OFFICE VISIT (OUTPATIENT)
Dept: PALLIATIVE MEDICINE | Facility: CLINIC | Age: 64
End: 2024-08-01

## 2024-08-01 VITALS
DIASTOLIC BLOOD PRESSURE: 68 MMHG | HEIGHT: 72 IN | HEART RATE: 77 BPM | RESPIRATION RATE: 16 BRPM | SYSTOLIC BLOOD PRESSURE: 118 MMHG | OXYGEN SATURATION: 96 % | BODY MASS INDEX: 24.33 KG/M2 | TEMPERATURE: 97.6 F | WEIGHT: 179.6 LBS

## 2024-08-01 DIAGNOSIS — Z71.89 GOALS OF CARE, COUNSELING/DISCUSSION: ICD-10-CM

## 2024-08-01 DIAGNOSIS — Z51.5 PALLIATIVE CARE PATIENT: ICD-10-CM

## 2024-08-01 DIAGNOSIS — C79.51 METASTASIS TO BONE (HCC): ICD-10-CM

## 2024-08-01 DIAGNOSIS — Z71.89 COUNSELING REGARDING ADVANCE CARE PLANNING AND GOALS OF CARE: ICD-10-CM

## 2024-08-01 DIAGNOSIS — G89.3 CANCER RELATED PAIN: ICD-10-CM

## 2024-08-01 DIAGNOSIS — E43 SEVERE PROTEIN-CALORIE MALNUTRITION (HCC): ICD-10-CM

## 2024-08-01 DIAGNOSIS — R60.0 LOWER EXTREMITY EDEMA: ICD-10-CM

## 2024-08-01 DIAGNOSIS — C34.90 SMALL CELL LUNG CANCER (HCC): Primary | ICD-10-CM

## 2024-08-01 RX ORDER — FENTANYL 50 UG/1
1 PATCH TRANSDERMAL
Qty: 10 PATCH | Refills: 0 | Status: SHIPPED | OUTPATIENT
Start: 2024-08-01

## 2024-08-01 RX ORDER — OXYCODONE HYDROCHLORIDE 15 MG/1
22.5-45 TABLET ORAL EVERY 4 HOURS
Qty: 180 TABLET | Refills: 0 | Status: SHIPPED | OUTPATIENT
Start: 2024-08-01

## 2024-08-01 NOTE — ASSESSMENT & PLAN NOTE
Patient experiencing 5/10 pain in abdomen and back.    increased to Oxycodone 15-30 mg at last visit, Taking 22.5 mg Q 4-6 hours, today increased to 22.5 mg - 45 mg.  Patient reports he feels he will only need higher dose once a day.   INcrease Fentanyl patch to 50 mcg July 22, pain better controlled, reports improvement overall.   Reports pain moves around to all body parts.      - Patient not on bowel regimen, reports normal bowel movements.

## 2024-08-01 NOTE — PROGRESS NOTES
Ambulatory Visit  Name: Ceasar March      : 1960      MRN: [id number]  Encounter Provider: CHRISTIANA Hensley  Encounter Date: 2024   Encounter department: [facility] PALLIATIVE CARE [place]    Assessment & Plan   1. Small cell lung cancer with metastasis to liver  Assessment & Plan:  CT CAP- R Hilar mass with lymphangitic spread of tumor, Right hilar and perihilar heterogeneously enhancing mass measures 5.5 x 6.3 x 5.2 cm,  Innumerable hypoenhancing lesions throughout the liver  Biopsy completed 4/15/24, small cell cancer  Tolerating chemotherapy well with exception of hypotension likely multifactorial.   CT , favorable response to treatment.  Orders:  -     fentaNYL (DURAGESIC) 50 mcg/hr; Place 1 patch on the skin over 72 hours every third day Max Daily Amount: 1 patch  2. Cancer related pain  Assessment & Plan:  Patient experiencing 5/10 pain in abdomen and back.    increased to Oxycodone 15-30 mg at last visit, Taking 22.5 mg Q 4-6 hours, today increased to 22.5 mg - 45 mg.  Patient reports he feels he will only need higher dose once a day.   INcrease Fentanyl patch to 50 mcg , pain better controlled, reports improvement overall.   Reports pain moves around to all body parts.      - Patient not on bowel regimen, reports normal bowel movements.  Orders:  -     oxyCODONE (ROXICODONE) 15 mg immediate release tablet; Take 1.5-3 tablets (22.5-45 mg total) by mouth every 4 (four) hours Max Daily Amount: 270 mg  -     fentaNYL (DURAGESIC) 50 mcg/hr; Place 1 patch on the skin over 72 hours every third day Max Daily Amount: 1 patch  3. Palliative care patient  Assessment & Plan:  Introduced palliative care to the patient during inpatient consult 4/15  Appropriate for outpatient follow up for symptom management, support and advanced care planning in metastatic cancer.  Psychosocial support  Prefers daughter be contacted prior to wife.  Lives with wife and daughter, Anna.  Orders:  -      oxyCODONE (ROXICODONE) 15 mg immediate release tablet; Take 1.5-3 tablets (22.5-45 mg total) by mouth every 4 (four) hours Max Daily Amount: 270 mg  4. Goals of care, counseling/discussion  Assessment & Plan:  Patient clear with limits of DNR/DNI  Has not completed advanced care planning documents.    5. Severe protein-calorie malnutrition (HCC)  Assessment & Plan:    Patient reports adequate appetite and gaining some weight back.    6. Lower extremity edema  Assessment & Plan:  Patient reports discussing with Dr. Guadarrama.    7. Counseling regarding advance care planning and goals of care  Assessment & Plan:  Have started ACP documents however need to get notarized.  Encouraged completion.    8. Metastasis to bone (HCC)      Decisional apparatus: Patient is competent on my exam today. If competence is lost, patient's substitute decision maker would default to wife [place].   Advance Directive / Living Will / POLST: in process     PDMP Review: I have reviewed the patient's controlled substance dispensing history in the Prescription Drug Monitoring Program in compliance with the [facility] regulations before prescribing any controlled substances.    History of Present Illness     Ceasar March is a 63 y.o. male who presents for symptom management support and goals of care in metastatic small cell cancer.   Patient presents to the office with his daughter Anna. He continues with chemotherapy, next infusion scheduled for August 13.     Overall reports doing okay.  He continues to have pain which moves throughout his body.  He is unsure how effective the fentanyl patch is but admits to significantly better pain control since the increase in to 50 mcg.        Review of Systems   Constitutional:  Positive for fatigue.   Respiratory:  Positive for shortness of breath.    Gastrointestinal:  Positive for abdominal pain.   Neurological:  Positive for light-headedness.   Psychiatric/Behavioral:  Negative for sleep  disturbance.      Current Outpatient Medications on File Prior to Visit   Medication Sig Dispense Refill    albuterol (PROVENTIL HFA,VENTOLIN HFA) 90 mcg/act inhaler Inhale 2 puffs every 6 (six) hours as needed for wheezing 6.7 g 0    calcium carbonate (TUMS EX) 750 mg chewable tablet Chew 1 tablet daily      dexamethasone (DECADRON) 4 mg tablet Take 1 tablet (4 mg total) by mouth daily with breakfast 1 tab po at breakfast 30 tablet 0    gabapentin (NEURONTIN) 100 mg capsule Take 1 capsule (100 mg total) by mouth 3 (three) times a day 90 capsule 0    lidocaine-prilocaine (EMLA) cream Apply topically as needed for mild pain Apply 45-60min prior to infusion time. Cover with platic wrap or coverings 30 g 1    ondansetron (ZOFRAN) 4 mg tablet Take 1 tablet (4 mg total) by mouth every 8 (eight) hours as needed for nausea or vomiting 10 tablet 0    [DISCONTINUED] fentaNYL (DURAGESIC) 50 mcg/hr Place 1 patch on the skin over 72 hours every third day Max Daily Amount: 1 patch 5 patch 0    [DISCONTINUED] oxyCODONE (ROXICODONE) 15 mg immediate release tablet Take 1-2 tablets (15-30 mg total) by mouth every 4 (four) hours Max Daily Amount: 180 mg 180 tablet 0    rivaroxaban (Xarelto) 15 mg tablet Take 1 tablet (15 mg total) by mouth 2 (two) times a day (Patient not taking: Reported on 8/1/2024) 21 tablet 0     No current facility-administered medications on file prior to visit.          Objective     /68 (BP Location: Right arm, Patient Position: Sitting, Cuff Size: Standard)   Pulse 77   Temp 97.6 °F (36.4 °C) (Tympanic)   Resp 16   Ht 6' (1.829 m)   Wt 81.5 kg (179 lb 9.6 oz)   SpO2 96%   BMI 24.36 kg/m²     Physical Exam  Vitals and nursing note reviewed.   Constitutional:       General: He is awake. He is not in acute distress.     Appearance: He is well-developed.   HENT:      Head: Normocephalic and atraumatic.   Eyes:      General: Lids are normal.      Conjunctiva/sclera: Conjunctivae normal.    Cardiovascular:      Rate and Rhythm: Normal rate and regular rhythm.   Pulmonary:      Effort: Pulmonary effort is normal. No respiratory distress or retractions.   Abdominal:      Palpations: Abdomen is soft.      Tenderness: There is no abdominal tenderness.   Musculoskeletal:         General: No swelling.      Cervical back: Neck supple.   Skin:     General: Skin is warm and dry.   Neurological:      General: No focal deficit present.      Mental Status: He is alert and oriented to person, place, and time.   Psychiatric:         Attention and Perception: Attention normal.         Mood and Affect: Mood normal.         Behavior: Behavior is cooperative.         Cognition and Memory: Cognition and memory normal.         Recent labs:  Lab Results   Component Value Date/Time    SODIUM 137 07/01/2024 11:57 AM    SODIUM 140 09/08/2023 02:55 PM    K 3.6 07/01/2024 11:57 AM    K 4.4 09/08/2023 02:55 PM    BUN 14 07/01/2024 11:57 AM    BUN 17 09/08/2023 02:55 PM    CREATININE 0.84 07/01/2024 11:57 AM    CREATININE 1.17 09/08/2023 02:55 PM    GLUC 128 07/01/2024 11:57 AM    GLUC 73 09/08/2023 02:55 PM    CALCIUM 8.8 07/01/2024 11:57 AM    CALCIUM 9.0 09/08/2023 02:55 PM    AST 27 07/01/2024 11:57 AM    AST 18 09/08/2023 02:55 PM    ALT 39 07/01/2024 11:57 AM    ALT 15 09/08/2023 02:55 PM    ALB 3.5 07/01/2024 11:57 AM    ALB 4.1 09/08/2023 02:55 PM    TP 5.8 (L) 07/01/2024 11:57 AM    TP 6.8 09/08/2023 02:55 PM    EGFR 93 07/01/2024 11:57 AM    EGFR 71 09/08/2023 02:55 PM    EGFR 60 (L) 04/04/2019 05:10 PM     Lab Results   Component Value Date/Time    HGB 10.5 (L) 07/01/2024 11:57 AM    WBC 10.81 (H) 07/01/2024 11:57 AM    PLT 99 (L) 07/01/2024 11:57 AM    INR 1.12 04/15/2024 04:32 AM     Lab Results   Component Value Date/Time    [id number] 0.410 (L) 07/01/2024 11:57 AM       Recent Imaging:  Procedure: CT chest abdomen pelvis w contrast    Result Date: 7/16/2024  Narrative: CT CHEST, ABDOMEN AND PELVIS WITH IV  CONTRAST INDICATION: C34.90: Malignant neoplasm of unspecified part of unspecified bronchus or lung R42: Dizziness and giddiness G89.3: Neoplasm related pain (acute) (chronic). COMPARISON: 5/9/2024 and 4/11/2024. TECHNIQUE: CT examination of the chest, abdomen and pelvis was performed. Multiplanar 2D reformatted images were created from the source data. This examination, like all CT scans performed in the [facility], was performed utilizing techniques to minimize radiation dose exposure, including the use of iterative reconstruction and automated exposure control. Radiation dose length product (DLP) for this visit: 739 mGy-cm IV Contrast: 100 mL of iohexol (OMNIPAQUE) Enteric Contrast: Not administered. FINDINGS: CHEST LUNGS: Airspace disease in the right middle and lower lobes has significantly improved. There is residual linear density in the right and lower lobes likely representing atelectasis/scarring. There is a new 3 mm right upper lobe nodule on series 3 image 121, suspicious for metastasis. There are small cystic foci with surrounding density in the bilateral upper lobes and superior segment of the left lower lobe, increased from the prior study though possibly inflammatory versus cavitary metastasis. Irregular nodular densities in the bilateral apices are stable and likely secondary to pleural parenchymal scarring. No tracheal or endobronchial lesion. PLEURA: Unremarkable. HEART/GREAT VESSELS: There are pulmonary emboli, acute to subacute, within the right upper and lower lobe vessels. Additional small PEs may be present though the study was not tailored for evaluation. RV/LV ratio is less than 0.9. Heart is unremarkable for patient's age. No thoracic aortic aneurysm. MEDIASTINUM AND LEWIS: The hilar mass seen on the study from April 2024 has resolved. Mediastinal lymphadenopathy has also resolved. CHEST WALL AND LOWER NECK: Tiny thyroid nodules of no clinical significance. ABDOMEN  LIVER/BILIARY TREE: Again seen are innumerable treated metastasis throughout the liver. Overall, size and number of lesions has decreased from May 2024. This includes a 1.1 x 1.2 cm segment 3 lesion on series 2 image 121 which previously measured 2.7 x 2.1 cm, a 0.9 x 1.1 cm segment 8 lesion on series 2 image 118, previously 1.3 x 1.7 cm, and a 1.2 x 1.2 cm subcapsular segment 6 lesion on series 2 image 133 which previously measured 1.9 x 2.1 cm. There is lobulation of the liver contour in keeping with  pseudocirrhosis. GALLBLADDER: Post cholecystectomy. SPLEEN: Unremarkable. PANCREAS: Unremarkable. ADRENAL GLANDS: Unremarkable. KIDNEYS/URETERS: Unremarkable. No hydronephrosis. STOMACH AND BOWEL: Unremarkable. APPENDIX: No findings to suggest appendicitis. ABDOMINOPELVIC CAVITY: No ascites. No pneumoperitoneum. No lymphadenopathy. VESSELS: Unremarkable for patient's age. PELVIS REPRODUCTIVE ORGANS: Enlarged prostate. URINARY BLADDER: Unremarkable. ABDOMINAL WALL/INGUINAL REGIONS: Small fat and fluid containing left inguinal hernia. Status post right inguinal hernia repair. BONES: There has been interval progression of extensive sclerotic osseous metastasis with small lesions in all visualized bony structures, most severe in the thoracic and lumbar spine and pelvis.     Impression: 1.  Acute to subacute pulmonary embolism to the right upper and lower lobes. Normal RV/LV ratio. 2.  Resolution of right hilar mass/mediastinal lymphadenopathy and near complete resolution of right middle and lower lobe airspace disease with residual linear appearing densities most suggestive of atelectasis and/or scarring. 3. 4.  New solid nodule in the right upper lobe suspicious for metastasis. Multiple cystic nodules with surrounding density have progressed from the prior study and may be inflammatory versus cavitary metastasis. 5.  Improved liver metastasis with associated pseudocirrhosis. 6.  Marked progression of diffuse sclerotic  osseous metastasis. I personally discussed this study with ALISTAIR SANCHEZ on 7/16/2024 10:50 AM. Workstation performed: [id number]     Procedure: IR port placement    Result Date: 6/27/2024  Narrative: Chest port placement. Clinical History: Stage IV small cell carcinoma, in need of a port for chemotherapy. Contrast: None Fluoro time: 55SEC Number of Images: 1 Radiation dose: 6.39 mGy Conscious sedation time: 1HR Technique: The patient was brought to the interventional radiology suite and identified verbally and by wristband. The patient was placed supine on the table. The right internal jugular vein was evaluated as a potential access site with ultrasound. The vessel was found to be patent and compressible. The right neck and upper chest was prepped and draped in the usual sterile fashion. All elements of maximal sterile barrier technique were followed (cap, mask, sterile gown, sterile gloves, large sterile sheet, hand hygiene, and 2% chlorhexidine for cutaneous antisepsis). Lidocaine was administered to the skin and a small skin incision was made. Under ultrasound guidance, utilizing sterile ultrasound technique with sterile gel and a sterile probe cover, the right internal jugular vein was accessed using single wall Seldinger technique. Static images of real time needle entry into the vessel were obtained. A 0.018 wire was then advanced through the needle into the central venous system. The needle was removed, and a 5 Canadian coaxial dilator was inserted. A heavy wire was inserted through the outer dilator and a 6 Canadian peel-away sheath was inserted over the wire. A subcutaneous pocket was created in the skin of the upper chest for the reservoir utilizing a surgical incision. The port catheter was then tunneled under the skin of the upper chest. The catheter was advanced through the peel-away and the peel-away was removed. The catheter tip was then positioned in the right atrium under fluoroscopic control. The  catheter was connected to the port, and the port inserted into the subcutaneous pocket.  The port was sutured in place utilizing Vicryl suture.  The pocket was closed with Vicryl suture and Histoacryl.  The port was flushed with normal saline.     Impression: Impression: 1. Successful ultrasound and fluoroscopically guided placement of a chest port via the right internal jugular vein. 2. The tip of the catheter is in the right atrium and may be used immediately. Workstation performed: [id number]     Procedure: XR chest pa & lateral    Result Date: 6/25/2024  Narrative: XR CHEST PA & LATERAL INDICATION: J40: Bronchitis, not specified as acute or chronic J45.901: Unspecified asthma with (acute) exacerbation. Wheezing, shortness of breath, cough. Recent diagnosis of metastatic small cell lung cancer. COMPARISON: Thoracic spine CT 4/16/2024, chest CT 4/11/2024, abdomen CT 5/9/2024. FINDINGS: Mild scarring/atelectasis at the site of recent postobstructive pneumonia in the right midlung. No pneumothorax or pleural effusion. Normal heart size, resolution of the right hilar mass. Bones are unremarkable for age. Upper abdomen normal. Cholecystectomy.     Impression: No acute cardiopulmonary disease. Mild scarring/atelectasis at the site of recent postobstructive pneumonia in the right midlung. Resolution of right hilar mass. Workstation performed: [id number]     Procedure: MRI brain w wo contrast    Result Date: 6/24/2024  Narrative: MRI BRAIN WITH AND WITHOUT CONTRAST INDICATION: C34.90: Malignant neoplasm of unspecified part of unspecified bronchus or lung R42: Dizziness and giddiness G89.3: Neoplasm related pain (acute) (chronic). Patient with small cell lung cancer complaining of dizziness lightheadedness rule out brain metastasis COMPARISON:  None. TECHNIQUE: Multiplanar, multisequence imaging of the brain was performed before and after gadolinium administration. IV Contrast:  7 mL of Gadobutrol injection (SINGLE-DOSE)  IMAGE QUALITY:   Diagnostic. FINDINGS: BRAIN PARENCHYMA:  There is no discrete mass, mass effect or midline shift. There is no intracranial hemorrhage.  Normal posterior fossa.  Diffusion imaging is unremarkable. No acute infarction. Small scattered hyperintensities on T2/FLAIR imaging are noted in the periventricular and subcortical white matter demonstrating an appearance that is statistically most likely to represent mild-to-moderate microangiopathic change. Small patchy T2/FLAIR hyperintense signal abnormality in the sorin, likely chronic microangiopathy. Small dilated perivascular spaces in bilateral cerebral hemispheres (worse in bilateral parietal deep white matter region). Postcontrast imaging of the brain demonstrates no abnormal enhancement. VENTRICLES:  Normal for the patient's age. SELLA AND PITUITARY GLAND:  Normal. ORBITS:  Normal. PARANASAL SINUSES: Mild mucosal thickening in left ethmoid and left maxillary sinuses. VASCULATURE:  Evaluation of the major intracranial vasculature demonstrates appropriate flow voids. CALVARIUM AND SKULL BASE: Small right mastoid effusion. EXTRACRANIAL SOFT TISSUES:  Normal.     Impression: No intracranial metastasis. No acute intracranial abnormality. Mild-to-moderate chronic microangiopathy. Workstation performed: [id number]     Procedure: CT abdomen pelvis with contrast    Result Date: 5/9/2024  Narrative: CT ABDOMEN AND PELVIS WITH IV CONTRAST INDICATION: Metastatic lung cancer, left flank pain and tenderness. COMPARISON: CT chest, abdomen and pelvis 4/11/2024 TECHNIQUE: CT examination of the abdomen and pelvis was performed. Multiplanar 2D reformatted images were created from the source data. This examination, like all CT scans performed in the [facility], was performed utilizing techniques to minimize radiation dose exposure, including the use of iterative reconstruction and automated exposure control. Radiation dose length product (DLP) for this  visit: 803 mGy-cm IV Contrast: 100 mL of iohexol (OMNIPAQUE) Enteric Contrast: Not administered. FINDINGS: ABDOMEN LOWER CHEST: No clinically significant abnormality in the visualized lower chest. LIVER/BILIARY TREE: Extensive hepatic lesions consistent with recently biopsy-proven metastatic disease have moderately improved in the interim. GALLBLADDER: Post cholecystectomy. SPLEEN: Unremarkable. PANCREAS: Mild fatty replacement of the pancreas without acute findings. Mild fatty replacement of the pancreas without acute findings. ADRENAL GLANDS: Unremarkable. KIDNEYS/URETERS: Unremarkable. No hydronephrosis. STOMACH AND BOWEL: Evaluation of the stomach is limited by underdistention.  No focal pathology seen within limitations. Small bowel is normal caliber. Colonic diverticulosis without evidence of diverticulitis. APPENDIX: No findings to suggest appendicitis. ABDOMINOPELVIC CAVITY: No pneumoperitoneum. Decreasing adenopathy in the naa hepatis now with subcentimeter lymph nodes (series 2 images 36-39). Small amount of pelvic free fluid is new from previous study, etiology unclear. VESSELS: Unremarkable for patient's age. PELVIS REPRODUCTIVE ORGANS: Enlarged prostate. URINARY BLADDER: Circumferential bladder wall thickening maybe accentuated by underdistention. No perivesical inflammation. ABDOMINAL WALL/INGUINAL REGIONS: Footprint of previous ventral hernia repair. Left inguinal hernia containing fat and small amount of fluid. BONES: No acute fracture. Spurring along the anterior right SI joint. Scattered sclerotic/groundglass and lucent osseous densities, most notably in the pelvis, may reflect metastatic disease.     Impression: No definite acute findings in the abdomen or pelvis. Circumferential bladder wall thickening, without perivesical inflammation, appearance possibly related to underdistention. If there is clinical concern for cystitis, correlate with urinalysis. Moderate improvement in diffuse hepatic  metastases. Decreasing periportal adenopathy. Small amount of new pelvic free fluid, etiology unclear. Unchanged appearance of mixed attenuation osseous foci, possibly indicative of metastatic disease. Colonic diverticulosis without diverticulitis. Workstation performed: [id number]     Procedure: CT spine thoracic & lumbar wo contrast    Result Date: 4/17/2024  Narrative: CT THORACIC AND LUMBAR SPINE INDICATION:   Back and hip pain.. COMPARISON:/11/24. TECHNIQUE:  Contiguous axial images were obtained. Sagittal and coronal reconstructions were performed. Radiation dose length product (DLP) for this visit:  1195 mGy-cm .  This examination, like all CT scans performed in the [facility], was performed utilizing techniques to minimize radiation dose exposure, including the use of iterative reconstruction and automated exposure control. IMAGE QUALITY:  Diagnostic. FINDINGS: ALIGNMENT: Normal alignment of the thoracic and lumbar spine. VERTEBRAE:  No evidence of thoracic or lumbar spine fracture, lytic or blastic lesion. DEGENERATIVE CHANGES: Mild multilevel degenerative disc disease. Endplate degenerative change in the lower thoracic spine. No significant disc degenerative change in the lumbar spine PARASPINAL SOFT TISSUES: No mass or fluid collection. OTHER: Partially imaged right hilar mass and postobstructive pneumonia, further described on the recent chest CT. Mediastinal, peritoneal and retroperitoneal lymphadenopathy, partially visualized. Hepatic metastases.     Impression: No evidence of pathologic fracture, lytic or blastic lesion in the thoracic or lumbar spine. Workstation performed: [id number]     Procedure: IR biopsy liver mass    Result Date: 4/15/2024  Narrative: CT-scan guided needle biopsy of a liver mass History: Lung mass with liver metastases Conscious sedation time: 30 mins Technique: This examination, like all CT scans performed in the [facility], was performed  utilizing techniques to minimize radiation dose exposure, including the use of iterative reconstruction and automated exposure control. The patient was brought to the CT scanner and placed supine on the table. After axial images were obtained through the region of interest an area of the skin was then marked, prepped, and draped in usual sterile fashion. Lidocaine was administered to the  skin and a small skin incision was made. A 17-gauge cannula was advanced up to the lesion, and through this, an 18-gauge core biopsy specimen was obtained. At the end of the procedure, D-Stat was used for hemostasis through the cannula as it was removed. The patient tolerated the procedure well and suffered no complications.     Impression: Impression: Successful percutaneous core biopsy of a left lobe liver mass. A full pathology report will follow. Workstation performed: [id number]     Procedure: Echo complete w/ contrast if indicated    Result Date: 4/14/2024  Narrative:   Very technically difficult study due to body habitus.   Left Ventricle: Left ventricular cavity size is normal. Wall thickness is normal. The left ventricular ejection fraction is 60%. Systolic function is normal. Wall motion is normal. Diastolic function is normal.   Right Ventricle: Right ventricular cavity size is possibly mildly dilated.   Tricuspid Valve: There is trace regurgitation.     Procedure: CT chest abdomen pelvis w contrast    Result Date: 4/11/2024  Narrative: CT CHEST, ABDOMEN AND PELVIS WITH IV CONTRAST INDICATION: Weakness, weight loss. COMPARISON: None. TECHNIQUE: CT examination of the chest, abdomen and pelvis was performed. Multiplanar 2D reformatted images were created from the source data. This examination, like all CT scans performed in the [facility], was performed utilizing techniques to minimize radiation dose exposure, including the use of iterative reconstruction and automated exposure control. Radiation dose  length product (DLP) for this visit: 721 mGy-cm IV Contrast: 100 mL of iohexol (OMNIPAQUE) Enteric Contrast: Not administered. FINDINGS: CHEST LUNGS: Airspace consolidation in the right middle lobe contiguous with the hilar mass, suggesting postobstructive pneumonia. Nodular opacification along the axial interstitium in the right lower lobe and nodular thickening along the right major fissure likely reflects lymphangitic spread of tumor. Severe narrowing of right middle lobe segmental and subsegmental bronchi. PLEURA: No effusion. HEART/GREAT VESSELS: Normal heart size. No thoracic aortic aneurysm. Proximal segmental branches of the right pulmonary artery in the right middle and upper lobes are narrowed by the encasing mass. MEDIASTINUM AND LEWIS: Right hilar and perihilar heterogeneously enhancing mass measures 5.5 x 6.3 x 5.2 cm. High right paratracheal lymph nodes measure up to 1.7 cm in short axis diameter. Subcentimeter prevascular lymph nodes. Subcarinal lymph node measures 2 cm in short axis diameter. Left paraesophageal 1.4 cm lymph node. Right supraclavicular 1.4 cm lymph node. CHEST WALL AND LOWER NECK: Unremarkable. ABDOMEN LIVER/BILIARY TREE: Innumerable hypoenhancing lesions throughout the liver measuring up to 3.1 cm. Hepatomegaly. GALLBLADDER: Cholecystectomy. SPLEEN: Unremarkable. PANCREAS: Unremarkable. ADRENAL GLANDS: Unremarkable. KIDNEYS/URETERS: Symmetric nephrographic phase enhancement of the kidneys. No obstructive uropathy. STOMACH AND BOWEL: Diverticulosis without evidence of diverticulitis or colitis. Ventral herniorrhaphy APPENDIX: Normal appendix. ABDOMINOPELVIC CAVITY: Partially confluent hypoenhancing periportal lymph nodes measuring up to 2.3 cm. Paracaval lymph node measures 1.5 cm in short axis diameter. Gastroesophageal junction lymph nodes measure up to 1 cm subcentimeter para-aortic lymph nodes. VESSELS: No abdominal aortic aneurysm. Patent portal, splenic and mesenteric veins  PELVIS REPRODUCTIVE ORGANS: Enlarged prostate. URINARY BLADDER: Unremarkable. ABDOMINAL WALL/INGUINAL REGIONS: Fat-containing left inguinal 2 cm hernia.. BONES: Subtle subcentimeter sclerotic lesions in the pelvis. No evidence of fracture..     Impression: 1. Right hilar and perihilar 5.5 x 6.3 x 5.2 cm mass consistent lung malignancy. 2. Right hilar, mediastinal and right supraclavicular lymphadenopathy compatible with metastatic disease. 3. Airspace consolidation in the right middle lobe adjacent to the hilum suggesting postobstructive pneumonia. 4. Nodular thickening along the axial interstitium in the right lower lobe and the right major fissure suggesting lymphangitic spread of tumor. 5. Diffuse hepatic metastases. 6. Periportal, gastroesophageal and para-aortic lymphadenopathy The study was marked in EPIC for immediate notification. Workstation performed: [id number]        Administrative Statements   I have spent a total time of 30+  minutes in caring for this patient on the day of the visit/encounter including Diagnostic results, Patient and family education, Risk factor reductions, Documenting in the medical record, Reviewing / ordering tests, medicine, procedures  , Obtaining or reviewing history  , and review of opioid regimen, supportive listening, discussion of ACP documents. . Topics discussed with the patient / family include symptom assessment and management, medication review, medication adjustment, goals of care, opioid titration, supportive listening, and anticipatory guidance.

## 2024-08-01 NOTE — ASSESSMENT & PLAN NOTE
CT CAP- R Hilar mass with lymphangitic spread of tumor, Right hilar and perihilar heterogeneously enhancing mass measures 5.5 x 6.3 x 5.2 cm,  Innumerable hypoenhancing lesions throughout the liver  Biopsy completed 4/15/24, small cell cancer  Tolerating chemotherapy well with exception of hypotension likely multifactorial.   CT 7/12, favorable response to treatment.

## 2024-08-06 DIAGNOSIS — C34.90 SMALL CELL LUNG CANCER (HCC): ICD-10-CM

## 2024-08-06 DIAGNOSIS — Z51.5 PALLIATIVE CARE PATIENT: ICD-10-CM

## 2024-08-06 DIAGNOSIS — G89.3 CANCER RELATED PAIN: ICD-10-CM

## 2024-08-07 RX ORDER — FENTANYL 50 UG/1
1 PATCH TRANSDERMAL
Qty: 10 PATCH | Refills: 0 | OUTPATIENT
Start: 2024-08-07

## 2024-08-07 RX ORDER — OXYCODONE HYDROCHLORIDE 15 MG/1
22.5-45 TABLET ORAL EVERY 4 HOURS
Qty: 180 TABLET | Refills: 0 | Status: SHIPPED | OUTPATIENT
Start: 2024-08-07

## 2024-08-07 NOTE — TELEPHONE ENCOUNTER
Patient called requesting refill for Fentanyl 50 mcg/hr patch. Patient made aware medication was refilled on 8/2/24 for 10 with 0 refills to [facility] pharmacy. Patient instructed to contact the pharmacy to obtain refills of medication. Patient verbalized understanding.

## 2024-08-08 ENCOUNTER — HOSPITAL ENCOUNTER (OUTPATIENT)
Dept: INFUSION CENTER | Facility: CLINIC | Age: 64
Discharge: HOME | End: 2024-08-08

## 2024-08-09 ENCOUNTER — TELEPHONE (OUTPATIENT)
Dept: HEMATOLOGY ONCOLOGY | Facility: CLINIC | Age: 64
End: 2024-08-09

## 2024-08-09 NOTE — TELEPHONE ENCOUNTER
Called to confirm if patient was going for labs today or Monday and daughter states usually he does go on Fridays however they will be going Monday morning for them.

## 2024-08-12 ENCOUNTER — APPOINTMENT (OUTPATIENT)
Dept: LAB | Facility: CLINIC | Age: 64
End: 2024-08-12
Payer: COMMERCIAL

## 2024-08-12 DIAGNOSIS — T45.1X5A CHEMOTHERAPY-INDUCED NEUTROPENIA (HCC): ICD-10-CM

## 2024-08-12 DIAGNOSIS — C34.90 SMALL CELL LUNG CANCER (HCC): ICD-10-CM

## 2024-08-12 DIAGNOSIS — D70.1 CHEMOTHERAPY-INDUCED NEUTROPENIA (HCC): ICD-10-CM

## 2024-08-12 LAB
ALBUMIN SERPL BCG-MCNC: 3.6 G/DL (ref 3.5–5)
ALP SERPL-CCNC: 114 U/L (ref 34–104)
ALT SERPL W P-5'-P-CCNC: 40 U/L (ref 7–52)
ANION GAP SERPL CALCULATED.3IONS-SCNC: 6 MMOL/L (ref 4–13)
AST SERPL W P-5'-P-CCNC: 27 U/L (ref 13–39)
BASOPHILS # BLD AUTO: 0.02 THOUSANDS/ÂΜL (ref 0–0.1)
BASOPHILS NFR BLD AUTO: 0 % (ref 0–1)
BILIRUB SERPL-MCNC: 0.52 MG/DL (ref 0.2–1)
BUN SERPL-MCNC: 15 MG/DL (ref 5–25)
CALCIUM SERPL-MCNC: 9.1 MG/DL (ref 8.4–10.2)
CHLORIDE SERPL-SCNC: 101 MMOL/L (ref 96–108)
CO2 SERPL-SCNC: 30 MMOL/L (ref 21–32)
CREAT SERPL-MCNC: 0.82 MG/DL (ref 0.6–1.3)
EOSINOPHIL # BLD AUTO: 0.1 THOUSAND/ÂΜL (ref 0–0.61)
EOSINOPHIL NFR BLD AUTO: 1 % (ref 0–6)
ERYTHROCYTE [DISTWIDTH] IN BLOOD BY AUTOMATED COUNT: 14.8 % (ref 11.6–15.1)
GFR SERPL CREATININE-BSD FRML MDRD: 94 ML/MIN/1.73SQ M
GLUCOSE SERPL-MCNC: 102 MG/DL (ref 65–140)
HCT VFR BLD AUTO: 37.8 % (ref 36.5–49.3)
HGB BLD-MCNC: 12.1 G/DL (ref 12–17)
IMM GRANULOCYTES # BLD AUTO: 0.19 THOUSAND/UL (ref 0–0.2)
IMM GRANULOCYTES NFR BLD AUTO: 2 % (ref 0–2)
LIPASE SERPL-CCNC: 8 U/L (ref 11–82)
LYMPHOCYTES # BLD AUTO: 0.9 THOUSANDS/ÂΜL (ref 0.6–4.47)
LYMPHOCYTES NFR BLD AUTO: 11 % (ref 14–44)
MCH RBC QN AUTO: 33.4 PG (ref 26.8–34.3)
MCHC RBC AUTO-ENTMCNC: 32 G/DL (ref 31.4–37.4)
MCV RBC AUTO: 104 FL (ref 82–98)
MONOCYTES # BLD AUTO: 0.48 THOUSAND/ÂΜL (ref 0.17–1.22)
MONOCYTES NFR BLD AUTO: 6 % (ref 4–12)
NEUTROPHILS # BLD AUTO: 6.77 THOUSANDS/ÂΜL (ref 1.85–7.62)
NEUTS SEG NFR BLD AUTO: 80 % (ref 43–75)
NRBC BLD AUTO-RTO: 0 /100 WBCS
PLATELET # BLD AUTO: 114 THOUSANDS/UL (ref 149–390)
PMV BLD AUTO: 9.9 FL (ref 8.9–12.7)
POTASSIUM SERPL-SCNC: 3.8 MMOL/L (ref 3.5–5.3)
PROT SERPL-MCNC: 6.2 G/DL (ref 6.4–8.4)
RBC # BLD AUTO: 3.62 MILLION/UL (ref 3.88–5.62)
SODIUM SERPL-SCNC: 137 MMOL/L (ref 135–147)
T4 FREE SERPL-MCNC: 0.72 NG/DL (ref 0.61–1.12)
TSH SERPL DL<=0.05 MIU/L-ACNC: 0.23 UIU/ML (ref 0.45–4.5)
WBC # BLD AUTO: 8.46 THOUSAND/UL (ref 4.31–10.16)

## 2024-08-12 PROCEDURE — 84439 ASSAY OF FREE THYROXINE: CPT

## 2024-08-12 PROCEDURE — 80053 COMPREHEN METABOLIC PANEL: CPT

## 2024-08-12 PROCEDURE — 83690 ASSAY OF LIPASE: CPT

## 2024-08-12 PROCEDURE — 85025 COMPLETE CBC W/AUTO DIFF WBC: CPT

## 2024-08-12 PROCEDURE — 84443 ASSAY THYROID STIM HORMONE: CPT

## 2024-08-12 PROCEDURE — 36415 COLL VENOUS BLD VENIPUNCTURE: CPT

## 2024-08-13 ENCOUNTER — TELEPHONE (OUTPATIENT)
Dept: HEMATOLOGY ONCOLOGY | Facility: CLINIC | Age: 64
End: 2024-08-13

## 2024-08-13 ENCOUNTER — HOSPITAL ENCOUNTER (OUTPATIENT)
Dept: INFUSION CENTER | Facility: CLINIC | Age: 64
Discharge: HOME | End: 2024-08-13
Payer: COMMERCIAL

## 2024-08-13 VITALS
HEIGHT: 72 IN | HEART RATE: 75 BPM | SYSTOLIC BLOOD PRESSURE: 117 MMHG | BODY MASS INDEX: 23.89 KG/M2 | TEMPERATURE: 97.6 F | DIASTOLIC BLOOD PRESSURE: 66 MMHG | WEIGHT: 176.37 LBS | RESPIRATION RATE: 18 BRPM

## 2024-08-13 DIAGNOSIS — T45.1X5A CHEMOTHERAPY-INDUCED NEUTROPENIA (HCC): ICD-10-CM

## 2024-08-13 DIAGNOSIS — R42 DIZZINESS: Primary | ICD-10-CM

## 2024-08-13 DIAGNOSIS — D70.1 CHEMOTHERAPY-INDUCED NEUTROPENIA (HCC): ICD-10-CM

## 2024-08-13 DIAGNOSIS — C34.90 SMALL CELL LUNG CANCER (HCC): Primary | ICD-10-CM

## 2024-08-13 PROCEDURE — 96367 TX/PROPH/DG ADDL SEQ IV INF: CPT

## 2024-08-13 PROCEDURE — 96417 CHEMO IV INFUS EACH ADDL SEQ: CPT

## 2024-08-13 PROCEDURE — 96413 CHEMO IV INFUSION 1 HR: CPT

## 2024-08-13 RX ORDER — SODIUM CHLORIDE 9 MG/ML
20 INJECTION, SOLUTION INTRAVENOUS ONCE
Status: CANCELLED | OUTPATIENT
Start: 2024-08-15

## 2024-08-13 RX ORDER — SODIUM CHLORIDE 9 MG/ML
20 INJECTION, SOLUTION INTRAVENOUS ONCE
Status: CANCELLED | OUTPATIENT
Start: 2024-08-20

## 2024-08-13 RX ORDER — SODIUM CHLORIDE 9 MG/ML
20 INJECTION, SOLUTION INTRAVENOUS ONCE
Status: CANCELLED | OUTPATIENT
Start: 2024-08-14

## 2024-08-13 RX ORDER — ZOLEDRONIC ACID 0.04 MG/ML
4 INJECTION, SOLUTION INTRAVENOUS ONCE
Status: CANCELLED | OUTPATIENT
Start: 2024-08-20 | End: 2024-08-20

## 2024-08-13 RX ORDER — SODIUM CHLORIDE 9 MG/ML
20 INJECTION, SOLUTION INTRAVENOUS ONCE
Status: COMPLETED | OUTPATIENT
Start: 2024-08-13 | End: 2024-08-13

## 2024-08-13 RX ORDER — SODIUM CHLORIDE 9 MG/ML
20 INJECTION, SOLUTION INTRAVENOUS ONCE
Status: CANCELLED | OUTPATIENT
Start: 2024-08-13

## 2024-08-13 RX ADMIN — DEXAMETHASONE SODIUM PHOSPHATE: 10 INJECTION, SOLUTION INTRAMUSCULAR; INTRAVENOUS at 12:43

## 2024-08-13 RX ADMIN — FOSAPREPITANT 150 MG: 150 INJECTION, POWDER, LYOPHILIZED, FOR SOLUTION INTRAVENOUS at 13:12

## 2024-08-13 RX ADMIN — CARBOPLATIN 519 MG: 10 INJECTION INTRAVENOUS at 14:29

## 2024-08-13 RX ADMIN — ETOPOSIDE 150 MG: 20 INJECTION INTRAVENOUS at 15:12

## 2024-08-13 RX ADMIN — ATEZOLIZUMAB 1200 MG: 1200 INJECTION, SOLUTION INTRAVENOUS at 13:50

## 2024-08-13 RX ADMIN — SODIUM CHLORIDE 20 ML/HR: 0.9 INJECTION, SOLUTION INTRAVENOUS at 12:00

## 2024-08-13 NOTE — PROGRESS NOTES
TIME OUT: Spoke with Lupe Ryan RN who stated per Dr. Guadarrama pt's chemotherapy doses will be reduced today. Pt to receive 75% of original carboplatin dose and etoposide dose adjusted from 100mg/m2 to 75 mg/m2.  These changes are to take effect during today's treatment and for all future treatments.

## 2024-08-13 NOTE — PROGRESS NOTES
Confirmed with Lupe Ryan RN per Dr. Guadarrama pt is to continue with tecentriq, carboplatin, and etoposide at this time.

## 2024-08-13 NOTE — PROGRESS NOTES
8/13/2024:    Patient has had reduction in doses due to problems with counts.  He has been delayed over 4 weeks.  Because of this we will keep about the reduced doses.    Dr. Guadarrama.

## 2024-08-13 NOTE — PROGRESS NOTES
Patient here for infusion center for his Tecentriq, Carboplatin and toposar treatment. Patient offers no complaints today. Port a cath accessed with positive blood return noted. Tolerated infusion without incident. Port a cath flushed positive blood return noted de accessed without difficulties. AVS declined at this time. Walked out in stable condition with daughter. Next appointment on 08/14/2024 at 3pm.

## 2024-08-13 NOTE — TELEPHONE ENCOUNTER
.edication time out/change to orders    Date patient scheduled: today    Original medication ordered: full dose of Carboplatin and Etoposide    New Medication ordered: dose reduction of 75% of original dose for Carboplatin AUC 5 and Etoposide.    Infusion RN notified patient - Elizabeth

## 2024-08-13 NOTE — TELEPHONE ENCOUNTER
Exiting pt MO     added zometa however the request to financial was done today. Awaiting response from them. Plan was entered. ty

## 2024-08-14 ENCOUNTER — HOSPITAL ENCOUNTER (OUTPATIENT)
Dept: INFUSION CENTER | Facility: CLINIC | Age: 64
Discharge: HOME | End: 2024-08-14
Payer: COMMERCIAL

## 2024-08-14 VITALS
HEART RATE: 72 BPM | WEIGHT: 177.2 LBS | TEMPERATURE: 96.2 F | BODY MASS INDEX: 24 KG/M2 | SYSTOLIC BLOOD PRESSURE: 115 MMHG | HEIGHT: 72 IN | RESPIRATION RATE: 18 BRPM | DIASTOLIC BLOOD PRESSURE: 76 MMHG

## 2024-08-14 DIAGNOSIS — C34.90 SMALL CELL LUNG CANCER (HCC): Primary | ICD-10-CM

## 2024-08-14 DIAGNOSIS — T45.1X5A CHEMOTHERAPY-INDUCED NEUTROPENIA (HCC): ICD-10-CM

## 2024-08-14 DIAGNOSIS — D70.1 CHEMOTHERAPY-INDUCED NEUTROPENIA (HCC): ICD-10-CM

## 2024-08-14 PROCEDURE — 96367 TX/PROPH/DG ADDL SEQ IV INF: CPT

## 2024-08-14 PROCEDURE — 96413 CHEMO IV INFUSION 1 HR: CPT

## 2024-08-14 RX ORDER — SODIUM CHLORIDE 9 MG/ML
20 INJECTION, SOLUTION INTRAVENOUS ONCE
Status: COMPLETED | OUTPATIENT
Start: 2024-08-14 | End: 2024-08-14

## 2024-08-14 RX ADMIN — ETOPOSIDE 150 MG: 20 INJECTION INTRAVENOUS at 16:18

## 2024-08-14 RX ADMIN — DEXAMETHASONE SODIUM PHOSPHATE: 10 INJECTION, SOLUTION INTRAMUSCULAR; INTRAVENOUS at 15:45

## 2024-08-14 RX ADMIN — SODIUM CHLORIDE 20 ML/HR: 0.9 INJECTION, SOLUTION INTRAVENOUS at 15:45

## 2024-08-14 NOTE — PROGRESS NOTES
Pt into clinic for etoposide. Pt offers complaints of dizziness and weakness in b/l lower extremity.     Tolerating infusion without reaction.    Pt aware of next appointment on 8/15/24 at 3pm.     Report given to Genie MOYER RN.

## 2024-08-14 NOTE — PROGRESS NOTES
Pt tolerated remainder of infusion without complications. Aware of next appointment on 8/15/24 at 3pm. Port de-accessed. AVS declined.

## 2024-08-15 ENCOUNTER — OFFICE VISIT (OUTPATIENT)
Dept: HEMATOLOGY ONCOLOGY | Facility: CLINIC | Age: 64
End: 2024-08-15
Payer: COMMERCIAL

## 2024-08-15 ENCOUNTER — HOSPITAL ENCOUNTER (OUTPATIENT)
Dept: INFUSION CENTER | Facility: CLINIC | Age: 64
Discharge: HOME | End: 2024-08-15
Payer: COMMERCIAL

## 2024-08-15 VITALS
WEIGHT: 175.4 LBS | BODY MASS INDEX: 23.76 KG/M2 | HEIGHT: 72 IN | HEART RATE: 84 BPM | DIASTOLIC BLOOD PRESSURE: 69 MMHG | SYSTOLIC BLOOD PRESSURE: 114 MMHG | TEMPERATURE: 97.8 F | RESPIRATION RATE: 18 BRPM

## 2024-08-15 VITALS
WEIGHT: 177.5 LBS | TEMPERATURE: 97.7 F | RESPIRATION RATE: 18 BRPM | HEART RATE: 84 BPM | BODY MASS INDEX: 24.04 KG/M2 | SYSTOLIC BLOOD PRESSURE: 122 MMHG | OXYGEN SATURATION: 98 % | HEIGHT: 72 IN | DIASTOLIC BLOOD PRESSURE: 76 MMHG

## 2024-08-15 DIAGNOSIS — C79.51 METASTASIS TO BONE (HCC): ICD-10-CM

## 2024-08-15 DIAGNOSIS — D70.1 CHEMOTHERAPY-INDUCED NEUTROPENIA (HCC): ICD-10-CM

## 2024-08-15 DIAGNOSIS — G89.3 CANCER RELATED PAIN: ICD-10-CM

## 2024-08-15 DIAGNOSIS — C34.90 SMALL CELL LUNG CANCER (HCC): Primary | ICD-10-CM

## 2024-08-15 DIAGNOSIS — Z51.5 PALLIATIVE CARE PATIENT: ICD-10-CM

## 2024-08-15 DIAGNOSIS — T45.1X5A CHEMOTHERAPY-INDUCED NEUTROPENIA (HCC): ICD-10-CM

## 2024-08-15 DIAGNOSIS — R42 DIZZINESS: ICD-10-CM

## 2024-08-15 PROCEDURE — 96413 CHEMO IV INFUSION 1 HR: CPT

## 2024-08-15 PROCEDURE — 99213 OFFICE O/P EST LOW 20 MIN: CPT | Performed by: INTERNAL MEDICINE

## 2024-08-15 PROCEDURE — 96367 TX/PROPH/DG ADDL SEQ IV INF: CPT

## 2024-08-15 RX ORDER — SODIUM CHLORIDE 9 MG/ML
20 INJECTION, SOLUTION INTRAVENOUS ONCE
Status: COMPLETED | OUTPATIENT
Start: 2024-08-15 | End: 2024-08-15

## 2024-08-15 RX ADMIN — ETOPOSIDE 150 MG: 20 INJECTION INTRAVENOUS at 16:18

## 2024-08-15 RX ADMIN — DEXAMETHASONE SODIUM PHOSPHATE: 10 INJECTION, SOLUTION INTRAMUSCULAR; INTRAVENOUS at 15:47

## 2024-08-15 RX ADMIN — SODIUM CHLORIDE 20 ML/HR: 9 INJECTION, SOLUTION INTRAVENOUS at 15:47

## 2024-08-15 NOTE — PROGRESS NOTES
Pt presents for chemotherapy offering no complaints. Port a cath accessed with positive blood return noted. Pt tolerated infusion without incident. Port a cath flushed positive blood return noted, de accessed without difficulty. AVS declined.  During discharge pt complained of pain where blood work was drawn, advised pt to use warm compress, and to monitor. Pulses palpable, capillary refill WDL, no swelling present. Pt educated on symptoms of DVT advised to go to ER if any symptoms begin. Walked out in stable condition, Next appointment on 8/16/24 at 2:30pm.

## 2024-08-15 NOTE — PROGRESS NOTES
Hematology/Oncology Outpatient Follow- up Note  Ceasar March 63 y.o. male MRN: @ Encounter: [id number]        Date:  8/15/2024        Assessment / Plan:    1 stage IV small cell lung cancer  2 extensive liver metastasis which have improved.  3 diffuse bone metastasis which appear worse however we will need to compare.  4 weakness fatigue dizziness particularly around his treatment.  Plan: Patient will undergo his chemo will complete this week.  Will see him in 2 weeks prior to his next treatment.  Overall prognosis very guarded.  Will see how he tolerates these treatments.  Cycle 5 of treatment        HPI: 63-year-old gentleman here for follow-up in 4 she has stage IV small cell lung cancer with improved but extensive liver metastasis improved lung and multiple bones sclerotic metastasis most which is sclerotic.  They appeared worse I will review the films as some these may have been lytic lesions that have calcified.  He is not nauseated but feels very weak tired dizzy lightheaded from the treatment.  He is in the middle of his treatment now.  He had delayed approximately 3 weeks.  He remains fatigued.  His liver functions are stable alk phos is down to 114 total bilirubin normal rest of electrolytes are stable.      Interval History: Note from 7/18/2024:  Assessment / Plan:    1 stage IV small cell lung cancer  2 extensive liver metastasis with improved abdominal pain and shrinkage on CAT scan  3 some improvement in dizziness  4 bone metastasis  5 improvement in dizziness  Plan: Patient will go ahead with treatment 1 week from Monday on the 7/29.  He will also add Zometa to his regimen.  He will hopefully come back here to be seen in 4 weeks.  Long-term prognosis unfortunately remains very guarded.  No other major suggestions  HPI: 63-year-old gentleman with stage IV small cell lung cancer.  He has extensive liver metastasis which have improved significantly on CAT scan.  He has a large mass in his right  lung which is also shrunk on CAT scan.  He has diffuse bone metastasis which look worse although it is unclear if some of that is sclerotic changes now.  He feels better bilirubin has stabilized he is fatigued and tired from his treatments.  However he is able to get around he can walk up to 200 feet without major shortness of breath.  He can eat.  He has to take 1 week off from treatment we will be agreeable with that otherwise no other major changes.  Interval History: Note from 6/18/2024  Assessment / Plan:    1 stage IV small cell lung cancer  2 extensive liver metastasis with less abdominal pain  3 jaundice which is corrected  4 dizziness  Plan: She will be getting the MRI coming up.  Port-A-Cath will be placed.  Tecentriq was added.  Prognosis long-term is quite guarded.  Will await results of the MRI of the brain.  He will push fluids as well.  Long-term outlook very guarded.  He has chemotherapy due on 7/1.  HPI: 63-year-old gentleman here for follow-up.  His bilirubin has not come down to 1.1.  Has had improvement with normalization of SGOT and SGPT alkaline phosphatase is come down considerably to was in the 160 range.  His biggest complaint is dizziness lightheadedness.  His blood pressure has been on the low side.  He is not complaining of headache.  I am concerned about that this is lightheadedness he is going to push fluids.  He also is due for an MRI of the brain and we will see what that shows.  He is certainly acutely needed treatment is to try and shrink liver which we did and try and control bilirubin which was done.  Hopefully the brain will be negative if positive we will for radiation.  Otherwise he is looking to have a Aekcof-d-Ntpl placed and we will put in for that as well  Interval History: Note from 6/6/2024:  Assessment / Plan:    1 stage IV small cell lung cancer  2 extensive liver metastasis exam slightly better  3 jaundiced looks better waiting for results from labs tomorrow  4 pain  secondary to #2  5 thrombocytopenia secondary to chemo await results of labs  6 dizziness.  Will be getting an MRI of the brain hopefully next week.  Plan: Tecentriq will be added to his chemotherapy regimen.  MRI of the brain will be done.  He will come back in 6 weeks.  He is going to have 2 more cycles of treatment before then.  At the time of his return we will get a CAT scan chest abdomen pelvis determine where he stands.  If he has had a fairly maximal response we will try and use Tecentriq alone from there.  Daughter was present during discussion.  HPI: 63-year-old gentleman here for follow-up.  He has stage IV small cell lung cancer with his extensive liver metastasis he is actually feeling better he is gaining weight he has pain primarily in the right upper quadrant and in the epigastric area but better controlled.  He was seen at [facility].  Their recommendation was to add Tecentriq to his carboplatinum etoposide.  He will start that coming Monday for his third cycle  They also recommended to obtain a MRI of the brain.  Will go ahead and order that.  Hopefully get that next week.  His only complaint regarding the neurologic problems is occasional dizziness and slight change in gait.  In the room his gait was fine.  He also complained of shortness of breath with a lot of exertion.  His sats here are 98%.  I advised him to get a pulse oximetry which he has at home.  If he can document sats dropping below 90% we can try and look into getting oxygen for him.  Interval History: Note from 5/1/2024:  Assessment / Plan:    1 stage IV small cell lung cancer  2 liver metastasis extensive  3 jaundice secondary to #2  4 pain secondary to #2  5 thrombocytopenia secondary to chemo.  Plan: Patient will get a CBC CMP today.  It is noted that he has thrombocytopenia secondary to chemo.  He will come back here 5/9 for laboratory work.  If all goes well he will get carboplatin/-16 beginning 5/13.  At that  "time we will also be looking at the possibility of adding immunotherapy.  Also beyond that he is asking and we will recommend the as he wishes to get a second opinion at [facility].  His prognosis is guarded long-term.  Hopefully he will get at least some response to these treatments.  HPI: 63-year-old gentleman with a history of small cell cancer along with diffuse metastatic disease to liver.  He has pain in the right upper quadrant and right flank area around the lower ribs upper abdominal area.  Hurts worse on palpation.  He is using oxycodone 22.5 mg every 4 hours with some relief.  He is able to eat.  He has no fever or chills.  He still coughs and some shortness of breath but improved.  He is due again for treatment on 5/10/2024.  He was asked to begin again 5/13/2024.  He remains with marked elevation of total bilirubin of 7.2 and all improved.  His LDH total is also quite elevated at 1000.  Interval History: Note 04/22/2024:  Small cell lung cancer with metastasis to liver  Assessment & Plan  Patient reported night sweats, recent weight loss, current every day smoker, severe back and flank pain.  CT chest: \" Right hilar and perihilar 5.5 x 6.3 x 5.2 cm mass consistent lung malignancy. Right hilar, mediastinal and right supraclavicular lymphadenopathy compatible with metastatic disease. Airspace consolidation in the right middle lobe adjacent to the hilum suggesting postobstructive pneumonia. Nodular thickening along the axial interstitium in the right lower lobe and the right major fissure suggesting lymphangitic spread of tumor. Diffuse hepatic metastases. Periportal, gastroesophageal and para-aortic lymphadenopathy\"  Complained of hip pain -- CT A/P displayed sclerotic lesion in the pelvis.  Liver biopsy pathology report positive for small cell carcinoma.   4/22 patient overall doing well status post chemotherapy x 3 days.  Patient cleared for discharge per oncology. Will need to follow-up " with outpatient oncology  Continue allopurinol for 1 month and Decadron per oncology  Pain med recs per palliative  Note that the patient has become quite jaundice bilirubin of 2.6-9.2 prior to receiving his treatments.  He received cisplatin and day 1 through 3 etopside.      Cancer Staging:  Cancer Staging   No matching staging information was found for the patient.      Molecular Testing:     Previous Hematologic/ Oncologic History:    Oncology History   Small cell lung cancer with metastasis to liver   4/19/2024 Initial Diagnosis    Small cell lung cancer in adult (HCC)     4/19/2024 - 6/13/2024 Chemotherapy    alteplase (CATHFLO), 2 mg, Intracatheter, Every 1 Minute as needed, 3 of 6 cycles  pegfilgrastim (NEULASTA), 6 mg, Subcutaneous, Once, 3 of 6 cycles  Administration: 6 mg (4/23/2024), 6 mg (5/16/2024), 6 mg (6/13/2024)  fosaprepitant (EMEND) IVPB, 150 mg, Intravenous, Once, 3 of 6 cycles  Administration: 150 mg (4/19/2024), 150 mg (5/13/2024), 150 mg (6/10/2024)  atezolizumab (TECENTRIQ) IVPB, 1,200 mg (100 % of original dose 1,200 mg), Intravenous, Once, 1 of 4 cycles  Dose modification: 1,200 mg (original dose 1,200 mg, Cycle 3, Reason: Anticipated Tolerance)  Administration: 1,200 mg (6/10/2024)  etoposide (TOPOSAR), 50 mg/m2 = 100.6 mg (50 % of original dose 100 mg/m2), Intravenous, Once, 3 of 6 cycles  Dose modification: 50 mg/m2 (original dose 100 mg/m2, Cycle 1, Reason: Anticipated Tolerance)  Administration: 100.6 mg (4/19/2024), 100.6 mg (4/20/2024), 201 mg (5/13/2024), 201 mg (5/14/2024), 100.6 mg (4/21/2024), 201 mg (5/15/2024), 201 mg (6/10/2024), 201 mg (6/11/2024), 201 mg (6/12/2024)  CARBOplatin (PARAPLATIN) IVPB (GOG AUC DOSING), 669.5 mg, Intravenous, Once, 3 of 6 cycles  Administration: 669.5 mg (4/19/2024), 731 mg (5/13/2024), 603 mg (6/10/2024)     6/19/2024 -  Chemotherapy    alteplase (CATHFLO), 2 mg, Intracatheter, Every 1 Minute as needed, 5 of 12 cycles  pegfilgrastim-cbqv  (UDENYCA), 6 mg, Subcutaneous, Once, 2 of 3 cycles  Administration: 6 mg (7/5/2024)  pegfilgrastim-apgf (Nyvepria), 6 mg, Subcutaneous, Once, 3 of 3 cycles  fosaprepitant (EMEND) IVPB, 150 mg, Intravenous, Once, 5 of 12 cycles  Administration: 150 mg (7/1/2024), 150 mg (8/13/2024)  atezolizumab (TECENTRIQ) IVPB, 1,200 mg, Intravenous, Once, 5 of 12 cycles  Administration: 1,200 mg (8/13/2024), 1,200 mg (7/1/2024)  etoposide (TOPOSAR), 100 mg/m2 = 200 mg, Intravenous, Once, 5 of 6 cycles  Dose modification: 75 mg/m2 (75 % of original dose 100 mg/m2, Cycle 4, Reason: Other (see comments), Comment: neutropenia), 75 mg/m2 (75 % of original dose 100 mg/m2, Cycle 4, Reason: Other (see comments)), 75 mg/m2 (original dose 100 mg/m2, Cycle 5, Reason: Thrombocytopenia)  Administration: 150 mg (7/1/2024), 150 mg (7/2/2024), 150 mg (7/3/2024), 150 mg (8/13/2024), 150 mg (8/14/2024)  CARBOplatin (PARAPLATIN) IVPB (GOG AUC DOSING), , Intravenous, Once, 5 of 6 cycles  Dose modification: 519 mg (75 % of original dose 692 mg, Cycle 4, Reason: Other (see comments), Comment: neutropenia), 500 mg (original dose 692 mg, Cycle 4, Reason: Neutropenia), 519 mg (75 % of original dose 692 mg, Cycle 5, Reason: Other (see comments), Comment: low counts)  Administration: 500 mg (7/1/2024), 519 mg (8/13/2024)         Current Hematologic/ Oncologic Treatment:       Cycle 1         Test Results:    Imaging: No results found.          Labs:   Lab Results   Component Value Date    WBC 8.46 08/12/2024    HGB 12.1 08/12/2024    HCT 37.8 08/12/2024     (H) 08/12/2024     (L) 08/12/2024     Lab Results   Component Value Date    K 3.8 08/12/2024     08/12/2024    CO2 30 08/12/2024    BUN 15 08/12/2024    CREATININE 0.82 08/12/2024    GLUF 79 06/07/2024    CALCIUM 9.1 08/12/2024    CORRECTEDCA 9.1 06/27/2024    AST 27 08/12/2024    ALT 40 08/12/2024    ALKPHOS 114 (H) 08/12/2024    EGFR 94 08/12/2024         No results found for:  "\"SPEP\", \"UPEP\"    No results found for: \"PSA\"    No results found for: \"CEA\"    No results found for: \"\"    No results found for: \"AFP\"    No results found for: \"IRON\", \"TIBC\", \"FERRITIN\"    No results found for: \"QYFAHTZF41\"      ROS: Review of Systems   Constitutional:  Positive for appetite change and fatigue.   HENT: Negative.     Eyes: Negative.    Respiratory: Negative.     Cardiovascular: Negative.    Gastrointestinal: Negative.    Endocrine: Negative.    Genitourinary: Negative.    Musculoskeletal: Negative.    Skin: Negative.    Allergic/Immunologic: Negative.    Neurological: Negative.    Hematological: Negative.          Current Medications: Reviewed  Allergies: Reviewed  PMH/FH/SH:  Reviewed      Physical Exam:    Body surface area is 2.02 meters squared.    Wt Readings from Last 3 Encounters:   08/15/24 80.5 kg (177 lb 8 oz)   08/14/24 80.4 kg (177 lb 3.2 oz)   08/13/24 80 kg (176 lb 5.9 oz)        Temp Readings from Last 3 Encounters:   08/15/24 97.7 °F (36.5 °C) (Temporal)   08/14/24 (!) 96.2 °F (35.7 °C) (Temporal)   08/13/24 97.6 °F (36.4 °C) (Oral)        BP Readings from Last 3 Encounters:   08/15/24 122/76   08/14/24 115/76   08/13/24 117/66         Pulse Readings from Last 3 Encounters:   08/15/24 84   08/14/24 72   08/13/24 75     @LASTSAO2(3)@      Physical Exam  Constitutional:       Appearance: Normal appearance. He is normal weight.   HENT:      Head: Normocephalic and atraumatic.   Eyes:      Extraocular Movements: Extraocular movements intact.      Conjunctiva/sclera: Conjunctivae normal.      Pupils: Pupils are equal, round, and reactive to light.   Cardiovascular:      Rate and Rhythm: Normal rate and regular rhythm.      Heart sounds: Normal heart sounds.   Pulmonary:      Effort: Pulmonary effort is normal.      Breath sounds: Normal breath sounds.   Abdominal:      General: Abdomen is flat. Bowel sounds are normal.      Palpations: Abdomen is soft.   Musculoskeletal:         " General: Normal range of motion.      Cervical back: Normal range of motion and neck supple.   Skin:     General: Skin is warm and dry.   Neurological:      General: No focal deficit present.      Mental Status: He is alert and oriented to person, place, and time. Mental status is at baseline.     Using a cane to get around overall somewhat weakened and fatigue complaining of pain on palpation lower back      Goals and Barriers:  Current Goal: Prolong Survival from Cancer.   Barriers: None.      Patient's Capacity to Self Care:  Patient is able to self care.    Code Status: [unfilled]  Advance Directive and Living Will:      Power of :

## 2024-08-15 NOTE — PROGRESS NOTES
Patient to clinic for Etoposide. Continues to c/o generalized weakness in bilateral lower extremities.Port accessed with positive blood return noted throughout treatment.  Tolerated infusion without complications. Port De-accessed and flushed.  Aware of next appointment on 8/16/24 at 2:23 pm.  AVS declined.

## 2024-08-16 ENCOUNTER — TELEPHONE (OUTPATIENT)
Age: 64
End: 2024-08-16

## 2024-08-16 ENCOUNTER — HOSPITAL ENCOUNTER (OUTPATIENT)
Dept: INFUSION CENTER | Facility: CLINIC | Age: 64
End: 2024-08-16
Payer: COMMERCIAL

## 2024-08-16 VITALS
SYSTOLIC BLOOD PRESSURE: 110 MMHG | BODY MASS INDEX: 24.03 KG/M2 | DIASTOLIC BLOOD PRESSURE: 73 MMHG | TEMPERATURE: 98.2 F | HEART RATE: 88 BPM | RESPIRATION RATE: 18 BRPM | WEIGHT: 177.2 LBS

## 2024-08-16 DIAGNOSIS — D70.1 CHEMOTHERAPY-INDUCED NEUTROPENIA (HCC): ICD-10-CM

## 2024-08-16 DIAGNOSIS — R42 DIZZINESS: Primary | ICD-10-CM

## 2024-08-16 DIAGNOSIS — C79.51 METASTASIS TO BONE (HCC): ICD-10-CM

## 2024-08-16 DIAGNOSIS — T45.1X5A CHEMOTHERAPY-INDUCED NEUTROPENIA (HCC): ICD-10-CM

## 2024-08-16 DIAGNOSIS — C34.90 SMALL CELL LUNG CANCER (HCC): ICD-10-CM

## 2024-08-16 PROCEDURE — 96372 THER/PROPH/DIAG INJ SC/IM: CPT

## 2024-08-16 PROCEDURE — 96365 THER/PROPH/DIAG IV INF INIT: CPT

## 2024-08-16 RX ORDER — ZOLEDRONIC ACID 0.04 MG/ML
4 INJECTION, SOLUTION INTRAVENOUS ONCE
OUTPATIENT
Start: 2024-09-26 | End: 2024-09-26

## 2024-08-16 RX ORDER — ZOLEDRONIC ACID 0.04 MG/ML
4 INJECTION, SOLUTION INTRAVENOUS ONCE
Status: CANCELLED | OUTPATIENT
Start: 2024-08-16 | End: 2024-08-20

## 2024-08-16 RX ORDER — ZOLEDRONIC ACID 0.04 MG/ML
4 INJECTION, SOLUTION INTRAVENOUS ONCE
Status: COMPLETED | OUTPATIENT
Start: 2024-08-16 | End: 2024-08-16

## 2024-08-16 RX ORDER — SODIUM CHLORIDE 9 MG/ML
20 INJECTION, SOLUTION INTRAVENOUS ONCE
Status: CANCELLED | OUTPATIENT
Start: 2024-08-16

## 2024-08-16 RX ORDER — SODIUM CHLORIDE 9 MG/ML
20 INJECTION, SOLUTION INTRAVENOUS ONCE
Status: COMPLETED | OUTPATIENT
Start: 2024-08-16 | End: 2024-08-16

## 2024-08-16 RX ORDER — ZOLEDRONIC ACID 0.04 MG/ML
4 INJECTION, SOLUTION INTRAVENOUS ONCE
Status: CANCELLED | OUTPATIENT
Start: 2024-09-26 | End: 2024-09-26

## 2024-08-16 RX ORDER — SODIUM CHLORIDE 9 MG/ML
20 INJECTION, SOLUTION INTRAVENOUS ONCE
OUTPATIENT
Start: 2024-09-26

## 2024-08-16 RX ORDER — SODIUM CHLORIDE 9 MG/ML
20 INJECTION, SOLUTION INTRAVENOUS ONCE
Status: CANCELLED | OUTPATIENT
Start: 2024-09-26

## 2024-08-16 RX ADMIN — ZOLEDRONIC ACID 4 MG: 0.04 INJECTION, SOLUTION INTRAVENOUS at 15:32

## 2024-08-16 RX ADMIN — SODIUM CHLORIDE 20 ML/HR: 9 INJECTION, SOLUTION INTRAVENOUS at 15:32

## 2024-08-16 RX ADMIN — PEGFILGRASTIM-CBQV 6 MG: 6 INJECTION, SOLUTION SUBCUTANEOUS at 16:16

## 2024-08-16 NOTE — TELEPHONE ENCOUNTER
Spoke with patient's daughter Anna who would like to know if Zometa infusion will be added on today with neulasta appt or if it will start with C6 on 9/3. She would also like to clarify if zometa can be given on the same days as chemo plan. I let her know someone will give her a call back once reviewed with Dr. Guadarrama.     Pt needs port lab appointments set up prior to future cycles, pt requesting next port labs appt to be scheduled for Thursday 8/29 prior to C6    Best call back number: [phone], ok to leave VM

## 2024-08-16 NOTE — PROGRESS NOTES
Patient to clinic for Zometa and  Udenyca injection. Continues to c/o generalized weakness.Port accessed with positive blood return noted throughout treatment.  Tolerated infusion without complications. Port De-accessed and flushed. Udenyca injection given in the left abdomen.  Aware of next appointment on 9/3/24 at 2pm.  AVS declined.

## 2024-08-19 ENCOUNTER — TELEPHONE (OUTPATIENT)
Dept: HEMATOLOGY ONCOLOGY | Facility: CLINIC | Age: 64
End: 2024-08-19

## 2024-08-19 ENCOUNTER — TELEPHONE (OUTPATIENT)
Age: 64
End: 2024-08-19

## 2024-08-20 ENCOUNTER — HOSPITAL ENCOUNTER (OUTPATIENT)
Dept: INFUSION CENTER | Facility: CLINIC | Age: 64
End: 2024-08-20

## 2024-08-21 DIAGNOSIS — Z51.5 PALLIATIVE CARE PATIENT: ICD-10-CM

## 2024-08-21 DIAGNOSIS — G89.3 CANCER RELATED PAIN: ICD-10-CM

## 2024-08-21 DIAGNOSIS — C34.90 SMALL CELL LUNG CANCER (HCC): ICD-10-CM

## 2024-08-21 RX ORDER — GABAPENTIN 100 MG/1
100 CAPSULE ORAL 3 TIMES DAILY
Qty: 180 CAPSULE | Refills: 0 | Status: SHIPPED | OUTPATIENT
Start: 2024-08-21

## 2024-08-21 RX ORDER — OXYCODONE HYDROCHLORIDE 30 MG/1
30-45 TABLET ORAL EVERY 4 HOURS
Qty: 180 TABLET | Refills: 0 | Status: SHIPPED | OUTPATIENT
Start: 2024-08-21

## 2024-08-21 RX ORDER — DEXAMETHASONE 4 MG/1
4 TABLET ORAL
Qty: 60 TABLET | Refills: 0 | Status: SHIPPED | OUTPATIENT
Start: 2024-08-21

## 2024-08-21 NOTE — TELEPHONE ENCOUNTER
Anna returned the call again.  She stated that she will be home all day today so she will be able to answer the call.

## 2024-08-27 ENCOUNTER — TELEPHONE (OUTPATIENT)
Age: 64
End: 2024-08-27

## 2024-08-27 RX ORDER — SODIUM CHLORIDE 9 MG/ML
20 INJECTION, SOLUTION INTRAVENOUS ONCE
Status: CANCELLED | OUTPATIENT
Start: 2024-09-25

## 2024-08-27 RX ORDER — SODIUM CHLORIDE 9 MG/ML
20 INJECTION, SOLUTION INTRAVENOUS ONCE
Status: CANCELLED | OUTPATIENT
Start: 2024-09-23

## 2024-08-27 RX ORDER — SODIUM CHLORIDE 9 MG/ML
20 INJECTION, SOLUTION INTRAVENOUS ONCE
Status: CANCELLED | OUTPATIENT
Start: 2024-09-24

## 2024-08-27 NOTE — TELEPHONE ENCOUNTER
"Call from patient's daughter, Anna. She states that her dad would like to postpone his treatment by 1 week as he is feeling weak and tired. He does have an appointment to see Dr. Guadarrama this week, but she wanted to give his care team a \"heads up\" before the appointment. 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 They will still complete the blood work prior Thursday's appointment. She had no other concerns at this time.  "

## 2024-08-29 ENCOUNTER — TELEPHONE (OUTPATIENT)
Dept: HEMATOLOGY ONCOLOGY | Facility: CLINIC | Age: 64
End: 2024-08-29

## 2024-08-29 ENCOUNTER — HOSPITAL ENCOUNTER (OUTPATIENT)
Dept: INFUSION CENTER | Facility: CLINIC | Age: 64
Discharge: HOME | End: 2024-08-29
Payer: COMMERCIAL

## 2024-08-29 ENCOUNTER — OFFICE VISIT (OUTPATIENT)
Dept: HEMATOLOGY ONCOLOGY | Facility: CLINIC | Age: 64
End: 2024-08-29
Payer: COMMERCIAL

## 2024-08-29 VITALS
WEIGHT: 187.5 LBS | SYSTOLIC BLOOD PRESSURE: 106 MMHG | BODY MASS INDEX: 25.4 KG/M2 | TEMPERATURE: 97.2 F | HEIGHT: 72 IN | HEART RATE: 84 BPM | RESPIRATION RATE: 18 BRPM | DIASTOLIC BLOOD PRESSURE: 68 MMHG | OXYGEN SATURATION: 96 %

## 2024-08-29 DIAGNOSIS — T45.1X5A CHEMOTHERAPY-INDUCED NEUTROPENIA (HCC): ICD-10-CM

## 2024-08-29 DIAGNOSIS — C34.90 SMALL CELL LUNG CANCER (HCC): Primary | ICD-10-CM

## 2024-08-29 DIAGNOSIS — C79.51 METASTASIS TO BONE (HCC): ICD-10-CM

## 2024-08-29 DIAGNOSIS — D70.1 CHEMOTHERAPY-INDUCED NEUTROPENIA (HCC): ICD-10-CM

## 2024-08-29 LAB
ALBUMIN SERPL BCG-MCNC: 3.3 G/DL (ref 3.5–5)
ALP SERPL-CCNC: 113 U/L (ref 34–104)
ALT SERPL W P-5'-P-CCNC: 38 U/L (ref 7–52)
ANION GAP SERPL CALCULATED.3IONS-SCNC: 7 MMOL/L (ref 4–13)
AST SERPL W P-5'-P-CCNC: 25 U/L (ref 13–39)
BASOPHILS # BLD MANUAL: 0.06 THOUSAND/UL (ref 0–0.1)
BASOPHILS NFR MAR MANUAL: 1 % (ref 0–1)
BILIRUB SERPL-MCNC: 0.3 MG/DL (ref 0.2–1)
BUN SERPL-MCNC: 18 MG/DL (ref 5–25)
CALCIUM ALBUM COR SERPL-MCNC: 9.2 MG/DL (ref 8.3–10.1)
CALCIUM SERPL-MCNC: 8.6 MG/DL (ref 8.4–10.2)
CHLORIDE SERPL-SCNC: 107 MMOL/L (ref 96–108)
CO2 SERPL-SCNC: 29 MMOL/L (ref 21–32)
CREAT SERPL-MCNC: 0.72 MG/DL (ref 0.6–1.3)
DACRYOCYTES BLD QL SMEAR: PRESENT
EOSINOPHIL # BLD MANUAL: 0 THOUSAND/UL (ref 0–0.4)
EOSINOPHIL NFR BLD MANUAL: 0 % (ref 0–6)
ERYTHROCYTE [DISTWIDTH] IN BLOOD BY AUTOMATED COUNT: 15.3 % (ref 11.6–15.1)
GFR SERPL CREATININE-BSD FRML MDRD: 99 ML/MIN/1.73SQ M
GLUCOSE SERPL-MCNC: 131 MG/DL (ref 65–140)
HCT VFR BLD AUTO: 34.2 % (ref 36.5–49.3)
HGB BLD-MCNC: 10.8 G/DL (ref 12–17)
LIPASE SERPL-CCNC: 22 U/L (ref 11–82)
LYMPHOCYTES # BLD AUTO: 2.67 THOUSAND/UL (ref 0.6–4.47)
LYMPHOCYTES # BLD AUTO: 36 % (ref 14–44)
MACROCYTES BLD QL AUTO: PRESENT
MCH RBC QN AUTO: 33 PG (ref 26.8–34.3)
MCHC RBC AUTO-ENTMCNC: 31.6 G/DL (ref 31.4–37.4)
MCV RBC AUTO: 105 FL (ref 82–98)
METAMYELOCYTE ABSOLUTE CT: 0.12 THOUSAND/UL (ref 0–0.1)
METAMYELOCYTES NFR BLD MANUAL: 2 % (ref 0–1)
MONOCYTES # BLD AUTO: 0.31 THOUSAND/UL (ref 0–1.22)
MONOCYTES NFR BLD: 5 % (ref 4–12)
NEUTROPHILS # BLD MANUAL: 3.04 THOUSAND/UL (ref 1.85–7.62)
NEUTS BAND NFR BLD MANUAL: 1 % (ref 0–8)
NEUTS SEG NFR BLD AUTO: 48 % (ref 43–75)
PLATELET # BLD AUTO: 25 THOUSANDS/UL (ref 149–390)
PLATELET BLD QL SMEAR: ABNORMAL
PMV BLD AUTO: 9.4 FL (ref 8.9–12.7)
POTASSIUM SERPL-SCNC: 3.6 MMOL/L (ref 3.5–5.3)
PROT SERPL-MCNC: 5.6 G/DL (ref 6.4–8.4)
RBC # BLD AUTO: 3.27 MILLION/UL (ref 3.88–5.62)
SODIUM SERPL-SCNC: 143 MMOL/L (ref 135–147)
TSH SERPL DL<=0.05 MIU/L-ACNC: 0.57 UIU/ML (ref 0.45–4.5)
VARIANT LYMPHS # BLD AUTO: 7 %
WBC # BLD AUTO: 6.21 THOUSAND/UL (ref 4.31–10.16)

## 2024-08-29 PROCEDURE — 99213 OFFICE O/P EST LOW 20 MIN: CPT | Performed by: INTERNAL MEDICINE

## 2024-08-29 PROCEDURE — 83690 ASSAY OF LIPASE: CPT | Performed by: INTERNAL MEDICINE

## 2024-08-29 PROCEDURE — 84443 ASSAY THYROID STIM HORMONE: CPT | Performed by: INTERNAL MEDICINE

## 2024-08-29 PROCEDURE — 80053 COMPREHEN METABOLIC PANEL: CPT | Performed by: INTERNAL MEDICINE

## 2024-08-29 PROCEDURE — 85027 COMPLETE CBC AUTOMATED: CPT | Performed by: INTERNAL MEDICINE

## 2024-08-29 PROCEDURE — 85007 BL SMEAR W/DIFF WBC COUNT: CPT | Performed by: INTERNAL MEDICINE

## 2024-08-29 NOTE — PROGRESS NOTES
Hematology/Oncology Outpatient Follow- up Note  Ceasar March 63 y.o. male MRN: @ Encounter: [id number]        Date:  8/29/2024        Assessment / Plan:    1 stage IV small cell lung cancer  2 extensive liver metastasis which have improved  3 diffuse bone metastasis which appears worse  4 continue weakness and fatigue.  Plan: He will continue chemotherapy he is going to the      HPI: 63-year-old gentleman due for cycle 6 of chemo with Tecentriq/carboplatin/-16 for stage IV small cell lung cancer.  He has diffuse bone mets liver and lung problems.  They liver and lung have improved the bone mets are very much more difficult to tell.  He does have diffuse disease there.  He is able to ambulate uses a cane pain is fairly well-controlled.  Liver chemistries have returned to normal.  Otherwise no other major changes.      Interval History: Note from 8/15/2024:  Assessment / Plan:    1 stage IV small cell lung cancer  2 extensive liver metastasis which have improved.  3 diffuse bone metastasis which appear worse however we will need to compare.  4 weakness fatigue dizziness particularly around his treatment.  Plan: Patient will undergo his chemo will complete this week.  Will see him in 2 weeks prior to his next treatment.  Overall prognosis very guarded.  Will see how he tolerates these treatments.  Cycle 5 of treatment  HPI: 63-year-old gentleman here for follow-up in 4 she has stage IV small cell lung cancer with improved but extensive liver metastasis improved lung and multiple bones sclerotic metastasis most which is sclerotic.  They appeared worse I will review the films as some these may have been lytic lesions that have calcified.  He is not nauseated but feels very weak tired dizzy lightheaded from the treatment.  He is in the middle of his treatment now.  He had delayed approximately 3 weeks.  He remains fatigued.  His liver functions are stable alk phos is down to 114 total bilirubin normal rest of  electrolytes are stable.  Interval History: Note from 7/18/2024:  Assessment / Plan:    1 stage IV small cell lung cancer  2 extensive liver metastasis with improved abdominal pain and shrinkage on CAT scan  3 some improvement in dizziness  4 bone metastasis  5 improvement in dizziness  Plan: Patient will go ahead with treatment 1 week from Monday on the 7/29.  He will also add Zometa to his regimen.  He will hopefully come back here to be seen in 4 weeks.  Long-term prognosis unfortunately remains very guarded.  No other major suggestions  HPI: 63-year-old gentleman with stage IV small cell lung cancer.  He has extensive liver metastasis which have improved significantly on CAT scan.  He has a large mass in his right lung which is also shrunk on CAT scan.  He has diffuse bone metastasis which look worse although it is unclear if some of that is sclerotic changes now.  He feels better bilirubin has stabilized he is fatigued and tired from his treatments.  However he is able to get around he can walk up to 200 feet without major shortness of breath.  He can eat.  He has to take 1 week off from treatment we will be agreeable with that otherwise no other major changes.  Interval History: Note from 6/18/2024  Assessment / Plan:    1 stage IV small cell lung cancer  2 extensive liver metastasis with less abdominal pain  3 jaundice which is corrected  4 dizziness  Plan: She will be getting the MRI coming up.  Port-A-Cath will be placed.  Tecentriq was added.  Prognosis long-term is quite guarded.  Will await results of the MRI of the brain.  He will push fluids as well.  Long-term outlook very guarded.  He has chemotherapy due on 7/1.  HPI: 63-year-old gentleman here for follow-up.  His bilirubin has not come down to 1.1.  Has had improvement with normalization of SGOT and SGPT alkaline phosphatase is come down considerably to was in the 160 range.  His biggest complaint is dizziness lightheadedness.  His blood pressure  has been on the low side.  He is not complaining of headache.  I am concerned about that this is lightheadedness he is going to push fluids.  He also is due for an MRI of the brain and we will see what that shows.  He is certainly acutely needed treatment is to try and shrink liver which we did and try and control bilirubin which was done.  Hopefully the brain will be negative if positive we will for radiation.  Otherwise he is looking to have a Ssqcrn-e-Edne placed and we will put in for that as well  Interval History: Note from 6/6/2024:  Assessment / Plan:    1 stage IV small cell lung cancer  2 extensive liver metastasis exam slightly better  3 jaundiced looks better waiting for results from labs tomorrow  4 pain secondary to #2  5 thrombocytopenia secondary to chemo await results of labs  6 dizziness.  Will be getting an MRI of the brain hopefully next week.  Plan: Tecentriq will be added to his chemotherapy regimen.  MRI of the brain will be done.  He will come back in 6 weeks.  He is going to have 2 more cycles of treatment before then.  At the time of his return we will get a CAT scan chest abdomen pelvis determine where he stands.  If he has had a fairly maximal response we will try and use Tecentriq alone from there.  Daughter was present during discussion.  HPI: 63-year-old gentleman here for follow-up.  He has stage IV small cell lung cancer with his extensive liver metastasis he is actually feeling better he is gaining weight he has pain primarily in the right upper quadrant and in the epigastric area but better controlled.  He was seen at [facility] [place].  Their recommendation was to add Tecentriq to his carboplatinum etoposide.  He will start that coming Monday for his third cycle  They also recommended to obtain a MRI of the brain.  Will go ahead and order that.  Hopefully get that next week.  His only complaint regarding the neurologic problems is occasional dizziness and slight change in  "gait.  In the room his gait was fine.  He also complained of shortness of breath with a lot of exertion.  His sats here are 98%.  I advised him to get a pulse oximetry which he has at home.  If he can document sats dropping below 90% we can try and look into getting oxygen for him.  Interval History: Note from 5/1/2024:  Assessment / Plan:    1 stage IV small cell lung cancer  2 liver metastasis extensive  3 jaundice secondary to #2  4 pain secondary to #2  5 thrombocytopenia secondary to chemo.  Plan: Patient will get a CBC CMP today.  It is noted that he has thrombocytopenia secondary to chemo.  He will come back here 5/9 for laboratory work.  If all goes well he will get carboplatin/-16 beginning 5/13.  At that time we will also be looking at the possibility of adding immunotherapy.  Also beyond that he is asking and we will recommend the as he wishes to get a second opinion at [facility].  His prognosis is guarded long-term.  Hopefully he will get at least some response to these treatments.  HPI: 63-year-old gentleman with a history of small cell cancer along with diffuse metastatic disease to liver.  He has pain in the right upper quadrant and right flank area around the lower ribs upper abdominal area.  Hurts worse on palpation.  He is using oxycodone 22.5 mg every 4 hours with some relief.  He is able to eat.  He has no fever or chills.  He still coughs and some shortness of breath but improved.  He is due again for treatment on 5/10/2024.  He was asked to begin again 5/13/2024.  He remains with marked elevation of total bilirubin of 7.2 and all improved.  His LDH total is also quite elevated at 1000.  Interval History: Note 04/22/2024:  Small cell lung cancer with metastasis to liver  Assessment & Plan  Patient reported night sweats, recent weight loss, current every day smoker, severe back and flank pain.  CT chest: \" Right hilar and perihilar 5.5 x 6.3 x 5.2 cm mass consistent lung " "malignancy. Right hilar, mediastinal and right supraclavicular lymphadenopathy compatible with metastatic disease. Airspace consolidation in the right middle lobe adjacent to the hilum suggesting postobstructive pneumonia. Nodular thickening along the axial interstitium in the right lower lobe and the right major fissure suggesting lymphangitic spread of tumor. Diffuse hepatic metastases. Periportal, gastroesophageal and para-aortic lymphadenopathy\"  Complained of hip pain -- CT A/P displayed sclerotic lesion in the pelvis.  Liver biopsy pathology report positive for small cell carcinoma.   4/22 patient overall doing well status post chemotherapy x 3 days.  Patient cleared for discharge per oncology. Will need to follow-up with outpatient oncology  Continue allopurinol for 1 month and Decadron per oncology  Pain med recs per palliative  Note that the patient has become quite jaundice bilirubin of 2.6-9.2 prior to receiving his treatments.  He received cisplatin and day 1 through 3 etopside.      Cancer Staging:  Cancer Staging   No matching staging information was found for the patient.      Molecular Testing:     Previous Hematologic/ Oncologic History:    Oncology History   Small cell lung cancer with metastasis to liver   4/19/2024 Initial Diagnosis    Small cell lung cancer in adult (HCC)     4/19/2024 - 6/13/2024 Chemotherapy    alteplase (CATHFLO), 2 mg, Intracatheter, Every 1 Minute as needed, 3 of 6 cycles  pegfilgrastim (NEULASTA), 6 mg, Subcutaneous, Once, 3 of 6 cycles  Administration: 6 mg (4/23/2024), 6 mg (5/16/2024), 6 mg (6/13/2024)  fosaprepitant (EMEND) IVPB, 150 mg, Intravenous, Once, 3 of 6 cycles  Administration: 150 mg (4/19/2024), 150 mg (5/13/2024), 150 mg (6/10/2024)  atezolizumab (TECENTRIQ) IVPB, 1,200 mg (100 % of original dose 1,200 mg), Intravenous, Once, 1 of 4 cycles  Dose modification: 1,200 mg (original dose 1,200 mg, Cycle 3, Reason: Anticipated Tolerance)  Administration: 1,200 mg " (6/10/2024)  etoposide (TOPOSAR), 50 mg/m2 = 100.6 mg (50 % of original dose 100 mg/m2), Intravenous, Once, 3 of 6 cycles  Dose modification: 50 mg/m2 (original dose 100 mg/m2, Cycle 1, Reason: Anticipated Tolerance)  Administration: 100.6 mg (4/19/2024), 100.6 mg (4/20/2024), 201 mg (5/13/2024), 201 mg (5/14/2024), 100.6 mg (4/21/2024), 201 mg (5/15/2024), 201 mg (6/10/2024), 201 mg (6/11/2024), 201 mg (6/12/2024)  CARBOplatin (PARAPLATIN) IVPB (GOG AUC DOSING), 669.5 mg, Intravenous, Once, 3 of 6 cycles  Administration: 669.5 mg (4/19/2024), 731 mg (5/13/2024), 603 mg (6/10/2024)     6/19/2024 -  Chemotherapy    alteplase (CATHFLO), 2 mg, Intracatheter, Every 1 Minute as needed, 5 of 12 cycles  pegfilgrastim-cbqv (UDENYCA), 6 mg, Subcutaneous, Once, 2 of 3 cycles  Administration: 6 mg (7/5/2024), 6 mg (8/16/2024)  pegfilgrastim-apgf (Nyvepria), 6 mg, Subcutaneous, Once, 3 of 3 cycles  fosaprepitant (EMEND) IVPB, 150 mg, Intravenous, Once, 5 of 12 cycles  Administration: 150 mg (7/1/2024), 150 mg (8/13/2024)  atezolizumab (TECENTRIQ) IVPB, 1,200 mg, Intravenous, Once, 5 of 12 cycles  Administration: 1,200 mg (8/13/2024), 1,200 mg (7/1/2024)  etoposide (TOPOSAR), 100 mg/m2 = 200 mg, Intravenous, Once, 5 of 6 cycles  Dose modification: 75 mg/m2 (75 % of original dose 100 mg/m2, Cycle 4, Reason: Other (see comments), Comment: neutropenia), 75 mg/m2 (75 % of original dose 100 mg/m2, Cycle 4, Reason: Other (see comments)), 75 mg/m2 (original dose 100 mg/m2, Cycle 5, Reason: Thrombocytopenia)  Administration: 150 mg (7/1/2024), 150 mg (7/2/2024), 150 mg (7/3/2024), 150 mg (8/13/2024), 150 mg (8/14/2024), 150 mg (8/15/2024)  CARBOplatin (PARAPLATIN) IVPB ([facility] AUC DOSING), , Intravenous, Once, 5 of 6 cycles  Dose modification: 519 mg (75 % of original dose 692 mg, Cycle 4, Reason: Other (see comments), Comment: neutropenia), 500 mg (original dose 692 mg, Cycle 4, Reason: Neutropenia), 519 mg (75 % of original dose 692 mg,  "Cycle 5, Reason: Other (see comments), Comment: low counts)  Administration: 500 mg (7/1/2024), 519 mg (8/13/2024)         Current Hematologic/ Oncologic Treatment:       Cycle 1         Test Results:    Imaging: No results found.          Labs:   Lab Results   Component Value Date    WBC 8.46 08/12/2024    HGB 12.1 08/12/2024    HCT 37.8 08/12/2024     (H) 08/12/2024     (L) 08/12/2024     Lab Results   Component Value Date    K 3.8 08/12/2024     08/12/2024    CO2 30 08/12/2024    BUN 15 08/12/2024    CREATININE 0.82 08/12/2024    GLUF 79 06/07/2024    CALCIUM 9.1 08/12/2024    CORRECTEDCA 9.1 06/27/2024    AST 27 08/12/2024    ALT 40 08/12/2024    ALKPHOS 114 (H) 08/12/2024    EGFR 94 08/12/2024         No results found for: \"SPEP\", \"UPEP\"    No results found for: \"PSA\"    No results found for: \"CEA\"    No results found for: \"\"    No results found for: \"AFP\"    No results found for: \"IRON\", \"TIBC\", \"FERRITIN\"    No results found for: \"XSZESMBM42\"      ROS: Review of Systems   Constitutional: Negative.    HENT: Negative.     Eyes: Negative.    Respiratory: Negative.     Cardiovascular: Negative.    Gastrointestinal: Negative.    Endocrine: Negative.    Genitourinary: Negative.    Musculoskeletal: Negative.    Skin: Negative.    Allergic/Immunologic: Negative.    Neurological: Negative.    Hematological: Negative.      Uses cane for ambulation does complain of some fatigue.    Current Medications: Reviewed  Allergies: Reviewed  PMH/FH/SH:  Reviewed      Physical Exam:    Body surface area is 2.07 meters squared.    Wt Readings from Last 3 Encounters:   08/29/24 85 kg (187 lb 8 oz)   08/16/24 80.4 kg (177 lb 3.2 oz)   08/15/24 79.6 kg (175 lb 6.4 oz)        Temp Readings from Last 3 Encounters:   08/29/24 (!) 97.2 °F (36.2 °C) (Temporal)   08/16/24 98.2 °F (36.8 °C) (Oral)   08/15/24 97.8 °F (36.6 °C) (Temporal)        BP Readings from Last 3 Encounters:   08/29/24 106/68   08/16/24 110/73 "   08/15/24 114/69         Pulse Readings from Last 3 Encounters:   08/29/24 84   08/16/24 88   08/15/24 84     @LASTSAO2(3)@      Physical Exam  Constitutional:       Appearance: Normal appearance. He is normal weight.   HENT:      Head: Normocephalic and atraumatic.   Eyes:      Extraocular Movements: Extraocular movements intact.      Conjunctiva/sclera: Conjunctivae normal.      Pupils: Pupils are equal, round, and reactive to light.   Cardiovascular:      Rate and Rhythm: Normal rate and regular rhythm.      Heart sounds: Normal heart sounds.   Pulmonary:      Effort: Pulmonary effort is normal.      Breath sounds: Normal breath sounds.   Abdominal:      General: Abdomen is flat. Bowel sounds are normal.      Palpations: Abdomen is soft.   Musculoskeletal:         General: Normal range of motion.      Cervical back: Neck supple.   Skin:     General: Skin is warm and dry.   Neurological:      General: No focal deficit present.      Mental Status: He is alert and oriented to person, place, and time. Mental status is at baseline.           Goals and Barriers:  Current Goal: Prolong Survival from Cancer.   Barriers: None.      Patient's Capacity to Self Care:  Patient is able to self care.    Code Status: [unfilled]  Advance Directive and Living Will:      Power of :

## 2024-08-29 NOTE — PROGRESS NOTES
Pt to clinic for port flush and lab draw via port, offers no complaints today, tolerated procedure without complications, aware of next appointment on 9/3/24 at 2pm, port de-accessed, avs declined.

## 2024-08-30 ENCOUNTER — TELEPHONE (OUTPATIENT)
Age: 64
End: 2024-08-30

## 2024-08-30 DIAGNOSIS — T45.1X5A CHEMOTHERAPY-INDUCED NEUTROPENIA (HCC): ICD-10-CM

## 2024-08-30 DIAGNOSIS — T45.1X5A CHEMOTHERAPY-INDUCED NEUTROPENIA (HCC): Primary | ICD-10-CM

## 2024-08-30 DIAGNOSIS — C34.90 SMALL CELL LUNG CANCER (HCC): Primary | ICD-10-CM

## 2024-08-30 DIAGNOSIS — C34.90 SMALL CELL LUNG CANCER (HCC): ICD-10-CM

## 2024-08-30 DIAGNOSIS — D70.1 CHEMOTHERAPY-INDUCED NEUTROPENIA (HCC): Primary | ICD-10-CM

## 2024-08-30 DIAGNOSIS — D70.1 CHEMOTHERAPY-INDUCED NEUTROPENIA (HCC): ICD-10-CM

## 2024-08-30 NOTE — TELEPHONE ENCOUNTER
Patient called the RX Refill Line. Message is being forwarded to the office.     Patient is requesting - Pt's daughter is requesting a call back from office. Daughter has a questions about an OTC cough medication that it won't interact with Pt's current med's. Please review and reach out, Thank you.    Please contact patient at - [phone]

## 2024-09-03 ENCOUNTER — HOSPITAL ENCOUNTER (OUTPATIENT)
Dept: INFUSION CENTER | Facility: CLINIC | Age: 64
End: 2024-09-03

## 2024-09-06 ENCOUNTER — RA CDI HCC (OUTPATIENT)
Dept: OTHER | Facility: HOSPITAL | Age: 64
End: 2024-09-06

## 2024-09-06 DIAGNOSIS — C34.90 SMALL CELL LUNG CANCER (HCC): ICD-10-CM

## 2024-09-06 DIAGNOSIS — G89.3 CANCER RELATED PAIN: ICD-10-CM

## 2024-09-06 NOTE — PROGRESS NOTES
HCC coding opportunities       Chart reviewed, no opportunity found: CHART REVIEWED, NO OPPORTUNITY FOUND        Patients Insurance        Commercial Insurance: [employer]

## 2024-09-09 RX ORDER — FENTANYL 50 UG/1
1 PATCH TRANSDERMAL
Qty: 10 PATCH | Refills: 0 | Status: SHIPPED | OUTPATIENT
Start: 2024-09-09

## 2024-09-09 NOTE — TELEPHONE ENCOUNTER
Medication: fentanyl 5-mcg/1 hr  PDMP   08/02/2024 08/01/2024 fentaNYL (Patch, Extended Release) 10.0 30 50 MCG/1 .0 APRIL SIMONS 07/22/2024 07/22/2024 fentaNYL TRANSDERMAL SYSTEM (Patch, Extended Release) 5.0 15 50 MCG/1 .0 APRIL SIMONS    Refill must be reviewed and completed by the office or provider. The refill is unable to be approved or denied by the medication management team.

## 2024-09-10 ENCOUNTER — TELEPHONE (OUTPATIENT)
Age: 64
End: 2024-09-10

## 2024-09-10 DIAGNOSIS — Z13.220 LIPID SCREENING: Primary | ICD-10-CM

## 2024-09-10 NOTE — TELEPHONE ENCOUNTER
Patient's daughter Anna called to check status of a form that she dropped off last week to be filled out before patient's visit scheduled with Marilyn on Friday, 9/13/24.  It is a portion of a Physician Screening Form that needs to be filled out for Lab Test Results.  Anna states blood work done within the past year can be used to fill it out, however, she is not sure if any new labs are required.    Please call Anna back to let her know if form was completed and/or if any new labs are needed for completion of the form so she can get him to the lab sooner rather than later.    Thank you!

## 2024-09-13 ENCOUNTER — HOSPITAL ENCOUNTER (OUTPATIENT)
Dept: INFUSION CENTER | Facility: CLINIC | Age: 64
End: 2024-09-13
Payer: COMMERCIAL

## 2024-09-13 DIAGNOSIS — C34.90 SMALL CELL LUNG CANCER (HCC): Primary | ICD-10-CM

## 2024-09-13 DIAGNOSIS — Z13.220 LIPID SCREENING: ICD-10-CM

## 2024-09-13 LAB
CHOLEST SERPL-MCNC: 180 MG/DL
HDLC SERPL-MCNC: 92 MG/DL
LDLC SERPL CALC-MCNC: 77 MG/DL (ref 0–100)
NONHDLC SERPL-MCNC: 88 MG/DL
TRIGL SERPL-MCNC: 53 MG/DL

## 2024-09-13 PROCEDURE — 80061 LIPID PANEL: CPT

## 2024-09-13 NOTE — PROGRESS NOTES
Patient presents for central venous labs. Patient offers no complaints. Port accessed, flushed, saline locked, labs drawn, port flushed and de-accessed. Band-aid placed on site.  AVS declined, next appointment 9/20/24 at 11am reviewed.

## 2024-09-16 ENCOUNTER — TELEPHONE (OUTPATIENT)
Dept: FAMILY MEDICINE CLINIC | Facility: CLINIC | Age: 64
End: 2024-09-16

## 2024-09-16 RX ORDER — SODIUM CHLORIDE 9 MG/ML
20 INJECTION, SOLUTION INTRAVENOUS ONCE
Status: CANCELLED | OUTPATIENT
Start: 2024-09-23

## 2024-09-16 NOTE — TELEPHONE ENCOUNTER
----- Message from Marilyn Polo PA-C sent at 9/15/2024  1:40 PM EDT -----  Total cholesterol and triglycerides normal

## 2024-09-18 DIAGNOSIS — G89.3 CANCER RELATED PAIN: ICD-10-CM

## 2024-09-18 DIAGNOSIS — C34.90 SMALL CELL LUNG CANCER (HCC): ICD-10-CM

## 2024-09-18 DIAGNOSIS — Z51.5 PALLIATIVE CARE PATIENT: ICD-10-CM

## 2024-09-18 RX ORDER — OXYCODONE HYDROCHLORIDE 30 MG/1
30-45 TABLET ORAL EVERY 4 HOURS
Qty: 180 TABLET | Refills: 0 | Status: SHIPPED | OUTPATIENT
Start: 2024-09-18

## 2024-09-18 RX ORDER — DEXAMETHASONE 4 MG/1
4 TABLET ORAL
Qty: 60 TABLET | Refills: 0 | Status: SHIPPED | OUTPATIENT
Start: 2024-09-18

## 2024-09-18 RX ORDER — GABAPENTIN 100 MG/1
100 CAPSULE ORAL 3 TIMES DAILY
Qty: 90 CAPSULE | Refills: 2 | Status: SHIPPED | OUTPATIENT
Start: 2024-09-18

## 2024-09-18 NOTE — TELEPHONE ENCOUNTER
Refill must be reviewed and completed by the office or provider. The refill is unable to be approved or denied by the medication management team.      Patient Id Prescription # Filled Written Drug Label Qty Days Strength MME** Prescriber Pharmacy Payment REFILL #/Auth State Detail   1 [id number] 09/09/2024 09/09/2024 fentaNYL (Patch, Extended Release) 10.0 30 50 MCG/1 .0 [facility] Commercial Insurance 0 / 0 PA    1 [id number] 08/21/2024 08/21/2024 oxyCODONE HCL (Tablet) 180.0 20 30 .0 [facility] [facility] Commercial Insurance 0 / 0 [place]    1 [id number] 08/07/2024 08/07/2024 oxyCODONE HCL (Tablet) 180.0 10 15 .0 APRIL JHONY [employer] [facility] Commercial Insurance 0 / 0 [place]

## 2024-09-20 ENCOUNTER — HOSPITAL ENCOUNTER (OUTPATIENT)
Dept: INFUSION CENTER | Facility: CLINIC | Age: 64
End: 2024-09-20
Payer: COMMERCIAL

## 2024-09-20 ENCOUNTER — TELEPHONE (OUTPATIENT)
Age: 64
End: 2024-09-20

## 2024-09-20 DIAGNOSIS — D70.1 CHEMOTHERAPY-INDUCED NEUTROPENIA (HCC): ICD-10-CM

## 2024-09-20 DIAGNOSIS — C34.90 SMALL CELL LUNG CANCER (HCC): Primary | ICD-10-CM

## 2024-09-20 DIAGNOSIS — T45.1X5A CHEMOTHERAPY-INDUCED NEUTROPENIA (HCC): ICD-10-CM

## 2024-09-20 LAB
ALBUMIN SERPL BCG-MCNC: 3.4 G/DL (ref 3.5–5)
ALP SERPL-CCNC: 104 U/L (ref 34–104)
ALT SERPL W P-5'-P-CCNC: 35 U/L (ref 7–52)
ANION GAP SERPL CALCULATED.3IONS-SCNC: 6 MMOL/L (ref 4–13)
AST SERPL W P-5'-P-CCNC: 24 U/L (ref 13–39)
BASOPHILS # BLD MANUAL: 0 THOUSAND/UL (ref 0–0.1)
BASOPHILS NFR MAR MANUAL: 0 % (ref 0–1)
BILIRUB SERPL-MCNC: 0.39 MG/DL (ref 0.2–1)
BUN SERPL-MCNC: 21 MG/DL (ref 5–25)
CALCIUM ALBUM COR SERPL-MCNC: 8.8 MG/DL (ref 8.3–10.1)
CALCIUM SERPL-MCNC: 8.3 MG/DL (ref 8.4–10.2)
CHLORIDE SERPL-SCNC: 108 MMOL/L (ref 96–108)
CO2 SERPL-SCNC: 28 MMOL/L (ref 21–32)
CREAT SERPL-MCNC: 0.8 MG/DL (ref 0.6–1.3)
EOSINOPHIL # BLD MANUAL: 0.23 THOUSAND/UL (ref 0–0.4)
EOSINOPHIL NFR BLD MANUAL: 2 % (ref 0–6)
ERYTHROCYTE [DISTWIDTH] IN BLOOD BY AUTOMATED COUNT: 14.9 % (ref 11.6–15.1)
GFR SERPL CREATININE-BSD FRML MDRD: 95 ML/MIN/1.73SQ M
GLUCOSE SERPL-MCNC: 112 MG/DL (ref 65–140)
HCT VFR BLD AUTO: 34.4 % (ref 36.5–49.3)
HGB BLD-MCNC: 10.9 G/DL (ref 12–17)
LIPASE SERPL-CCNC: 20 U/L (ref 11–82)
LYMPHOCYTES # BLD AUTO: 1.95 THOUSAND/UL (ref 0.6–4.47)
LYMPHOCYTES # BLD AUTO: 17 % (ref 14–44)
MCH RBC QN AUTO: 32.4 PG (ref 26.8–34.3)
MCHC RBC AUTO-ENTMCNC: 31.7 G/DL (ref 31.4–37.4)
MCV RBC AUTO: 102 FL (ref 82–98)
MONOCYTES # BLD AUTO: 0.57 THOUSAND/UL (ref 0–1.22)
MONOCYTES NFR BLD: 5 % (ref 4–12)
NEUTROPHILS # BLD MANUAL: 8.72 THOUSAND/UL (ref 1.85–7.62)
NEUTS SEG NFR BLD AUTO: 76 % (ref 43–75)
PLATELET # BLD AUTO: 114 THOUSANDS/UL (ref 149–390)
PLATELET BLD QL SMEAR: ABNORMAL
PMV BLD AUTO: 9.6 FL (ref 8.9–12.7)
POTASSIUM SERPL-SCNC: 3.4 MMOL/L (ref 3.5–5.3)
PROT SERPL-MCNC: 5.8 G/DL (ref 6.4–8.4)
RBC # BLD AUTO: 3.36 MILLION/UL (ref 3.88–5.62)
RBC MORPH BLD: NORMAL
SODIUM SERPL-SCNC: 142 MMOL/L (ref 135–147)
T4 FREE SERPL-MCNC: 0.82 NG/DL (ref 0.61–1.12)
TSH SERPL DL<=0.05 MIU/L-ACNC: 0.4 UIU/ML (ref 0.45–4.5)
WBC # BLD AUTO: 11.47 THOUSAND/UL (ref 4.31–10.16)

## 2024-09-20 PROCEDURE — 80053 COMPREHEN METABOLIC PANEL: CPT | Performed by: INTERNAL MEDICINE

## 2024-09-20 PROCEDURE — 85007 BL SMEAR W/DIFF WBC COUNT: CPT | Performed by: INTERNAL MEDICINE

## 2024-09-20 PROCEDURE — 84443 ASSAY THYROID STIM HORMONE: CPT | Performed by: INTERNAL MEDICINE

## 2024-09-20 PROCEDURE — 85027 COMPLETE CBC AUTOMATED: CPT | Performed by: INTERNAL MEDICINE

## 2024-09-20 PROCEDURE — 84439 ASSAY OF FREE THYROXINE: CPT | Performed by: INTERNAL MEDICINE

## 2024-09-20 PROCEDURE — 83690 ASSAY OF LIPASE: CPT | Performed by: INTERNAL MEDICINE

## 2024-09-20 NOTE — TELEPHONE ENCOUNTER
Patient called stating med refill submitted in my chart were not sent to pharmacy.     Advised patient they were sent to [facility] [place]. Daughter thought she requested [facility]. She will check with Ileana for the medications.

## 2024-09-20 NOTE — PROGRESS NOTES
Ceasar March  tolerated treatment well with no complications.      Ceasar March is aware of future appt on 9/23 at 1000.     AVS  No (Declined by Ceasar March) d/c from unit stable

## 2024-09-23 ENCOUNTER — HOSPITAL ENCOUNTER (OUTPATIENT)
Dept: INFUSION CENTER | Facility: CLINIC | Age: 64
Discharge: HOME | End: 2024-09-23
Payer: COMMERCIAL

## 2024-09-23 VITALS
DIASTOLIC BLOOD PRESSURE: 63 MMHG | RESPIRATION RATE: 18 BRPM | HEIGHT: 72 IN | HEART RATE: 72 BPM | WEIGHT: 180.4 LBS | TEMPERATURE: 96.3 F | BODY MASS INDEX: 24.43 KG/M2 | SYSTOLIC BLOOD PRESSURE: 118 MMHG

## 2024-09-23 DIAGNOSIS — C34.90 SMALL CELL LUNG CANCER (HCC): Primary | ICD-10-CM

## 2024-09-23 DIAGNOSIS — T45.1X5A CHEMOTHERAPY-INDUCED NEUTROPENIA (HCC): ICD-10-CM

## 2024-09-23 DIAGNOSIS — D70.1 CHEMOTHERAPY-INDUCED NEUTROPENIA (HCC): ICD-10-CM

## 2024-09-23 PROCEDURE — 96413 CHEMO IV INFUSION 1 HR: CPT

## 2024-09-23 PROCEDURE — 96417 CHEMO IV INFUS EACH ADDL SEQ: CPT

## 2024-09-23 PROCEDURE — 96367 TX/PROPH/DG ADDL SEQ IV INF: CPT

## 2024-09-23 RX ORDER — SODIUM CHLORIDE 9 MG/ML
20 INJECTION, SOLUTION INTRAVENOUS ONCE
Status: COMPLETED | OUTPATIENT
Start: 2024-09-23 | End: 2024-09-23

## 2024-09-23 RX ADMIN — DEXAMETHASONE SODIUM PHOSPHATE: 10 INJECTION, SOLUTION INTRAMUSCULAR; INTRAVENOUS at 10:43

## 2024-09-23 RX ADMIN — CARBOPLATIN 655 MG: 10 INJECTION INTRAVENOUS at 12:32

## 2024-09-23 RX ADMIN — ETOPOSIDE 150 MG: 20 INJECTION INTRAVENOUS at 13:09

## 2024-09-23 RX ADMIN — ATEZOLIZUMAB 1200 MG: 1200 INJECTION, SOLUTION INTRAVENOUS at 11:52

## 2024-09-23 RX ADMIN — SODIUM CHLORIDE 20 ML/HR: 0.9 INJECTION, SOLUTION INTRAVENOUS at 10:41

## 2024-09-23 RX ADMIN — FOSAPREPITANT 150 MG: 150 INJECTION, POWDER, LYOPHILIZED, FOR SOLUTION INTRAVENOUS at 11:07

## 2024-09-23 NOTE — PROGRESS NOTES
Pt to clinic for Tecentriq, Carboplatin, and Etoposide. Pt offers no complaints today. Tolerated infusion without complications. Aware of next appointment on 9/24/24 at 1130. AVS declined. Port de-accessed.

## 2024-09-24 ENCOUNTER — HOSPITAL ENCOUNTER (OUTPATIENT)
Dept: INFUSION CENTER | Facility: CLINIC | Age: 64
Discharge: HOME | End: 2024-09-24
Payer: COMMERCIAL

## 2024-09-24 VITALS
RESPIRATION RATE: 18 BRPM | SYSTOLIC BLOOD PRESSURE: 119 MMHG | HEIGHT: 72 IN | WEIGHT: 180.4 LBS | HEART RATE: 75 BPM | DIASTOLIC BLOOD PRESSURE: 67 MMHG | BODY MASS INDEX: 24.43 KG/M2 | TEMPERATURE: 96.3 F

## 2024-09-24 DIAGNOSIS — T45.1X5A CHEMOTHERAPY-INDUCED NEUTROPENIA (HCC): ICD-10-CM

## 2024-09-24 DIAGNOSIS — D70.1 CHEMOTHERAPY-INDUCED NEUTROPENIA (HCC): ICD-10-CM

## 2024-09-24 DIAGNOSIS — C34.90 SMALL CELL LUNG CANCER (HCC): Primary | ICD-10-CM

## 2024-09-24 PROCEDURE — 96367 TX/PROPH/DG ADDL SEQ IV INF: CPT

## 2024-09-24 PROCEDURE — 96413 CHEMO IV INFUSION 1 HR: CPT

## 2024-09-24 RX ORDER — SODIUM CHLORIDE 9 MG/ML
20 INJECTION, SOLUTION INTRAVENOUS ONCE
Status: COMPLETED | OUTPATIENT
Start: 2024-09-24 | End: 2024-09-24

## 2024-09-24 RX ORDER — SODIUM CHLORIDE 9 MG/ML
20 INJECTION, SOLUTION INTRAVENOUS ONCE
Status: CANCELLED | OUTPATIENT
Start: 2024-09-24

## 2024-09-24 RX ORDER — SODIUM CHLORIDE 9 MG/ML
20 INJECTION, SOLUTION INTRAVENOUS ONCE
Status: CANCELLED | OUTPATIENT
Start: 2024-09-25

## 2024-09-24 RX ADMIN — DEXAMETHASONE SODIUM PHOSPHATE: 10 INJECTION, SOLUTION INTRAMUSCULAR; INTRAVENOUS at 12:16

## 2024-09-24 RX ADMIN — ETOPOSIDE 150 MG: 20 INJECTION INTRAVENOUS at 12:59

## 2024-09-24 RX ADMIN — SODIUM CHLORIDE 20 ML/HR: 0.9 INJECTION, SOLUTION INTRAVENOUS at 12:16

## 2024-09-24 NOTE — PROGRESS NOTES
Ceasar March  tolerated treatment well with no complications.      Ceasar March is aware of future appt on 9/25 at 1230.     AVS  No (Declined by Ceasar March) d/c from unit stable

## 2024-09-25 ENCOUNTER — HOSPITAL ENCOUNTER (OUTPATIENT)
Dept: INFUSION CENTER | Facility: CLINIC | Age: 64
Discharge: HOME | End: 2024-09-25
Payer: COMMERCIAL

## 2024-09-25 VITALS
DIASTOLIC BLOOD PRESSURE: 81 MMHG | BODY MASS INDEX: 24.76 KG/M2 | SYSTOLIC BLOOD PRESSURE: 123 MMHG | WEIGHT: 182.8 LBS | HEIGHT: 72 IN | TEMPERATURE: 97.8 F | RESPIRATION RATE: 18 BRPM | HEART RATE: 67 BPM

## 2024-09-25 DIAGNOSIS — C34.90 SMALL CELL LUNG CANCER (HCC): Primary | ICD-10-CM

## 2024-09-25 DIAGNOSIS — D70.1 CHEMOTHERAPY-INDUCED NEUTROPENIA (HCC): ICD-10-CM

## 2024-09-25 DIAGNOSIS — T45.1X5A CHEMOTHERAPY-INDUCED NEUTROPENIA (HCC): ICD-10-CM

## 2024-09-25 PROCEDURE — 96413 CHEMO IV INFUSION 1 HR: CPT

## 2024-09-25 PROCEDURE — 96367 TX/PROPH/DG ADDL SEQ IV INF: CPT

## 2024-09-25 RX ORDER — SODIUM CHLORIDE 9 MG/ML
20 INJECTION, SOLUTION INTRAVENOUS ONCE
Status: COMPLETED | OUTPATIENT
Start: 2024-09-25 | End: 2024-09-25

## 2024-09-25 RX ADMIN — SODIUM CHLORIDE 150 MG: 9 INJECTION, SOLUTION INTRAVENOUS at 14:02

## 2024-09-25 RX ADMIN — SODIUM CHLORIDE 20 ML/HR: 0.9 INJECTION, SOLUTION INTRAVENOUS at 13:05

## 2024-09-25 RX ADMIN — DEXAMETHASONE SODIUM PHOSPHATE: 10 INJECTION, SOLUTION INTRAMUSCULAR; INTRAVENOUS at 13:17

## 2024-09-25 NOTE — PROGRESS NOTES
Patient Ceasar March is here for Etoposide. Patient offers no complaints at this time. Port a cath accessed with positive blood return. Tolerated entire infusion without complications. Port a cath deaccessed.   Aware of next appointment on 9/26/2024 at 3pm.   AVS declined.   Walked out of infusion center with no complications.

## 2024-09-26 ENCOUNTER — HOSPITAL ENCOUNTER (OUTPATIENT)
Dept: INFUSION CENTER | Facility: CLINIC | Age: 64
Discharge: HOME | End: 2024-09-26
Payer: COMMERCIAL

## 2024-09-26 ENCOUNTER — OFFICE VISIT (OUTPATIENT)
Dept: HEMATOLOGY ONCOLOGY | Facility: CLINIC | Age: 64
End: 2024-09-26
Payer: COMMERCIAL

## 2024-09-26 VITALS
HEART RATE: 75 BPM | RESPIRATION RATE: 18 BRPM | DIASTOLIC BLOOD PRESSURE: 72 MMHG | TEMPERATURE: 97.3 F | BODY MASS INDEX: 24.28 KG/M2 | SYSTOLIC BLOOD PRESSURE: 123 MMHG | WEIGHT: 179 LBS

## 2024-09-26 VITALS
BODY MASS INDEX: 24.65 KG/M2 | TEMPERATURE: 97.6 F | HEIGHT: 72 IN | HEART RATE: 84 BPM | DIASTOLIC BLOOD PRESSURE: 80 MMHG | WEIGHT: 182 LBS | OXYGEN SATURATION: 96 % | RESPIRATION RATE: 18 BRPM | SYSTOLIC BLOOD PRESSURE: 136 MMHG

## 2024-09-26 DIAGNOSIS — R42 DIZZINESS: Primary | ICD-10-CM

## 2024-09-26 DIAGNOSIS — R42 DIZZINESS: ICD-10-CM

## 2024-09-26 DIAGNOSIS — C34.90 SMALL CELL LUNG CANCER (HCC): Primary | ICD-10-CM

## 2024-09-26 DIAGNOSIS — D69.59 THROMBOCYTOPENIA DUE TO DRUGS: ICD-10-CM

## 2024-09-26 DIAGNOSIS — T45.1X5A CHEMOTHERAPY-INDUCED NEUTROPENIA (HCC): ICD-10-CM

## 2024-09-26 DIAGNOSIS — D70.1 CHEMOTHERAPY-INDUCED NEUTROPENIA (HCC): ICD-10-CM

## 2024-09-26 DIAGNOSIS — C79.51 METASTASIS TO BONE (HCC): ICD-10-CM

## 2024-09-26 DIAGNOSIS — T50.905A THROMBOCYTOPENIA DUE TO DRUGS: ICD-10-CM

## 2024-09-26 DIAGNOSIS — Z72.0 TOBACCO ABUSE: Chronic | ICD-10-CM

## 2024-09-26 PROCEDURE — 99213 OFFICE O/P EST LOW 20 MIN: CPT | Performed by: INTERNAL MEDICINE

## 2024-09-26 PROCEDURE — 96372 THER/PROPH/DIAG INJ SC/IM: CPT

## 2024-09-26 RX ORDER — SODIUM CHLORIDE 9 MG/ML
20 INJECTION, SOLUTION INTRAVENOUS ONCE
OUTPATIENT
Start: 2024-11-07

## 2024-09-26 RX ORDER — ZOLEDRONIC ACID 0.04 MG/ML
4 INJECTION, SOLUTION INTRAVENOUS ONCE
OUTPATIENT
Start: 2024-11-07 | End: 2024-11-07

## 2024-09-26 RX ADMIN — PEGFILGRASTIM-CBQV 6 MG: 6 INJECTION, SOLUTION SUBCUTANEOUS at 16:08

## 2024-09-26 NOTE — PROGRESS NOTES
Hematology/Oncology Outpatient Follow- up Note  Ceasar March 63 y.o. male MRN: @ Encounter: [id number]        Date:  9/26/2024        Assessment / Plan:    1 stage IV small cell lung cancer  2 extensive liver metastasis with improvement  3 diffuse bone metastasis which may be worse although unclear.  4 continue problems with weakness fatigue and poor balance.  Plan: Patient will undergo scans chest abdomen pelvis along with laboratory work and then follow-up here in 4 weeks labs will be done in 3 weeks with a scan.  If everything stable we will go onto Tecentriq on their every 3 week basis.        HPI: 63-year-old gentleman with stage IV small cell lung cancer with liver and bone involvement.  He is completing his 6 cycle of treatment this week with Tecentriq/carboplatin/etoposide.  Had a good partial response with the exception of his bones.  We discussed the situation he will undergo CAT scan chest abdomen pelvis and return in 3 to 4 weeks.  If this shows stability or improvement we will go ahead and start him on Tecentriq every 3 weeks.  He asked about obtaining testing to try and determine what caused the small cell.  I told him we can to do mutation testing but that will not give is the cause.  Certainly toxins other than smoking could be a contributor although smoking still is the major cause of this type of lung cancer.  He is ambulating he is getting around he is able to eat.  He does still has periods of poor balance.      Interval History:    Assessment / Plan: Note from 8/29/2024:  1 stage IV small cell lung cancer  2 extensive liver metastasis which have improved  3 diffuse bone metastasis which appears worse  4 continue weakness and fatigue.  Plan: He will continue chemotherapy he is going to the  HPI: 63-year-old gentleman due for cycle 6 of chemo with Tecentriq/carboplatin/-16 for stage IV small cell lung cancer.  He has diffuse bone mets liver and lung problems.  They liver and lung have  improved the bone mets are very much more difficult to tell.  He does have diffuse disease there.  He is able to ambulate uses a cane pain is fairly well-controlled.  Liver chemistries have returned to normal.  Otherwise no other major changes.  Interval History: Note from 8/15/2024:  Assessment / Plan:    1 stage IV small cell lung cancer  2 extensive liver metastasis which have improved.  3 diffuse bone metastasis which appear worse however we will need to compare.  4 weakness fatigue dizziness particularly around his treatment.  Plan: Patient will undergo his chemo will complete this week.  Will see him in 2 weeks prior to his next treatment.  Overall prognosis very guarded.  Will see how he tolerates these treatments.  Cycle 5 of treatment  HPI: 63-year-old gentleman here for follow-up in 4 she has stage IV small cell lung cancer with improved but extensive liver metastasis improved lung and multiple bones sclerotic metastasis most which is sclerotic.  They appeared worse I will review the films as some these may have been lytic lesions that have calcified.  He is not nauseated but feels very weak tired dizzy lightheaded from the treatment.  He is in the middle of his treatment now.  He had delayed approximately 3 weeks.  He remains fatigued.  His liver functions are stable alk phos is down to 114 total bilirubin normal rest of electrolytes are stable.  Interval History: Note from 7/18/2024:  Assessment / Plan:    1 stage IV small cell lung cancer  2 extensive liver metastasis with improved abdominal pain and shrinkage on CAT scan  3 some improvement in dizziness  4 bone metastasis  5 improvement in dizziness  Plan: Patient will go ahead with treatment 1 week from Monday on the 7/29.  He will also add Zometa to his regimen.  He will hopefully come back here to be seen in 4 weeks.  Long-term prognosis unfortunately remains very guarded.  No other major suggestions  HPI: 63-year-old gentleman with stage IV small  cell lung cancer.  He has extensive liver metastasis which have improved significantly on CAT scan.  He has a large mass in his right lung which is also shrunk on CAT scan.  He has diffuse bone metastasis which look worse although it is unclear if some of that is sclerotic changes now.  He feels better bilirubin has stabilized he is fatigued and tired from his treatments.  However he is able to get around he can walk up to 200 feet without major shortness of breath.  He can eat.  He has to take 1 week off from treatment we will be agreeable with that otherwise no other major changes.  Interval History: Note from 6/18/2024  Assessment / Plan:    1 stage IV small cell lung cancer  2 extensive liver metastasis with less abdominal pain  3 jaundice which is corrected  4 dizziness  Plan: She will be getting the MRI coming up.  Port-A-Cath will be placed.  Tecentriq was added.  Prognosis long-term is quite guarded.  Will await results of the MRI of the brain.  He will push fluids as well.  Long-term outlook very guarded.  He has chemotherapy due on 7/1.  HPI: 63-year-old gentleman here for follow-up.  His bilirubin has not come down to 1.1.  Has had improvement with normalization of SGOT and SGPT alkaline phosphatase is come down considerably to was in the 160 range.  His biggest complaint is dizziness lightheadedness.  His blood pressure has been on the low side.  He is not complaining of headache.  I am concerned about that this is lightheadedness he is going to push fluids.  He also is due for an MRI of the brain and we will see what that shows.  He is certainly acutely needed treatment is to try and shrink liver which we did and try and control bilirubin which was done.  Hopefully the brain will be negative if positive we will for radiation.  Otherwise he is looking to have a [id number] placed and we will put in for that as well  Interval History: Note from 6/6/2024:  Assessment / Plan:    1 stage IV small cell lung  cancer  2 extensive liver metastasis exam slightly better  3 jaundiced looks better waiting for results from labs tomorrow  4 pain secondary to #2  5 thrombocytopenia secondary to chemo await results of labs  6 dizziness.  Will be getting an MRI of the brain hopefully next week.  Plan: Tecentriq will be added to his chemotherapy regimen.  MRI of the brain will be done.  He will come back in 6 weeks.  He is going to have 2 more cycles of treatment before then.  At the time of his return we will get a CAT scan chest abdomen pelvis determine where he stands.  If he has had a fairly maximal response we will try and use Tecentriq alone from there.  Daughter was present during discussion.  HPI: 63-year-old gentleman here for follow-up.  He has stage IV small cell lung cancer with his extensive liver metastasis he is actually feeling better he is gaining weight he has pain primarily in the right upper quadrant and in the epigastric area but better controlled.  He was seen at [facility].  Their recommendation was to add Tecentriq to his carboplatinum etoposide.  He will start that coming Monday for his third cycle  They also recommended to obtain a MRI of the brain.  Will go ahead and order that.  Hopefully get that next week.  His only complaint regarding the neurologic problems is occasional dizziness and slight change in gait.  In the room his gait was fine.  He also complained of shortness of breath with a lot of exertion.  His sats here are 98%.  I advised him to get a pulse oximetry which he has at home.  If he can document sats dropping below 90% we can try and look into getting oxygen for him.  Interval History: Note from 5/1/2024:  Assessment / Plan:    1 stage IV small cell lung cancer  2 liver metastasis extensive  3 jaundice secondary to #2  4 pain secondary to #2  5 thrombocytopenia secondary to chemo.  Plan: Patient will get a CBC CMP today.  It is noted that he has thrombocytopenia secondary to  "chemo.  He will come back here 5/9 for laboratory work.  If all goes well he will get carboplatin/-16 beginning 5/13.  At that time we will also be looking at the possibility of adding immunotherapy.  Also beyond that he is asking and we will recommend the as [facility]  His prognosis is guarded long-term.  Hopefully he will get at least some response to these treatments.  HPI: 63-year-old gentleman with a history of small cell cancer along with diffuse metastatic disease to liver.  He has pain in the right upper quadrant and right flank area around the lower ribs upper abdominal area.  Hurts worse on palpation.  He is using oxycodone 22.5 mg every 4 hours with some relief.  He is able to eat.  He has no fever or chills.  He still coughs and some shortness of breath but improved.  He is due again for treatment on 5/10/2024.  He was asked to begin again 5/13/2024.  He remains with marked elevation of total bilirubin of 7.2 and all improved.  His LDH total is also quite elevated at 1000.  Interval History: Note 04/22/2024:  Small cell lung cancer with metastasis to liver  Assessment & Plan  Patient reported night sweats, recent weight loss, current every day smoker, severe back and flank pain.  CT chest: \" Right hilar and perihilar 5.5 x 6.3 x 5.2 cm mass consistent lung malignancy. Right hilar, mediastinal and right supraclavicular lymphadenopathy compatible with metastatic disease. Airspace consolidation in the right middle lobe adjacent to the hilum suggesting postobstructive pneumonia. Nodular thickening along the axial interstitium in the right lower lobe and the right major fissure suggesting lymphangitic spread of tumor. Diffuse hepatic metastases. Periportal, gastroesophageal and para-aortic lymphadenopathy\"  Complained of hip pain -- CT A/P displayed sclerotic lesion in the pelvis.  Liver biopsy pathology report positive for small cell carcinoma.   4/22 " patient overall doing well status post chemotherapy x 3 days.  Patient cleared for discharge per oncology. Will need to follow-up with outpatient oncology  Continue allopurinol for 1 month and Decadron per oncology  Pain med recs per palliative  Note that the patient has become quite jaundice bilirubin of 2.6-9.2 prior to receiving his treatments.  He received cisplatin and day 1 through 3 etopside.      Cancer Staging:  Cancer Staging   No matching staging information was found for the patient.      Molecular Testing:     Previous Hematologic/ Oncologic History:    Oncology History   Small cell lung cancer with metastasis to liver   4/19/2024 Initial Diagnosis    Small cell lung cancer in adult (HCC)     4/19/2024 - 6/13/2024 Chemotherapy    alteplase (CATHFLO), 2 mg, Intracatheter, Every 1 Minute as needed, 3 of 6 cycles  pegfilgrastim (NEULASTA), 6 mg, Subcutaneous, Once, 3 of 6 cycles  Administration: 6 mg (4/23/2024), 6 mg (5/16/2024), 6 mg (6/13/2024)  fosaprepitant (EMEND) IVPB, 150 mg, Intravenous, Once, 3 of 6 cycles  Administration: 150 mg (4/19/2024), 150 mg (5/13/2024), 150 mg (6/10/2024)  atezolizumab (TECENTRIQ) IVPB, 1,200 mg (100 % of original dose 1,200 mg), Intravenous, Once, 1 of 4 cycles  Dose modification: 1,200 mg (original dose 1,200 mg, Cycle 3, Reason: Anticipated Tolerance)  Administration: 1,200 mg (6/10/2024)  etoposide (TOPOSAR), 50 mg/m2 = 100.6 mg (50 % of original dose 100 mg/m2), Intravenous, Once, 3 of 6 cycles  Dose modification: 50 mg/m2 (original dose 100 mg/m2, Cycle 1, Reason: Anticipated Tolerance)  Administration: 100.6 mg (4/19/2024), 100.6 mg (4/20/2024), 201 mg (5/13/2024), 201 mg (5/14/2024), 100.6 mg (4/21/2024), 201 mg (5/15/2024), 201 mg (6/10/2024), 201 mg (6/11/2024), 201 mg (6/12/2024)  CARBOplatin (PARAPLATIN) IVPB ([facility] AUC DOSING), 669.5 mg, Intravenous, Once, 3 of 6 cycles  Administration: 669.5 mg (4/19/2024), 731 mg (5/13/2024), 603 mg (6/10/2024)     6/19/2024  -  Chemotherapy    alteplase (CATHFLO), 2 mg, Intracatheter, Every 1 Minute as needed, 6 of 12 cycles  pegfilgrastim-cbqv (UDENYCA), 6 mg, Subcutaneous, Once, 3 of 3 cycles  Administration: 6 mg (7/5/2024), 6 mg (8/16/2024)  pegfilgrastim-apgf (Nyvepria), 6 mg, Subcutaneous, Once, 3 of 3 cycles  fosaprepitant (EMEND) IVPB, 150 mg, Intravenous, Once, 6 of 12 cycles  Administration: 150 mg (7/1/2024), 150 mg (8/13/2024), 150 mg (9/23/2024)  atezolizumab (TECENTRIQ) IVPB, 1,200 mg, Intravenous, Once, 6 of 12 cycles  Administration: 1,200 mg (8/13/2024), 1,200 mg (7/1/2024), 1,200 mg (9/23/2024)  etoposide (TOPOSAR), 100 mg/m2 = 200 mg, Intravenous, Once, 6 of 6 cycles  Dose modification: 75 mg/m2 (75 % of original dose 100 mg/m2, Cycle 4, Reason: Other (see comments), Comment: neutropenia), 75 mg/m2 (75 % of original dose 100 mg/m2, Cycle 4, Reason: Other (see comments)), 75 mg/m2 (original dose 100 mg/m2, Cycle 5, Reason: Thrombocytopenia)  Administration: 150 mg (7/1/2024), 150 mg (7/2/2024), 150 mg (7/3/2024), 150 mg (8/13/2024), 150 mg (9/23/2024), 150 mg (8/14/2024), 150 mg (9/24/2024), 150 mg (8/15/2024), 150 mg (9/25/2024)  CARBOplatin (PARAPLATIN) IVPB ([facility] AUC DOSING), , Intravenous, Once, 6 of 6 cycles  Dose modification: 519 mg (75 % of original dose 692 mg, Cycle 4, Reason: Other (see comments), Comment: neutropenia), 500 mg (original dose 692 mg, Cycle 4, Reason: Neutropenia), 519 mg (75 % of original dose 692 mg, Cycle 5, Reason: Other (see comments), Comment: low counts), 655 mg (original dose 692 mg, Cycle 6, Reason: Dose modified as per discussion with consulting physician)  Administration: 500 mg (7/1/2024), 519 mg (8/13/2024), 655 mg (9/23/2024)         Current Hematologic/ Oncologic Treatment:       Cycle 1         Test Results:    Imaging: No results found.          Labs:   Lab Results   Component Value Date    WBC 11.47 (H) 09/20/2024    HGB 10.9 (L) 09/20/2024    HCT 34.4 (L) 09/20/2024    MCV  "102 (H) 09/20/2024     (L) 09/20/2024     Lab Results   Component Value Date    K 3.4 (L) 09/20/2024     09/20/2024    CO2 28 09/20/2024    BUN 21 09/20/2024    CREATININE 0.80 09/20/2024    GLUF 79 06/07/2024    CALCIUM 8.3 (L) 09/20/2024    CORRECTEDCA 8.8 09/20/2024    AST 24 09/20/2024    ALT 35 09/20/2024    ALKPHOS 104 09/20/2024    EGFR 95 09/20/2024         No results found for: \"SPEP\", \"UPEP\"    No results found for: \"PSA\"    No results found for: \"CEA\"    No results found for: \"\"    No results found for: \"AFP\"    No results found for: \"IRON\", \"TIBC\", \"FERRITIN\"    No results found for: \"ZWPEJDSZ46\"      ROS: Review of Systems   Constitutional:  Positive for fatigue.   HENT: Negative.     Eyes: Negative.    Respiratory:  Positive for cough and wheezing.    Cardiovascular: Negative.    Gastrointestinal: Negative.    Endocrine: Negative.    Genitourinary: Negative.    Musculoskeletal:  Positive for arthralgias and myalgias.   Skin: Negative.    Neurological: Negative.    Hematological: Negative.          Current Medications: Reviewed  Allergies: Reviewed  PMH/FH/SH:  Reviewed      Physical Exam:    Body surface area is 2.05 meters squared.    Wt Readings from Last 3 Encounters:   09/26/24 82.6 kg (182 lb)   09/25/24 82.9 kg (182 lb 12.8 oz)   09/24/24 81.8 kg (180 lb 6.4 oz)        Temp Readings from Last 3 Encounters:   09/26/24 97.6 °F (36.4 °C) (Temporal)   09/25/24 97.8 °F (36.6 °C) (Oral)   09/24/24 (!) 96.3 °F (35.7 °C) (Temporal)        BP Readings from Last 3 Encounters:   09/26/24 136/80   09/25/24 123/81   09/24/24 119/67         Pulse Readings from Last 3 Encounters:   09/26/24 84   09/25/24 67   09/24/24 75     @LASTSAO2(3)@      Physical Exam  Constitutional:       Appearance: Normal appearance. He is normal weight.   HENT:      Head: Normocephalic and atraumatic.   Eyes:      Extraocular Movements: Extraocular movements intact.      Conjunctiva/sclera: Conjunctivae normal.      " Pupils: Pupils are equal, round, and reactive to light.   Cardiovascular:      Rate and Rhythm: Normal rate and regular rhythm.      Heart sounds: Normal heart sounds.   Pulmonary:      Effort: Pulmonary effort is normal.      Breath sounds: Normal breath sounds.   Abdominal:      General: Abdomen is flat. Bowel sounds are normal.      Palpations: Abdomen is soft.   Musculoskeletal:         General: Normal range of motion.      Cervical back: Normal range of motion and neck supple.   Skin:     General: Skin is warm and dry.   Neurological:      General: No focal deficit present.      Mental Status: He is alert and oriented to person, place, and time. Mental status is at baseline.     No major axillary cervical or inguinal lymph nodes      Goals and Barriers:  Current Goal: Prolong Survival from Cancer.   Barriers: None.      Patient's Capacity to Self Care:  Patient is able to self care.    Code Status: [unfilled]  Advance Directive and Living Will:      Power of :

## 2024-09-26 NOTE — PROGRESS NOTES
Confirmed with provider that patient should get zometa every 3 months and this was updated with RN in infusion so no zometa today.    Udenyca was added to plan because of neutropenia

## 2024-09-26 NOTE — PROGRESS NOTES
Pt to clinic for Udenyca injection. Verified with office RN that Zometa is supposed to be given every 3 months per Dr. Guadarrama. Pt offers no complaints. Tolerated injection in RLQ without complications. AVS declined. Pt aware of next appointment on 10/11/24 at 1330.

## 2024-10-07 ENCOUNTER — TELEPHONE (OUTPATIENT)
Age: 64
End: 2024-10-07

## 2024-10-07 RX ORDER — SODIUM CHLORIDE 9 MG/ML
20 INJECTION, SOLUTION INTRAVENOUS ONCE
OUTPATIENT
Start: 2024-10-24

## 2024-10-08 NOTE — TELEPHONE ENCOUNTER
Date/Time: 2/21 3-11 shift    Trauma/Ortho/Medical (Choose one) Ortho    Diagnosis: Fx R femur  POD#: Surgery tomorrow  Mental Status: A&O x4  Activity/dangle: Bedrest  Diet: Regular, NPO at midnight  Pain: Denies  Coker/Voiding: Has external catheter on, has difficulty with urinal  Tele/Restraints/Iso: On tele  02/LDA: RA  D/C Date: TBD  Other Info: Pt admitted to floor direct admit at 2000 and oriented to room. Seen by hospitalist and pharmacist, labs drawn, EKG done and tele placed, PIV placed. Surgery planned for tomorrow, time TBD.                            Spoke with ptd daughter we will keepthe appt for now and she will call on Friday and Monday to see if we can move his appt to a later time

## 2024-10-09 ENCOUNTER — OFFICE VISIT (OUTPATIENT)
Dept: PALLIATIVE MEDICINE | Facility: CLINIC | Age: 64
End: 2024-10-09
Payer: COMMERCIAL

## 2024-10-09 VITALS
OXYGEN SATURATION: 98 % | TEMPERATURE: 97.1 F | BODY MASS INDEX: 22.54 KG/M2 | HEIGHT: 72 IN | SYSTOLIC BLOOD PRESSURE: 122 MMHG | HEART RATE: 99 BPM | DIASTOLIC BLOOD PRESSURE: 68 MMHG | WEIGHT: 166.4 LBS | RESPIRATION RATE: 18 BRPM

## 2024-10-09 DIAGNOSIS — Z51.5 PALLIATIVE CARE PATIENT: ICD-10-CM

## 2024-10-09 DIAGNOSIS — C34.90 SMALL CELL LUNG CANCER (HCC): Primary | ICD-10-CM

## 2024-10-09 DIAGNOSIS — C79.51 METASTASIS TO BONE (HCC): ICD-10-CM

## 2024-10-09 DIAGNOSIS — Z71.89 GOALS OF CARE, COUNSELING/DISCUSSION: ICD-10-CM

## 2024-10-09 DIAGNOSIS — Z71.89 COUNSELING REGARDING ADVANCE CARE PLANNING AND GOALS OF CARE: ICD-10-CM

## 2024-10-09 DIAGNOSIS — G89.3 CANCER RELATED PAIN: ICD-10-CM

## 2024-10-09 PROCEDURE — 99214 OFFICE O/P EST MOD 30 MIN: CPT | Performed by: NURSE PRACTITIONER

## 2024-10-09 RX ORDER — ONDANSETRON 4 MG/1
4 TABLET, FILM COATED ORAL EVERY 8 HOURS PRN
Qty: 10 TABLET | Refills: 0 | Status: SHIPPED | OUTPATIENT
Start: 2024-10-09 | End: 2024-10-23 | Stop reason: SDUPTHER

## 2024-10-09 RX ORDER — FENTANYL 75 UG/1
1 PATCH TRANSDERMAL
Qty: 5 PATCH | Refills: 0 | Status: SHIPPED | OUTPATIENT
Start: 2024-10-09 | End: 2024-10-26 | Stop reason: SDUPTHER

## 2024-10-09 NOTE — ASSESSMENT & PLAN NOTE
Introduced palliative care to the patient during inpatient consult 4/15  Appropriate for outpatient follow up for symptom management, support and advanced care planning in metastatic cancer.  Psychosocial support  Prefers daughter be contacted prior to wife.  Lives with wife and daughter, Anna.    Orders:    fentaNYL (DURAGESIC) 75 mcg/hr; Place 1 patch on the skin over 72 hours every third day Max Daily Amount: 1 patch    ondansetron (ZOFRAN) 4 mg tablet; Take 1 tablet (4 mg total) by mouth every 8 (eight) hours as needed for nausea or vomiting

## 2024-10-09 NOTE — ASSESSMENT & PLAN NOTE
Patient experiencing 8-9/10 pain in abdomen and back, generalized.   increased to Oxycodone 15-30 mg at last visit, Taking 22.5 mg Q 4-6 hours, today increased to 22.5 mg - 45 mg.  Patient reports he feels he will only need higher dose once a day.   Increase Fentanyl patch to 75 mcg.   Reports pain moves around to all body parts, pain significantly worse     - Patient not on bowel regimen, reports normal bowel movements.    Orders:    fentaNYL (DURAGESIC) 75 mcg/hr; Place 1 patch on the skin over 72 hours every third day Max Daily Amount: 1 patch

## 2024-10-09 NOTE — ASSESSMENT & PLAN NOTE
Patient clear with limits of DNR/DNI  Has not completed advanced care planning documents.

## 2024-10-09 NOTE — ASSESSMENT & PLAN NOTE
CT CAP- R Hilar mass with lymphangitic spread of tumor, Right hilar and perihilar heterogeneously enhancing mass measures 5.5 x 6.3 x 5.2 cm,  Innumerable hypoenhancing lesions throughout the liver  Biopsy completed 4/15/24, small cell cancer  Tolerating chemotherapy well with exception of hypotension likely multifactorial.   CT 7/12, favorable response to treatment.  Follows with Dr. Guadarrama, completed cycle 6 CT scheduled.     Orders:    fentaNYL (DURAGESIC) 75 mcg/hr; Place 1 patch on the skin over 72 hours every third day Max Daily Amount: 1 patch

## 2024-10-11 ENCOUNTER — HOSPITAL ENCOUNTER (OUTPATIENT)
Dept: INFUSION CENTER | Facility: CLINIC | Age: 64
End: 2024-10-11
Payer: COMMERCIAL

## 2024-10-11 ENCOUNTER — TELEPHONE (OUTPATIENT)
Dept: HEMATOLOGY ONCOLOGY | Facility: CLINIC | Age: 64
End: 2024-10-11

## 2024-10-11 DIAGNOSIS — C79.51 METASTASIS TO BONE (HCC): ICD-10-CM

## 2024-10-11 DIAGNOSIS — C34.81 SMALL CELL CARCINOMA OF OVERLAPPING SITES OF RIGHT LUNG (HCC): ICD-10-CM

## 2024-10-11 DIAGNOSIS — C34.90 SMALL CELL CARCINOMA OF LUNG, UNSPECIFIED LATERALITY, UNSPECIFIED PART OF LUNG (HCC): Primary | ICD-10-CM

## 2024-10-11 DIAGNOSIS — R42 DIZZINESS: Primary | ICD-10-CM

## 2024-10-11 DIAGNOSIS — D70.1 CHEMOTHERAPY-INDUCED NEUTROPENIA (HCC): ICD-10-CM

## 2024-10-11 DIAGNOSIS — T45.1X5A CHEMOTHERAPY-INDUCED NEUTROPENIA (HCC): ICD-10-CM

## 2024-10-11 LAB
ALBUMIN SERPL BCG-MCNC: 3.6 G/DL (ref 3.5–5)
ALP SERPL-CCNC: 121 U/L (ref 34–104)
ALT SERPL W P-5'-P-CCNC: 24 U/L (ref 7–52)
ANION GAP SERPL CALCULATED.3IONS-SCNC: 9 MMOL/L (ref 4–13)
ANISOCYTOSIS BLD QL SMEAR: PRESENT
AST SERPL W P-5'-P-CCNC: 20 U/L (ref 13–39)
BASOPHILS # BLD MANUAL: 0 THOUSAND/UL (ref 0–0.1)
BASOPHILS NFR MAR MANUAL: 0 % (ref 0–1)
BILIRUB SERPL-MCNC: 0.43 MG/DL (ref 0.2–1)
BUN SERPL-MCNC: 8 MG/DL (ref 5–25)
CALCIUM SERPL-MCNC: 8.5 MG/DL (ref 8.4–10.2)
CHLORIDE SERPL-SCNC: 105 MMOL/L (ref 96–108)
CO2 SERPL-SCNC: 26 MMOL/L (ref 21–32)
CREAT SERPL-MCNC: 1.03 MG/DL (ref 0.6–1.3)
EOSINOPHIL # BLD MANUAL: 0.08 THOUSAND/UL (ref 0–0.4)
EOSINOPHIL NFR BLD MANUAL: 1 % (ref 0–6)
ERYTHROCYTE [DISTWIDTH] IN BLOOD BY AUTOMATED COUNT: 15.1 % (ref 11.6–15.1)
GFR SERPL CREATININE-BSD FRML MDRD: 77 ML/MIN/1.73SQ M
GLUCOSE SERPL-MCNC: 129 MG/DL (ref 65–140)
HCT VFR BLD AUTO: 37.4 % (ref 36.5–49.3)
HGB BLD-MCNC: 11.9 G/DL (ref 12–17)
LIPASE SERPL-CCNC: <6 U/L (ref 11–82)
LYMPHOCYTES # BLD AUTO: 2 THOUSAND/UL (ref 0.6–4.47)
LYMPHOCYTES # BLD AUTO: 24 % (ref 14–44)
MCH RBC QN AUTO: 32 PG (ref 26.8–34.3)
MCHC RBC AUTO-ENTMCNC: 31.8 G/DL (ref 31.4–37.4)
MCV RBC AUTO: 101 FL (ref 82–98)
METAMYELOCYTE ABSOLUTE CT: 0.17 THOUSAND/UL (ref 0–0.1)
METAMYELOCYTES NFR BLD MANUAL: 2 % (ref 0–1)
MONOCYTES # BLD AUTO: 0.75 THOUSAND/UL (ref 0–1.22)
MONOCYTES NFR BLD: 9 % (ref 4–12)
MYELOCYTE ABSOLUTE CT: 0.25 THOUSAND/UL (ref 0–0.1)
MYELOCYTES NFR BLD MANUAL: 3 % (ref 0–1)
NEUTROPHILS # BLD MANUAL: 5.09 THOUSAND/UL (ref 1.85–7.62)
NEUTS BAND NFR BLD MANUAL: 1 % (ref 0–8)
NEUTS SEG NFR BLD AUTO: 60 % (ref 43–75)
NRBC BLD AUTO-RTO: 1 /100 WBC (ref 0–2)
PLATELET # BLD AUTO: 44 THOUSANDS/UL (ref 149–390)
PLATELET BLD QL SMEAR: ABNORMAL
PMV BLD AUTO: 9.3 FL (ref 8.9–12.7)
POTASSIUM SERPL-SCNC: 4 MMOL/L (ref 3.5–5.3)
PROT SERPL-MCNC: 6.1 G/DL (ref 6.4–8.4)
RBC # BLD AUTO: 3.72 MILLION/UL (ref 3.88–5.62)
RBC MORPH BLD: NORMAL
SODIUM SERPL-SCNC: 140 MMOL/L (ref 135–147)
TSH SERPL DL<=0.05 MIU/L-ACNC: 0.74 UIU/ML (ref 0.45–4.5)
WBC # BLD AUTO: 8.34 THOUSAND/UL (ref 4.31–10.16)

## 2024-10-11 PROCEDURE — 83690 ASSAY OF LIPASE: CPT | Performed by: INTERNAL MEDICINE

## 2024-10-11 PROCEDURE — 84443 ASSAY THYROID STIM HORMONE: CPT | Performed by: INTERNAL MEDICINE

## 2024-10-11 PROCEDURE — 80053 COMPREHEN METABOLIC PANEL: CPT | Performed by: INTERNAL MEDICINE

## 2024-10-11 PROCEDURE — 85007 BL SMEAR W/DIFF WBC COUNT: CPT | Performed by: INTERNAL MEDICINE

## 2024-10-11 PROCEDURE — 85027 COMPLETE CBC AUTOMATED: CPT | Performed by: INTERNAL MEDICINE

## 2024-10-11 RX ORDER — SODIUM CHLORIDE 9 MG/ML
20 INJECTION, SOLUTION INTRAVENOUS ONCE
OUTPATIENT
Start: 2024-10-24

## 2024-10-11 RX ORDER — ZOLEDRONIC ACID 0.04 MG/ML
4 INJECTION, SOLUTION INTRAVENOUS ONCE
OUTPATIENT
Start: 2024-10-24 | End: 2024-10-24

## 2024-10-11 NOTE — PROGRESS NOTES
Patient arrives to infusion center for port maintenance with labs. Patient offers no complaints. Port accessed, labs collected, flush performed. Port de-accessed, AVS offered.     Next appointment: 10/14/24 @ 1000

## 2024-10-11 NOTE — TELEPHONE ENCOUNTER
S/w daughter darius, patient deferred to 10/24 for chemo and labs 10/22.  Per Lupe JOSHI, we are ok after cycle 7 to go back to a Monday tx/ Friday labs schedule as family request for the remaining cycles

## 2024-10-11 NOTE — TELEPHONE ENCOUNTER
MO  Existing  Daughter came to office and states her father has not recovered from his chemo. Discussed with Dr Guadarrama and he is ok with deferring 1 week.   Please defer and I will change date-ty

## 2024-10-11 NOTE — PROGRESS NOTES
Daughter came to office stating that her father has not been feeling well since his last chemotherapy infusion.  She states he has gotten out of bed just a few times and they are concerned about getting chemo at this time. Dr. Guadarrama was made aware and he is fine with delaying treatment one week.   Communication sent to infusion dept.

## 2024-10-14 ENCOUNTER — HOSPITAL ENCOUNTER (OUTPATIENT)
Dept: INFUSION CENTER | Facility: CLINIC | Age: 64
Discharge: HOME | End: 2024-10-14

## 2024-10-17 NOTE — PROGRESS NOTES
Ambulatory Visit  Name: Ceasar March      : 1960      MRN: [id number]  Encounter Provider: CHRISTIANA Hensley  Encounter Date: 10/9/2024   Encounter department: [facility] PALLIATIVE CARE [place]    Assessment & Plan  Small cell lung cancer with metastasis to liver  CT CAP- R Hilar mass with lymphangitic spread of tumor, Right hilar and perihilar heterogeneously enhancing mass measures 5.5 x 6.3 x 5.2 cm,  Innumerable hypoenhancing lesions throughout the liver  Biopsy completed 4/15/24, small cell cancer  Tolerating chemotherapy well with exception of hypotension likely multifactorial.   CT , favorable response to treatment.  Follows with Dr. Guadarrama, completed cycle 6 CT scheduled.     Orders:    fentaNYL (DURAGESIC) 75 mcg/hr; Place 1 patch on the skin over 72 hours every third day Max Daily Amount: 1 patch    Metastasis to bone (HCC)         Cancer related pain  Patient experiencing 8-9/10 pain in abdomen and back, generalized.   increased to Oxycodone 15-30 mg at last visit, Taking 22.5 mg Q 4-6 hours, today increased to 22.5 mg - 45 mg.  Patient reports he feels he will only need higher dose once a day.   Increase Fentanyl patch to 75 mcg.   Reports pain moves around to all body parts, pain significantly worse     - Patient not on bowel regimen, reports normal bowel movements.    Orders:    fentaNYL (DURAGESIC) 75 mcg/hr; Place 1 patch on the skin over 72 hours every third day Max Daily Amount: 1 patch    Palliative care patient  Introduced palliative care to the patient during inpatient consult 4/15  Appropriate for outpatient follow up for symptom management, support and advanced care planning in metastatic cancer.  Psychosocial support  Prefers daughter be contacted prior to wife.  Lives with wife and daughter, Anna.    Orders:    fentaNYL (DURAGESIC) 75 mcg/hr; Place 1 patch on the skin over 72 hours every third day Max Daily Amount: 1 patch    ondansetron (ZOFRAN) 4 mg tablet; Take  1 tablet (4 mg total) by mouth every 8 (eight) hours as needed for nausea or vomiting    Counseling regarding advance care planning and goals of care  ACP documents.         Goals of care, counseling/discussion  Patient clear with limits of DNR/DNI  Has not completed advanced care planning documents.               PDMP Review: I have reviewed the patient's controlled substance dispensing history in the Prescription Drug Monitoring Program in compliance with the [place] regulations before prescribing any controlled substances.    History of Present Illness     Ceasar March is a 63 y.o. male with metastatic small cell lung caner who presents to [facility] for symptom management and support in serious illness.  Thepatietn presents tot he office today with his daughter, Anna.  He appears very uncomfortable and fatigued.  He reports this last oncology treatment was not tolerated well. He reports nausea, weakness, fatigue and significant pain.  He remained laying down for duration of visit with eyes closed.  He has lost 13 pounds in past 3 months.      Discussed pain regimen and increased fentanyl patch.  Added zofran for nausea.  The patient is able to drink fluids and eat small amoutns, encouraged small frequent nutrition.    History obtained from : patient and patient's daughter  Review of Systems   Constitutional:  Positive for appetite change and fatigue.   Gastrointestinal:  Positive for abdominal pain and nausea.   Neurological:  Positive for weakness.   Current Outpatient Medications on File Prior to Visit   Medication Sig Dispense Refill    albuterol (PROVENTIL HFA,VENTOLIN HFA) 90 mcg/act inhaler Inhale 2 puffs every 6 (six) hours as needed for wheezing 6.7 g 0    calcium carbonate (TUMS EX) 750 mg chewable tablet Chew 1 tablet daily      lidocaine-prilocaine (EMLA) cream Apply topically as needed for mild pain Apply 45-60min prior to infusion time. Cover with platic wrap or coverings 30 g 1    oxyCODONE (ROXICODONE)  30 MG immediate release tablet Take 1-1.5 tablets (30-45 mg total) by mouth every 4 (four) hours Max Daily Amount: 270 mg 180 tablet 0    rivaroxaban (Xarelto) 15 mg tablet Take 1 tablet (15 mg total) by mouth 2 (two) times a day 21 tablet 0    dexamethasone (DECADRON) 4 mg tablet Take 1 tablet (4 mg total) by mouth daily with breakfast 1 tab po at breakfast (Patient not taking: Reported on 10/9/2024) 60 tablet 0     No current facility-administered medications on file prior to visit.      Social History     Tobacco Use    Smoking status: Some Days     Current packs/day: 0.75     Types: Cigarettes    Smokeless tobacco: Never   Vaping Use    Vaping status: Never Used   Substance and Sexual Activity    Alcohol use: Never    Drug use: Not Currently     Types: Marijuana    Sexual activity: Not on file             Objective     /68 (BP Location: Left arm, Patient Position: Sitting, Cuff Size: Standard)   Pulse 99   Temp (!) 97.1 °F (36.2 °C) (Tympanic)   Resp 18   Ht 6' (1.829 m)   Wt 75.5 kg (166 lb 6.4 oz)   SpO2 98%   BMI 22.57 kg/m²     Physical Exam  Vitals reviewed.   Constitutional:       Appearance: He is ill-appearing.   HENT:      Head: Normocephalic.   Eyes:      General: Lids are normal.   Cardiovascular:      Rate and Rhythm: Normal rate.   Pulmonary:      Effort: No respiratory distress or retractions.   Skin:     General: Skin is warm.      Coloration: Skin is pale.   Neurological:      General: No focal deficit present.      Mental Status: He is alert and oriented to person, place, and time.   Psychiatric:         Attention and Perception: Attention normal.         Behavior: Behavior is cooperative.         Cognition and Memory: Cognition and memory normal.     Recent labs:  Lab Results   Component Value Date/Time    SODIUM 140 10/11/2024 01:44 PM    SODIUM 140 09/08/2023 02:55 PM    K 4.0 10/11/2024 01:44 PM    K 4.4 09/08/2023 02:55 PM    BUN 8 10/11/2024 01:44 PM    BUN 17 09/08/2023 02:55  PM    CREATININE 1.03 10/11/2024 01:44 PM    CREATININE 1.17 09/08/2023 02:55 PM    GLUC 129 10/11/2024 01:44 PM    GLUC 73 09/08/2023 02:55 PM    CALCIUM 8.5 10/11/2024 01:44 PM    CALCIUM 9.0 09/08/2023 02:55 PM    AST 20 10/11/2024 01:44 PM    AST 18 09/08/2023 02:55 PM    ALT 24 10/11/2024 01:44 PM    ALT 15 09/08/2023 02:55 PM    ALB 3.6 10/11/2024 01:44 PM    ALB 4.1 09/08/2023 02:55 PM    TP 6.1 (L) 10/11/2024 01:44 PM    TP 6.8 09/08/2023 02:55 PM    EGFR 77 10/11/2024 01:44 PM    EGFR 71 09/08/2023 02:55 PM    EGFR 60 (L) 04/04/2019 05:10 PM     Lab Results   Component Value Date/Time    HGB 11.9 (L) 10/11/2024 01:44 PM    WBC 8.34 10/11/2024 01:44 PM    PLT 44 (L) 10/11/2024 01:44 PM    INR 1.12 04/15/2024 04:32 AM     Lab Results   Component Value Date/Time    [id number] 0.740 10/11/2024 01:44 PM       Recent Imaging:  Procedure: CT chest abdomen pelvis w contrast    Result Date: 7/16/2024  Narrative: CT CHEST, ABDOMEN AND PELVIS WITH IV CONTRAST INDICATION: C34.90: Malignant neoplasm of unspecified part of unspecified bronchus or lung R42: Dizziness and giddiness G89.3: Neoplasm related pain (acute) (chronic). COMPARISON: 5/9/2024 and 4/11/2024. TECHNIQUE: CT examination of the chest, abdomen and pelvis was performed. Multiplanar 2D reformatted images were created from the source data. This examination, like all CT scans performed in the [facility], was performed utilizing techniques to minimize radiation dose exposure, including the use of iterative reconstruction and automated exposure control. Radiation dose length product (DLP) for this visit: 739 mGy-cm IV Contrast: 100 mL of iohexol (OMNIPAQUE) Enteric Contrast: Not administered. FINDINGS: CHEST LUNGS: Airspace disease in the right middle and lower lobes has significantly improved. There is residual linear density in the right and lower lobes likely representing atelectasis/scarring. There is a new 3 mm right upper lobe nodule on  series 3 image 121, suspicious for metastasis. There are small cystic foci with surrounding density in the bilateral upper lobes and superior segment of the left lower lobe, increased from the prior study though possibly inflammatory versus cavitary metastasis. Irregular nodular densities in the bilateral apices are stable and likely secondary to pleural parenchymal scarring. No tracheal or endobronchial lesion. PLEURA: Unremarkable. HEART/GREAT VESSELS: There are pulmonary emboli, acute to subacute, within the right upper and lower lobe vessels. Additional small PEs may be present though the study was not tailored for evaluation. RV/LV ratio is less than 0.9. Heart is unremarkable for patient's age. No thoracic aortic aneurysm. MEDIASTINUM AND LEWIS: The hilar mass seen on the study from April 2024 has resolved. Mediastinal lymphadenopathy has also resolved. CHEST WALL AND LOWER NECK: Tiny thyroid nodules of no clinical significance. ABDOMEN LIVER/BILIARY TREE: Again seen are innumerable treated metastasis throughout the liver. Overall, size and number of lesions has decreased from May 2024. This includes a 1.1 x 1.2 cm segment 3 lesion on series 2 image 121 which previously measured 2.7 x 2.1 cm, a 0.9 x 1.1 cm segment 8 lesion on series 2 image 118, previously 1.3 x 1.7 cm, and a 1.2 x 1.2 cm subcapsular segment 6 lesion on series 2 image 133 which previously measured 1.9 x 2.1 cm. There is lobulation of the liver contour in keeping with  pseudocirrhosis. GALLBLADDER: Post cholecystectomy. SPLEEN: Unremarkable. PANCREAS: Unremarkable. ADRENAL GLANDS: Unremarkable. KIDNEYS/URETERS: Unremarkable. No hydronephrosis. STOMACH AND BOWEL: Unremarkable. APPENDIX: No findings to suggest appendicitis. ABDOMINOPELVIC CAVITY: No ascites. No pneumoperitoneum. No lymphadenopathy. VESSELS: Unremarkable for patient's age. PELVIS REPRODUCTIVE ORGANS: Enlarged prostate. URINARY BLADDER: Unremarkable. ABDOMINAL WALL/INGUINAL  REGIONS: Small fat and fluid containing left inguinal hernia. Status post right inguinal hernia repair. BONES: There has been interval progression of extensive sclerotic osseous metastasis with small lesions in all visualized bony structures, most severe in the thoracic and lumbar spine and pelvis.     Impression: 1.  Acute to subacute pulmonary embolism to the right upper and lower lobes. Normal RV/LV ratio. 2.  Resolution of right hilar mass/mediastinal lymphadenopathy and near complete resolution of right middle and lower lobe airspace disease with residual linear appearing densities most suggestive of atelectasis and/or scarring. 3. 4.  New solid nodule in the right upper lobe suspicious for metastasis. Multiple cystic nodules with surrounding density have progressed from the prior study and may be inflammatory versus cavitary metastasis. 5.  Improved liver metastasis with associated pseudocirrhosis. 6.  Marked progression of diffuse sclerotic osseous metastasis. I personally discussed this study with ALISTAIR SANCHEZ on 7/16/2024 10:50 AM. Workstation performed: [id number]     Procedure: IR port placement    Result Date: 6/27/2024  Narrative: Chest port placement. Clinical History: Stage IV small cell carcinoma, in need of a port for chemotherapy. Contrast: None Fluoro time: 55SEC Number of Images: 1 Radiation dose: 6.39 mGy Conscious sedation time: 1HR Technique: The patient was brought to the interventional radiology suite and identified verbally and by wristband. The patient was placed supine on the table. The right internal jugular vein was evaluated as a potential access site with ultrasound. The vessel was found to be patent and compressible. The right neck and upper chest was prepped and draped in the usual sterile fashion. All elements of maximal sterile barrier technique were followed (cap, mask, sterile gown, sterile gloves, large sterile sheet, hand hygiene, and 2% chlorhexidine for cutaneous  antisepsis). Lidocaine was administered to the skin and a small skin incision was made. Under ultrasound guidance, utilizing sterile ultrasound technique with sterile gel and a sterile probe cover, the right internal jugular vein was accessed using single wall Seldinger technique. Static images of real time needle entry into the vessel were obtained. A 0.018 wire was then advanced through the needle into the central venous system. The needle was removed, and a 5 Swazi coaxial dilator was inserted. A heavy wire was inserted through the outer dilator and a 6 Swazi peel-away sheath was inserted over the wire. A subcutaneous pocket was created in the skin of the upper chest for the reservoir utilizing a surgical incision. The port catheter was then tunneled under the skin of the upper chest. The catheter was advanced through the peel-away and the peel-away was removed. The catheter tip was then positioned in the right atrium under fluoroscopic control. The catheter was connected to the port, and the port inserted into the subcutaneous pocket.  The port was sutured in place utilizing Vicryl suture.  The pocket was closed with Vicryl suture and Histoacryl.  The port was flushed with normal saline.     Impression: Impression: 1. Successful ultrasound and fluoroscopically guided placement of a chest port via the right internal jugular vein. 2. The tip of the catheter is in the right atrium and may be used immediately. Workstation performed: [id number]     Procedure: XR chest pa & lateral    Result Date: 6/25/2024  Narrative: XR CHEST PA & LATERAL INDICATION: J40: Bronchitis, not specified as acute or chronic J45.901: Unspecified asthma with (acute) exacerbation. Wheezing, shortness of breath, cough. Recent diagnosis of metastatic small cell lung cancer. COMPARISON: Thoracic spine CT 4/16/2024, chest CT 4/11/2024, abdomen CT 5/9/2024. FINDINGS: Mild scarring/atelectasis at the site of recent postobstructive pneumonia in  the right midlung. No pneumothorax or pleural effusion. Normal heart size, resolution of the right hilar mass. Bones are unremarkable for age. Upper abdomen normal. Cholecystectomy.     Impression: No acute cardiopulmonary disease. Mild scarring/atelectasis at the site of recent postobstructive pneumonia in the right midlung. Resolution of right hilar mass. Workstation performed: [id number]     Procedure: MRI brain w wo contrast    Result Date: 6/24/2024  Narrative: MRI BRAIN WITH AND WITHOUT CONTRAST INDICATION: C34.90: Malignant neoplasm of unspecified part of unspecified bronchus or lung R42: Dizziness and giddiness G89.3: Neoplasm related pain (acute) (chronic). Patient with small cell lung cancer complaining of dizziness lightheadedness rule out brain metastasis COMPARISON:  None. TECHNIQUE: Multiplanar, multisequence imaging of the brain was performed before and after gadolinium administration. IV Contrast:  7 mL of Gadobutrol injection (SINGLE-DOSE) IMAGE QUALITY:   Diagnostic. FINDINGS: BRAIN PARENCHYMA:  There is no discrete mass, mass effect or midline shift. There is no intracranial hemorrhage.  Normal posterior fossa.  Diffusion imaging is unremarkable. No acute infarction. Small scattered hyperintensities on T2/FLAIR imaging are noted in the periventricular and subcortical white matter demonstrating an appearance that is statistically most likely to represent mild-to-moderate microangiopathic change. Small patchy T2/FLAIR hyperintense signal abnormality in the sorin, likely chronic microangiopathy. Small dilated perivascular spaces in bilateral cerebral hemispheres (worse in bilateral parietal deep white matter region). Postcontrast imaging of the brain demonstrates no abnormal enhancement. VENTRICLES:  Normal for the patient's age. SELLA AND PITUITARY GLAND:  Normal. ORBITS:  Normal. PARANASAL SINUSES: Mild mucosal thickening in left ethmoid and left maxillary sinuses. VASCULATURE:  Evaluation of the  major intracranial vasculature demonstrates appropriate flow voids. CALVARIUM AND SKULL BASE: Small right mastoid effusion. EXTRACRANIAL SOFT TISSUES:  Normal.     Impression: No intracranial metastasis. No acute intracranial abnormality. Mild-to-moderate chronic microangiopathy. Workstation performed: [id number]      Administrative Statements   I have spent a total time of 30+  minutes in caring for this patient on the day of the visit/encounter including Instructions for management, Patient and family education, Documenting in the medical record, Reviewing / ordering tests, medicine, procedures  , and Obtaining or reviewing history  . Topics discussed with the patient / family include symptom assessment and management, medication review, medication adjustment, psychosocial support, supportive listening, and anticipatory guidance.

## 2024-10-22 ENCOUNTER — HOSPITAL ENCOUNTER (OUTPATIENT)
Dept: INFUSION CENTER | Facility: CLINIC | Age: 64
Discharge: HOME | End: 2024-10-22
Payer: COMMERCIAL

## 2024-10-22 ENCOUNTER — HOSPITAL ENCOUNTER (OUTPATIENT)
Dept: CT IMAGING | Facility: HOSPITAL | Age: 64
Discharge: HOME | End: 2024-10-22
Attending: INTERNAL MEDICINE
Payer: COMMERCIAL

## 2024-10-22 DIAGNOSIS — T50.905A THROMBOCYTOPENIA DUE TO DRUGS: ICD-10-CM

## 2024-10-22 DIAGNOSIS — C34.81 SMALL CELL CARCINOMA OF OVERLAPPING SITES OF RIGHT LUNG (HCC): ICD-10-CM

## 2024-10-22 DIAGNOSIS — Z72.0 TOBACCO ABUSE: Chronic | ICD-10-CM

## 2024-10-22 DIAGNOSIS — C79.51 METASTASIS TO BONE (HCC): ICD-10-CM

## 2024-10-22 DIAGNOSIS — D69.59 THROMBOCYTOPENIA DUE TO DRUGS: ICD-10-CM

## 2024-10-22 DIAGNOSIS — T45.1X5A CHEMOTHERAPY-INDUCED NEUTROPENIA (HCC): ICD-10-CM

## 2024-10-22 DIAGNOSIS — D70.1 CHEMOTHERAPY-INDUCED NEUTROPENIA (HCC): ICD-10-CM

## 2024-10-22 DIAGNOSIS — C34.90 SMALL CELL LUNG CANCER (HCC): ICD-10-CM

## 2024-10-22 DIAGNOSIS — C34.90 SMALL CELL CARCINOMA OF LUNG, UNSPECIFIED LATERALITY, UNSPECIFIED PART OF LUNG (HCC): Primary | ICD-10-CM

## 2024-10-22 DIAGNOSIS — R42 DIZZINESS: ICD-10-CM

## 2024-10-22 LAB
ALBUMIN SERPL BCG-MCNC: 3.2 G/DL (ref 3.5–5)
ALP SERPL-CCNC: 75 U/L (ref 34–104)
ALT SERPL W P-5'-P-CCNC: 20 U/L (ref 7–52)
ANION GAP SERPL CALCULATED.3IONS-SCNC: 6 MMOL/L (ref 4–13)
ANISOCYTOSIS BLD QL SMEAR: PRESENT
AST SERPL W P-5'-P-CCNC: 20 U/L (ref 13–39)
BASOPHILS # BLD MANUAL: 0 THOUSAND/UL (ref 0–0.1)
BASOPHILS NFR MAR MANUAL: 0 % (ref 0–1)
BILIRUB SERPL-MCNC: 0.45 MG/DL (ref 0.2–1)
BUN SERPL-MCNC: 13 MG/DL (ref 5–25)
CALCIUM ALBUM COR SERPL-MCNC: 9 MG/DL (ref 8.3–10.1)
CALCIUM SERPL-MCNC: 8.4 MG/DL (ref 8.4–10.2)
CHLORIDE SERPL-SCNC: 105 MMOL/L (ref 96–108)
CO2 SERPL-SCNC: 29 MMOL/L (ref 21–32)
CREAT SERPL-MCNC: 0.87 MG/DL (ref 0.6–1.3)
DACRYOCYTES BLD QL SMEAR: PRESENT
EOSINOPHIL # BLD MANUAL: 0.09 THOUSAND/UL (ref 0–0.4)
EOSINOPHIL NFR BLD MANUAL: 1 % (ref 0–6)
ERYTHROCYTE [DISTWIDTH] IN BLOOD BY AUTOMATED COUNT: 15.9 % (ref 11.6–15.1)
GFR SERPL CREATININE-BSD FRML MDRD: 92 ML/MIN/1.73SQ M
GLUCOSE SERPL-MCNC: 106 MG/DL (ref 65–140)
HCT VFR BLD AUTO: 32.4 % (ref 36.5–49.3)
HGB BLD-MCNC: 10.3 G/DL (ref 12–17)
LG PLATELETS BLD QL SMEAR: PRESENT
LIPASE SERPL-CCNC: 12 U/L (ref 11–82)
LYMPHOCYTES # BLD AUTO: 0.86 THOUSAND/UL (ref 0.6–4.47)
LYMPHOCYTES # BLD AUTO: 5 % (ref 14–44)
MCH RBC QN AUTO: 32.2 PG (ref 26.8–34.3)
MCHC RBC AUTO-ENTMCNC: 31.8 G/DL (ref 31.4–37.4)
MCV RBC AUTO: 101 FL (ref 82–98)
METAMYELOCYTE ABSOLUTE CT: 0.09 THOUSAND/UL (ref 0–0.1)
METAMYELOCYTES NFR BLD MANUAL: 1 % (ref 0–1)
MONOCYTES # BLD AUTO: 0.17 THOUSAND/UL (ref 0–1.22)
MONOCYTES NFR BLD: 2 % (ref 4–12)
NEUTROPHILS # BLD MANUAL: 7.4 THOUSAND/UL (ref 1.85–7.62)
NEUTS SEG NFR BLD AUTO: 86 % (ref 43–75)
PLATELET # BLD AUTO: 175 THOUSANDS/UL (ref 149–390)
PLATELET BLD QL SMEAR: ADEQUATE
PMV BLD AUTO: 9.4 FL (ref 8.9–12.7)
POTASSIUM SERPL-SCNC: 3.7 MMOL/L (ref 3.5–5.3)
PROT SERPL-MCNC: 5.3 G/DL (ref 6.4–8.4)
RBC # BLD AUTO: 3.2 MILLION/UL (ref 3.88–5.62)
SODIUM SERPL-SCNC: 140 MMOL/L (ref 135–147)
TSH SERPL DL<=0.05 MIU/L-ACNC: 0.84 UIU/ML (ref 0.45–4.5)
VARIANT LYMPHS # BLD AUTO: 5 %
WBC # BLD AUTO: 8.6 THOUSAND/UL (ref 4.31–10.16)

## 2024-10-22 PROCEDURE — 85007 BL SMEAR W/DIFF WBC COUNT: CPT | Performed by: INTERNAL MEDICINE

## 2024-10-22 PROCEDURE — 74177 CT ABD & PELVIS W/CONTRAST: CPT

## 2024-10-22 PROCEDURE — 83690 ASSAY OF LIPASE: CPT | Performed by: INTERNAL MEDICINE

## 2024-10-22 PROCEDURE — 71260 CT THORAX DX C+: CPT

## 2024-10-22 PROCEDURE — 85027 COMPLETE CBC AUTOMATED: CPT | Performed by: INTERNAL MEDICINE

## 2024-10-22 PROCEDURE — 80053 COMPREHEN METABOLIC PANEL: CPT | Performed by: INTERNAL MEDICINE

## 2024-10-22 PROCEDURE — 84443 ASSAY THYROID STIM HORMONE: CPT | Performed by: INTERNAL MEDICINE

## 2024-10-22 RX ADMIN — IOHEXOL 100 ML: 350 INJECTION, SOLUTION INTRAVENOUS at 14:19

## 2024-10-22 NOTE — PROGRESS NOTES
Pt presents for port a cath flush. Pt offers no complaints. Port accessed, labs drawn, flushed, saline locked and de-accessed without difficulty. AVS declined, Next appointment reviewed.

## 2024-10-23 DIAGNOSIS — G89.3 CANCER RELATED PAIN: ICD-10-CM

## 2024-10-23 DIAGNOSIS — Z51.5 PALLIATIVE CARE PATIENT: ICD-10-CM

## 2024-10-23 RX ORDER — ONDANSETRON 4 MG/1
4 TABLET, FILM COATED ORAL EVERY 8 HOURS PRN
Qty: 10 TABLET | Refills: 0 | Status: SHIPPED | OUTPATIENT
Start: 2024-10-23

## 2024-10-23 RX ORDER — OXYCODONE HYDROCHLORIDE 30 MG/1
30-45 TABLET ORAL EVERY 4 HOURS
Qty: 180 TABLET | Refills: 0 | Status: SHIPPED | OUTPATIENT
Start: 2024-10-23

## 2024-10-23 NOTE — TELEPHONE ENCOUNTER
Refill must be reviewed and completed by the office or provider. The refill is unable to be approved or denied by the medication management team.      This refill cannot be delegated.      Patient Id Prescription # Filled Written Drug Label Qty Days Strength MME** Prescriber Pharmacy Payment REFILL #/Auth State Detail  1 [id number] 10/09/2024 10/09/2024 fentaNYL TRANSDERMAL SYSTEM (Patch, Extended Release) 5.0 15 75 MCG/1 .0 [facility] Commercial Insurance 0 / 0 [place]   1 [id number] 09/18/2024 09/18/2024 oxyCODONE HCL (Tablet) 180.0 20 30 .0 [facility] Commercial Insurance 0 / 0 [place]   1 [id number] 09/09/2024 09/09/2024 fentaNYL (Patch, Extended Release) 10.0 30 50 MCG/1 .0 [facility] Commercial Insurance 0 / 0 [place]   1 [id number] 08/21/2024 08/21/2024 oxyCODONE HCL (Tablet) 180.0 20 30 .0 [facility] [employer] 0 / 0 [place]

## 2024-10-24 ENCOUNTER — DOCUMENTATION (OUTPATIENT)
Dept: HEMATOLOGY ONCOLOGY | Facility: CLINIC | Age: 64
End: 2024-10-24

## 2024-10-24 ENCOUNTER — TELEPHONE (OUTPATIENT)
Dept: HEMATOLOGY ONCOLOGY | Facility: CLINIC | Age: 64
End: 2024-10-24

## 2024-10-24 ENCOUNTER — OFFICE VISIT (OUTPATIENT)
Dept: HEMATOLOGY ONCOLOGY | Facility: CLINIC | Age: 64
End: 2024-10-24
Payer: COMMERCIAL

## 2024-10-24 ENCOUNTER — HOSPITAL ENCOUNTER (OUTPATIENT)
Dept: INFUSION CENTER | Facility: CLINIC | Age: 64
Discharge: HOME | End: 2024-10-24
Payer: COMMERCIAL

## 2024-10-24 VITALS
BODY MASS INDEX: 24.16 KG/M2 | DIASTOLIC BLOOD PRESSURE: 70 MMHG | TEMPERATURE: 96.3 F | RESPIRATION RATE: 18 BRPM | HEIGHT: 72 IN | SYSTOLIC BLOOD PRESSURE: 118 MMHG | WEIGHT: 178.4 LBS | HEART RATE: 79 BPM

## 2024-10-24 VITALS
OXYGEN SATURATION: 96 % | BODY MASS INDEX: 24.38 KG/M2 | HEART RATE: 85 BPM | RESPIRATION RATE: 18 BRPM | TEMPERATURE: 97.6 F | WEIGHT: 180 LBS | HEIGHT: 72 IN | DIASTOLIC BLOOD PRESSURE: 78 MMHG | SYSTOLIC BLOOD PRESSURE: 118 MMHG

## 2024-10-24 DIAGNOSIS — C79.51 METASTASIS TO BONE (HCC): ICD-10-CM

## 2024-10-24 DIAGNOSIS — C34.91 SMALL CELL CARCINOMA OF RIGHT LUNG, UNSPECIFIED PART OF LUNG (HCC): Primary | ICD-10-CM

## 2024-10-24 DIAGNOSIS — R42 DIZZINESS: ICD-10-CM

## 2024-10-24 DIAGNOSIS — T45.1X5A CHEMOTHERAPY-INDUCED NEUTROPENIA (HCC): ICD-10-CM

## 2024-10-24 DIAGNOSIS — D69.59 THROMBOCYTOPENIA DUE TO DRUGS: ICD-10-CM

## 2024-10-24 DIAGNOSIS — C34.81 SMALL CELL CARCINOMA OF OVERLAPPING SITES OF RIGHT LUNG (HCC): ICD-10-CM

## 2024-10-24 DIAGNOSIS — T50.905A THROMBOCYTOPENIA DUE TO DRUGS: ICD-10-CM

## 2024-10-24 DIAGNOSIS — C79.51 METASTASIS TO BONE (HCC): Primary | ICD-10-CM

## 2024-10-24 DIAGNOSIS — D70.1 CHEMOTHERAPY-INDUCED NEUTROPENIA (HCC): ICD-10-CM

## 2024-10-24 PROCEDURE — 96413 CHEMO IV INFUSION 1 HR: CPT

## 2024-10-24 PROCEDURE — 96367 TX/PROPH/DG ADDL SEQ IV INF: CPT

## 2024-10-24 PROCEDURE — 99213 OFFICE O/P EST LOW 20 MIN: CPT | Performed by: INTERNAL MEDICINE

## 2024-10-24 RX ORDER — SODIUM CHLORIDE 9 MG/ML
20 INJECTION, SOLUTION INTRAVENOUS ONCE
Status: COMPLETED | OUTPATIENT
Start: 2024-10-24 | End: 2024-10-24

## 2024-10-24 RX ORDER — SODIUM CHLORIDE 9 MG/ML
20 INJECTION, SOLUTION INTRAVENOUS ONCE
Status: CANCELLED | OUTPATIENT
Start: 2024-12-03 | Stop reason: HOSPADM

## 2024-10-24 RX ORDER — ZOLEDRONIC ACID 0.04 MG/ML
4 INJECTION, SOLUTION INTRAVENOUS ONCE
Status: COMPLETED | OUTPATIENT
Start: 2024-10-24 | End: 2024-10-24

## 2024-10-24 RX ORDER — SODIUM CHLORIDE 9 MG/ML
20 INJECTION, SOLUTION INTRAVENOUS ONCE
Status: DISCONTINUED | OUTPATIENT
Start: 2024-10-24 | End: 2024-10-27 | Stop reason: HOSPADM

## 2024-10-24 RX ORDER — ZOLEDRONIC ACID 0.04 MG/ML
4 INJECTION, SOLUTION INTRAVENOUS ONCE
Status: CANCELLED | OUTPATIENT
Start: 2024-12-03 | End: 2024-12-03 | Stop reason: HOSPADM

## 2024-10-24 RX ADMIN — ZOLEDRONIC ACID 4 MG: 0.04 INJECTION, SOLUTION INTRAVENOUS at 15:27

## 2024-10-24 RX ADMIN — DEXAMETHASONE SODIUM PHOSPHATE: 10 INJECTION, SOLUTION INTRAMUSCULAR; INTRAVENOUS at 16:03

## 2024-10-24 RX ADMIN — FOSAPREPITANT 150 MG: 150 INJECTION, POWDER, LYOPHILIZED, FOR SOLUTION INTRAVENOUS at 16:24

## 2024-10-24 RX ADMIN — SODIUM CHLORIDE 20 ML/HR: 9 INJECTION, SOLUTION INTRAVENOUS at 15:27

## 2024-10-24 RX ADMIN — ATEZOLIZUMAB 1200 MG: 1200 INJECTION, SOLUTION INTRAVENOUS at 17:01

## 2024-10-24 NOTE — PROGRESS NOTES
Paperwork Complete   What type of form  Disability   Method returned to patient MyChart   Communicated to patient forms are completed Yes   Scanned completed form(s) into patient's Epic chart Yes   Additional Comments:      No return fax number on file, the one on cover isn't going through

## 2024-10-24 NOTE — PROGRESS NOTES
Hematology/Oncology Outpatient Follow- up Note  Ceasar March 63 y.o. male MRN: @ Encounter: [id number]        Date:  10/24/2024        Assessment / Plan:    1 stage IV small cell lung cancer  2 extensive liver metastasis with improvement  3 bone metastasis diffuse stable  4 question abnormality in the right hilum will review with radiology.  5 planned to begin Tecentriq alone.  Plan: He will start Tecentriq every 3 weeks.  He will return in 6 weeks.  He will see how he performs and how he does with the treatments.  I will review radiology the results of his CAT scan particularly of the chest.        HPI: 63-year-old gentleman here for follow-up with stage IV small cell lung cancer.  He had extensive liver bone mets which are apparently stable.  I need to review his scans with the radiologist.  He has tolerated 6 cycles of chemo fairly well.  We will plan to continue his Tecentriq alone.  No major complaints of fever chills sweats he does have weakness fatigue.  His laboratory work is otherwise stable he is holding his own.    Interval History: Note from 9/26/2024:  Assessment / Plan:    1 stage IV small cell lung cancer  2 extensive liver metastasis with improvement  3 diffuse bone metastasis which may be worse although unclear.  4 continue problems with weakness fatigue and poor balance.  Plan: Patient will undergo scans chest abdomen pelvis along with laboratory work and then follow-up here in 4 weeks labs will be done in 3 weeks with a scan.  If everything stable we will go onto Tecentriq on their every 3 week basis.  HPI: 63-year-old gentleman with stage IV small cell lung cancer with liver and bone involvement.  He is completing his 6 cycle of treatment this week with Tecentriq/carboplatin/etoposide.  Had a good partial response with the exception of his bones.  We discussed the situation he will undergo CAT scan chest abdomen pelvis and return in 3 to 4 weeks.  If this shows stability or improvement we  will go ahead and start him on Tecentriq every 3 weeks.  He asked about obtaining testing to try and determine what caused the small cell.  I told him we can to do mutation testing but that will not give is the cause.  Certainly toxins other than smoking could be a contributor although smoking still is the major cause of this type of lung cancer.  He is ambulating he is getting around he is able to eat.  He does still has periods of poor balance.  Interval History:    Assessment / Plan: Note from 8/29/2024:  1 stage IV small cell lung cancer  2 extensive liver metastasis which have improved  3 diffuse bone metastasis which appears worse  4 continue weakness and fatigue.  Plan: He will continue chemotherapy he is going to the  HPI: 63-year-old gentleman due for cycle 6 of chemo with Tecentriq/carboplatin/-16 for stage IV small cell lung cancer.  He has diffuse bone mets liver and lung problems.  They liver and lung have improved the bone mets are very much more difficult to tell.  He does have diffuse disease there.  He is able to ambulate uses a cane pain is fairly well-controlled.  Liver chemistries have returned to normal.  Otherwise no other major changes.  Interval History: Note from 8/15/2024:  Assessment / Plan:    1 stage IV small cell lung cancer  2 extensive liver metastasis which have improved.  3 diffuse bone metastasis which appear worse however we will need to compare.  4 weakness fatigue dizziness particularly around his treatment.  Plan: Patient will undergo his chemo will complete this week.  Will see him in 2 weeks prior to his next treatment.  Overall prognosis very guarded.  Will see how he tolerates these treatments.  Cycle 5 of treatment  HPI: 63-year-old gentleman here for follow-up in 4 she has stage IV small cell lung cancer with improved but extensive liver metastasis improved lung and multiple bones sclerotic metastasis most which is sclerotic.  They appeared worse I will review the films  as some these may have been lytic lesions that have calcified.  He is not nauseated but feels very weak tired dizzy lightheaded from the treatment.  He is in the middle of his treatment now.  He had delayed approximately 3 weeks.  He remains fatigued.  His liver functions are stable alk phos is down to 114 total bilirubin normal rest of electrolytes are stable.  Interval History: Note from 7/18/2024:  Assessment / Plan:    1 stage IV small cell lung cancer  2 extensive liver metastasis with improved abdominal pain and shrinkage on CAT scan  3 some improvement in dizziness  4 bone metastasis  5 improvement in dizziness  Plan: Patient will go ahead with treatment 1 week from Monday on the 7/29.  He will also add Zometa to his regimen.  He will hopefully come back here to be seen in 4 weeks.  Long-term prognosis unfortunately remains very guarded.  No other major suggestions  HPI: 63-year-old gentleman with stage IV small cell lung cancer.  He has extensive liver metastasis which have improved significantly on CAT scan.  He has a large mass in his right lung which is also shrunk on CAT scan.  He has diffuse bone metastasis which look worse although it is unclear if some of that is sclerotic changes now.  He feels better bilirubin has stabilized he is fatigued and tired from his treatments.  However he is able to get around he can walk up to 200 feet without major shortness of breath.  He can eat.  He has to take 1 week off from treatment we will be agreeable with that otherwise no other major changes.  Interval History: Note from 6/18/2024  Assessment / Plan:    1 stage IV small cell lung cancer  2 extensive liver metastasis with less abdominal pain  3 jaundice which is corrected  4 dizziness  Plan: She will be getting the MRI coming up.  Port-A-Cath will be placed.  Tecentriq was added.  Prognosis long-term is quite guarded.  Will await results of the MRI of the brain.  He will push fluids as well.  Long-term outlook  very guarded.  He has chemotherapy due on 7/1.  HPI: 63-year-old gentleman here for follow-up.  His bilirubin has not come down to 1.1.  Has had improvement with normalization of SGOT and SGPT alkaline phosphatase is come down considerably to was in the 160 range.  His biggest complaint is dizziness lightheadedness.  His blood pressure has been on the low side.  He is not complaining of headache.  I am concerned about that this is lightheadedness he is going to push fluids.  He also is due for an MRI of the brain and we will see what that shows.  He is certainly acutely needed treatment is to try and shrink liver which we did and try and control bilirubin which was done.  Hopefully the brain will be negative if positive we will for radiation.  Otherwise he is looking to have a Owtjto-n-Fbpd placed and we will put in for that as well  Interval History: Note from 6/6/2024:  Assessment / Plan:    1 stage IV small cell lung cancer  2 extensive liver metastasis exam slightly better  3 jaundiced looks better waiting for results from labs tomorrow  4 pain secondary to #2  5 thrombocytopenia secondary to chemo await results of labs  6 dizziness.  Will be getting an MRI of the brain hopefully next week.  Plan: Tecentriq will be added to his chemotherapy regimen.  MRI of the brain will be done.  He will come back in 6 weeks.  He is going to have 2 more cycles of treatment before then.  At the time of his return we will get a CAT scan chest abdomen pelvis determine where he stands.  If he has had a fairly maximal response we will try and use Tecentriq alone from there.  Daughter was present during discussion.  HPI: 63-year-old gentleman here for follow-up.  He has stage IV small cell lung cancer with his extensive liver metastasis he is actually feeling better he is gaining weight he has pain primarily in the right upper quadrant and in the epigastric area but better controlled.  He was seen at [facility].  Their  recommendation was to add Tecentriq to his carboplatinum etoposide.  He will start that coming Monday for his third cycle  They also recommended to obtain a MRI of the brain.  Will go ahead and order that.  Hopefully get that next week.  His only complaint regarding the neurologic problems is occasional dizziness and slight change in gait.  In the room his gait was fine.  He also complained of shortness of breath with a lot of exertion.  His sats here are 98%.  I advised him to get a pulse oximetry which he has at home.  If he can document sats dropping below 90% we can try and look into getting oxygen for him.  Interval History: Note from 5/1/2024:  Assessment / Plan:    1 stage IV small cell lung cancer  2 liver metastasis extensive  3 jaundice secondary to #2  4 pain secondary to #2  5 thrombocytopenia secondary to chemo.  Plan: Patient will get a CBC CMP today.  It is noted that he has thrombocytopenia secondary to chemo.  He will come back here 5/9 for laboratory work.  If all goes well he will get carboplatin/-16 beginning 5/13.  At that time we will also be looking at the possibility of adding immunotherapy.  Also beyond that he is asking and we will recommend the as he wishes to get a second opinion at [facility].  His prognosis is guarded long-term.  Hopefully he will get at least some response to these treatments.  HPI: 63-year-old gentleman with a history of small cell cancer along with diffuse metastatic disease to liver.  He has pain in the right upper quadrant and right flank area around the lower ribs upper abdominal area.  Hurts worse on palpation.  He is using oxycodone 22.5 mg every 4 hours with some relief.  He is able to eat.  He has no fever or chills.  He still coughs and some shortness of breath but improved.  He is due again for treatment on 5/10/2024.  He was asked to begin again 5/13/2024.  He remains with marked elevation of total bilirubin of 7.2 and all improved.  His LDH  "total is also quite elevated at 1000.  Interval History: Note 04/22/2024:  Small cell lung cancer with metastasis to liver  Assessment & Plan  Patient reported night sweats, recent weight loss, current every day smoker, severe back and flank pain.  CT chest: \" Right hilar and perihilar 5.5 x 6.3 x 5.2 cm mass consistent lung malignancy. Right hilar, mediastinal and right supraclavicular lymphadenopathy compatible with metastatic disease. Airspace consolidation in the right middle lobe adjacent to the hilum suggesting postobstructive pneumonia. Nodular thickening along the axial interstitium in the right lower lobe and the right major fissure suggesting lymphangitic spread of tumor. Diffuse hepatic metastases. Periportal, gastroesophageal and para-aortic lymphadenopathy\"  Complained of hip pain -- CT A/P displayed sclerotic lesion in the pelvis.  Liver biopsy pathology report positive for small cell carcinoma.   4/22 patient overall doing well status post chemotherapy x 3 days.  Patient cleared for discharge per oncology. Will need to follow-up with outpatient oncology  Continue allopurinol for 1 month and Decadron per oncology  Pain med recs per palliative  Note that the patient has become quite jaundice bilirubin of 2.6-9.2 prior to receiving his treatments.  He received cisplatin and day 1 through 3 etopside.      Cancer Staging:  Cancer Staging   No matching staging information was found for the patient.      Molecular Testing:     Previous Hematologic/ Oncologic History:    Oncology History   Small cell lung cancer with metastasis to liver   4/19/2024 Initial Diagnosis    Small cell lung cancer in adult (HCC)     4/19/2024 - 6/13/2024 Chemotherapy    alteplase (CATHFLO), 2 mg, Intracatheter, Every 1 Minute as needed, 3 of 6 cycles  pegfilgrastim (NEULASTA), 6 mg, Subcutaneous, Once, 3 of 6 cycles  Administration: 6 mg (4/23/2024), 6 mg (5/16/2024), 6 mg (6/13/2024)  fosaprepitant (EMEND) IVPB, 150 mg, " Intravenous, Once, 3 of 6 cycles  Administration: 150 mg (4/19/2024), 150 mg (5/13/2024), 150 mg (6/10/2024)  atezolizumab (TECENTRIQ) IVPB, 1,200 mg (100 % of original dose 1,200 mg), Intravenous, Once, 1 of 4 cycles  Dose modification: 1,200 mg (original dose 1,200 mg, Cycle 3, Reason: Anticipated Tolerance)  Administration: 1,200 mg (6/10/2024)  etoposide (TOPOSAR), 50 mg/m2 = 100.6 mg (50 % of original dose 100 mg/m2), Intravenous, Once, 3 of 6 cycles  Dose modification: 50 mg/m2 (original dose 100 mg/m2, Cycle 1, Reason: Anticipated Tolerance)  Administration: 100.6 mg (4/19/2024), 100.6 mg (4/20/2024), 201 mg (5/13/2024), 201 mg (5/14/2024), 100.6 mg (4/21/2024), 201 mg (5/15/2024), 201 mg (6/10/2024), 201 mg (6/11/2024), 201 mg (6/12/2024)  CARBOplatin (PARAPLATIN) IVPB (GOG AUC DOSING), 669.5 mg, Intravenous, Once, 3 of 6 cycles  Administration: 669.5 mg (4/19/2024), 731 mg (5/13/2024), 603 mg (6/10/2024)     6/19/2024 -  Chemotherapy    alteplase (CATHFLO), 2 mg, Intracatheter, Every 1 Minute as needed, 7 of 12 cycles  pegfilgrastim-cbqv (UDENYCA), 6 mg, Subcutaneous, Once, 3 of 3 cycles  Administration: 6 mg (7/5/2024), 6 mg (8/16/2024), 6 mg (9/26/2024)  pegfilgrastim-apgf (Nyvepria), 6 mg, Subcutaneous, Once, 3 of 3 cycles  fosaprepitant (EMEND) IVPB, 150 mg, Intravenous, Once, 7 of 12 cycles  Administration: 150 mg (7/1/2024), 150 mg (8/13/2024), 150 mg (9/23/2024)  atezolizumab (TECENTRIQ) IVPB, 1,200 mg, Intravenous, Once, 7 of 12 cycles  Administration: 1,200 mg (8/13/2024), 1,200 mg (7/1/2024), 1,200 mg (9/23/2024)  etoposide (TOPOSAR), 100 mg/m2 = 200 mg, Intravenous, Once, 6 of 6 cycles  Dose modification: 75 mg/m2 (75 % of original dose 100 mg/m2, Cycle 4, Reason: Other (see comments), Comment: neutropenia), 75 mg/m2 (75 % of original dose 100 mg/m2, Cycle 4, Reason: Other (see comments)), 75 mg/m2 (original dose 100 mg/m2, Cycle 5, Reason: Thrombocytopenia)  Administration: 150 mg (7/1/2024),  150 mg (7/2/2024), 150 mg (7/3/2024), 150 mg (8/13/2024), 150 mg (9/23/2024), 150 mg (8/14/2024), 150 mg (9/24/2024), 150 mg (8/15/2024), 150 mg (9/25/2024)  CARBOplatin (PARAPLATIN) IVPB (G AUC DOSING), , Intravenous, Once, 6 of 6 cycles  Dose modification: 519 mg (75 % of original dose 692 mg, Cycle 4, Reason: Other (see comments), Comment: neutropenia), 500 mg (original dose 692 mg, Cycle 4, Reason: Neutropenia), 519 mg (75 % of original dose 692 mg, Cycle 5, Reason: Other (see comments), Comment: low counts), 655 mg (original dose 692 mg, Cycle 6, Reason: Dose modified as per discussion with consulting physician)  Administration: 500 mg (7/1/2024), 519 mg (8/13/2024), 655 mg (9/23/2024)         Current Hematologic/ Oncologic Treatment:       Cycle 1         Test Results:    Imaging: CT chest abdomen pelvis w contrast    Result Date: 10/24/2024  Narrative: CT CHEST, ABDOMEN AND PELVIS WITH IV CONTRAST INDICATION: C34.90: Malignant neoplasm of unspecified part of unspecified bronchus or lung C79.51: Secondary malignant neoplasm of bone Z72.0: Tobacco use D69.59: Other secondary thrombocytopenia T50.905A: Adverse effect of unspecified drugs, medicaments and biological substances, initial encounter R42: Dizziness and giddiness. COMPARISON: 6/12/2024. TECHNIQUE: CT examination of the chest, abdomen and pelvis was performed. Multiplanar 2D reformatted images were created from the source data. This examination, like all CT scans performed in the [facility], was performed utilizing techniques to minimize radiation dose exposure, including the use of iterative reconstruction and automated exposure control. Radiation dose length product (DLP) for this visit: 676 mGy-cm IV Contrast: 100 mL of iohexol (OMNIPAQUE) Enteric Contrast: Not administered. FINDINGS: CHEST LUNGS: A small right upper lobe nodule on series 3 image 113 has decreased in size and undergone central cavitation, now measuring 3 mm,  previously 4 mm. The appearance is now similar to numerous tiny cystic/cavitary nodules throughout the upper lobes and superior segments of the lower lobes which are unchanged from the prior CT. Linear juxtapleural opacity in the right upper lobe appears slightly decreased in thickness measuring up to 1.1 cm, previously 9 mm. Stable pleural parenchymal scarring. No tracheal or endobronchial lesion. PLEURA: Unremarkable. HEART/GREAT VESSELS: The previously seen right upper lobe pulmonary embolism has resolved. However, there is new low-density soft tissue within multiple right-sided pulmonary veins contiguous with a right hilar mass and consistent with tumor thrombus. Heart is unremarkable for patient's age. No thoracic aortic aneurysm. MEDIASTINUM AND LEWIS: There is a right hilar mass, not present previously, measuring 2.2 x 1.8 cm. CHEST WALL AND LOWER NECK: Unremarkable. ABDOMEN LIVER/BILIARY TREE: Again seen are numerous to treated liver metastasis with underlying cirrhosis likely related to treatment related fibrosis. Representative lesions as follows: 1.1 x 1.8 cm lesion adjacent to the middle hepatic vein on series 2 image 103, previously 1.3 x 2.3 cm. 0.9 x 1.2 cm segment 8 lesion on series 2 image 102, previously 1.3 x 1.1 cm. Segment 6/7 lesions on series 2 image 127 measure 8 and 7 mm, previously 1.4 and 1.0 cm respectively. GALLBLADDER: Post cholecystectomy. SPLEEN: Unremarkable. PANCREAS: Unremarkable. ADRENAL GLANDS: Unremarkable. KIDNEYS/URETERS: Unremarkable. No hydronephrosis. STOMACH AND BOWEL: Colonic diverticulosis without findings of acute diverticulitis. APPENDIX: No findings to suggest appendicitis. ABDOMINOPELVIC CAVITY: No ascites. No pneumoperitoneum. No lymphadenopathy. VESSELS: Unremarkable for patient's age. PELVIS REPRODUCTIVE ORGANS: Prostatomegaly URINARY BLADDER: There is mild chronic bladder wall thickening. ABDOMINAL WALL/INGUINAL REGIONS: There is a fat-containing left inguinal  "hernia with trace fluid. BONES: There is stable diffuse sclerotic osseous metastasis.     Impression: 1.  New right hilar metastasis with tumor thrombus in multiple adjacent pulmonary veins. Right upper lobe pulmonary arterial embolism has resolved. 2.  Previously seen 5 mm right upper lobe nodule has slightly decreased in size and undergone central cavitation. Its appearance is now similar to numerous tiny cystic/cavitary nodules throughout the upper lobes and superior segments of the lower lobes. These may be inflammatory though treated metastasis not excluded. 3.  Continued decrease in size of linear juxtapleural opacity in the right upper lobe, likely atelectasis/scarring. 4.  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 5.  Stable diffuse sclerotic osseous metastasis. The study was marked in EPIC for significant notification. Workstation performed: [id number]             Labs:   Lab Results   Component Value Date    WBC 8.60 10/22/2024    HGB 10.3 (L) 10/22/2024    HCT 32.4 (L) 10/22/2024     (H) 10/22/2024     10/22/2024     Lab Results   Component Value Date    K 3.7 10/22/2024     10/22/2024    CO2 29 10/22/2024    BUN 13 10/22/2024    CREATININE 0.87 10/22/2024    GLUF 79 06/07/2024    CALCIUM 8.4 10/22/2024    CORRECTEDCA 9.0 10/22/2024    AST 20 10/22/2024    ALT 20 10/22/2024    ALKPHOS 75 10/22/2024    EGFR 92 10/22/2024         No results found for: \"SPEP\", \"UPEP\"    No results found for: \"PSA\"    No results found for: \"CEA\"    No results found for: \"\"    No results found for: \"AFP\"    No results found for: \"IRON\", \"TIBC\", \"FERRITIN\"    No results found for: \"GHRVMBKW21\"      ROS: Review of Systems   Constitutional:  Positive for fatigue.   HENT: Negative.     Eyes: Negative.    Respiratory: Negative.     Cardiovascular: Negative.    Gastrointestinal: Negative.    Endocrine: Negative.    Genitourinary: Negative.    Musculoskeletal: Negative.    Skin: " Negative.    Allergic/Immunologic: Negative.    Neurological: Negative.    Hematological: Negative.      No abdominal pain or tenderness he is moving air bilaterally fairly well.    Current Medications: Reviewed  Allergies: Reviewed  PMH/FH/SH:  Reviewed      Physical Exam:    Body surface area is 2.04 meters squared.    Wt Readings from Last 3 Encounters:   10/24/24 80.9 kg (178 lb 6.4 oz)   10/24/24 81.6 kg (180 lb)   10/09/24 75.5 kg (166 lb 6.4 oz)        Temp Readings from Last 3 Encounters:   10/24/24 97.6 °F (36.4 °C) (Temporal)   10/09/24 (!) 97.1 °F (36.2 °C) (Tympanic)   09/26/24 (!) 97.3 °F (36.3 °C) (Temporal)        BP Readings from Last 3 Encounters:   10/24/24 118/78   10/09/24 122/68   09/26/24 123/72         Pulse Readings from Last 3 Encounters:   10/24/24 85   10/09/24 99   09/26/24 75     @LASTSAO2(3)@      Physical Exam      Goals and Barriers:  Current Goal: Prolong Survival from Cancer.   Barriers: None.      Patient's Capacity to Self Care:  Patient is able to self care.    Code Status: [unfilled]  Advance Directive and Living Will:      Power of :

## 2024-10-24 NOTE — PROGRESS NOTES
Pt to clinic for Tecentriq and Zometa. Pt offers no complaints today. CrCl and calcium within parameters for Zometa. Tolerated infusion without complications. Aware of next appointment on 11/15/24 at 1500. AVS declined. Port de-accessed.

## 2024-10-25 ENCOUNTER — TELEPHONE (OUTPATIENT)
Dept: HEMATOLOGY ONCOLOGY | Facility: CLINIC | Age: 64
End: 2024-10-25

## 2024-10-25 DIAGNOSIS — C34.01 MALIGNANT NEOPLASM OF HILUS OF RIGHT LUNG (HCC): Primary | ICD-10-CM

## 2024-10-25 DIAGNOSIS — Z85.89 HISTORY OF KNOWN METASTASIS TO LIVER: ICD-10-CM

## 2024-10-25 NOTE — TELEPHONE ENCOUNTER
10/25/2024:    Spoke with the patient and daughter on the phone.  Went over his scans it looks like the problem and of note his liver continues to improve.  He remains with bone mets but the appears stable.  Recommended that he continue his immunotherapy every 3 weeks for the next 6 weeks repeat the scans and reassess to see if the disease is significantly worsening if it is that then change in therapy would be warranted.    Dr. Guadarrama

## 2024-10-26 ENCOUNTER — DOCUMENTATION (OUTPATIENT)
Dept: PALLIATIVE MEDICINE | Facility: CLINIC | Age: 64
End: 2024-10-26

## 2024-10-26 DIAGNOSIS — G89.3 CANCER RELATED PAIN: ICD-10-CM

## 2024-10-26 DIAGNOSIS — C34.90 SMALL CELL LUNG CANCER (HCC): ICD-10-CM

## 2024-10-26 DIAGNOSIS — Z51.5 PALLIATIVE CARE PATIENT: ICD-10-CM

## 2024-10-26 RX ORDER — FENTANYL 75 UG/1
1 PATCH TRANSDERMAL
Qty: 5 PATCH | Refills: 0 | Status: CANCELLED | OUTPATIENT
Start: 2024-10-26

## 2024-10-26 RX ORDER — FENTANYL 75 UG/1
1 PATCH TRANSDERMAL
Qty: 5 PATCH | Refills: 0 | Status: SHIPPED | OUTPATIENT
Start: 2024-10-26

## 2024-10-26 NOTE — TELEPHONE ENCOUNTER
[facility] Patient Engagement Partner//Patient daughter is calling because her father is totally out of fentanyl (DURAGESIC) 75 mcg/hr . Thank you in advance. Phone# [phone]   Medication Request    fentanyl (DURAGESIC) 75 mcg/hr   Please send to                                                                                                                                  [facility]/pharmacy #[id number] -  [address], [facility] [address]  Phone: [phone]  Fax: [phone]

## 2024-10-26 NOTE — TELEPHONE ENCOUNTER
Per on call provider Dr Chyna Michele  she will need to address a little later. Current tech problems prohibiting her to log on

## 2024-10-26 NOTE — TELEPHONE ENCOUNTER
Patient daughter is calling to see if she can get  a refill of 10 patches of fentaNYL (DURAGESIC) 75 mcg/hr  instead of 5. Please folow up and advise . Thank you in advance.

## 2024-10-26 NOTE — TELEPHONE ENCOUNTER
"Pt's daughter stated, \" I am calling to have his  fentaNYL  refilled.\"    Paged on call VIA EPIC   "

## 2024-10-29 DIAGNOSIS — C34.90 SMALL CELL LUNG CANCER (HCC): ICD-10-CM

## 2024-10-30 RX ORDER — DEXAMETHASONE 4 MG/1
4 TABLET ORAL
Qty: 60 TABLET | Refills: 0 | Status: SHIPPED | OUTPATIENT
Start: 2024-10-30

## 2024-10-30 NOTE — TELEPHONE ENCOUNTER
Last appointment:10/09/24    Next scheduled appointment:11/20/24    Pharmacy:      PDMP review:

## 2024-11-08 ENCOUNTER — OFFICE VISIT (OUTPATIENT)
Dept: FAMILY MEDICINE CLINIC | Facility: CLINIC | Age: 64
End: 2024-11-08
Payer: COMMERCIAL

## 2024-11-08 ENCOUNTER — APPOINTMENT (EMERGENCY)
Dept: RADIOLOGY | Facility: HOSPITAL | Age: 64
End: 2024-11-08
Payer: COMMERCIAL

## 2024-11-08 ENCOUNTER — APPOINTMENT (EMERGENCY)
Dept: CT IMAGING | Facility: HOSPITAL | Age: 64
End: 2024-11-08
Payer: COMMERCIAL

## 2024-11-08 ENCOUNTER — NURSE TRIAGE (OUTPATIENT)
Age: 64
End: 2024-11-08

## 2024-11-08 ENCOUNTER — HOSPITAL ENCOUNTER (EMERGENCY)
Facility: HOSPITAL | Age: 64
Discharge: HOME | End: 2024-11-08
Attending: EMERGENCY MEDICINE
Payer: COMMERCIAL

## 2024-11-08 VITALS
SYSTOLIC BLOOD PRESSURE: 115 MMHG | RESPIRATION RATE: 13 BRPM | TEMPERATURE: 98.5 F | HEART RATE: 74 BPM | DIASTOLIC BLOOD PRESSURE: 70 MMHG | OXYGEN SATURATION: 99 %

## 2024-11-08 VITALS
OXYGEN SATURATION: 93 % | HEIGHT: 72 IN | SYSTOLIC BLOOD PRESSURE: 90 MMHG | DIASTOLIC BLOOD PRESSURE: 60 MMHG | HEART RATE: 95 BPM | WEIGHT: 159 LBS | BODY MASS INDEX: 21.54 KG/M2 | TEMPERATURE: 98.5 F

## 2024-11-08 DIAGNOSIS — G89.3 CANCER RELATED PAIN: ICD-10-CM

## 2024-11-08 DIAGNOSIS — Z51.5 PALLIATIVE CARE PATIENT: ICD-10-CM

## 2024-11-08 DIAGNOSIS — C34.90 SMALL CELL LUNG CANCER (HCC): ICD-10-CM

## 2024-11-08 DIAGNOSIS — E86.0 DEHYDRATION: ICD-10-CM

## 2024-11-08 DIAGNOSIS — R63.4 WEIGHT LOSS: ICD-10-CM

## 2024-11-08 DIAGNOSIS — R11.2 NAUSEA AND VOMITING, UNSPECIFIED VOMITING TYPE: Primary | ICD-10-CM

## 2024-11-08 DIAGNOSIS — F11.20 CONTINUOUS OPIOID DEPENDENCE (HCC): ICD-10-CM

## 2024-11-08 DIAGNOSIS — R11.2 NAUSEA AND VOMITING: Primary | ICD-10-CM

## 2024-11-08 DIAGNOSIS — C34.91 SMALL CELL CARCINOMA OF RIGHT LUNG, UNSPECIFIED PART OF LUNG (HCC): ICD-10-CM

## 2024-11-08 DIAGNOSIS — G93.9 BRAIN LESION: ICD-10-CM

## 2024-11-08 DIAGNOSIS — K04.7 DENTAL INFECTION: ICD-10-CM

## 2024-11-08 DIAGNOSIS — R10.30 LOWER ABDOMINAL PAIN: ICD-10-CM

## 2024-11-08 DIAGNOSIS — R11.2 NAUSEA & VOMITING: Primary | ICD-10-CM

## 2024-11-08 LAB
ALBUMIN SERPL BCG-MCNC: 3.6 G/DL (ref 3.5–5)
ALP SERPL-CCNC: 78 U/L (ref 34–104)
ALT SERPL W P-5'-P-CCNC: 18 U/L (ref 7–52)
ANION GAP SERPL CALCULATED.3IONS-SCNC: 7 MMOL/L (ref 4–13)
APTT PPP: 28 SECONDS (ref 23–34)
AST SERPL W P-5'-P-CCNC: 23 U/L (ref 13–39)
BACTERIA UR QL AUTO: ABNORMAL /HPF
BASOPHILS # BLD AUTO: 0.04 THOUSANDS/ÂΜL (ref 0–0.1)
BASOPHILS NFR BLD AUTO: 0 % (ref 0–1)
BILIRUB DIRECT SERPL-MCNC: 0.11 MG/DL (ref 0–0.2)
BILIRUB SERPL-MCNC: 0.83 MG/DL (ref 0.2–1)
BILIRUB UR QL STRIP: NEGATIVE
BUN SERPL-MCNC: 9 MG/DL (ref 5–25)
CALCIUM SERPL-MCNC: 8.7 MG/DL (ref 8.4–10.2)
CARDIAC TROPONIN I PNL SERPL HS: 3 NG/L
CHLORIDE SERPL-SCNC: 106 MMOL/L (ref 96–108)
CLARITY UR: CLEAR
CO2 SERPL-SCNC: 25 MMOL/L (ref 21–32)
COLOR UR: ABNORMAL
CREAT SERPL-MCNC: 0.75 MG/DL (ref 0.6–1.3)
EOSINOPHIL # BLD AUTO: 0.53 THOUSAND/ÂΜL (ref 0–0.61)
EOSINOPHIL NFR BLD AUTO: 6 % (ref 0–6)
ERYTHROCYTE [DISTWIDTH] IN BLOOD BY AUTOMATED COUNT: 14.6 % (ref 11.6–15.1)
GFR SERPL CREATININE-BSD FRML MDRD: 97 ML/MIN/1.73SQ M
GLUCOSE SERPL-MCNC: 88 MG/DL (ref 65–140)
GLUCOSE UR STRIP-MCNC: NEGATIVE MG/DL
HCT VFR BLD AUTO: 36.6 % (ref 36.5–49.3)
HGB BLD-MCNC: 12 G/DL (ref 12–17)
HGB UR QL STRIP.AUTO: ABNORMAL
HYALINE CASTS #/AREA URNS LPF: ABNORMAL /LPF
IMM GRANULOCYTES # BLD AUTO: 0.08 THOUSAND/UL (ref 0–0.2)
IMM GRANULOCYTES NFR BLD AUTO: 1 % (ref 0–2)
INR PPP: 0.99 (ref 0.85–1.19)
KETONES UR STRIP-MCNC: ABNORMAL MG/DL
LACTATE SERPL-SCNC: 0.5 MMOL/L (ref 0.5–2)
LEUKOCYTE ESTERASE UR QL STRIP: NEGATIVE
LIPASE SERPL-CCNC: <6 U/L (ref 11–82)
LYMPHOCYTES # BLD AUTO: 0.75 THOUSANDS/ÂΜL (ref 0.6–4.47)
LYMPHOCYTES NFR BLD AUTO: 8 % (ref 14–44)
MAGNESIUM SERPL-MCNC: 1.7 MG/DL (ref 1.9–2.7)
MCH RBC QN AUTO: 31.7 PG (ref 26.8–34.3)
MCHC RBC AUTO-ENTMCNC: 32.8 G/DL (ref 31.4–37.4)
MCV RBC AUTO: 97 FL (ref 82–98)
MONOCYTES # BLD AUTO: 0.68 THOUSAND/ÂΜL (ref 0.17–1.22)
MONOCYTES NFR BLD AUTO: 7 % (ref 4–12)
MUCOUS THREADS UR QL AUTO: ABNORMAL
NEUTROPHILS # BLD AUTO: 7.13 THOUSANDS/ÂΜL (ref 1.85–7.62)
NEUTS SEG NFR BLD AUTO: 78 % (ref 43–75)
NITRITE UR QL STRIP: NEGATIVE
NON-SQ EPI CELLS URNS QL MICRO: ABNORMAL /HPF
NRBC BLD AUTO-RTO: 0 /100 WBCS
PH UR STRIP.AUTO: 5.5 [PH]
PLATELET # BLD AUTO: 123 THOUSANDS/UL (ref 149–390)
PMV BLD AUTO: 9.8 FL (ref 8.9–12.7)
POTASSIUM SERPL-SCNC: 3.6 MMOL/L (ref 3.5–5.3)
PROT SERPL-MCNC: 5.8 G/DL (ref 6.4–8.4)
PROT UR STRIP-MCNC: NEGATIVE MG/DL
PROTHROMBIN TIME: 13.8 SECONDS (ref 12.3–15)
RBC # BLD AUTO: 3.78 MILLION/UL (ref 3.88–5.62)
RBC #/AREA URNS AUTO: ABNORMAL /HPF
SODIUM SERPL-SCNC: 138 MMOL/L (ref 135–147)
SP GR UR STRIP.AUTO: 1.03 (ref 1–1.03)
UROBILINOGEN UR STRIP-ACNC: <2 MG/DL
WBC # BLD AUTO: 9.21 THOUSAND/UL (ref 4.31–10.16)
WBC #/AREA URNS AUTO: ABNORMAL /HPF

## 2024-11-08 PROCEDURE — 96365 THER/PROPH/DIAG IV INF INIT: CPT

## 2024-11-08 PROCEDURE — 96375 TX/PRO/DX INJ NEW DRUG ADDON: CPT

## 2024-11-08 PROCEDURE — 84484 ASSAY OF TROPONIN QUANT: CPT | Performed by: EMERGENCY MEDICINE

## 2024-11-08 PROCEDURE — 71260 CT THORAX DX C+: CPT

## 2024-11-08 PROCEDURE — 99284 EMERGENCY DEPT VISIT MOD MDM: CPT

## 2024-11-08 PROCEDURE — 96361 HYDRATE IV INFUSION ADD-ON: CPT

## 2024-11-08 PROCEDURE — 70470 CT HEAD/BRAIN W/O & W/DYE: CPT

## 2024-11-08 PROCEDURE — 74177 CT ABD & PELVIS W/CONTRAST: CPT

## 2024-11-08 PROCEDURE — 83605 ASSAY OF LACTIC ACID: CPT | Performed by: EMERGENCY MEDICINE

## 2024-11-08 PROCEDURE — 80048 BASIC METABOLIC PNL TOTAL CA: CPT | Performed by: EMERGENCY MEDICINE

## 2024-11-08 PROCEDURE — 99285 EMERGENCY DEPT VISIT HI MDM: CPT | Performed by: EMERGENCY MEDICINE

## 2024-11-08 PROCEDURE — 85730 THROMBOPLASTIN TIME PARTIAL: CPT | Performed by: EMERGENCY MEDICINE

## 2024-11-08 PROCEDURE — 83735 ASSAY OF MAGNESIUM: CPT | Performed by: EMERGENCY MEDICINE

## 2024-11-08 PROCEDURE — 83690 ASSAY OF LIPASE: CPT | Performed by: EMERGENCY MEDICINE

## 2024-11-08 PROCEDURE — 93005 ELECTROCARDIOGRAM TRACING: CPT

## 2024-11-08 PROCEDURE — 85610 PROTHROMBIN TIME: CPT | Performed by: EMERGENCY MEDICINE

## 2024-11-08 PROCEDURE — 85025 COMPLETE CBC W/AUTO DIFF WBC: CPT | Performed by: EMERGENCY MEDICINE

## 2024-11-08 PROCEDURE — 81001 URINALYSIS AUTO W/SCOPE: CPT | Performed by: EMERGENCY MEDICINE

## 2024-11-08 PROCEDURE — 99215 OFFICE O/P EST HI 40 MIN: CPT

## 2024-11-08 PROCEDURE — 71045 X-RAY EXAM CHEST 1 VIEW: CPT

## 2024-11-08 PROCEDURE — 80076 HEPATIC FUNCTION PANEL: CPT | Performed by: EMERGENCY MEDICINE

## 2024-11-08 RX ORDER — FENTANYL 75 UG/1
1 PATCH TRANSDERMAL
Qty: 10 PATCH | Refills: 0 | Status: SHIPPED | OUTPATIENT
Start: 2024-11-08

## 2024-11-08 RX ORDER — ONDANSETRON 2 MG/ML
4 INJECTION INTRAMUSCULAR; INTRAVENOUS ONCE
Status: COMPLETED | OUTPATIENT
Start: 2024-11-08 | End: 2024-11-08

## 2024-11-08 RX ORDER — AMOXICILLIN 875 MG/1
875 TABLET, COATED ORAL 2 TIMES DAILY
Qty: 14 TABLET | Refills: 0 | Status: SHIPPED | OUTPATIENT
Start: 2024-11-08 | End: 2024-11-08

## 2024-11-08 RX ORDER — AMOXICILLIN 400 MG/5ML
875 POWDER, FOR SUSPENSION ORAL 2 TIMES DAILY
Qty: 152.6 ML | Refills: 0 | Status: SHIPPED | OUTPATIENT
Start: 2024-11-08 | End: 2024-11-15

## 2024-11-08 RX ORDER — OXYCODONE HYDROCHLORIDE 10 MG/1
30 TABLET ORAL ONCE
Status: COMPLETED | OUTPATIENT
Start: 2024-11-08 | End: 2024-11-08

## 2024-11-08 RX ORDER — PROCHLORPERAZINE MALEATE 5 MG/1
10 TABLET ORAL EVERY 6 HOURS PRN
Qty: 30 TABLET | Refills: 0 | Status: SHIPPED | OUTPATIENT
Start: 2024-11-08

## 2024-11-08 RX ORDER — FENTANYL 75 UG/1
75 PATCH TRANSDERMAL ONCE
Status: DISCONTINUED | OUTPATIENT
Start: 2024-11-08 | End: 2024-11-08 | Stop reason: HOSPADM

## 2024-11-08 RX ORDER — MAGNESIUM SULFATE HEPTAHYDRATE 40 MG/ML
2 INJECTION, SOLUTION INTRAVENOUS ONCE
Status: COMPLETED | OUTPATIENT
Start: 2024-11-08 | End: 2024-11-08

## 2024-11-08 RX ORDER — FENTANYL 75 UG/1
1 PATCH TRANSDERMAL
Qty: 5 PATCH | Refills: 0 | Status: CANCELLED | OUTPATIENT
Start: 2024-11-08

## 2024-11-08 RX ADMIN — ONDANSETRON 4 MG: 2 INJECTION INTRAMUSCULAR; INTRAVENOUS at 16:35

## 2024-11-08 RX ADMIN — MAGNESIUM SULFATE HEPTAHYDRATE 2 G: 40 INJECTION, SOLUTION INTRAVENOUS at 17:32

## 2024-11-08 RX ADMIN — OXYCODONE HYDROCHLORIDE 30 MG: 10 TABLET ORAL at 17:08

## 2024-11-08 RX ADMIN — IOHEXOL 100 ML: 350 INJECTION, SOLUTION INTRAVENOUS at 17:53

## 2024-11-08 RX ADMIN — FENTANYL 75 MCG: 75 PATCH TRANSDERMAL at 16:37

## 2024-11-08 RX ADMIN — SODIUM CHLORIDE 1000 ML: 0.9 INJECTION, SOLUTION INTRAVENOUS at 16:43

## 2024-11-08 NOTE — PROGRESS NOTES
Ambulatory Visit  Name: Ceasar March      : 1960      MRN: [id number]  Encounter Provider: Marilyn Ploo PA-C  Encounter Date: 2024   Encounter department: [facility]    Assessment & Plan  Nausea and vomiting, unspecified vomiting type  - patient with small cell lung cancer undergoing chemotherapy presents for evaluation of three days of severe nausea and vomiting, limited oral and fluid intake with rapid weight loss  - patient is ill-appearing today, hypotensive with BP 90/60 mmHg and dry mucous membranes   - based on his exam findings and symptoms, with high risk given cancer diagnosis and current immunosuppression from chemotherapy I did advise I'd recommend going to the ER immediately for stat labs and IVF -- patient and daughter are agreeable. I recommended EMS, however they declined. Reports daughter will take him to [facility]. Therefore ADT order placed.  - did discuss hospice referral with patient and his daughter today -- they report they are not interested in the referral at this time, but will let me know if they change their mind     Orders:    Transfer to other facility    Small cell carcinoma of right lung, unspecified part of lung (HCC)    Orders:    Transfer to other facility    Dehydration    Orders:    Transfer to other facility    Weight loss    Orders:    Transfer to other facility    Continuous opioid dependence (HCC)  - following with palliative care who manages opioids        Dental infection  - patient reports tooth pain of the right lower molar associated with foul taste and odor of the mouth for the last few days   - on exam, right lower molar broken with surrounding gingiva erythematous and swollen -- suspect infection and will cover for amoxicillin (pt request liquid, having a hard time swallowing) -- discussed side effects  - did recommend establishing with a dentist ASAP for further evaluation   - discussed good dental hygiene today      Orders:    amoxicillin (AMOXIL) 400 MG/5ML suspension; Take 10.9 mL (875 mg total) by mouth 2 (two) times a day for 7 days       History of Present Illness     CC: nausea, vomiting, dental pain     Patient with small cell lung cancer undergoing chemotherapy presents with his daughter for evaluation of severe nausea and vomiting for the last three days, refractory to zofran. Reports he has not been able to keep any food down in about three days and barely any water and he has been losing weight rapidly and feels very weak. They did call his palliative care team who recommended IVF.     He also reports right lower dental pain for the last few days. He has poor dentition, does not follow with dentist. Reports he has a very foul taste and smell in his mouth, and his tooth hurts. He denies any trouble swallowing.   No fevers or chills.   Baseline SOB. No chest pain.         History obtained from : patient  Review of Systems   Constitutional:  Positive for fatigue. Negative for chills, diaphoresis and fever.   HENT:  Positive for dental problem.    Respiratory:  Positive for shortness of breath. Negative for chest tightness.    Cardiovascular:  Negative for chest pain and palpitations.   Gastrointestinal:  Positive for nausea and vomiting.   Neurological:  Positive for weakness.     Medical History Reviewed by provider this encounter:       Past Medical History   Past Medical History:   Diagnosis Date    Asthma     Hypotension     Non-small cell lung cancer metastatic to liver (HCC)      Past Surgical History:   Procedure Laterality Date    CHOLECYSTECTOMY      ELBOW SURGERY Right     HERNIA REPAIR      IR BIOPSY LIVER MASS  04/15/2024    IR PORT PLACEMENT  6/27/2024     No family history on file.  Current Outpatient Medications on File Prior to Visit   Medication Sig Dispense Refill    albuterol (PROVENTIL HFA,VENTOLIN HFA) 90 mcg/act inhaler Inhale 2 puffs every 6 (six) hours as needed for wheezing 6.7 g 0     calcium carbonate (TUMS EX) 750 mg chewable tablet Chew 1 tablet daily      dexamethasone (DECADRON) 4 mg tablet Take 1 tablet (4 mg total) by mouth daily with breakfast 1 tab po at breakfast 60 tablet 0    lidocaine-prilocaine (EMLA) cream Apply topically as needed for mild pain Apply 45-60min prior to infusion time. Cover with platic wrap or coverings 30 g 1    ondansetron (ZOFRAN) 4 mg tablet Take 1 tablet (4 mg total) by mouth every 8 (eight) hours as needed for nausea or vomiting 10 tablet 0    oxyCODONE (ROXICODONE) 30 MG immediate release tablet Take 1-1.5 tablets (30-45 mg total) by mouth every 4 (four) hours Max Daily Amount: 270 mg 180 tablet 0    rivaroxaban (Xarelto) 15 mg tablet Take 1 tablet (15 mg total) by mouth 2 (two) times a day 21 tablet 0    [DISCONTINUED] fentaNYL (DURAGESIC) 75 mcg/hr Place 1 patch on the skin over 72 hours every third day Max Daily Amount: 1 patch 5 patch 0     No current facility-administered medications on file prior to visit.     Allergies   Allergen Reactions    Dog Epithelium (Canis Lupus Familiaris) Itching      Current Outpatient Medications on File Prior to Visit   Medication Sig Dispense Refill    albuterol (PROVENTIL HFA,VENTOLIN HFA) 90 mcg/act inhaler Inhale 2 puffs every 6 (six) hours as needed for wheezing 6.7 g 0    calcium carbonate (TUMS EX) 750 mg chewable tablet Chew 1 tablet daily      dexamethasone (DECADRON) 4 mg tablet Take 1 tablet (4 mg total) by mouth daily with breakfast 1 tab po at breakfast 60 tablet 0    lidocaine-prilocaine (EMLA) cream Apply topically as needed for mild pain Apply 45-60min prior to infusion time. Cover with platic wrap or coverings 30 g 1    ondansetron (ZOFRAN) 4 mg tablet Take 1 tablet (4 mg total) by mouth every 8 (eight) hours as needed for nausea or vomiting 10 tablet 0    oxyCODONE (ROXICODONE) 30 MG immediate release tablet Take 1-1.5 tablets (30-45 mg total) by mouth every 4 (four) hours Max Daily Amount: 270 mg 180  tablet 0    rivaroxaban (Xarelto) 15 mg tablet Take 1 tablet (15 mg total) by mouth 2 (two) times a day 21 tablet 0    [DISCONTINUED] fentaNYL (DURAGESIC) 75 mcg/hr Place 1 patch on the skin over 72 hours every third day Max Daily Amount: 1 patch 5 patch 0     No current facility-administered medications on file prior to visit.      Social History     Tobacco Use    Smoking status: Some Days     Current packs/day: 0.75     Types: Cigarettes    Smokeless tobacco: Never   Vaping Use    Vaping status: Never Used   Substance and Sexual Activity    Alcohol use: Never    Drug use: Not Currently     Types: Marijuana    Sexual activity: Not on file       Objective     BP 90/60 (BP Location: Left arm, Patient Position: Sitting, Cuff Size: Large)   Pulse 95   Temp 98.5 °F (36.9 °C)   Ht 6' (1.829 m)   Wt 72.1 kg (159 lb)   SpO2 93%   BMI 21.56 kg/m²     Physical Exam  Vitals reviewed.   Constitutional:       General: He is not in acute distress.     Appearance: Normal appearance. He is ill-appearing. He is not diaphoretic.   HENT:      Head: Normocephalic and atraumatic.      Right Ear: External ear normal. There is impacted cerumen.      Left Ear: External ear normal. There is impacted cerumen.      Nose: Nose normal. No congestion or rhinorrhea.      Mouth/Throat:      Mouth: Mucous membranes are dry.      Dentition: Dental caries present.      Pharynx: Oropharynx is clear.     Eyes:      General:         Right eye: No discharge.         Left eye: No discharge.      Conjunctiva/sclera: Conjunctivae normal.   Cardiovascular:      Rate and Rhythm: Normal rate and regular rhythm.      Pulses: Normal pulses.      Heart sounds: Normal heart sounds.   Pulmonary:      Effort: Pulmonary effort is normal. No respiratory distress.      Breath sounds: Wheezing (scattered) present. No rhonchi or rales.   Musculoskeletal:      Cervical back: Normal range of motion.      Right lower leg: No edema.      Left lower leg: No edema.    Skin:     General: Skin is warm.   Neurological:      General: No focal deficit present.      Mental Status: He is alert.   Psychiatric:         Mood and Affect: Mood normal.

## 2024-11-08 NOTE — TELEPHONE ENCOUNTER
"   Called and spoke to patient's daughter Neela who reports significant nausea and vomiting which has been going on since 10/24.  Taking Zofran with minimal improvement.  Patient has been able to keep minimal fluids and food down.  Reports drinking only about 16 ounces daily.  Has \"wooziness\" with position changes.  Daughter states she also talked to oncology and the patient my be going in for IV hydration.  They also have an appointment at PCP in the next 30 minutes and they are also having blood work completed today.   Patient may need IV hydration.  Patient may need ED evaluation.  Being that patient will be seeing provider with in the hour, did not recommend immediate ED evaluation.  Did tell Neela he may need ED evaluation.  Compazine sent to pharmacy, explained new medication to daughter Anna.  Fentanyl patches also refilled.         "

## 2024-11-08 NOTE — TELEPHONE ENCOUNTER
Patient daugher calling in, would like 2 packs of the patches to be sent in, instead of 1 pack at a time as they are the same price. Patient only has enough patches left for today.    She is aware she would need to pick this up tomorrow    fentaNYL (DURAGESIC) 75 mcg/hr  Pharmacy: [facility]/pharmacy #[id number] [place], [place]

## 2024-11-08 NOTE — TELEPHONE ENCOUNTER
DESCRIPTION     Patient is having nausea and vomiting since his last treatment on 10/24. Has been vomiting about 4 times a day. Having decreased appetite as well and headaches      MEDICATIONS    Has been taking prescribed Zofran but it has not been helping his symptoms. Questioning if another medication can be given.    OTHER SYMPTOMS    Patient has appt with PCP today for possible infected tooth, unsure if that could be making these symptoms worse        Also questioning if Fentanyl patches quantity can be changed from 5 to 10.       Reason for Disposition  • Treatment changes not effective within six hours    Answer Assessment - Initial Assessment Questions  2. What medications are you currently taking? Are you taking an opioid, or new medications? If yes, what are they? Are you currently using any antiemetic medications? (medications that help with nausea or vomiting) If yes, what are they? Include last dosages of medications.   Currently taking zofran for nausea but not helping    3. Obtain a history of the problem. Please describe your symptoms in detail, including severity, precipitating/relieving factors, onset and duration. What color is the emesis? Is there any blood or coffee-ground emesis?  Having nausea and vomiting about 4 times a day, also having headaches and decreased appetite    5. What is your food and fluid intake over the past 24 hours? Any associated symptoms such as signs of dehydration (e.g., decreased urine output, fever, thirst, dry mucous membranes, weakness, dizziness, confusion)? When was the last time patient urinated?  Daughter states patient is having decreased oral intake    Protocols used: ONC-Nausea and Vomiting-ADULT-OH

## 2024-11-08 NOTE — ED PROVIDER NOTES
Time reflects when diagnosis was documented in both MDM as applicable and the Disposition within this note       Time User Action Codes Description Comment    11/8/2024  6:55 PM Nany Vaughan Add [R11.2] Nausea and vomiting     11/8/2024  6:55 PM Nany Vaughan Add [R10.30] Lower abdominal pain           ED Disposition       None          Assessment & Plan   {Hyperlinks  Risk Stratification - NIHSS - HEART SCORE - Fill out sepsis note and make sure you call [phone] if severe or septic shock:[id number]}    Medical Decision Making  64-year-old male presents to the ED for intractable nausea, vomiting despite taking Zofran.  Patient recently completed first round of immunotherapy for metastatic lung cancer.  Suspect symptoms are related to his immunotherapy side effects however other considerations include infectious gastritis or gastroenteritis, pancreatitis, bowel obstruction, recurrent hernia, diverticulitis, renal failure.  Will evaluate with cardiac and abdominal labs, EKG, chest x-ray and CT scan of the head to rule out ICH or new brain mets, CT scan of the chest, abdomen and pelvis.  Will provide IV fluid bolus, IV Zofran for nausea.  Patient's current fentanyl patch was removed by me from left anterior abdominal wall.  Ordered a new fentanyl patch as well as patient's scheduled oxycodone dose for 16:30.    Amount and/or Complexity of Data Reviewed  Independent Historian:      Details: Patient's daughter  Labs: ordered. Decision-making details documented in ED Course.  Radiology: ordered and independent interpretation performed. Decision-making details documented in ED Course.  ECG/medicine tests: ordered and independent interpretation performed. Decision-making details documented in ED Course.    Risk  Prescription drug management.      ED Course as of 11/08/24 1858   Fri Nov 08, 2024   1703 WBC: 9.21   1703 Hemoglobin: 12.0   1725 LACTIC ACID: 0.5   1725 hs TnI 0hr: 3   1726 Creatinine: 0.75   1726 GFR,  Calculated: 97   1726 Other than mildly low magnesium, labs unremarkable. Will replace mag with 2g IV mag sulfate.    1817 Updated patient about results thus far including essentially unremarkable blood work other than the low magnesium level.  Patient's nausea overall improved.   1854 Signed patient out to Dr. Garcia at end of shift, who will follow up CT scans and UA if patient able to provide sample.        Medications   fentaNYL (DURAGESIC) 75 mcg/hr TD 72 hr patch 75 mcg (75 mcg Transdermal Medication Applied 11/8/24 1637)   sodium chloride 0.9 % bolus 1,000 mL (0 mL Intravenous Stopped 11/8/24 1732)   ondansetron (ZOFRAN) injection 4 mg (4 mg Intravenous Given 11/8/24 1635)   oxyCODONE (ROXICODONE) immediate release tablet 30 mg (30 mg Oral Given 11/8/24 1708)   magnesium sulfate 2 g/50 mL IVPB (premix) 2 g (0 g Intravenous Stopped 11/8/24 1833)   iohexol (OMNIPAQUE) 350 MG/ML injection (MULTI-DOSE) 100 mL (100 mL Intravenous Given 11/8/24 1753)       ED Risk Strat Scores   HEART Risk Score      Flowsheet Row Most Recent Value   Heart Score Risk Calculator    History 0 Filed at: 11/08/2024 1823   ECG 0 Filed at: 11/08/2024 1823   Age 1 Filed at: 11/08/2024 1823   Risk Factors 1 Filed at: 11/08/2024 1823   Troponin 0 Filed at: 11/08/2024 1823   HEART Score 2 Filed at: 11/08/2024 1823                                                   History of Present Illness   {Hyperlinks  History (Med, Surg, Fam, Social) - Current Medications - Allergies  :[id number]}    Chief Complaint   Patient presents with   • Vomiting     Pt c/o nausea/vomiting x 1 week , pt had immunotherapy 2 weeks ago       Past Medical History:   Diagnosis Date   • Asthma    • Hypotension    • Non-small cell lung cancer metastatic to liver (HCC)       Past Surgical History:   Procedure Laterality Date   • CHOLECYSTECTOMY     • ELBOW SURGERY Right    • HERNIA REPAIR     • IR BIOPSY LIVER MASS  04/15/2024   • IR PORT PLACEMENT  6/27/2024      History  reviewed. No pertinent family history.   Social History     Tobacco Use   • Smoking status: Some Days     Current packs/day: 0.75     Types: Cigarettes   • Smokeless tobacco: Never   Vaping Use   • Vaping status: Never Used   Substance Use Topics   • Alcohol use: Never   • Drug use: Not Currently     Types: Marijuana      E-Cigarette/Vaping   • E-Cigarette Use Never User       E-Cigarette/Vaping Substances   • Nicotine No    • THC No    • CBD No    • Flavoring No    • Other No    • Unknown No       I have reviewed and agree with the history as documented.     Patient is a 64-year-old male with past medical and surgical history significant for metastatic lung cancer with known mets to the liver and bone status postchemotherapy and recently started on immunotherapy 2 weeks ago, presents to the emergency department with his daughter for nausea and vomiting.  Patient states that he completed his first round of immunotherapy about 2 weeks ago which he states he is supposed to have every 3 weeks.  A few days after getting the immunotherapy, he started having nausea and vomiting which has been persistent since.  He states he is only vomiting about once a day but has dry heaving throughout the day.  He states initially his prescribe Zofran was helping but now it is not helping.  He also complains of colicky lower abdominal pain bilaterally.  He states he does have baseline chronic lightheadedness but it has worsened per the daughter.  He also reports chronic chest pain related to his cancer and denies any worsening of this.  Patient also states that he has known dental infection for which his doctor recently called in a prescription for amoxicillin which daughter states he has yet to start and plans to  today or tomorrow.  She also reports that the palliative care specialist called in a another antiemetic medication which is also waiting at his pharmacy.  According to records, the other antiemetic is  Prochlorperazine.  Patient denies any new dyspnea, headaches, vertigo, cough, hemoptysis, URI symptoms, palpitations, abdominal distention, blood per rectum, melena, dysuria, change in frequency, hematuria, flank pain, skin rash or color change, new focal neurologic deficits.  Daughter states that he is due for a new fentanyl patch which she changes every 72 hours.  He is also due for oxycodone 30 mg IR tablet which he takes every 4 hours and his last dose was at 1:30 PM.  Surgical history includes prior abdominal hernia repair with mesh as well as cholecystectomy.      History provided by:  Patient and relative (Patient's daughter)   used: No    Vomiting  Associated symptoms: abdominal pain    Associated symptoms: no chills, no cough, no diarrhea, no fever, no headaches and no sore throat        Review of Systems   Constitutional:  Positive for fatigue. Negative for chills and fever.   HENT:  Negative for congestion, ear pain, rhinorrhea and sore throat.    Eyes:  Negative for photophobia, pain and visual disturbance.   Respiratory:  Negative for cough, chest tightness, shortness of breath and wheezing.    Cardiovascular:  Positive for chest pain. Negative for palpitations and leg swelling.        (Chronic chest pain related to cancer)   Gastrointestinal:  Positive for abdominal pain, nausea and vomiting. Negative for abdominal distention, constipation and diarrhea.   Genitourinary:  Negative for dysuria, flank pain, frequency and hematuria.   Musculoskeletal:  Positive for back pain. Negative for neck pain and neck stiffness.        (Chronic back pain secondary to bone mets in spine).   Skin:  Negative for color change, pallor, rash and wound.   Allergic/Immunologic: Positive for immunocompromised state.   Neurological:  Positive for light-headedness. Negative for dizziness, seizures, syncope, weakness, numbness and headaches.   Hematological:  Negative for adenopathy. Does not bruise/bleed  easily.   Psychiatric/Behavioral:  Negative for confusion and decreased concentration.    All other systems reviewed and are negative.          Objective   {Hyperlinks  Historical Vitals - Historical Labs - Chart Review/Microbiology - Last Echo - Code Status  :[id number]}    ED Triage Vitals   Temperature Pulse Blood Pressure Respirations SpO2 Patient Position - Orthostatic VS   11/08/24 1513 11/08/24 1513 11/08/24 1513 11/08/24 1513 11/08/24 1513 11/08/24 1513   98.5 °F (36.9 °C) 89 103/63 22 96 % Sitting      Temp src Heart Rate Source BP Location FiO2 (%) Pain Score    -- 11/08/24 1513 11/08/24 1513 -- 11/08/24 1637     Monitor Left arm  4      Vitals      Date and Time Temp Pulse SpO2 Resp BP Pain Score FACES Pain Rating User   11/08/24 1830 -- 74 99 % 13 115/70 2 -- [facility]   11/08/24 1700 -- 65 94 % 20 117/74 -- -- [facility]   11/08/24 1637 -- -- -- -- -- 4 -- [facility]   11/08/24 1513 98.5 °F (36.9 °C) 89 96 % 22 103/63 -- -- [facility]          Vitals:    11/08/24 1513 11/08/24 1700 11/08/24 1830   BP: 103/63 117/74 115/70   BP Location: Left arm Right arm Right arm   Pulse: 89 65 74   Resp: 22 20 13   Temp: 98.5 °F (36.9 °C)     SpO2: 96% 94% 99%        Physical Exam  Vitals and nursing note reviewed.   Constitutional:       General: He is not in acute distress.     Appearance: Normal appearance. He is well-developed. He is ill-appearing. He is not toxic-appearing or diaphoretic.      Comments: Patient is chronically ill-appearing.   HENT:      Head: Normocephalic and atraumatic.      Right Ear: External ear normal.      Left Ear: External ear normal.      Nose: Nose normal.      Mouth/Throat:      Mouth: Mucous membranes are moist.      Pharynx: Oropharynx is clear.      Comments: Poor dentition overall.  Right lower molars partially decayed with evidence of dental caries.  No facial swelling or tenderness.  No fluctuant drainable collection in the mouth.  Eyes:      Extraocular Movements: Extraocular movements intact.       Conjunctiva/sclera: Conjunctivae normal.      Pupils: Pupils are equal, round, and reactive to light.   Neck:      Vascular: No JVD.   Cardiovascular:      Rate and Rhythm: Normal rate and regular rhythm.      Pulses: Normal pulses.      Heart sounds: Normal heart sounds. No murmur heard.     No friction rub. No gallop.   Pulmonary:      Effort: Pulmonary effort is normal. No respiratory distress.      Breath sounds: Normal breath sounds. No wheezing, rhonchi or rales.   Abdominal:      General: Bowel sounds are normal. There is no distension.      Palpations: Abdomen is soft.      Tenderness: There is abdominal tenderness. There is no guarding or rebound.      Comments: Mild diffuse lower abdominal tenderness most significant in the left lower quadrant.   Musculoskeletal:         General: No swelling or tenderness. Normal range of motion.      Cervical back: Normal range of motion and neck supple. No rigidity.   Skin:     General: Skin is warm and dry.      Coloration: Skin is not pale.      Findings: No erythema or rash.   Neurological:      General: No focal deficit present.      Mental Status: He is alert and oriented to person, place, and time.      Sensory: No sensory deficit.      Motor: No weakness.   Psychiatric:         Mood and Affect: Mood normal.         Behavior: Behavior normal.       Results Reviewed       Procedure Component Value Units Date/Time    UA (URINE) with reflex to Scope [[id number]] Collected: 11/08/24 1854    Lab Status: In process Specimen: Urine, Clean Catch Updated: 11/08/24 1855    Basic metabolic panel [[id number]] Collected: 11/08/24 1631    Lab Status: Final result Specimen: Blood from Artery Updated: 11/08/24 1725     Sodium 138 mmol/L      Potassium 3.6 mmol/L      Chloride 106 mmol/L      CO2 25 mmol/L      ANION GAP 7 mmol/L      BUN 9 mg/dL      Creatinine 0.75 mg/dL      Glucose 88 mg/dL      Calcium 8.7 mg/dL      eGFR 97 ml/min/1.73sq m     Narrative:      National Kidney  Disease [employer] guidelines for Chronic Kidney Disease (CKD):   •  Stage 1 with normal or high GFR (GFR > 90 mL/min/1.73 square meters)  •  Stage 2 Mild CKD (GFR = 60-89 mL/min/1.73 square meters)  •  Stage 3A Moderate CKD (GFR = 45-59 mL/min/1.73 square meters)  •  Stage 3B Moderate CKD (GFR = 30-44 mL/min/1.73 square meters)  •  Stage 4 Severe CKD (GFR = 15-29 mL/min/1.73 square meters)  •  Stage 5 End Stage CKD (GFR <15 mL/min/1.73 square meters)  Note: GFR calculation is accurate only with a steady state creatinine    Hepatic function panel [[id number]]  (Abnormal) Collected: 11/08/24 1631    Lab Status: Final result Specimen: Blood from Artery Updated: 11/08/24 1725     Total Bilirubin 0.83 mg/dL      Bilirubin, Direct 0.11 mg/dL      Alkaline Phosphatase 78 U/L      AST 23 U/L      ALT 18 U/L      Total Protein 5.8 g/dL      Albumin 3.6 g/dL     Magnesium [[id number]]  (Abnormal) Collected: 11/08/24 1631    Lab Status: Final result Specimen: Blood from Artery Updated: 11/08/24 1725     Magnesium 1.7 mg/dL     Lipase [[id number]]  (Abnormal) Collected: 11/08/24 1631    Lab Status: Final result Specimen: Blood from Artery Updated: 11/08/24 1725     Lipase <6 u/L     HS Troponin 0hr (reflex protocol) [[id number]]  (Normal) Collected: 11/08/24 1631    Lab Status: Final result Specimen: Blood from Artery Updated: 11/08/24 1717     hs TnI 0hr 3 ng/L     Lactic acid, plasma (w/reflex if result > 2.0) [[id number]]  (Normal) Collected: 11/08/24 1631    Lab Status: Final result Specimen: Blood from Artery Updated: 11/08/24 1710     LACTIC ACID 0.5 mmol/L     Narrative:      Result may be elevated if tourniquet was used during collection.    Protime-INR [[id number]]  (Normal) Collected: 11/08/24 1631    Lab Status: Final result Specimen: Blood from Arm, Right Updated: 11/08/24 1706     Protime 13.8 seconds      INR 0.99    Narrative:      INR Therapeutic Range    Indication                                             INR  Range      Atrial Fibrillation                                               2.0-3.0  Hypercoagulable State                                    2.0.2.3  Left Ventricular Asist Device                            2.0-3.0  Mechanical Heart Valve                                  -    Aortic(with afib, MI, embolism, HF, LA enlargement,    and/or coagulopathy)                                     2.0-3.0 (2.5-3.5)     Mitral                                                             2.5-3.5  Prosthetic/Bioprosthetic Heart Valve               2.0-3.0  Venous thromboembolism (VTE: VT, PE        2.0-3.0    APTT [[id number]]  (Normal) Collected: 11/08/24 1631    Lab Status: Final result Specimen: Blood from Arm, Right Updated: 11/08/24 1706     PTT 28 seconds     CBC and differential [[id number]]  (Abnormal) Collected: 11/08/24 1631    Lab Status: Final result Specimen: Blood from Artery Updated: 11/08/24 1701     WBC 9.21 Thousand/uL      RBC 3.78 Million/uL      Hemoglobin 12.0 g/dL      Hematocrit 36.6 %      MCV 97 fL      MCH 31.7 pg      MCHC 32.8 g/dL      RDW 14.6 %      MPV 9.8 fL      Platelets 123 Thousands/uL      nRBC 0 /100 WBCs      Segmented % 78 %      Immature Grans % 1 %      Lymphocytes % 8 %      Monocytes % 7 %      Eosinophils Relative 6 %      Basophils Relative 0 %      Absolute Neutrophils 7.13 Thousands/µL      Absolute Immature Grans 0.08 Thousand/uL      Absolute Lymphocytes 0.75 Thousands/µL      Absolute Monocytes 0.68 Thousand/µL      Eosinophils Absolute 0.53 Thousand/µL      Basophils Absolute 0.04 Thousands/µL             XR chest 1 view portable    (Results Pending)   CT chest abdomen pelvis w contrast    (Results Pending)   CT head w wo contrast    (Results Pending)       ECG 12 Lead Documentation Only    Date/Time: 11/8/2024 6:18 PM    Performed by: Nany Vaughan DO  Authorized by: Nany Vaughan DO    ECG reviewed by me, the ED Provider: yes    Patient location:   ED  Previous ECG:     Previous ECG:  Compared to current    Comparison ECG info:  4-; incomplete RBBB is now present  Rate:     ECG rate:  76    ECG rate assessment: normal    Rhythm:     Rhythm: sinus rhythm    Ectopy:     Ectopy: none    QRS:     QRS axis:  Normal    QRS intervals:  Normal  Conduction:     Conduction: abnormal      Abnormal conduction: incomplete RBBB    ST segments:     ST segments:  Normal  T waves:     T waves: normal        ED Medication and Procedure Management   Prior to Admission Medications   Prescriptions Last Dose Informant Patient Reported? Taking?   albuterol (PROVENTIL HFA,VENTOLIN HFA) 90 mcg/act inhaler  Self, Child No No   Sig: Inhale 2 puffs every 6 (six) hours as needed for wheezing   amoxicillin (AMOXIL) 400 MG/5ML suspension   No No   Sig: Take 10.9 mL (875 mg total) by mouth 2 (two) times a day for 7 days   calcium carbonate (TUMS EX) 750 mg chewable tablet  Self, Child Yes No   Sig: Chew 1 tablet daily   dexamethasone (DECADRON) 4 mg tablet   No No   Sig: Take 1 tablet (4 mg total) by mouth daily with breakfast 1 tab po at breakfast   fentaNYL (DURAGESIC) 75 mcg/hr   No No   Sig: Place 1 patch on the skin over 72 hours every third day Max Daily Amount: 1 patch   lidocaine-prilocaine (EMLA) cream  Self, Child No No   Sig: Apply topically as needed for mild pain Apply 45-60min prior to infusion time. Cover with platic wrap or coverings   ondansetron (ZOFRAN) 4 mg tablet   No No   Sig: Take 1 tablet (4 mg total) by mouth every 8 (eight) hours as needed for nausea or vomiting   oxyCODONE (ROXICODONE) 30 MG immediate release tablet   No No   Sig: Take 1-1.5 tablets (30-45 mg total) by mouth every 4 (four) hours Max Daily Amount: 270 mg   prochlorperazine (COMPAZINE) 5 mg tablet   No No   Sig: Take 2 tablets (10 mg total) by mouth every 6 (six) hours as needed for nausea or vomiting   rivaroxaban (Xarelto) 15 mg tablet  Self, Child No No   Sig: Take 1 tablet (15 mg total) by  mouth 2 (two) times a day      Facility-Administered Medications: None     Patient's Medications   Discharge Prescriptions    No medications on file     No discharge procedures on file.  ED SEPSIS DOCUMENTATION   Time reflects when diagnosis was documented in both MDM as applicable and the Disposition within this note       Time User Action Codes Description Comment    11/8/2024  6:55 PM Nany Vaughan Add [R11.2] Nausea and vomiting     11/8/2024  6:55 PM Nany Vaughan Add [R10.30] Lower abdominal pain                puffs every 6 (six) hours as needed for wheezing, Starting Fri 5/24/2024, Normal      amoxicillin (AMOXIL) 400 MG/5ML suspension Take 10.9 mL (875 mg total) by mouth 2 (two) times a day for 7 days, Starting Fri 11/8/2024, Until Fri 11/15/2024, Normal      calcium carbonate (TUMS EX) 750 mg chewable tablet Chew 1 tablet daily, Historical Med      dexamethasone (DECADRON) 4 mg tablet Take 1 tablet (4 mg total) by mouth daily with breakfast 1 tab po at breakfast, Starting Wed 10/30/2024, Normal      fentaNYL (DURAGESIC) 75 mcg/hr Place 1 patch on the skin over 72 hours every third day Max Daily Amount: 1 patch, Starting Fri 11/8/2024, Normal      lidocaine-prilocaine (EMLA) cream Apply topically as needed for mild pain Apply 45-60min prior to infusion time. Cover with platic wrap or coverings, Starting Mon 7/1/2024, Normal      ondansetron (ZOFRAN) 4 mg tablet Take 1 tablet (4 mg total) by mouth every 8 (eight) hours as needed for nausea or vomiting, Starting Wed 10/23/2024, Normal      oxyCODONE (ROXICODONE) 30 MG immediate release tablet Take 1-1.5 tablets (30-45 mg total) by mouth every 4 (four) hours Max Daily Amount: 270 mg, Starting Wed 10/23/2024, Normal      prochlorperazine (COMPAZINE) 5 mg tablet Take 2 tablets (10 mg total) by mouth every 6 (six) hours as needed for nausea or vomiting, Starting Fri 11/8/2024, Normal      rivaroxaban (Xarelto) 15 mg tablet Take 1 tablet (15 mg total) by mouth 2 (two) times a day, Starting Tue 7/16/2024, Normal           No discharge procedures on file.  ED SEPSIS DOCUMENTATION   Time reflects when diagnosis was documented in both MDM as applicable and the Disposition within this note       Time User Action Codes Description Comment    11/8/2024  6:55 PM Nany Vaughan Add [R11.2] Nausea and vomiting     11/8/2024  6:55 PM Nany Vaughan Add [R10.30] Lower abdominal pain     11/8/2024  7:07 PM Craig Garcia Add [G93.9] Brain lesion                  Nany Vaugahn,  DO  11/11/24 1551

## 2024-11-09 LAB
ATRIAL RATE: 76 BPM
P AXIS: 52 DEGREES
PR INTERVAL: 184 MS
QRS AXIS: -9 DEGREES
QRSD INTERVAL: 110 MS
QT INTERVAL: 424 MS
QTC INTERVAL: 477 MS
T WAVE AXIS: 17 DEGREES
VENTRICULAR RATE: 76 BPM

## 2024-11-09 PROCEDURE — 93010 ELECTROCARDIOGRAM REPORT: CPT | Performed by: INTERNAL MEDICINE

## 2024-11-09 NOTE — DISCHARGE INSTRUCTIONS
Please follow up PCP. Recommend tylenol 650 mg and ibuprofen 600 mg every 6 hours as needed for pain. Please return for severe chest pain, significant shortness of breath, severely worsening symptoms, or any other concerning signs or symptoms. Please refer to the following documents for additional instructions and return precautions.     Numerous lesions of the cerebral hemispheres measuring up to 1 cm compatible with metastatic disease. Single lesion in the brainstem and lesion in the left cerebellar hemisphere. Recommend follow-up MRI of the brain with gadolinium.

## 2024-11-12 ENCOUNTER — TELEPHONE (OUTPATIENT)
Age: 64
End: 2024-11-12

## 2024-11-12 ENCOUNTER — HOME CARE VISIT (OUTPATIENT)
Dept: HOME HEALTH SERVICES | Facility: HOME HEALTHCARE | Age: 64
End: 2024-11-12

## 2024-11-12 ENCOUNTER — HOME CARE VISIT (OUTPATIENT)
Dept: HOME HOSPICE | Facility: HOSPICE | Age: 64
End: 2024-11-12

## 2024-11-12 DIAGNOSIS — C34.91 SMALL CELL CARCINOMA OF RIGHT LUNG, UNSPECIFIED PART OF LUNG (HCC): ICD-10-CM

## 2024-11-12 DIAGNOSIS — C79.51 METASTASIS TO BONE (HCC): ICD-10-CM

## 2024-11-12 DIAGNOSIS — E43 SEVERE PROTEIN-CALORIE MALNUTRITION (HCC): Primary | ICD-10-CM

## 2024-11-12 NOTE — TELEPHONE ENCOUNTER
Patient called to let us know that they might be running late for his appointment today at 340. I did advised her of the time frame and if they were past that, they may have to be rescheduled. She said within the past week he has not been doing very well or eating so she is having ambika trouble getting him ready to go to the appointment and wanted to give us a heads up about the timing and how he is feeling

## 2024-11-12 NOTE — TELEPHONE ENCOUNTER
Patient's daughter,Anna called regarding hospice referral. She is asking if referral can be placed to [facility], and said patient agreed to hospice, and understands what it means.  She also stated he is still not doing well. As he has mostly been sleeping and only getting up to use the bathroom.   Advised that she take him to the ED if his symptoms are worsening. She stated he is not at that point yet, but she will monitor, and knows to take him if his symptoms progress. I advised that she can call 911 if she is unable to get him there herself and she agreed to do so if needed.

## 2024-11-12 NOTE — TELEPHONE ENCOUNTER
Patient's daughter states patient was in the ED Friday and received IV fluids and antiemetics. He still is not eating much, not drinking enough. States he is also sleeping a lot and she's currently trying to get him to get out of bed to go into the Oncology office to discuss hospice. However, she wanted to discuss this with Jannet VILLEDA as well as she may not be able to get him to the Oncology office. States they have discussed and are on board with proceeding with hospice care.

## 2024-11-12 NOTE — TELEPHONE ENCOUNTER
Received secure chat from [facility]. They are unable to accept patient's referral as their [place] census is at maximum capacity. She is recommending referral placed to [facility] if appropriate.

## 2024-11-13 ENCOUNTER — TELEPHONE (OUTPATIENT)
Age: 64
End: 2024-11-13

## 2024-11-13 ENCOUNTER — TELEPHONE (OUTPATIENT)
Dept: HEMATOLOGY ONCOLOGY | Facility: CLINIC | Age: 64
End: 2024-11-13

## 2024-11-13 ENCOUNTER — PATIENT OUTREACH (OUTPATIENT)
Dept: CASE MANAGEMENT | Facility: HOSPITAL | Age: 64
End: 2024-11-13

## 2024-11-13 DIAGNOSIS — R11.2 NAUSEA AND VOMITING, UNSPECIFIED VOMITING TYPE: ICD-10-CM

## 2024-11-13 DIAGNOSIS — G89.3 CANCER RELATED PAIN: ICD-10-CM

## 2024-11-13 DIAGNOSIS — Z51.5 PALLIATIVE CARE PATIENT: Primary | ICD-10-CM

## 2024-11-13 RX ORDER — OXYCODONE HCL 20 MG/ML
30-45 CONCENTRATE, ORAL ORAL
Qty: 60 ML | Refills: 0 | Status: SHIPPED | OUTPATIENT
Start: 2024-11-13 | End: 2024-11-14 | Stop reason: SDUPTHER

## 2024-11-13 RX ORDER — SCOLOPAMINE TRANSDERMAL SYSTEM 1 MG/1
1 PATCH, EXTENDED RELEASE TRANSDERMAL
Qty: 10 PATCH | Refills: 0 | Status: SHIPPED | OUTPATIENT
Start: 2024-11-13

## 2024-11-13 RX ORDER — LORAZEPAM 2 MG/ML
0.5 CONCENTRATE ORAL EVERY 4 HOURS PRN
Qty: 30 ML | Refills: 0 | Status: SHIPPED | OUTPATIENT
Start: 2024-11-13

## 2024-11-13 NOTE — PROGRESS NOTES
Placing referral via [facility] for home hospice care,  Hospice is unable to accept d/t at pt capacity at this time.    [facility].  Med Onc RN made aware.  MSW r/o to pt's daughter Anna to let her know to expect this call and that I have requested admission today.  She is very appreciative of this.  She says that pt has expressed that he does not want to go to the hospital at this point so they are going to keep him home.  I have advised her to reach out if she needs anything additional, but hospice should be calling her shortly to schedule admission.    [facility] [address]  [place] [address]  Phone: [phone]  Fax: [phone]

## 2024-11-13 NOTE — TELEPHONE ENCOUNTER
[facility] calling in, prior authorization is needed for the oxycodone prescription that was sent in today. Please call them back at [phone] once this is obtained

## 2024-11-13 NOTE — TELEPHONE ENCOUNTER
PA for OXYCODONE SOLUTION  CANCELLED due to       [x]Medication already on Formulary/SURE SCRIPTS MESSAGE FOR PHARMACY TO USE A DIFFERENT NDC    PLAN DOES NOT REQUIRE PRIOR AUTH

## 2024-11-13 NOTE — TELEPHONE ENCOUNTER
Hector from [facility] pharmacy calling in reporting that they do not have the liquid oxycodone 100mg/5mL, they are only able to get the liquid oxycodone 5mg/mL.    They did also say they fill tablets as well, that can be crushed, if patient patient is unable to take pills.    I made him aware aware we would send this to [facility] and send a new script, if appropriate. He verbalized understanding and has no additional questions or concerns at this moment.

## 2024-11-13 NOTE — TELEPHONE ENCOUNTER
Patient daughter calling in, she would like a call to inform her when this is done, so she can go and  his medications. Her # [phone]

## 2024-11-13 NOTE — TELEPHONE ENCOUNTER
Hector from [facility] calling in regarding scopolamine needing an authorization. Please advise. Hector can be reached at [phone].

## 2024-11-14 DIAGNOSIS — Z51.5 PALLIATIVE CARE PATIENT: ICD-10-CM

## 2024-11-14 DIAGNOSIS — G89.3 CANCER RELATED PAIN: ICD-10-CM

## 2024-11-14 RX ORDER — OXYCODONE HCL 20 MG/ML
30-45 CONCENTRATE, ORAL ORAL
Qty: 60 ML | Refills: 0 | Status: SHIPPED | OUTPATIENT
Start: 2024-11-14

## 2024-11-15 ENCOUNTER — HOSPITAL ENCOUNTER (OUTPATIENT)
Dept: INFUSION CENTER | Facility: CLINIC | Age: 64
Discharge: HOME | End: 2024-11-15

## 2024-12-06 ENCOUNTER — HOSPITAL ENCOUNTER (OUTPATIENT)
Dept: INFUSION CENTER | Facility: CLINIC | Age: 64
Discharge: HOME | End: 2024-12-06

## 2024-12-09 ENCOUNTER — HOSPITAL ENCOUNTER (OUTPATIENT)
Dept: INFUSION CENTER | Facility: CLINIC | Age: 64
Discharge: HOME | End: 2024-12-09

## 2025-06-30 ENCOUNTER — TELEPHONE (OUTPATIENT)
Dept: FAMILY MEDICINE CLINIC | Facility: CLINIC | Age: 65
End: 2025-06-30